# Patient Record
Sex: FEMALE | Race: WHITE | NOT HISPANIC OR LATINO | Employment: OTHER | URBAN - NONMETROPOLITAN AREA
[De-identification: names, ages, dates, MRNs, and addresses within clinical notes are randomized per-mention and may not be internally consistent; named-entity substitution may affect disease eponyms.]

---

## 2017-01-30 ENCOUNTER — AMBULATORY - GICH (OUTPATIENT)
Dept: FAMILY MEDICINE | Facility: OTHER | Age: 52
End: 2017-01-30

## 2017-02-07 ENCOUNTER — OFFICE VISIT - GICH (OUTPATIENT)
Dept: FAMILY MEDICINE | Facility: OTHER | Age: 52
End: 2017-02-07

## 2017-02-07 ENCOUNTER — HISTORY (OUTPATIENT)
Dept: FAMILY MEDICINE | Facility: OTHER | Age: 52
End: 2017-02-07

## 2017-02-07 DIAGNOSIS — N95.2 POSTMENOPAUSAL ATROPHIC VAGINITIS: ICD-10-CM

## 2017-02-07 DIAGNOSIS — R23.2 FLUSHING: ICD-10-CM

## 2017-02-07 LAB
ABSOLUTE BASOPHILS - HISTORICAL: 0 THOU/CU MM
ABSOLUTE EOSINOPHILS - HISTORICAL: 0 THOU/CU MM
ABSOLUTE LYMPHOCYTES - HISTORICAL: 2 THOU/CU MM (ref 0.9–2.9)
ABSOLUTE MONOCYTES - HISTORICAL: 0.4 THOU/CU MM
ABSOLUTE NEUTROPHILS - HISTORICAL: 4.1 THOU/CU MM (ref 1.7–7)
BASOPHILS # BLD AUTO: 0.7 %
EOSINOPHIL NFR BLD AUTO: 0.6 %
ERYTHROCYTE [DISTWIDTH] IN BLOOD BY AUTOMATED COUNT: 11.6 % (ref 11.5–15.5)
GLUCOSE SERPL-MCNC: 92 MG/DL (ref 70–105)
HCT VFR BLD AUTO: 40.2 % (ref 33–51)
HEMOGLOBIN: 13.7 G/DL (ref 12–16)
LYMPHOCYTES NFR BLD AUTO: 30.6 % (ref 20–44)
MCH RBC QN AUTO: 33.3 PG (ref 26–34)
MCHC RBC AUTO-ENTMCNC: 34.1 G/DL (ref 32–36)
MCV RBC AUTO: 98 FL (ref 80–100)
MONOCYTES NFR BLD AUTO: 6.1 %
NEUTROPHILS NFR BLD AUTO: 61.9 % (ref 42–72)
PLATELET # BLD AUTO: 253 THOU/CU MM (ref 140–440)
PMV BLD: 6.8 FL (ref 6.5–11)
RED BLOOD COUNT - HISTORICAL: 4.12 MIL/CU MM (ref 4–5.2)
TSH - HISTORICAL: 1.92 UIU/ML (ref 0.34–5.6)
WHITE BLOOD COUNT - HISTORICAL: 6.6 THOU/CU MM (ref 4.5–11)

## 2017-02-09 ENCOUNTER — COMMUNICATION - GICH (OUTPATIENT)
Dept: PHARMACY | Facility: OTHER | Age: 52
End: 2017-02-09

## 2017-02-21 ENCOUNTER — AMBULATORY - GICH (OUTPATIENT)
Dept: FAMILY MEDICINE | Facility: OTHER | Age: 52
End: 2017-02-21

## 2017-02-22 ENCOUNTER — AMBULATORY - GICH (OUTPATIENT)
Dept: FAMILY MEDICINE | Facility: OTHER | Age: 52
End: 2017-02-22

## 2017-08-04 ENCOUNTER — AMBULATORY - GICH (OUTPATIENT)
Dept: SCHEDULING | Facility: OTHER | Age: 52
End: 2017-08-04

## 2017-08-04 ENCOUNTER — AMBULATORY - GICH (OUTPATIENT)
Dept: RADIOLOGY | Facility: OTHER | Age: 52
End: 2017-08-04

## 2017-08-04 DIAGNOSIS — S83.249A OTHER TEAR OF MEDIAL MENISCUS, CURRENT INJURY, UNSPECIFIED KNEE, INITIAL ENCOUNTER: ICD-10-CM

## 2017-08-04 DIAGNOSIS — M25.562 PAIN IN LEFT KNEE: ICD-10-CM

## 2017-08-07 ENCOUNTER — HOSPITAL ENCOUNTER (OUTPATIENT)
Dept: RADIOLOGY | Facility: OTHER | Age: 52
End: 2017-08-07
Attending: SPECIALIST

## 2017-08-07 DIAGNOSIS — S83.249A OTHER TEAR OF MEDIAL MENISCUS, CURRENT INJURY, UNSPECIFIED KNEE, INITIAL ENCOUNTER: ICD-10-CM

## 2017-08-07 DIAGNOSIS — M25.562 PAIN IN LEFT KNEE: ICD-10-CM

## 2017-08-15 ENCOUNTER — AMBULATORY - GICH (OUTPATIENT)
Dept: SCHEDULING | Facility: OTHER | Age: 52
End: 2017-08-15

## 2017-09-08 ENCOUNTER — HOSPITAL ENCOUNTER (OUTPATIENT)
Dept: RADIOLOGY | Facility: OTHER | Age: 52
End: 2017-09-08
Attending: FAMILY MEDICINE

## 2017-09-08 ENCOUNTER — HISTORY (OUTPATIENT)
Dept: RADIOLOGY | Facility: OTHER | Age: 52
End: 2017-09-08

## 2017-09-08 DIAGNOSIS — Z12.31 ENCOUNTER FOR SCREENING MAMMOGRAM FOR MALIGNANT NEOPLASM OF BREAST: ICD-10-CM

## 2017-09-13 ENCOUNTER — AMBULATORY - GICH (OUTPATIENT)
Dept: SCHEDULING | Facility: OTHER | Age: 52
End: 2017-09-13

## 2017-09-13 ENCOUNTER — HISTORY (OUTPATIENT)
Dept: FAMILY MEDICINE | Facility: OTHER | Age: 52
End: 2017-09-13

## 2017-09-13 ENCOUNTER — OFFICE VISIT - GICH (OUTPATIENT)
Dept: FAMILY MEDICINE | Facility: OTHER | Age: 52
End: 2017-09-13

## 2017-09-13 DIAGNOSIS — Z00.00 ENCOUNTER FOR GENERAL ADULT MEDICAL EXAMINATION WITHOUT ABNORMAL FINDINGS: ICD-10-CM

## 2017-09-13 DIAGNOSIS — F33.41 RECURRENT MAJOR DEPRESSIVE DISORDER IN PARTIAL REMISSION (H): ICD-10-CM

## 2017-09-13 DIAGNOSIS — F51.04 PSYCHOPHYSIOLOGIC INSOMNIA: ICD-10-CM

## 2017-09-13 LAB
BACTERIA URINE: NORMAL BACTERIA/HPF
BILIRUB UR QL: NEGATIVE
CLARITY, URINE: CLEAR CLARITY
COLOR UR: YELLOW COLOR
EPITHELIAL CELLS: NORMAL EPI/HPF
GLUCOSE URINE: NEGATIVE MG/DL
KETONES UR QL: NEGATIVE MG/DL
LEUKOCYTE ESTERASE URINE: NEGATIVE
NITRITE UR QL STRIP: NEGATIVE
OCCULT BLOOD,URINE - HISTORICAL: ABNORMAL
PH UR: 5.5 [PH]
PROTEIN QUALITATIVE,URINE - HISTORICAL: NEGATIVE MG/DL
RBC - HISTORICAL: NORMAL /HPF
SP GR UR STRIP: 1.02
UROBILINOGEN,QUALITATIVE - HISTORICAL: NORMAL EU/DL
WBC - HISTORICAL: NORMAL /HPF

## 2017-09-15 ENCOUNTER — AMBULATORY - GICH (OUTPATIENT)
Dept: SCHEDULING | Facility: OTHER | Age: 52
End: 2017-09-15

## 2017-09-27 ENCOUNTER — AMBULATORY - GICH (OUTPATIENT)
Dept: FAMILY MEDICINE | Facility: OTHER | Age: 52
End: 2017-09-27

## 2017-09-28 ENCOUNTER — AMBULATORY - GICH (OUTPATIENT)
Dept: FAMILY MEDICINE | Facility: OTHER | Age: 52
End: 2017-09-28

## 2017-10-05 ENCOUNTER — COMMUNICATION - GICH (OUTPATIENT)
Dept: FAMILY MEDICINE | Facility: OTHER | Age: 52
End: 2017-10-05

## 2017-10-10 ENCOUNTER — COMMUNICATION - GICH (OUTPATIENT)
Dept: FAMILY MEDICINE | Facility: OTHER | Age: 52
End: 2017-10-10

## 2017-12-11 ENCOUNTER — COMMUNICATION - GICH (OUTPATIENT)
Dept: FAMILY MEDICINE | Facility: OTHER | Age: 52
End: 2017-12-11

## 2017-12-12 ENCOUNTER — AMBULATORY - GICH (OUTPATIENT)
Dept: SCHEDULING | Facility: OTHER | Age: 52
End: 2017-12-12

## 2017-12-28 NOTE — TELEPHONE ENCOUNTER
Patient Information     Patient Name MRN Sex Soraida Branham [id number] Female 1965      Telephone Encounter by Mary Scott MD at 10/10/2017  9:33 AM     Author:  Mary Scott MD Service:  (none) Author Type:  Physician     Filed:  10/10/2017  9:33 AM Encounter Date:  10/10/2017 Status:  Signed     :  Mary Scott MD (Physician)            Needs to be seen.   IBS folks should avoid foods that worsen symptoms, often times lactose/ gluten./ spicy.  Avoid NSAIDS.   Keep up on fluids.

## 2017-12-28 NOTE — TELEPHONE ENCOUNTER
Patient Information     Patient Name MRN Soraida Becker [id number] Female 1965      Telephone Encounter by Vivian Wyman at 10/10/2017  1:46 PM     Author:  Vivian Wyman Service:  (none) Author Type:  (none)     Filed:  10/10/2017  1:50 PM Encounter Date:  10/5/2017 Status:  Signed     :  Vivian Wyman            Now they want to know if the maximum doses specified under the quantity restriction been tried for an adequate period of time and been deemed ineffective in the treatment of the member's disease or medical condition?  Please let me know.  Vivian Wyman ....................  10/10/2017   1:47 PM

## 2017-12-28 NOTE — PROGRESS NOTES
Patient Information     Patient Name MRN Sex Soraida Branham [id number] Female 1965      Progress Notes by Mary Scott MD at 2017  3:30 PM     Author:  Mary Scott MD Service:  (none) Author Type:  Physician     Filed:  9/15/2017  6:05 PM Encounter Date:  2017 Status:  Signed     :  Mary Scott MD (Physician)            Nursing Notes:   Jodi Castellanos  2017  4:16 PM  Signed  Patient presents for yearly Physical with health dynamic paperwork for insurance.    Jodi Castellanos LPN........................2017  3:42 PM     Subjective:  Soraida Suresh is a 52 y.o. female who presents for  Physical           Routine physical examination   patient is a G2 para 2002. She is in monogamous relationship. She denies any breast concerns. She declines any STD screening. Pap is current. No vaginal discharge she does have some rectal discharge after she had surgery 1-2 years ago. She has some hot flashes.  They are not debilitating.  She has paperwork to be filled out for her health insurance.           Recurrent major depressive disorder, in partial remission (HC)    doing well with her anxiety and depression on Wellbutrin  mg ;  She has some insomnia    PHQ Depression Screen  Date of PHQ exam: 17  Over the last 2 weeks, how often have you been bothered by any of the following problems?  1. Little interest or pleasure in doing things: 0 - Not at all  2. Feeling down, depressed, or hopeless: 0 - Not at all                                  skin lesions on both forearms   she would like skin surveillance.  She is out in the sun.             Allergies      Allergen   Reactions     Azithromycin  Palpitations     Codeine  Hallucinations     Depo-Provera [Medroxyprogesterone]  Headache     Caused patient's brain to swell.      Latex  Rash     hands      Penicillins  Rash     Proline  Rash     Prolene: Sutures; caused keloid       Sulfamethoxazole-Trimethoprim  Rash     Current Outpatient Prescriptions on File Prior to Visit       Medication  Sig Dispense Refill     acyclovir (ZOVIRAX) 5 % ointment Apply  topically to affected area(s) 6 times daily. 1 Tube 0     CALCIUM CARBONATE/VITAMIN D3 (CALCIUM 500 + D ORAL) Take 1 tablet by mouth 2 times daily.       calcium citrate 1,000 mg tablet Take 1 tablet by mouth 2 times daily with meals.  0     cholecalciferol (VITAMIN D-3) 2,000 unit capsule Take 1 capsule by mouth once daily.  0     guar gum (BENEFIBER) powd Take  by mouth 3 times daily. MIX IN 4 OZ BEVERAGE/SOFT FOOD.  0     omega-3 fatty acids-vitamin E (FISH OIL) 1,000 mg cap 1 capsule twice daily  0     triamcinolone, 55 mcg each actuation, nasal (NASACORT AQ) 55 mcg nasal spray Inhale 2 Sprays into both nostrils once daily. 16.5 g 0     No current facility-administered medications on file prior to visit.        Problem List/PMH: reviewed in EMR    Social Hx:  Social History        Substance Use Topics          Smoking status:   Former Smoker      Quit date:  10/10/2005      Smokeless tobacco:   Never Used      Alcohol use   8.4 oz/week     14 Standard drinks or equivalent per week        Comment: 2-3 per day       Social History Narrative    This patient was  in .  She was  on 04.  Works at [employer], work #[phone].  Part-time at [employer].        Justen Suresh     Keri Flowers Daughter, born     Mina Flowers Son, born          Preload  2013.                    Family Hx:   Family History       Problem   Relation Age of Onset     Arthritis  Mother      Osteoarthritis;  of 76 pneumonia but no autopsy       Diabetes  Father      Other  Father      COPD/Benign colon polyps/AAA       Cancer-colon  Paternal Grandmother      Cancer-colon  Paternal Aunt      Diabetes  Paternal Aunt      ROS: No TIA's or dysphagia. No prolonged cough. No dyspnea or chest pain on exertion.  No  "abdominal pain, change in bowel habits, black or bloody stools.  No urinary tract symptoms. She is post ablation. , abnormal vaginal bleeding, discharge or unexpected pelvic pain. No new breast lumps, breast pain or nipple discharge.      Objective:  /74  Pulse 68  Ht 1.67 m (5' 5.75\")  Wt 75.8 kg (167 lb)  Breastfeeding? No  BMI 27.16 kg/m2     Patient appears well, alert and oriented x 3, pleasant, cooperative.  SIM. TM's clear, Oral pharynx with good dentition, without lesion, erythema or exudate. Moist mucous membranes. Neck supple and free of adenopathy, or masses. No thyromegaly. Lungs are clear, without wheezes, rhonchi or rales. Heart sounds are normal, no murmurs, clicks, gallops or rubs. Abdomen is soft, no tenderness, masses or organomegaly. No CVAT.  Extremities are without edema. Peripheral pulses are normal. Screening neurological exam is normal without focal findings. Skin is normal without suspicious lesions noted. Breast exam without discrete mass axillary adenopathy or nipple discharge    Results for orders placed or performed in visit on 09/13/17      URINALYSIS W REFLEX MICROSCOPIC IF POSITIVE      Result  Value Ref Range    COLOR                     Yellow Yellow Color    CLARITY                   Clear Clear Clarity    SPECIFIC GRAVITY,URINE    1.025 1.010, 1.015, 1.020, 1.025                    PH,URINE                  5.5 6.0, 7.0, 8.0, 5.5, 6.5, 7.5, 8.5                    UROBILINOGEN,QUALITATIVE  Normal Normal EU/dl    PROTEIN, URINE Negative Negative mg/dL    GLUCOSE, URINE Negative Negative mg/dL    KETONES,URINE             Negative Negative mg/dL    BILIRUBIN,URINE           Negative Negative                    OCCULT BLOOD,URINE        Moderate (A) Negative                    NITRITE                   Negative Negative                    LEUKOCYTE ESTERASE        Negative Negative                   URINALYSIS MICROSCOPIC      Result  Value Ref Range    RBC 0-2 0-2, None " Seen /HPF    WBC None Seen 0-2, 3-5, None Seen /HPF    BACTERIA                  Few None Seen, Rare, Occasional, Few Bacteria/HPF    EPITHELIAL CELLS          Few None Seen, Few Epi/HPF           Assessment:    ICD-10-CM    1. Routine physical examination Z00.00 URINALYSIS W REFLEX MICROSCOPIC IF POSITIVE      URINALYSIS W REFLEX MICROSCOPIC IF POSITIVE      URINALYSIS MICROSCOPIC      URINALYSIS MICROSCOPIC   2. Psychophysiological insomnia F51.04    3. Recurrent major depressive disorder, in partial remission (HC) F33.41 buPROPion (WELLBUTRIN XL) 300 mg Extended-Release tablet            Plan:   -- Expected clinical course discussed   -- Medications and their side effects discussed  Patient Instructions     Please consider the following general health recommendations:    Schedule a mammogram annually.     Eat a quality diet (generally, low in simple sugars, starches, cholesterol and saturated fat.)    Please get 1000 mg of calcium in divided doses with vitamin D in your diet daily.  If you are postmenopausal increase the calcium to 1500 mg daily in divided doses    Exercise regularly.  Ideally you would have 30-60 minutes of aerobic exercise at least 4 times weekly.  Find something you enjoy and a friend to do it with you.    Maintain ideal weight.  Your Body mass index is 27.16 kg/(m^2).. Generally a BMI of 20-25 is considered ideal. Overweight is defined as 25-30, Obese is 30-35 and markedly obese is greater than 35.    Apply sun block (SPF 25 or greater) on exposed skin anytime you are out in the sun to prevent skin cancer.      Wear a seatbelt whenever you are in a car.    If you are beyond age 60 or had an early menopause for any reason, consider a bone density study every 2-3 years to see if you are at increased risk for fracture.    Check blood sugar annually.  Cholesterol annually unless you have had a normal level when last checked within 5 years.     You should have a tetanus booster at least once every  10 years.        Immunization History     Administered  Date(s) Administered     AMB Influenza, IIV4 PF (=>6 mos Flulaval;=>3 yrs Fluzone Fluarix)(Flu Clinic Only) 10/22/2014     Influenza Virus, Unspecified 10/11/2011     Influenza, IIV3 (Age >=3 years) 10/14/2013, 09/29/2016     Tdap 06/03/2008, 02/17/2011       Strongly consider having a flu shot every fall.     You should have a pap  every 3 years between the ages of 30 and 70 unless you have had previous abnormal pap smear, (in these cases the exams and PAP's should be done yearly). If you have had hysterectomy in the past, your future Pap plan may be different.     Colonoscopy (an exam of the colon) is recommended every 10 years after the age of 50 to screen for colon cancer.  (More often if you are at increased risk.)    A vaccine to reduce your chances of getting shingles is available if you are over 60. Information about the vaccine is available through the clinic or at:  (http://www.nlm.nih.gov/medlineplus/druginfo/medmaster/p587005.html)        Electronically signed by Mary Scott MD

## 2017-12-28 NOTE — PROGRESS NOTES
Patient Information     Patient Name MRN Sex Soraida Branham [id number] Female 1965      Progress Notes by Flavia Smallwood at 2017  7:40 AM     Author:  Flavia Smallwood Service:  (none) Author Type:  (none)     Filed:  2017  7:40 AM Date of Service:  2017  7:40 AM Status:  Signed     :  Flavia Smallwood            Falls Risk Criteria:    Age 65 and older or under age 4        Sensory deficits    Poor vision    Use of ambulatory aides    Impaired judgment    Unable to walk independently    Meets High Risk criteria for falls:  no

## 2017-12-28 NOTE — TELEPHONE ENCOUNTER
Patient Information     Patient Name MRN Sex Soraida Branham [id number] Female 1965      Telephone Encounter by Edda Regan at 10/10/2017  8:08 AM     Author:  Edda Regan Service:  (none) Author Type:  (none)     Filed:  10/10/2017  8:09 AM Encounter Date:  10/10/2017 Status:  Signed     :  Edda Regan            Patients IBS has flared up and was wondering if there is anything she can take? Please advise.  Edda Lorenzo ....................  10/10/2017   8:09 AM

## 2017-12-28 NOTE — TELEPHONE ENCOUNTER
Patient Information     Patient Name MRN Sex Soraida Branham [id number] Female 1965      Telephone Encounter by Vivian Wyman at 10/5/2017  2:28 PM     Author:  Vivian Wyman Service:  (none) Author Type:  (none)     Filed:  10/5/2017  2:31 PM Encounter Date:  10/5/2017 Status:  Signed     :  Vivian Wyman electronically is wanting to know which of the following is correct for this patients RX pa request for buPROPion (WELLBUTRIN XL) 300 mg Extended-Release tablet:       Prior Authorization Request Evaluation Questions: What is the reason for exceeding the plan limitations?  For titration or loading-dose purposes (one time authorization)  Requested strength/dose is commercially unavailable  Patient is on dose alternating schedule  Patient requires a larger quantity to cover a larger surface area (for top...  Other    Please let me know so I can try and get this authorized.  Vivian Wyman ....................  10/5/2017   2:31 PM

## 2017-12-28 NOTE — PROGRESS NOTES
Patient Information     Patient Name MRN Sex Soraida Branham [id number] Female 1965      Progress Notes by Brit Gonsales at 2017  5:00 PM     Author:  Brit Gonsales Service:  (none) Author Type:  Other Clinical Staff     Filed:  2017  5:00 PM Date of Service:  2017  5:00 PM Status:  Signed     :  Brit Gonsales (Other Clinical Staff)            Falls Risk Criteria:    Age 65 and older or under age 4        Sensory deficits    Poor vision    Use of ambulatory aides    Impaired judgment    Unable to walk independently    Meets High Risk criteria for falls:  no

## 2017-12-28 NOTE — TELEPHONE ENCOUNTER
Patient Information     Patient Name MRN Soraida Becker [id number] Female 1965      Telephone Encounter by Amalia Vasquez at 10/16/2017 10:53 AM     Author:  Amalia Vasquez Service:  (none) Author Type:  (none)     Filed:  10/16/2017 10:53 AM Encounter Date:  10/5/2017 Status:  Signed     :  Amalia Vasquez            This prior authorization has been approved.  No further action is needed.  Amalia Vasquez LPN ....................  10/16/2017   10:53 AM

## 2017-12-28 NOTE — TELEPHONE ENCOUNTER
Patient Information     Patient Name MRN Soraida Becker [id number] Female 1965      Telephone Encounter by Toña Gallardo at 10/10/2017  9:39 AM     Author:  Toña Gallardo Service:  (none) Author Type:  (none)     Filed:  10/10/2017  9:40 AM Encounter Date:  10/10/2017 Status:  Signed     :  Toña Gallardo            Spoke with patient she is wondering if Mary Scott MD could work her in today or should she see someone else today? Please advise. Toña Gallardo LPN......................10/10/2017 9:39 AM

## 2017-12-28 NOTE — TELEPHONE ENCOUNTER
Patient Information     Patient Name MRN Sex Soraida Branham [id number] Female 1965      Telephone Encounter by Mary Scott MD at 10/10/2017  1:26 PM     Author:  Mary Scott MD Service:  (none) Author Type:  Physician     Filed:  10/10/2017  1:27 PM Encounter Date:  10/5/2017 Status:  Signed     :  Mary Scott MD (Physician)            Patient is increase her wellbutrin from 150 mg to 300 mg XL due to uncontrolled  Symptoms.   She was stopping another medication

## 2017-12-28 NOTE — TELEPHONE ENCOUNTER
Patient Information     Patient Name MRN Sex Soraida Branham [id number] Female 1965      Telephone Encounter by Jodi Castellanos at 10/10/2017  1:11 PM     Author:  Jodi Castellanos Service:  (none) Author Type:  (none)     Filed:  10/10/2017  1:11 PM Encounter Date:  10/10/2017 Status:  Signed     :  Jodi Castellanos            Patient given an apt for 10/11.   Jodi Castellanos LPN........................10/10/2017  1:11 PM

## 2017-12-28 NOTE — PATIENT INSTRUCTIONS
Patient Information     Patient Name MRN Soraida Becker [id number] Female 1965      Patient Instructions by Mary Scott MD at 2017  3:30 PM     Author:  Mary Scott MD Service:  (none) Author Type:  Physician     Filed:  2017  4:29 PM Encounter Date:  2017 Status:  Signed     :  Mary Scott MD (Physician)            Please consider the following general health recommendations:    Schedule a mammogram annually.     Eat a quality diet (generally, low in simple sugars, starches, cholesterol and saturated fat.)    Please get 1000 mg of calcium in divided doses with vitamin D in your diet daily.  If you are postmenopausal increase the calcium to 1500 mg daily in divided doses    Exercise regularly.  Ideally you would have 30-60 minutes of aerobic exercise at least 4 times weekly.  Find something you enjoy and a friend to do it with you.    Maintain ideal weight.  Your Body mass index is 27.16 kg/(m^2).. Generally a BMI of 20-25 is considered ideal. Overweight is defined as 25-30, Obese is 30-35 and markedly obese is greater than 35.    Apply sun block (SPF 25 or greater) on exposed skin anytime you are out in the sun to prevent skin cancer.      Wear a seatbelt whenever you are in a car.    If you are beyond age 60 or had an early menopause for any reason, consider a bone density study every 2-3 years to see if you are at increased risk for fracture.    Check blood sugar annually.  Cholesterol annually unless you have had a normal level when last checked within 5 years.     You should have a tetanus booster at least once every 10 years.        Immunization History     Administered  Date(s) Administered     AMB Influenza, IIV4 PF (=>6 mos Flulaval;=>3 yrs Fluzone Fluarix)(Flu Clinic Only) 10/22/2014     Influenza Virus, Unspecified 10/11/2011     Influenza, IIV3 (Age >=3 years) 10/14/2013, 2016     Tdap 2008, 2011        Strongly consider having a flu shot every fall.     You should have a pap  every 3 years between the ages of 30 and 70 unless you have had previous abnormal pap smear, (in these cases the exams and PAP's should be done yearly). If you have had hysterectomy in the past, your future Pap plan may be different.     Colonoscopy (an exam of the colon) is recommended every 10 years after the age of 50 to screen for colon cancer.  (More often if you are at increased risk.)    A vaccine to reduce your chances of getting shingles is available if you are over 60. Information about the vaccine is available through the clinic or at:  (http://www.nlm.nih.gov/medlineplus/druginfo/medmaster/k745986.html)

## 2017-12-30 NOTE — NURSING NOTE
Patient Information     Patient Name MRN Sex Soraida Branham [id number] Female 1965      Nursing Note by Jodi Castellanos at 2017  3:30 PM     Author:  Jodi Castellanos Service:  (none) Author Type:  (none)     Filed:  2017  4:16 PM Encounter Date:  2017 Status:  Signed     :  Jodi Castellanos            Patient presents for yearly Physical with health dynamic paperwork for insurance.    Jodi Castellanos LPN........................2017  3:42 PM

## 2018-01-03 NOTE — PATIENT INSTRUCTIONS
Patient Information     Patient Name MRSoraida Duarte [id number] Female 1965      Patient Instructions by Mary Scott MD at 2017  4:50 PM     Author:  Mary Scott MD  Service:  (none) Author Type:  Physician     Filed:  2017  4:54 PM  Encounter Date:  2017 Status:  Addendum     :  Mary Scott MD (Physician)        Related Notes: Original Note by Mary Scott MD (Physician) filed at 2017  4:50 PM            1. Continue wellbutrin  2.  Soy milk  :  One cup at night  3.  Estrace cream ;  Every night for 10 days;  Then no more than twice weekly  4.  If continued Hot flashes, then GYN consult.    Index [place] Related topics   Hot Flashes Caused by Menopause   ________________________________________________________________________  KEY POINTS    Hot flashes are sudden feelings of flushing and heat that are common and early signs of menopause. You may start having hot flashes before you stop having menstrual periods.    If your hot flashes are mild and don t happen often, use a fan or sip a glass of cool water or juice when you start having a hot flash. If your hot flashes are frequent, severe, or keep you from sleeping at night, your healthcare provider may recommend lifestyle changes, such as a healthy diet and exercise program. You may need hormone medicine to relieve symptoms and reduce bone lost that may come with menopause.    Follow the full course of treatment prescribed by your healthcare provider. Keep track of and avoid things that trigger your hot flashes.  ________________________________________________________________________  What are hot flashes?   Hot flashes are sudden feelings of flushing and heat that are common and early signs of menopause. You may start having hot flashes before you stop having menstrual periods. Hot flashes may stop after a few months, or you may have them for several years.  What is  the cause?  The exact cause of hot flashes is not fully known.  Female hormones, such as estrogen, change the balance of chemicals in the brain that help regulate body heat. The hypothalamus is a part of the brain that makes hormones that control body temperature, heart rate, and sleep. The drop in estrogen levels may tell the hypothalamus that you need to cool down. It does this by widening blood vessels in the face, chest, and body to release heat.  What are the symptoms?  Symptoms may include:    Sudden redness and warmth on the skin of your face, neck, shoulders, or upper chest    Pounding heartbeat    Sweating followed by a slight chill  Hot flashes usually last a few seconds to a few minutes. Most last no more than 2 or 3 minutes. Hot flashes can happen at night and interrupt your sleep. These are called night sweats.  How are they diagnosed?  Your healthcare provider will ask about your symptoms and medical history and examine you.  How are they treated?  If your hot flashes are mild and don t happen often, use a fan or sip a glass of cool water or juice when you start having a hot flash.  If your hot flashes are frequent, severe, or keep you from sleeping at night, your healthcare provider may recommend lifestyle changes, such as a healthy diet and exercise program. Your provider may prescribe medicine to:    Treat anxiety, depression, and mood swings    Treat hot flashes and other symptoms  Hormone replacement therapy, or menopausal hormone therapy, can treat menopausal symptoms and help prevent bone loss (osteoporosis). Discuss the risks and benefits of hormone therapy with your healthcare provider. Hormone therapy may increase your risk for heart disease. It may also increase your risk for stroke, breast cancer, blood clots, gallbladder problems, and possibly dementia. If you still have your uterus and choose to take hormones, you will need to take progesterone with the estrogen. Taking estrogen alone may  increase your risk of cancer of the uterus.  Estrogen may be taken in many different forms, such as:    Tablets to be swallowed    Patches or lotion to be put on the skin    A cream, ring, or tablet put into the vagina    Pellets placed under the skin    Shots  If you are going to take hormone therapy, ask your healthcare provider about:    The different types and dosages of hormone therapy    Any side effects or special precautions you should know about    When you should start and stop taking the hormones  Claims have been made that certain herbal and dietary products help control hot flashes. These include black cohosh, soy, dong quai, red clover, and evening primrose oil. No herb or dietary supplement has been proven to consistently or completely relieve symptoms. Supplements are not tested or standardized and may vary in strengths and effects. They may change how your prescription medicines work or have side effects and are not always safe. Talk with your healthcare provider before taking nonprescription medicines or supplements.  How can I take care of myself?  Follow the full course of treatment prescribed by your healthcare provider. In addition:    Keep as cool as you can. Wear light, cotton clothing and dress in loose layers. Keep your house cool and use lightweight blankets at night.    Keep track of and avoid things that trigger your hot flashes. The following may help:    Avoid hot, spicy foods.    Don't drink a lot of red wine or eat a lot of chocolate or aged cheeses. These foods contain a chemical that can trigger a hot flash.    Take care of your health. Try to get at least 7 to 9 hours of sleep each night. If you smoke, try to quit. If you want to drink alcohol, ask your healthcare provider how much is safe for you to drink. Learn ways to manage stress. Exercise according to your healthcare provider's instructions.    Ask your provider:    How and when you will get your test results    If there are  activities you should avoid and when you can return to your normal activities    How to take care of yourself at home    What symptoms or problems you should watch for and what to do if you have them    Make sure you know when you should come back for a checkup. Keep all appointments for provider visits or tests.  Developed by [employer].  Adult Advisor 2016.3 published by [employer].  Last modified: 2016-06-08  Last reviewed: 2014-12-31  This content is reviewed periodically and is subject to change as new health information becomes available. The information is intended to inform and educate and is not a replacement for medical evaluation, advice, diagnosis or treatment by a healthcare professional.  References   Adult Advisor 2016.3 Index    Copyright   2016 [employer], a division of [employer] All rights reserved.          Index   Soybean   SOY-been  ________________________________________________________________________  KEY POINTS    This remedy has been used to treat several conditions. Studies in humans or animals have not proved that this remedy is safe or effective for all uses. Before using this remedy for a serious condition, you should talk with your healthcare provider.    The US Food and Drug Administration (FDA) does not approve uses for natural remedies. The FDA does not inspect or regulate natural remedies the way they do prescription medicines. Natural remedies are not always safe.    This remedy may cause unwanted side effects. Tell your healthcare provider if you have any side effects that are serious, continue, or get worse.    This remedy affects your body and may interact with prescription medicines that you take. Tell all healthcare providers who treat you about all the prescription medicines, nonprescription medicines, supplements, natural remedies, and vitamins that you take.  ________________________________________________________________________  What are other names  for this remedy?   Type of medicine: natural remedy  Scientific and common names: Glycine max, soy, soybean, soya, soy protein, soy isoflavones, tofu, miso, phytoestrogen, plant estrogen, isoflavone, genistein, tempeh  What is soybean?   The soybean is a plant that grows 1 to 5 feet tall. The hull pods contain up to 4 oval seeds, which can be yellow to brownish in color. Soybeans contain isoflavones which are similar to the hormone estrogen.  What is it used for?   This remedy has been used to treat several conditions. Studies in humans or animals have not proved that this remedy is safe or effective for all uses. Before using this remedy for a serious condition, you should talk with your healthcare provider.   This remedy is helpful to treat high cholesterol and triglycerides, high blood pressure, hot flashes, and to help treat diarrhea in infants.  Soy has been used to treat    Breast, colon, thyroid, endometrial, and prostate cancer (There is conflicting evidence on whether soy prevents or increases the risk of breast cancer.)    Crohn s disease    Diabetes    Fibromyalgia    Kidney disease    Premenstrual syndrome (PMS)    Rheumatoid arthritis  This remedy does not appear to help treat or prevent osteoporosis.  The US Food and Drug Administration (FDA) does not approve uses for natural remedies. The FDA does not inspect or regulate natural remedies the way they do prescription medicines.  How is it taken?   Examples of soy food products are soy milk, soy cheese, soy yogurt, soy nuts, tofu, soybean oil for cooking, soybean flour, soybean curd, tofu, fermented soybean paste, soybean sprouts, soy sauce, and soy protein drinks. Soy is used to make some infant formulas for babies who are allergic to milk.  Soy is also available in the form of capsules and tablets.  Follow the directions printed on the product label or given by your healthcare provider.  What if I overdose?  Symptoms of an acute overdose have not been  reported.  What should I watch out for?  Talk with your healthcare provider before taking soy supplements if you have:    A bleeding disorder    Endometriosis or uterine fibroids    Hormone-sensitive cancers such as breast, uterine, or ovarian cancer (this remedy has effects similar to the female hormone estrogen).  If you need emergency care, surgery, lab tests, or dental work, tell the healthcare provider or dentist you are taking this medicine.  Talk to your healthcare provider or pharmacist about any natural remedy that you are using or thinking about using. If your provider does not tell you how to take it, follow the directions that come with the package. Do not take more or take it longer than recommended. Ask about anything you do not understand. Remember:    Natural remedies are not always safe.    You should not take them if you are pregnant or breast-feeding without your healthcare provider's approval. They should not be taken by infants, children, or older adults without your provider's approval.    They affect your body and may interact with prescription medicines that you take.    Natural remedies are not standardized and may have different strengths and effects. They may be contaminated.  What are the possible side effects?   Along with its desirable effects, this remedy may cause some unwanted side effects. Some side effects may be very serious. Some side effects may go away as your body adjusts to the remedy. Tell your healthcare provider if you have any side effects that continue or get worse.  Life-threatening (Report these to your healthcare provider right away. If you cannot reach your healthcare provider right away, get emergency medical care or call 911 for help): Allergic reaction (hives; itching; rash; trouble breathing; tightness in your chest; swelling of your lips, tongue, and throat).  Other: Constipation, diarrhea, rash, trouble sleeping, bloating, nausea.  What products might interact  with this remedy?  When you take this remedy with other medicines, it can change the way the remedy or the medicines work. Vitamins and certain foods may also interact. Using these products together might cause harmful side effects. Before taking this remedy, talk to your healthcare provider if you are taking:    Hormonal birth control pills, implants, shots, patches, vaginal rings, and IUDs, and hormones such as conjugated estrogens (Premarin), estradiol (Climara, Estrace, Estraderm, Vivelle), medroxyprogesterone (Depo-Provera, Provera), and norethindrone (Aygestin, Micronor)    Selective estrogen receptor modulators (SERMs) such as raloxifene (Evista) and tamoxifen    Thyroid medicines such as levothyroxine (Levo-T, Levothroid, Levoxyl, Synthroid, Unithroid), liothyronine (Cytomel, Triostat), liotrix (Thyrolar), and thyroid USP (Auxvasse Thyroid, Nature-Throid)    Warfarin (Coumadin)  If you are not sure if your medicines might interact, ask your pharmacist or healthcare provider. Keep a list of all your medicines with you. List all the prescription medicines, nonprescription medicines, supplements, natural remedies, and vitamins that you take. Be sure that you tell all healthcare providers who treat you about all the products you are taking.     Keep all natural remedies and medicines out of the reach of children.  This advisory includes select information only. The information was obtained from scientific journals, study reports, and other documents. The author and publisher make no warranty, expressed or implied, as to the information. The advisory may not include all side effects associated with a remedy or interactions with other medicines. Nothing herein shall constitute a recommendation for the use of any remedy. Ask your healthcare provider or pharmacist for more information.  Developed by [employer].  Medication Advisor 2016.3 published by [employer].  Last modified: 2016-03-08  Last reviewed:  2013-12-03  This content is reviewed periodically and is subject to change as new health information becomes available. The information is intended to inform and educate and is not a replacement for medical evaluation, advice, diagnosis or treatment by a healthcare professional.  References   Medication Advisor 2016.3 Index    Copyright   2016 [employer], a division of [employer] All rights reserved.

## 2018-01-03 NOTE — PROGRESS NOTES
Patient Information     Patient Name MRN Sex Soraida Branham [id number] Female 1965      Progress Notes by Mary Scott MD at 2017  3:45 PM     Author:  Mary Scott MD Service:  (none) Author Type:  Physician     Filed:  2017  8:35 PM Encounter Date:  2017 Status:  Signed     :  Mary Scott MD (Physician)            There are no exam notes on file for this visit.        Subjective:  Soraida Suresh is a 51 y.o. female who presents for hot flashes    Patient is pleasant 51 year old white female who comes in today to discuss hot flashes.  They have come back ' strong'. Noting she had stopped her Estroven over the counter prior to her last surgery.  She has hot flashes, becomes flushed.  At times she has to take outer layer of clothing off, find a fan.  She finds this embarrassing.   She restarted estroven without relief.  She is having vaginal dryness.  She says she has implemented environmental measures but without relief.  She had problem with Depoprovera which caused 'brain swelling' remotely.  She would rather not be on HRT.   She is doing well from an anxiety depression       Allergies: reviewed in EMR  Medications: reviewed in EMR  Problem List/PMH: reviewed in EMR    Social Hx:  Social History        Substance Use Topics          Smoking status:   Former Smoker      Quit date:  10/10/2005      Smokeless tobacco:   Never Used      Alcohol use   8.4 oz/week     14 Standard drinks or equivalent per week        Comment: 2-3 per day       Social History Narrative    This patient was  in .  She was  on 04.  Works at [employer], [id number].  Part-time at [employer].        Justen Suresh     Keri Flowers Daughter, born     Mina Flowers Son, born          Preload  2013.                    Family Hx:   Family History       Problem   Relation Age of Onset     Arthritis  Mother       "Osteoarthritis;  of 76 pneumonia but no autopsy       Diabetes  Father      Other  Father      COPD/Benign colon polyps/AAA       Cancer-colon  Paternal Grandmother      Cancer-colon  Paternal Aunt      Diabetes  Paternal Aunt        Objective:  Visit Vitals       /78     Pulse 76     Temp 96  F (35.6  C) (Temporal)     Resp 16     Ht 1.689 m (5' 6.5\")     Wt 78.7 kg (173 lb 9.6 oz)     BMI 27.6 kg/m2      She is alert , mildly anxious.  Conversational.  Ext without edema.  Rest of exam deferred    Results for orders placed or performed in visit on 17      TSH      Result  Value Ref Range    TSH 1.92 0.34 - 5.60 uIU/mL   GLUCOSE, RANDOM      Result  Value Ref Range    GLUCOSE,RANDOM 92 70 - 105 mg/dL   CBC WITH AUTO DIFFERENTIAL      Result  Value Ref Range    WHITE BLOOD COUNT         6.6 4.5 - 11.0 thou/cu mm    RED BLOOD COUNT           4.12 4.00 - 5.20 mil/cu mm    HEMOGLOBIN                13.7 12.0 - 16.0 g/dL    HEMATOCRIT                40.2 33.0 - 51.0 %    MCV                       98 80 - 100 fL    MCH                       33.3 26.0 - 34.0 pg    MCHC                      34.1 32.0 - 36.0 g/dL    RDW                       11.6 11.5 - 15.5 %    PLATELET COUNT            253 140 - 440 thou/cu mm    MPV                       6.8 6.5 - 11.0 fL    NEUTROPHILS               61.9 42.0 - 72.0 %    LYMPHOCYTES               30.6 20.0 - 44.0 %    MONOCYTES                 6.1 <12.0 %    EOSINOPHILS               0.6 <8.0 %    BASOPHILS                 0.7 <3.0 %    ABSOLUTE NEUTROPHILS      4.1 1.7 - 7.0 thou/cu mm    ABSOLUTE LYMPHOCYTES      2.0 0.9 - 2.9 thou/cu mm    ABSOLUTE MONOCYTES        0.4 <0.9 thou/cu mm    ABSOLUTE EOSINOPHILS      0.0 <0.5 thou/cu mm    ABSOLUTE BASOPHILS        0.0 <0.3 thou/cu mm         Assessment:    ICD-10-CM    1. Hot flashes R23.2 citalopram (CELEXA) 10 mg tablet      TSH      CBC AND DIFFERENTIAL      GLUCOSE, RANDOM      TSH      CBC AND DIFFERENTIAL      " GLUCOSE, RANDOM      CBC WITH AUTO DIFFERENTIAL   2. Vaginal atrophy N95.2 estradiol (ESTRACE) 0.1 mg/g vaginal cream       Counseled on nonhormonal means of managing hot flashes.  Will trial low dose celexa, vaginal estrace ;  Patient counseled not to use estrace more than 2 times a week after the first 2 weeks to avoid risk of endometrial cancer.  She was able to verbalize understanding.  If needed and tolerating celexa, max dose 10 mg.   If symptoms, not controlled then gyn consult     Ms. Suresh's Body mass index is 27.6 kg/(m^2). This is out of the normal range for a 51 y.o. Normal range for ages 18-64 is between 18.5 and 24.9; normal range for ages 65+ is 23-30. To lose weight we reviewed risks and benefits of appropriate options such as diet, exercise, and medications. Patient's strategy will be  self-directed nutrition plan      Plan:   -- Expected clinical course discussed   -- Medications and their side effects discussed  Patient Instructions   1. Continue wellbutrin  2.  Soy milk  :  One cup at night  3.  Estrace cream ;  Every night for 10 days;  Then no more than twice weekly  4.  If continued Hot flashes, then GYN consult.    Index Yi Related topics   Hot Flashes Caused by Menopause   ________________________________________________________________________  KEY POINTS    Hot flashes are sudden feelings of flushing and heat that are common and early signs of menopause. You may start having hot flashes before you stop having menstrual periods.    If your hot flashes are mild and don t happen often, use a fan or sip a glass of cool water or juice when you start having a hot flash. If your hot flashes are frequent, severe, or keep you from sleeping at night, your healthcare provider may recommend lifestyle changes, such as a healthy diet and exercise program. You may need hormone medicine to relieve symptoms and reduce bone lost that may come with menopause.    Follow the full course of treatment  prescribed by your healthcare provider. Keep track of and avoid things that trigger your hot flashes.  ________________________________________________________________________  What are hot flashes?   Hot flashes are sudden feelings of flushing and heat that are common and early signs of menopause. You may start having hot flashes before you stop having menstrual periods. Hot flashes may stop after a few months, or you may have them for several years.  What is the cause?  The exact cause of hot flashes is not fully known.  Female hormones, such as estrogen, change the balance of chemicals in the brain that help regulate body heat. The hypothalamus is a part of the brain that makes hormones that control body temperature, heart rate, and sleep. The drop in estrogen levels may tell the hypothalamus that you need to cool down. It does this by widening blood vessels in the face, chest, and body to release heat.  What are the symptoms?  Symptoms may include:    Sudden redness and warmth on the skin of your face, neck, shoulders, or upper chest    Pounding heartbeat    Sweating followed by a slight chill  Hot flashes usually last a few seconds to a few minutes. Most last no more than 2 or 3 minutes. Hot flashes can happen at night and interrupt your sleep. These are called night sweats.  How are they diagnosed?  Your healthcare provider will ask about your symptoms and medical history and examine you.  How are they treated?  If your hot flashes are mild and don t happen often, use a fan or sip a glass of cool water or juice when you start having a hot flash.  If your hot flashes are frequent, severe, or keep you from sleeping at night, your healthcare provider may recommend lifestyle changes, such as a healthy diet and exercise program. Your provider may prescribe medicine to:    Treat anxiety, depression, and mood swings    Treat hot flashes and other symptoms  Hormone replacement therapy, or menopausal hormone therapy,  can treat menopausal symptoms and help prevent bone loss (osteoporosis). Discuss the risks and benefits of hormone therapy with your healthcare provider. Hormone therapy may increase your risk for heart disease. It may also increase your risk for stroke, breast cancer, blood clots, gallbladder problems, and possibly dementia. If you still have your uterus and choose to take hormones, you will need to take progesterone with the estrogen. Taking estrogen alone may increase your risk of cancer of the uterus.  Estrogen may be taken in many different forms, such as:    Tablets to be swallowed    Patches or lotion to be put on the skin    A cream, ring, or tablet put into the vagina    Pellets placed under the skin    Shots  If you are going to take hormone therapy, ask your healthcare provider about:    The different types and dosages of hormone therapy    Any side effects or special precautions you should know about    When you should start and stop taking the hormones  Claims have been made that certain herbal and dietary products help control hot flashes. These include black cohosh, soy, dong quai, red clover, and evening primrose oil. No herb or dietary supplement has been proven to consistently or completely relieve symptoms. Supplements are not tested or standardized and may vary in strengths and effects. They may change how your prescription medicines work or have side effects and are not always safe. Talk with your healthcare provider before taking nonprescription medicines or supplements.  How can I take care of myself?  Follow the full course of treatment prescribed by your healthcare provider. In addition:    Keep as cool as you can. Wear light, cotton clothing and dress in loose layers. Keep your house cool and use lightweight blankets at night.    Keep track of and avoid things that trigger your hot flashes. The following may help:    Avoid hot, spicy foods.    Don't drink a lot of red wine or eat a lot of  chocolate or aged cheeses. These foods contain a chemical that can trigger a hot flash.    Take care of your health. Try to get at least 7 to 9 hours of sleep each night. If you smoke, try to quit. If you want to drink alcohol, ask your healthcare provider how much is safe for you to drink. Learn ways to manage stress. Exercise according to your healthcare provider's instructions.    Ask your provider:    How and when you will get your test results    If there are activities you should avoid and when you can return to your normal activities    How to take care of yourself at home    What symptoms or problems you should watch for and what to do if you have them    Make sure you know when you should come back for a checkup. Keep all appointments for provider visits or tests.  Developed by [employer].  Adult Advisor 2016.3 published by [employer].  Last modified: 2016-06-08  Last reviewed: 2014-12-31  This content is reviewed periodically and is subject to change as new health information becomes available. The information is intended to inform and educate and is not a replacement for medical evaluation, advice, diagnosis or treatment by a healthcare professional.  References   Adult Advisor 2016.3 Index    Copyright   2016 [employer], a division of [employer] All rights reserved.          Index   Soybean   SOY-been  ________________________________________________________________________  KEY POINTS    This remedy has been used to treat several conditions. Studies in humans or animals have not proved that this remedy is safe or effective for all uses. Before using this remedy for a serious condition, you should talk with your healthcare provider.    The US Food and Drug Administration (FDA) does not approve uses for natural remedies. The FDA does not inspect or regulate natural remedies the way they do prescription medicines. Natural remedies are not always safe.    This remedy may cause unwanted  side effects. Tell your healthcare provider if you have any side effects that are serious, continue, or get worse.    This remedy affects your body and may interact with prescription medicines that you take. Tell all healthcare providers who treat you about all the prescription medicines, nonprescription medicines, supplements, natural remedies, and vitamins that you take.  ________________________________________________________________________  What are other names for this remedy?   Type of medicine: natural remedy  Scientific and common names: Glycine max, soy, soybean, soya, soy protein, soy isoflavones, tofu, miso,phytoestrogen, plant estrogen, isoflavone, genistein, tempeh  What is soybean?   The soybean is a plant that grows 1 to 5 feet tall. The hull pods contain up to 4 oval seeds, which can be yellow to brownish in color. Soybeans contain isoflavones which are similar to the hormone estrogen.  What is it used for?   This remedy has been used to treat several conditions. Studies in humans or animals have not proved that this remedy is safe or effective for all uses. Before using this remedy for a serious condition, you should talk with your healthcare provider.   This remedy is helpful to treat high cholesterol and triglycerides, high blood pressure, hot flashes, and to help treat diarrhea in infants.  Soy has been used to treat    Breast, colon, thyroid, endometrial, and prostate cancer (There is conflicting evidence on whether soy prevents or increases the risk of breast cancer.)    Crohn s disease    Diabetes    Fibromyalgia    Kidney disease    Premenstrual syndrome (PMS)    Rheumatoid arthritis  This remedy does not appear to help treat or prevent osteoporosis.  The US Food and Drug Administration (FDA) does not approve uses for natural remedies. The FDA does not inspect or regulate natural remedies the way they do prescription medicines.  How is it taken?   Examples of soy food products are soy milk,  soy cheese, soy yogurt, soy nuts, tofu, soybean oil for cooking, soybean flour, soybean curd, tofu, fermented soybean paste, soybean sprouts, soy sauce, and soy protein drinks. Soy is used to make some infant formulas for babies who are allergic to milk.  Soy is also available in the form of capsules and tablets.  Follow the directions printed on the product label or given by your healthcare provider.  What if I overdose?  Symptoms of an acute overdose have not been reported.  What should I watch out for?  Talk with your healthcare provider before taking soy supplements if you have:    A bleeding disorder    Endometriosis or uterine fibroids    Hormone-sensitive cancers such as breast, uterine, or ovarian cancer (this remedy has effects similar to the female hormone estrogen).  If you need emergency care, surgery, lab tests, or dental work, tell the healthcare provider or dentist you are taking this medicine.  Talk to your healthcare provider or pharmacist about any natural remedy that you are using or thinking about using. If your provider does not tell you how to take it, follow the directions that come with the package. Do not take more or take it longer than recommended. Ask about anything you do not understand. Remember:    Natural remedies are not always safe.    You should not take them if you are pregnant or breast-feeding without your healthcare provider's approval. They should not be taken by infants, children, or older adults without your provider's approval.    They affect your body and may interact with prescription medicines that you take.    Natural remedies are not standardized and may have different strengths and effects. They may be contaminated.  What are the possible side effects?   Along with its desirable effects, this remedy may cause some unwanted side effects. Some side effects may be very serious. Some side effects may go away as your body adjusts to the remedy. Tell your healthcare  provider if you have any side effects that continue or get worse.  Life-threatening (Report these to your healthcare provider right away. If you cannot reach your healthcare provider right away, get emergency medical care or call 911 for help): Allergic reaction (hives; itching; rash; trouble breathing; tightness in your chest; swelling of your lips, tongue, and throat).  Other: Constipation, diarrhea, rash, trouble sleeping, bloating, nausea.  What products might interact with this remedy?  When you take this remedy with other medicines, it can change the way the remedy or the medicines work. Vitamins and certain foods may also interact. Using these products together might cause harmful side effects. Before taking this remedy, talk to your healthcare provider if you are taking:    Hormonal birth control pills, implants, shots, patches, vaginal rings, and IUDs, and hormones such as conjugated estrogens (Premarin), estradiol (Climara, Estrace, Estraderm, Vivelle), medroxyprogesterone (Depo-Provera, Provera), and norethindrone (Aygestin, Micronor)    Selective estrogen receptor modulators (SERMs) such as raloxifene (Evista) and tamoxifen    Thyroid medicines such as levothyroxine (Levo-T, Levothroid, Levoxyl, Synthroid, Unithroid), liothyronine (Cytomel, Triostat), liotrix (Thyrolar), and thyroid USP ([place] Thyroid, Nature-Throid)    Warfarin (Coumadin)  If you are not sure if your medicines might interact, ask your pharmacist or healthcare provider. Keep a list of all your medicines with you. List all the prescription medicines, nonprescription medicines, supplements, natural remedies, and vitamins that you take. Be sure that you tell all healthcare providers who treat you about all the products you are taking.     Keep all natural remedies and medicines out of the reach of children.  This advisory includes select information only. The information was obtained from scientific journals, study reports, and other  documents. The author and publisher make no warranty, expressed or implied, as to the information. The advisory may not include all side effects associated with a remedy or interactions with other medicines. Nothing herein shall constitute a recommendation for the use of any remedy. Ask your healthcare provider or pharmacist for more information.  Developed by [employer].  Medication Advisor 2016.3 published by [employer].  Last modified: 2016-03-08  Last reviewed: 2013-12-03  This content is reviewed periodically and is subject to change as new health information becomes available. The information is intended to inform and educate and is not a replacement for medical evaluation, advice, diagnosis or treatment by a healthcare professional.  References   Medication Advisor 2016.3 Index    Copyright   2016 [employer], a division of [employer] All rights reserved.             Electronically signed by Mary Scott MD

## 2018-01-03 NOTE — TELEPHONE ENCOUNTER
Patient Information     Patient Name MRN Soraida Becker [id number] Female 1965      Telephone Encounter by Cuba Gruber RPh at 2017  8:53 AM     Author:  Cuba Gruber RPh Service:  (none) Author Type:  PHARM- Pharmacist     Filed:  2017  8:57 AM Encounter Date:  2017 Status:  Signed     :  Cuba Gruber RPh (PHARM- Pharmacist)            In attempting to bill Soraida's Esctrace cream, primary insurance would cover but not her secondary insurance. Both will cover Premarin Cream however.     Do you want to use Premarin cream with same directions or do you want us to start a PA for the Estrace Cream?

## 2018-01-03 NOTE — TELEPHONE ENCOUNTER
Patient Information     Patient Name MRN Sex Soraida Branham [id number] Female 1965      Telephone Encounter by Mary Scott MD at 2017  9:45 AM     Author:  Mary Scott MD  Service:  (none) Author Type:  Physician     Filed:  2017  9:59 AM  Encounter Date:  2017 Status:  Addendum     :  Cuba Gruber [facility] (PHARM- Pharmacist)        Related Notes: Original Note by Mary Scott MD (Physician) filed at 2017  9:46 AM            Which ever     We are going with Premarin- Soraida wanted whichever cost her less and it is $0.00 with Premarin

## 2018-01-16 PROBLEM — K21.9 ESOPHAGEAL REFLUX: Status: ACTIVE | Noted: 2018-01-16

## 2018-01-16 PROBLEM — K58.9 IRRITABLE BOWEL SYNDROME: Status: ACTIVE | Noted: 2018-01-16

## 2018-01-16 PROBLEM — Q26.3: Status: ACTIVE | Noted: 2018-01-16

## 2018-01-16 PROBLEM — J30.1 ALLERGIC RHINITIS DUE TO POLLEN: Status: ACTIVE | Noted: 2018-01-16

## 2018-01-16 RX ORDER — DOCUSATE SODIUM 100 MG/1
100 CAPSULE, LIQUID FILLED ORAL
COMMUNITY
Start: 2017-09-13 | End: 2018-08-20

## 2018-01-16 RX ORDER — ACYCLOVIR 50 MG/G
OINTMENT TOPICAL
COMMUNITY
Start: 2015-12-03 | End: 2020-11-05

## 2018-01-16 RX ORDER — BUPROPION HYDROCHLORIDE 300 MG/1
300 TABLET ORAL
COMMUNITY
Start: 2017-09-13 | End: 2018-07-26

## 2018-01-16 RX ORDER — TRIAMCINOLONE ACETONIDE 55 UG/1
2 SPRAY, METERED NASAL
COMMUNITY
Start: 2015-02-23 | End: 2020-11-05

## 2018-01-16 RX ORDER — CHLORAL HYDRATE 500 MG
CAPSULE ORAL
COMMUNITY
Start: 2013-08-20 | End: 2018-08-20

## 2018-01-16 RX ORDER — ACETAMINOPHEN 160 MG
2000 TABLET,DISINTEGRATING ORAL
COMMUNITY
Start: 2013-08-20 | End: 2018-08-20

## 2018-01-26 VITALS
WEIGHT: 167 LBS | SYSTOLIC BLOOD PRESSURE: 110 MMHG | HEIGHT: 66 IN | HEIGHT: 67 IN | TEMPERATURE: 96 F | DIASTOLIC BLOOD PRESSURE: 74 MMHG | RESPIRATION RATE: 16 BRPM | HEART RATE: 68 BPM | BODY MASS INDEX: 26.84 KG/M2 | SYSTOLIC BLOOD PRESSURE: 116 MMHG | DIASTOLIC BLOOD PRESSURE: 78 MMHG | HEART RATE: 76 BPM | BODY MASS INDEX: 27.25 KG/M2 | WEIGHT: 173.6 LBS

## 2018-02-12 NOTE — TELEPHONE ENCOUNTER
Patient Information     Patient Name MRN Sex Soraida Branham [id number] Female 1965      Telephone Encounter by Vivian Wyman at 2017  3:43 PM     Author:  Vivian Wyman Service:  (none) Author Type:  (none)     Filed:  2017  3:44 PM Encounter Date:  2017 Status:  Signed     :  Vivian Wyman            This was approved by [facility] back on 10/13/17.  Vivian Wyman ....................  2017   3:43 PM

## 2018-02-12 NOTE — TELEPHONE ENCOUNTER
Patient Information     Patient Name Soraida Mcdaniel [id number] Female 1965      Telephone Encounter by Linda Patel at 2017  3:58 PM     Author:  Linda Patel  Service:  (none) Author Type:  (none)     Filed:  2017  4:01 PM  Encounter Date:  2017 Status:  Addendum     :  Linda Patel        Related Notes: Original Note by Linda Patel filed at 2017  3:59 PM            Patient stated pharmacy told her on  that they still needed a prior authorization.  Linda Patel LPN............................ 2017 3:55PM    Pharmacy states they still need a prior authorization, was ran on  and stated it needed a prior authorization.   Linda Patel LPN............................ 2017 3:59 PM

## 2018-02-12 NOTE — TELEPHONE ENCOUNTER
Patient Information     Patient Name MRN Sex Soraida Branham [id number] Female 1965      Telephone Encounter by Linda Patel at 2017 11:53 AM     Author:  Linda Patel Service:  (none) Author Type:  (none)     Filed:  2017 11:56 AM Encounter Date:  2017 Status:  Signed     :  Linda Patel            Spoke with patient who confirmed last name and birth date in regards to fax that was received. Patient stated the prior authorization for Bupropion was sent to her  insurance and not hers. Patient needs the prior authorization sent to the [employer] insurance that she gets through the [facility]. Patient also stated on the back of her insurance card it stated the administer is [employer].   Linda Patel LPN............................ 2017 11:55 AM

## 2018-02-12 NOTE — TELEPHONE ENCOUNTER
Patient Information     Patient Name MRN Sex Soraida Branham [id number] Female 1965      Telephone Encounter by Vivian Wyman at 2017  9:45 AM     Author:  Vivian Wyman Service:  (none) Author Type:  (none)     Filed:  2017  9:47 AM Encounter Date:  2017 Status:  Signed     :  Vivian Wyman            I called [facility] at [phone] today at 9:29 and spoke with Prashant ECKERT and she said that the authorization was valid from 16 to 17 and she started a new pa request for me and it had to go for further review.  She did marisela it as urgent so that the turn around time is within 72 hours.  She said that she will call the patient with the outcome and that we would receive a fax.    Vivian Wyman ....................  2017   9:47 AM

## 2018-05-22 ENCOUNTER — HOSPITAL ENCOUNTER (OUTPATIENT)
Dept: GENERAL RADIOLOGY | Facility: OTHER | Age: 53
Discharge: HOME | End: 2018-05-22
Attending: FAMILY MEDICINE | Admitting: FAMILY MEDICINE
Payer: COMMERCIAL

## 2018-05-22 ENCOUNTER — OFFICE VISIT (OUTPATIENT)
Dept: FAMILY MEDICINE | Facility: OTHER | Age: 53
End: 2018-05-22
Attending: FAMILY MEDICINE
Payer: COMMERCIAL

## 2018-05-22 VITALS
BODY MASS INDEX: 26.2 KG/M2 | SYSTOLIC BLOOD PRESSURE: 120 MMHG | HEIGHT: 66 IN | WEIGHT: 163 LBS | HEART RATE: 74 BPM | DIASTOLIC BLOOD PRESSURE: 60 MMHG

## 2018-05-22 DIAGNOSIS — M77.11 LATERAL EPICONDYLITIS, RIGHT ELBOW: ICD-10-CM

## 2018-05-22 DIAGNOSIS — M25.521 RIGHT ELBOW PAIN: Primary | ICD-10-CM

## 2018-05-22 DIAGNOSIS — M25.521 RIGHT ELBOW PAIN: ICD-10-CM

## 2018-05-22 DIAGNOSIS — S46.911A STRAIN OF RIGHT SHOULDER, INITIAL ENCOUNTER: ICD-10-CM

## 2018-05-22 PROCEDURE — 73080 X-RAY EXAM OF ELBOW: CPT | Mod: RT

## 2018-05-22 PROCEDURE — 99213 OFFICE O/P EST LOW 20 MIN: CPT | Performed by: FAMILY MEDICINE

## 2018-05-22 ASSESSMENT — ENCOUNTER SYMPTOMS
WOUND: 1
JOINT SWELLING: 0
FEVER: 0
ARTHRALGIAS: 1
MYALGIAS: 1

## 2018-05-22 ASSESSMENT — PAIN SCALES - GENERAL: PAINLEVEL: SEVERE PAIN (6)

## 2018-05-22 NOTE — NURSING NOTE
Patient was walking her dog and another dog attacked her dog on 05/07/18. She was bit on right ankle and her right arm hurts still . Carolina Sanchez LPN ....................5/22/2018  1:51 PM'

## 2018-05-22 NOTE — PROGRESS NOTES
SUBJECTIVE:   Nursing Notes:   Daniel Carolina RODGERSTANJA Mendez  5/22/2018  1:51 PM  Unsigned  Patient was walking her dog and another dog attacked her dog on 05/07/18. She was bit on right ankle and her right arm hurts still . Carolina Daniel BROWNINGN ....................5/22/2018  1:51 PM'      Soraida Suresh is a 53 year old female who presents to clinic today for right ankle dog bite and right arm injury.  Both injuries happened on 5/7/18.  She had been walking her dog, who weighs about 18 pounds.  Along her walk, a bulldog ran out of a yard across the street onto the sidewalk where she was walking.  It attacked her dog and grabbed by the neck.  She was trying to wrestle this dog away from her own dog.  It bit her on the right ankle.  Her right shoulder and elbow now hurt.  Doesn't hurt to reach overhead.  If she lifts something like a coffee cup, that affecting motion aggravates it.  Also hurts to supinate her right hand.    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         Cervical disc herniation 07/23/2013     Priority: Medium     Overview:   C6/7 on left;  S/p injection Dr. 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     Priority: Medium     Past Medical History:   Diagnosis Date     Abnormal cytological finding in specimen from cervix uteri      10/11/2011     Allergic rhinitis     No Comments Provided     Benign lipomatous neoplasm of skin and subcutaneous tissue of trunk     1/20/2016     Contact dermatitis     No Comments Provided     Dyshidrosis     No Comments Provided     Encounter for screening for malignant neoplasm of colon     8/27/2014     Gastro-esophageal reflux disease without esophagitis     No Comments Provided     Major depressive disorder, single episode     6/24/2011     Other congenital malformations of great veins (CODE)     7/10/2006     Other specified diseases of anus and rectum (CODE)     5/20/2015     Panic disorder without agoraphobia     resolved after heart surgery     Partial anomalous pulmonary venous connection     No Comments Provided     Residual hemorrhoidal skin tags     3/3/2011      Past Surgical History:   Procedure Laterality Date     APPENDECTOMY OPEN      5/1987     ARTHROSCOPY KNEE      7/2006, 2013,Debridement, partial medial meniscectomy, and anterior cruciate ligament reconstruction using tibialis anterior allograft, right knee     COLONOSCOPY      8/28/2014     ESOPHAGOSCOPY, GASTROSCOPY, DUODENOSCOPY (EGD), COMBINED      1996     ESOPHAGOSCOPY, GASTROSCOPY, DUODENOSCOPY (EGD), COMBINED      2001     ESOPHAGOSCOPY, GASTROSCOPY, DUODENOSCOPY (EGD), COMBINED      2006     ESOPHAGOSCOPY, GASTROSCOPY, DUODENOSCOPY (EGD), COMBINED      6/28/11,Normal     OTHER SURGICAL HISTORY      8/22/00,272561,([place]) FACILITY LAP ESOPHAGOGAST FUNDOPLASTY,Laprascopic Nissen Fundoplication     OTHER SURGICAL HISTORY      11/2006,73032,CARDIAC SURGERY,Repair of anomalous pulmonary venous return by anastomosis of the anomalous vein to the left atrial appendage, done at [facility]     OTHER SURGICAL HISTORY      037363,ECHO LIMITED W CONT,mild left atrial enlargement;  normal ef     OTHER SURGICAL HISTORY      5/20/2015,49062.0,[place] ANORECTAL EXAM SURG REQ ANESTH  DIAG     OTHER SURGICAL HISTORY       2015,53478.0,[place] HYSTEROSCOPY [facility] PERFORMED     OTHER SURGICAL HISTORY      16,930981,OTHER,Left,Left flank     SIGMOIDOSCOPY FLEXIBLE      ,And BE, negative, for rectal bleeding.  Fissures and hemorrhoids noted     Family History   Problem Relation Age of Onset     Arthritis Mother      Arthritis,Osteoarthritis;  of 76 pneumonia but no autopsy     DIABETES Father      Diabetes     Other - See Comments Father      COPD/Benign colon polyps/AAA     Colon Cancer Paternal Grandmother      Cancer-colon     Colon Cancer Paternal Aunt      Cancer-colon     DIABETES Paternal Aunt      Diabetes     Social History   Substance Use Topics     Smoking status: Former Smoker     Quit date: 10/10/2005     Smokeless tobacco: Never Used     Alcohol use 8.4 oz/week      Comment: Alcoholic Drinks/day: 2-3 per day     Social History     Social History Narrative    This patient was  in .  She was remarried on 04.  Works at [employer], work #[phone].          Justen Suresh       Keri Flowers Daughter, born       Mina Flowers Son, born          Current Outpatient Prescriptions   Medication Sig Dispense Refill     acyclovir (ZOVIRAX) 5 % ointment        buPROPion (WELLBUTRIN XL) 300 MG 24 hr tablet Take 300 mg by mouth       Calcium carb-Vitamin D 500 mg [place]-200 units (OSCAL WITH D;OYSTER SHELL CALCIUM) 500-200 MG-UNIT per tablet Take 1 tablet by mouth       Cholecalciferol (VITAMIN D3) 2000 UNITS CAPS Take 2,000 Units by mouth       docusate sodium (COLACE) 100 MG capsule Take 100 mg by mouth       FIBER, GUAR GUM, PO        fish oil-omega-3 fatty acids 1000 MG capsule 1 capsule twice daily       Multiple Minerals-Vitamins (CALCIUM CITRATE PLUS PO) Take 1,000 mg by mouth 2 times daily (with meals)       other medical supplies Tennis elbow strap.  Diagnosis:  M77.11.  Length of need:  99. 1 each 0     triamcinolone (NASACORT) 55 MCG/ACT Inhaler 2 sprays       Allergies  "  Allergen Reactions     Codeine Other (See Comments)     Medroxyprogesterone Other (See Comments)     Caused patient's brain to swell.     Azithromycin Palpitations     Latex Rash     hands     Penicillins Rash     Proline Rash     Prolene: Sutures; caused keloid     Sulfamethoxazole-Trimethoprim Rash       Review of Systems   Constitutional: Negative for fever.   Musculoskeletal: Positive for arthralgias and myalgias. Negative for joint swelling.   Skin: Positive for wound.   Psychiatric/Behavioral: Negative for mood changes.        OBJECTIVE:     /60 (BP Location: Right arm, Patient Position: Sitting, Cuff Size: Adult Regular)  Pulse 74  Ht 5' 6\" (1.676 m)  Wt 163 lb (73.9 kg)  BMI 26.31 kg/m2  Body mass index is 26.31 kg/(m^2).  Physical Exam   Constitutional: She appears well-developed.   Musculoskeletal:   Right arm: No tenderness over the clavicle, AC joint or scapula.  She is able to abduct her shoulder fully to 180  with negative Neer's and Hamm signs.  Elbow has tenderness palpation over the lateral epicondyle.  No medial epicondyle tenderness to palpation.  Normal flexion/extension at elbow.  Pain with supination, but not with pronation.  Normal  strength.  No bruising noted of the forearm, upper arm or shoulder.   Skin: Skin is warm and dry.   Right lateral ankle has a scabbed lesion.  There is no surrounding redness or drainage noted.       PHQ-9 SCORE 11/24/2015 6/7/2016 9/9/2016   Total Score 4 5 3     I personally reviewed results withpatient as listed below:   Diagnostic Test Results:  PROCEDURE:  XR ELBOW RT G/E 3 VW     HISTORY: ; Right elbow pain     COMPARISON:  None.     TECHNIQUE:  3 views of the right elbow were obtained.     FINDINGS:  No fracture or dislocation is identified. The joint spaces  are preserved.           IMPRESSION: No acute fracture.       JEFF RESENDIZ MD      ASSESSMENT/PLAN:       ICD-10-CM    1. Right elbow pain M25.521 XR Elbow Right G/E 3 Views     " other medical supplies   2. Lateral epicondylitis, right elbow M77.11    3. Strain of right shoulder, initial encounter S49.140L        1.  X-ray of her elbow was negative.  Suspect that she strained muscles causing the lateral epicondylitis and the pain that she is experiencing in her upper arm as well as her forearm.  She will continue to ice, stretching give this time if it continues to be a problem, consider further imaging.  2.  Recommended rest, ice, etc. prescription for tennis elbow strap given to obtain a globe drug.  3.  See #1.    Patricia Brooke MD  [facility]

## 2018-05-22 NOTE — MR AVS SNAPSHOT
"              After Visit Summary   5/22/2018    Soraida Suresh    MRN: [id number]           Patient Information     Date Of Birth          1965        Visit Information        Provider Department      5/22/2018 1:30 PM Patricia Brooke MD [facility]        Today's Diagnoses     Right elbow pain    -  1    Lateral epicondylitis, right elbow        Strain of right shoulder, initial encounter           Follow-ups after your visit        Your next 10 appointments already scheduled     May 29, 2018  8:45 AM CDT   SHORT with Waqar Mckeon MD   [facility] ([facility])    [address]  [place] [address]   [phone]              Future tests that were ordered for you today     Open Future Orders        Priority Expected Expires Ordered    XR Elbow Right G/E 3 Views Routine 5/22/2018 5/22/2019 5/22/2018            Who to contact     If you have questions or need follow up information about today's clinic visit or your schedule please contact [facility] directly at [phone].  Normal or non-critical lab and imaging results will be communicated to you by [employer], letter or phone within 4 business days after the clinic has received the results. If you do not hear from us within 7 days, please contact the clinic through [employer] or phone. If you have a critical or abnormal lab result, we will notify you by phone as soon as possible.  Submit refill requests through [employer] or call your pharmacy and they will forward the refill request to us. Please allow 3 business days for your refill to be completed.          Additional Information About Your Visit        [employer] Information     [employer] lets you send messages to your doctor, view your test results, renew your prescriptions, schedule appointments and more. To sign up, go to [email or web address] . Click on \"Log in\" on the left side of the screen, which " "will take you to the Welcome page. Then click on \"Sign up Now\" on the right side of the page.     You will be asked to enter the access code listed below, as well as some personal information. Please follow the directions to create your username and password.     Your access code is: [email or web address]  Expires: 2018  2:26 PM     Your access code will  in 90 days. If you need help or a new code, please call your [facility] clinic or [phone].        Care EveryWhere ID     This is your Care EveryWhere ID. This could be used by other organizations to access your [facility] medical records  [id number]        Your Vitals Were     Pulse Height BMI (Body Mass Index)             74 5' 6\" (1.676 m) 26.31 kg/m2          Blood Pressure from Last 3 Encounters:   18 120/60   17 116/74   17 110/78    Weight from Last 3 Encounters:   18 163 lb (73.9 kg)   17 167 lb (75.8 kg)   17 173 lb 9.6 oz (78.7 kg)                 Today's Medication Changes          These changes are accurate as of 18  2:26 PM.  If you have any questions, ask your nurse or doctor.               Start taking these medicines.        Dose/Directions    other medical supplies   Used for:  Right elbow pain   Started by:  Patricia Brooke MD        Tennis elbow strap.  Diagnosis:  M77.11.  Length of need:  99.   Quantity:  1 each   Refills:  0            Where to get your medicines      Some of these will need a paper prescription and others can be bought over the counter.  Ask your nurse if you have questions.     Bring a paper prescription for each of these medications     other medical supplies                Primary Care Provider Office Phone # Fax #    Mary Scott [phone] [address] [facility] [address]        Equal Access to Services     STEFANO JAFFE AH: Hadii leopoldo washingtono Sojudah, waaxda luqadaha, qaybta kaalmarebecca rivera " labetzaida monaco. So [facility] [phone].    ATENCIÓN: Si habla kevin, tiene a bee disposición servicios gratuitos de asistencia lingüística. Kwan augustin [phone].    We comply with applicable federal civil rights laws and [place] laws. We do not discriminate on the basis of race, color, national origin, age, disability, sex, sexual orientation, or gender identity.            Thank you!     Thank you for choosing [facility] AND [facility]  for your care. Our goal is always to provide you with excellent care. Hearing back from our patients is one way we can continue to improve our services. Please take a few minutes to complete the written survey that you may receive in the mail after your visit with us. Thank you!             Your Updated Medication List - Protect others around you: Learn how to safely use, store and throw away your medicines at www.disposemymeds.org.          This list is accurate as of 5/22/18  2:26 PM.  Always use your most recent med list.                   Brand Name Dispense Instructions for use Diagnosis    acyclovir 5 % ointment    ZOVIRAX          buPROPion 300 MG 24 hr tablet    WELLBUTRIN XL     Take 300 mg by mouth        Calcium carb-Vitamin D 500 mg [place]-200 units 500-200 MG-UNIT per tablet    OSCAL with D;Oyster Shell Calcium     Take 1 tablet by mouth        CALCIUM CITRATE PLUS PO      Take 1,000 mg by mouth 2 times daily (with meals)        docusate sodium 100 MG capsule    COLACE     Take 100 mg by mouth        FIBER (GUAR GUM) PO           fish oil-omega-3 fatty acids 1000 MG capsule      1 capsule twice daily        other medical supplies     1 each    Tennis elbow strap.  Diagnosis:  M77.11.  Length of need:  99.    Right elbow pain       triamcinolone 55 MCG/ACT Inhaler    NASACORT     2 sprays        vitamin D3 2000 units Caps      Take 2,000 Units by mouth

## 2018-07-05 ENCOUNTER — TELEPHONE (OUTPATIENT)
Dept: FAMILY MEDICINE | Facility: OTHER | Age: 53
End: 2018-07-05

## 2018-07-05 DIAGNOSIS — M77.11 RIGHT LATERAL EPICONDYLITIS: Primary | ICD-10-CM

## 2018-07-05 DIAGNOSIS — M25.521 RIGHT ELBOW PAIN: ICD-10-CM

## 2018-07-05 NOTE — TELEPHONE ENCOUNTER
"Patient was seen in May and states,\"she is still having pain in elbow and trouble gripping things\". She is requesting a referral for PT at [facility]. Please advise    Debby Rubio LPN on 7/5/2018 at 10:44 AM    "

## 2018-07-25 ENCOUNTER — TELEPHONE (OUTPATIENT)
Dept: FAMILY MEDICINE | Facility: OTHER | Age: 53
End: 2018-07-25

## 2018-07-25 RX ORDER — BUPROPION HYDROCHLORIDE 300 MG/1
TABLET ORAL
Qty: 90 TABLET | OUTPATIENT
Start: 2018-07-25

## 2018-07-25 NOTE — TELEPHONE ENCOUNTER
[facility]- Patient requesting medication renewal. Former patient of [facility]. Patient is scheduled on 8/20/18 for medication renewal and to establish care. However, patient is in need of medication renewal at this time.  Request was sent by [facility] pharmacy per patient.

## 2018-07-25 NOTE — TELEPHONE ENCOUNTER
Redundant refill request refused: Too soon.    buPROPion (WELLBUTRIN XL) 300 mg Extended-Release tablet 90 tablet 3 9/13/2017     Sig - Route: Take 1 tablet by mouth every morning. - Oral    Class: eRx    E-Prescribing Status: Receipt confirmed by pharmacy (9/13/2017  4:27 PM CDT)    Prior authorization: Approved      [facility] - [address]     Unable to complete prescription refill per RN Medication Refill Policy. Alida Sierra RN .............. 7/25/2018  3:37 PM

## 2018-07-26 ENCOUNTER — TRANSFERRED RECORDS (OUTPATIENT)
Dept: HEALTH INFORMATION MANAGEMENT | Facility: OTHER | Age: 53
End: 2018-07-26

## 2018-07-26 DIAGNOSIS — F32.9 MAJOR DEPRESSION: Primary | ICD-10-CM

## 2018-07-30 ENCOUNTER — TRANSFERRED RECORDS (OUTPATIENT)
Dept: HEALTH INFORMATION MANAGEMENT | Facility: OTHER | Age: 53
End: 2018-07-30

## 2018-07-30 RX ORDER — BUPROPION HYDROCHLORIDE 300 MG/1
TABLET ORAL
Qty: 90 TABLET | Refills: 2 | Status: SHIPPED | OUTPATIENT
Start: 2018-07-30 | End: 2018-08-20

## 2018-07-30 NOTE — TELEPHONE ENCOUNTER
OV with PCP 5/22/18 - medications reviewed    Prescription approved per [facility] Refill Protocol.  Alida Wharton RN............................ 7/30/2018 9:58 AM

## 2018-08-16 ENCOUNTER — THERAPY VISIT (OUTPATIENT)
Dept: CHIROPRACTIC MEDICINE | Facility: OTHER | Age: 53
End: 2018-08-16
Attending: CHIROPRACTOR
Payer: COMMERCIAL

## 2018-08-16 DIAGNOSIS — R51.9 CEPHALALGIA: ICD-10-CM

## 2018-08-16 DIAGNOSIS — G89.29 CHRONIC LEFT-SIDED LOW BACK PAIN WITH LEFT-SIDED SCIATICA: ICD-10-CM

## 2018-08-16 DIAGNOSIS — M99.01 SEGMENTAL AND SOMATIC DYSFUNCTION OF CERVICAL REGION: Primary | ICD-10-CM

## 2018-08-16 DIAGNOSIS — M62.838 MUSCLE SPASMS OF NECK: ICD-10-CM

## 2018-08-16 DIAGNOSIS — M54.2 CERVICALGIA: ICD-10-CM

## 2018-08-16 DIAGNOSIS — M99.04 SEGMENTAL AND SOMATIC DYSFUNCTION OF SACRAL REGION: ICD-10-CM

## 2018-08-16 DIAGNOSIS — M54.42 CHRONIC LEFT-SIDED LOW BACK PAIN WITH LEFT-SIDED SCIATICA: ICD-10-CM

## 2018-08-16 PROCEDURE — 99202 OFFICE O/P NEW SF 15 MIN: CPT | Mod: 25 | Performed by: CHIROPRACTOR

## 2018-08-16 PROCEDURE — 97140 MANUAL THERAPY 1/> REGIONS: CPT | Performed by: CHIROPRACTOR

## 2018-08-16 PROCEDURE — 98940 CHIROPRACT MANJ 1-2 REGIONS: CPT | Mod: AT | Performed by: CHIROPRACTOR

## 2018-08-16 NOTE — PATIENT INSTRUCTIONS
INSTRUCTIONS   Continue to perform activities as tolerated    Follow-up:  Return to care in 4 days.

## 2018-08-16 NOTE — MR AVS SNAPSHOT
After Visit Summary   8/16/2018    Soraida Suresh    MRN: [id number]           Patient Information     Date Of Birth          1965        Visit Information        Provider Department      8/16/2018 3:30 PM Heath Gomez DC [facility]        Today's Diagnoses     Segmental and somatic dysfunction of cervical region    -  1    Segmental and somatic dysfunction of sacral region        Chronic left-sided low back pain with left-sided sciatica        Cervicalgia        Cephalalgia        Muscle spasms of neck          Care Instructions    INSTRUCTIONS   Continue to perform activities as tolerated    Follow-up:  Return to care in 4 days.           Follow-ups after your visit        Follow-up notes from your care team     Return in about 4 days (around 8/20/2018) for Routine Visit.      Your next 10 appointments already scheduled     Aug 20, 2018  3:00 PM CDT   Office Visit with Patricia Brooke MD   [facility])    [address]  [place] [id number] [phone]           Bring a current list of meds and any records pertaining to this visit. For Physicals, please bring immunization records and any forms needing to be filled out. Please arrive 10 minutes early to complete paperwork.            Aug 22, 2018  4:00 PM CDT   [facility] Chiropractor with Heath Gomez DC   [facility]    [address] [facility] [id number] [phone]              Who to contact     If you have questions or need follow up information about today's clinic visit or your schedule please contact [facility] directly at [phone].  Normal or non-critical lab and imaging results will be communicated to you by MyChart, letter or phone within 4 business days after the clinic has received the results. If you do not hear from us within 7 days, please  "contact the clinic through [employer] or phone. If you have a critical or abnormal lab result, we will notify you by phone as soon as possible.  Submit refill requests through [employer] or call your pharmacy and they will forward the refill request to us. Please allow 3 business days for your refill to be completed.          Additional Information About Your Visit        [employer] Information     [employer] lets you send messages to your doctor, view your test results, renew your prescriptions, schedule appointments and more. To sign up, go to [email or web address] . Click on \"Log in\" on the left side of the screen, which will take you to the Welcome page. Then click on \"Sign up Now\" on the right side of the page.     You will be asked to enter the access code listed below, as well as some personal information. Please follow the directions to create your username and password.     Your access code is: [email or web address]  Expires: 2018  2:26 PM     Your access code will  in 90 days. If you need help or a new code, please call your [facility] clinic or [phone].        Care EveryWhere ID     This is your Care EveryWhere ID. This could be used by other organizations to access your [place] medical records  [id number]         Blood Pressure from Last 3 Encounters:   18 120/60   17 116/74   17 110/78    Weight from Last 3 Encounters:   18 73.9 kg (163 lb)   17 75.8 kg (167 lb)   17 78.7 kg (173 lb 9.6 oz)              We Performed the Following     CHIROPRAC MANIP,SPINAL,1-2 REGIONS     MANUAL THER TECH,1+REGIONS,EA 15 MIN        Primary Care Provider Office Phone # Fax #    Mary Scott [phone] [address] [facility] [address]        Equal Access to Services     Mission Bernal campusGREG : Arsh Tyler, neelima kelly, qarebecca telles . [facility] [phone].    ATENCIÓN: Si kylela " español, tiene a bee disposición servicios gratuitos de asistencia lingüística. Kwan augustin [phone].    We comply with applicable federal civil rights laws and [place] laws. We do not discriminate on the basis of race, color, national origin, age, disability, sex, sexual orientation, or gender identity.            Thank you!     Thank you for choosing [facility]  for your care. Our goal is always to provide you with excellent care. Hearing back from our patients is one way we can continue to improve our services. Please take a few minutes to complete the written survey that you may receive in the mail after your visit with us. Thank you!             Your Updated Medication List - Protect others around you: Learn how to safely use, store and throw away your medicines at www.disposemymeds.org.          This list is accurate as of 8/16/18  4:43 PM.  Always use your most recent med list.                   Brand Name Dispense Instructions for use Diagnosis    acyclovir 5 % ointment    ZOVIRAX          buPROPion 300 MG 24 hr tablet    WELLBUTRIN XL    90 tablet    Take 1 tablet by mouth every morning.    Major depression       Calcium carb-Vitamin D 500 mg [place]-200 units 500-200 MG-UNIT per tablet    OSCAL with D;Oyster Shell Calcium     Take 1 tablet by mouth        CALCIUM CITRATE PLUS PO      Take 1,000 mg by mouth 2 times daily (with meals)        docusate sodium 100 MG capsule    COLACE     Take 100 mg by mouth        FIBER (GUAR GUM) PO           fish oil-omega-3 fatty acids 1000 MG capsule      1 capsule twice daily        other medical supplies     1 each    Tennis elbow strap.  Diagnosis:  M77.11.  Length of need:  99.    Right elbow pain       triamcinolone 55 MCG/ACT inhaler    NASACORT     2 sprays        vitamin D3 2000 units Caps      Take 2,000 Units by mouth

## 2018-08-16 NOTE — PROGRESS NOTES
PATIENT:  Soraida Suresh is a 53 year old female presenting for pain with headaches and low back pain with sciatic-like symptoms on the left side    PROBLEM:   Date of Initial Visit for this Episode:  8/16/2018     Visit #1    SUBJECTIVE / HPI:   Description and onset: Initially presented to our office on August 14.  Patient had to leave before being seen for evaluation and treatment.  No treatment or evaluation was rendered on August 14.  Patient rescheduled for August 16 where evaluation and treatment was rendered  She reports primary complaints of neck pain with headaches patient reports headaches have not been present for several years and attributes this to being out of chiropractic care for extended period of time.  No recent accidents or traumas were reported by the patient  Duration and Frequency of Pain: Approximately 3 weeks and frequent 51-75%  Radiation of pain: Patient notes pain with headaches but no radiation of symptoms into the upper extremities  Pain rated at it's worst: 8/10  Pain rated currently:  4/10  Pain course: Gradually getting worse  Worse with:  overhead motions, cervical rotation  Improved by: Patient reports trying ice and a tens unit.  Patient noted motion like sickness with TENS unit  Additional Features: Aches are present  Other Health Care Providers seen for this: GUILLERMO Johnson DC  Previous treatment: Chiropractic, spinal decompression        Other Secondary/Associated Problems  Description, Duration and Location of Seondary Problem: Low back with sciatic-like symptoms into the left hamstring.  Patient reports sciatic-like symptoms are not typical and are usually a sign to be seen.    Duration and Frequency of Pain: Approximately 3 weeks and record 51-75%  Radiation of pain: Patient reports occasional radiation of symptoms into the mid hamstring on the left side  Pain rated at it's worst: 5/10  Pain rated currently:  2/10      See flowsheets in chart for details.  8/16/2018  Neck index  42%    Oswestry Back index 29%      Functional limitations: Sleeping, static right rotation of the neck, ironing, and overhead motions    Exercise habits: She reports stretching helps manage symptoms  Sleeping habits: Patient notes neck pain and headaches have been waking her more recently    Past D.C. Care: yes, helpful       Health History as reported by the patient: Good      PAST MEDICAL HISTORY:  Past Medical History:   Diagnosis Date     Abnormal cytological finding in specimen from cervix uteri     10/11/2011     Allergic rhinitis     No Comments Provided     Benign lipomatous neoplasm of skin and subcutaneous tissue of trunk     1/20/2016     Contact dermatitis     No Comments Provided     Dyshidrosis     No Comments Provided     Encounter for screening for malignant neoplasm of colon     8/27/2014     Gastro-esophageal reflux disease without esophagitis     No Comments Provided     Major depressive disorder, single episode     6/24/2011     Other congenital malformations of great veins (CODE)     7/10/2006     Other specified diseases of anus and rectum (CODE)     5/20/2015     Panic disorder without agoraphobia     resolved after heart surgery     Partial anomalous pulmonary venous connection     No Comments Provided     Residual hemorrhoidal skin tags     3/3/2011       PAST SURGICAL HISTORY:  Past Surgical History:   Procedure Laterality Date     APPENDECTOMY OPEN      5/1987     ARTHROSCOPY KNEE      7/2006, 2013,Debridement, partial medial meniscectomy, and anterior cruciate ligament reconstruction using tibialis anterior allograft, right knee     COLONOSCOPY      8/28/2014     ESOPHAGOSCOPY, GASTROSCOPY, DUODENOSCOPY (EGD), COMBINED      1996     ESOPHAGOSCOPY, GASTROSCOPY, DUODENOSCOPY (EGD), COMBINED      2001     ESOPHAGOSCOPY, GASTROSCOPY, DUODENOSCOPY (EGD), COMBINED      2006     ESOPHAGOSCOPY, GASTROSCOPY, DUODENOSCOPY (EGD), COMBINED      6/28/11,Normal     OTHER SURGICAL HISTORY       8/22/00,593537,([place]) FACILITY LAP ESOPHAGOGAST FUNDOPLASTY,Laprascopic Nissen Fundoplication     OTHER SURGICAL HISTORY      11/2006,43335,CARDIAC SURGERY,Repair of anomalous pulmonary venous return by anastomosis of the anomalous vein to the left atrial appendage, done at [facility]     OTHER SURGICAL HISTORY      848691,ECHO LIMITED W CONT,mild left atrial enlargement;  normal ef     OTHER SURGICAL HISTORY      5/20/2015,99020.0,[place] ANORECTAL EXAM SURG REQ ANESTH  DIAG     OTHER SURGICAL HISTORY      12/14/2015,45135.0,[facility]OSCOPY [facility] PERFOR[facility]     OTHER SURGICAL HISTORY      2/2/16,170089,OTHER,Left,Left flank     SIGMOIDOSCOPY FLEXIBLE      1996,And BE, negative, for rectal bleeding.  Fissures and hemorrhoids noted       ALLERGIES:  Allergies   Allergen Reactions     Codeine Other (See Comments)     Medroxyprogesterone Other (See Comments)     Caused patient's brain to swell.     Azithromycin Palpitations     Latex Rash     hands     Penicillins Rash     Proline Rash     Prolene: Sutures; caused keloid     Sulfamethoxazole-Trimethoprim Rash       CURRENT MEDICATIONS:  Current Outpatient Prescriptions   Medication Sig Dispense Refill     acyclovir (ZOVIRAX) 5 % ointment        buPROPion (WELLBUTRIN XL) 300 MG 24 hr tablet Take 1 tablet by mouth every morning. 90 tablet 2     Calcium carb-Vitamin D 500 mg [place]-200 units (OSCAL WITH D;OYSTER SHELL CALCIUM) 500-200 MG-UNIT per tablet Take 1 tablet by mouth       Cholecalciferol (VITAMIN D3) 2000 UNITS CAPS Take 2,000 Units by mouth       docusate sodium (COLACE) 100 MG capsule Take 100 mg by mouth       FIBER, GUAR GUM, PO        fish oil-omega-3 fatty acids 1000 MG capsule 1 capsule twice daily       Multiple Minerals-Vitamins (CALCIUM CITRATE PLUS PO) Take 1,000 mg by mouth 2 times daily (with meals)       other medical supplies Tennis elbow strap.  Diagnosis:  M77.11.  Length of need:  99. 1 each 0     triamcinolone (NASACORT)  "55 MCG/ACT Inhaler 2 sprays         SOCIAL HISTORY:  Marital Status: .  Children: yes.  Occupation: Works at [employer].  Alcohol use: Yes.  Tobacco use: Smoker: Former smoker.      FAMILY HISTORY:  Family History   Problem Relation Age of Onset     Arthritis Mother      Arthritis,Osteoarthritis;  of 76 pneumonia but no autopsy     Diabetes Father      Diabetes     Other - See Comments Father      COPD/Benign colon polyps/AAA     Colon Cancer Paternal Grandmother      Cancer-colon     Colon Cancer Paternal Aunt      Cancer-colon     Diabetes Paternal Aunt      Diabetes       Patient Active Problem List   Diagnosis     Other congenital anomalies of great veins     Allergic rhinitis due to pollen     Cervical disc herniation     Esophageal reflux     Irritable bowel syndrome     Major depression     Menorrhagia with regular cycle     Other urinary incontinence     Partial congenital anomalous pulmonary venous connection     Segmental and somatic dysfunction of cervical region         ROS:  The patient denies any fevers, chills, nausea, vomiting, diarrhea, constipation,dysuria, hematuria, or urinary hesitancy or incontinence.  No shortness of breath, chest pain, or rashes.    OBJECTIVE:    DIAGNOSTICS:  no current spinal imaging taken.     PHYSICAL EXAM:     GENERAL APPEARANCE: healthy, alert, mild distress, moderate distress and cooperative   GAIT: NORMAL      MUSCULOSKELETAL:   Posture: Anterior head carriage, rounded shoulders.  Low left iliac crest  Gait:  unremarkable.     Cervical  ROM:   smooth/halting arc of motion   40/50 flexion    45/45 extension    25/45 RLF  25/45 LLF    75/85 RR         85/85 LR       -Maximal Foraminal Compression: +focal mild neck pain.    Shoulder Depression:   -Distraction causes a motion like sickness      Thoracic and Lumbar  ROM:  55/60 flexion 60/60 extension    45/45 RLF    45/45 LLF      +Kemps: Left  +Gillets:+KRISSY, DARREN   +Leg length inequality: left \"[facility] -; " Restricted left heel to buttock   Other:  +Left Nachlas, + Left Elys  -Braggards, - Fabres    +Tenderness: Moderate severe noted over C6 on the right and over the left SI joint  +Muscle spasm: Moderate severe spasms of the suboccipitals bilaterally and of the cervical paraspinals bilaterally right predominant.  Moderate noted over the upper trapezius bilaterally.  Moderate of the left lumbar paraspinals and quadratus lumborum extending into the lower thoracic paraspinals on the left  +Joint asymmetry and restriction: C6 with right lateral flexion extension, S1 with left lateral flexion extension    ASSESSMENT: Soraida Suresh is a 53 year old female      1. Segmental and somatic dysfunction of cervical region    2. Segmental and somatic dysfunction of sacral region    3. Chronic left-sided low back pain with left-sided sciatica    4. Cervicalgia    5. Cephalalgia    6. Muscle spasms of neck        PLAN    Evaluation and Management:  89502 Low to moderate level exam 20 min    Procedures:  Modalities:  Traction, C/S intermittent pull 18/7 for 17 minutes    CMT:  98543 Chiropractic manipulative treatment 1-2 regions performed   Cervical: Activator, C6, Supine  Pelvis: Diversified, Sacrum , Side posture    Therapeutic procedures:  None    Response to Treatment  Patient reported not noticing initially reduction in symptoms following traction protocols. following adjustment patient did note decrease of symptoms of the low back and neck.    Prognosis: Good, patient is expected to respond well to care however due to patient history exacerbation of symptoms is expected and progress may be slowed.    8/16/2018 Plan of Care:  4-6 visits of Chiropractic Care including Spinal Adjustments and/or physiotherapy and active rehabilitation, to include exercises in the office and/or at home to meet care plan goals.     Frequency: 1-2xweek for up to 4 weeks. A reevaluation would be clinically appropriate in 4-6 visits, to determine  progress and further course of care.    POC discussed and patient agreeable to plan of care.      8/16/2018 Goals:      Patient will report improved pain of the neck and of the headaches.   Patient will report improved cervical rotation to the right.   Reduce spasms noted over left quadratus lumborum and upper trapezium bilaterally        INSTRUCTIONS   Continue to perform activities as tolerated    Follow-up:  Return to care in 4 days.        Disclaimer: This note consists of symbols derived from keyboarding, dictation and/or voice recognition software. As a result, there may be errors in the script that have gone undetected. Please consider this when interpreting information found in this chart.

## 2018-08-20 ENCOUNTER — OFFICE VISIT (OUTPATIENT)
Dept: FAMILY MEDICINE | Facility: OTHER | Age: 53
End: 2018-08-20
Attending: FAMILY MEDICINE
Payer: COMMERCIAL

## 2018-08-20 VITALS
DIASTOLIC BLOOD PRESSURE: 80 MMHG | HEART RATE: 80 BPM | SYSTOLIC BLOOD PRESSURE: 122 MMHG | HEIGHT: 66 IN | BODY MASS INDEX: 25.07 KG/M2 | WEIGHT: 156 LBS

## 2018-08-20 DIAGNOSIS — F33.42 RECURRENT MAJOR DEPRESSIVE DISORDER, IN FULL REMISSION (H): Primary | ICD-10-CM

## 2018-08-20 PROCEDURE — 99213 OFFICE O/P EST LOW 20 MIN: CPT | Performed by: FAMILY MEDICINE

## 2018-08-20 RX ORDER — LYSINE 500 MG
1 TABLET ORAL DAILY
COMMUNITY

## 2018-08-20 RX ORDER — POLYETHYLENE GLYCOL 3350 17 G/17G
POWDER, FOR SOLUTION ORAL DAILY
COMMUNITY
End: 2023-12-04

## 2018-08-20 RX ORDER — ASCORBIC ACID 500 MG
500 TABLET ORAL DAILY
COMMUNITY
End: 2021-11-15

## 2018-08-20 RX ORDER — MULTIVIT/FOLIC ACID/HERBAL 223 400 MCG
1 TABLET ORAL DAILY
COMMUNITY
End: 2019-11-25

## 2018-08-20 RX ORDER — BUPROPION HYDROCHLORIDE 300 MG/1
300 TABLET ORAL EVERY MORNING
Qty: 90 TABLET | Refills: 3 | Status: SHIPPED | OUTPATIENT
Start: 2018-08-20 | End: 2018-08-20

## 2018-08-20 RX ORDER — BUPROPION HYDROCHLORIDE 300 MG/1
300 TABLET ORAL EVERY MORNING
Qty: 90 TABLET | Refills: 3 | Status: SHIPPED | OUTPATIENT
Start: 2018-08-20 | End: 2019-09-16

## 2018-08-20 ASSESSMENT — PAIN SCALES - GENERAL: PAINLEVEL: NO PAIN (0)

## 2018-08-20 ASSESSMENT — ENCOUNTER SYMPTOMS
FEVER: 0
UNEXPECTED WEIGHT CHANGE: 0
NERVOUS/ANXIOUS: 0
COUGH: 0

## 2018-08-20 NOTE — PROGRESS NOTES
SUBJECTIVE:   Nursing Notes:   Iva Young LPN  8/20/2018  3:20 PM  Signed  Patient is here to establish care.  Iva Young LPN .......8/20/2018 3:13 PM     Soraida Suresh is a 53 year old female who presents to clinic today for establish care.  She previously followed with Dr. Mary alexis.  She needs a refill of the Wellbutrin which she uses for depression.  She has been stable for quite some time on this medication and is doing very well.    HPI    I personally reviewed medications/allergies/history listed below:    Patient Active Problem List    Diagnosis Date Noted     Segmental and somatic dysfunction of cervical region 08/16/2018     Priority: Medium     Allergic rhinitis due to pollen 01/16/2018     Priority: Medium     Esophageal reflux 01/16/2018     Priority: Medium     Irritable bowel syndrome 01/16/2018     Priority: Medium     Partial congenital anomalous pulmonary venous connection 01/16/2018     Priority: Medium     Overview:   Repaired       Menorrhagia with regular cycle 12/14/2015     Priority: Medium     Abscess of anal and rectal regions 07/14/2015     Priority: Medium     Major depression 08/07/2014     Priority: Medium     Overview:    Riaz Cheung.         Cervical disc herniation 07/23/2013     Priority: Medium     Overview:   C6/7 on left;  S/p injection Dr. 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     Priority: Medium     Past Medical History:   Diagnosis Date     Abnormal cytological finding in specimen from cervix uteri     10/11/2011     Allergic rhinitis     No Comments Provided     Benign lipomatous neoplasm of skin and subcutaneous tissue of trunk     1/20/2016     Contact dermatitis     No Comments Provided     Dyshidrosis     No Comments Provided     Encounter for screening for malignant neoplasm of colon     8/27/2014      Gastro-esophageal reflux disease without esophagitis     No Comments Provided     Major depressive disorder, single episode     2011     Other congenital malformations of great veins (CODE)     7/10/2006     Other specified diseases of anus and rectum (CODE)     2015     Panic disorder without agoraphobia     resolved after heart surgery     Partial anomalous pulmonary venous connection     No Comments Provided     Residual hemorrhoidal skin tags     3/3/2011      Past Surgical History:   Procedure Laterality Date     anorectal exam anesthesia  2015    [place] ANORECTAL EXAM SURG REQ ANESTH  DIAG     APPENDECTOMY OPEN      1987     ARTHROSCOPY KNEE      2006, 2013,Debridement, partial medial meniscectomy, and anterior cruciate ligament reconstruction using tibialis anterior allograft, right knee     AS HYSTEROSCOPY, SURGICAL; W/ ENDOMETRIAL ABLATION, ANY METHOD  2015    Kathya     CARDIAC SURGERY  2006    Repair of anomalous pulmonary venous return by anastomosis of the anomalous vein to the left atrial appendage, done at [facility]     COLONOSCOPY      2014     ECHO LIMITED      mild left atrial enlargement;  normal ef     ESOPHAGOSCOPY, GASTROSCOPY, DUODENOSCOPY (EGD), COMBINED           ESOPHAGOSCOPY, GASTROSCOPY, DUODENOSCOPY (EGD), COMBINED           ESOPHAGOSCOPY, GASTROSCOPY, DUODENOSCOPY (EGD), COMBINED           ESOPHAGOSCOPY, GASTROSCOPY, DUODENOSCOPY (EGD), COMBINED      11,Normal     NISSEN FUNDOPLICATION  2000    Dr. Sales     SIGMOIDOSCOPY FLEXIBLE      ,And BE, negative, for rectal bleeding.  Fissures and hemorrhoids noted     SURGICAL PATHOLOGY EXAM Left 2016    Left flank     Family History   Problem Relation Age of Onset     Arthritis Mother      Arthritis,Osteoarthritis;  of 76 pneumonia but no autopsy     Diabetes Father      Diabetes     Other - See Comments Father      COPD/Benign colon polyps/AAA -  of  ruptured AAA     Colon Cancer Paternal Grandmother      Cancer-colon     Colon Cancer Paternal Aunt      Cancer-colon     Diabetes Paternal Aunt      Diabetes     Obesity Sister      bariatric surgery     Other - See Comments Sister      TBI     Depression Sister      Anxiety Disorder Sister      Diabetes Sister      Hyperlipidemia Sister      Diabetes Brother      Hyperlipidemia Brother      Other - See Comments Maternal Grandmother       of old age     Dementia Maternal Grandfather      possible dementia,  of pneumonia     Other - See Comments Brother      MVA - at age 50     Hypertension Brother      Diabetes Brother      Hyperlipidemia Brother      Depression Brother      Diabetes Brother      Hyperlipidemia Brother      Macular Degeneration Brother      Diabetes Brother      Hyperlipidemia Brother      Suicide Brother 49     Cerebrovascular Disease Paternal Grandfather       of stroke     Social History   Substance Use Topics     Smoking status: Former Smoker     Quit date: 10/10/2005     Smokeless tobacco: Never Used     Alcohol use 8.4 oz/week      Comment: Alcoholic Drinks/day: 2-3 per day     Social History     Social History Narrative    This patient was  in .  She was remarried on 04.  Works at [employer], work #[phone].          Justen Suresh       Keri Flowers Daughter, born       Mina Flowers Son, born          Current Outpatient Prescriptions   Medication Sig Dispense Refill     acyclovir (ZOVIRAX) 5 % ointment        ascorbic acid (VITAMIN C) 500 MG tablet Take 500 mg by mouth daily       Black Cohosh-Soy-Ginkgo-Magnol (ESTROVEN MOOD & MEMORY) TABS Take 1 tablet by mouth daily       buPROPion (WELLBUTRIN XL) 300 MG 24 hr tablet Take 1 tablet (300 mg) by mouth every morning 90 tablet 3     Calcium carb-Vitamin D 500 mg [place]-200 units (OSCAL WITH D;OYSTER SHELL CALCIUM) 500-200 MG-UNIT per tablet Take 1 tablet by mouth       FIBER, GUAR GUM, PO         "Glucosamine-Chondroitin 750 & 400 MG MISC Take 1 tablet by mouth daily       L-Lysine 500 MG TABS Take 1 tablet by mouth daily       other medical supplies Tennis elbow strap.  Diagnosis:  M77.11.  Length of need:  99. 1 each 0     polyethylene glycol (MIRALAX) powder Take by mouth daily       triamcinolone (NASACORT) 55 MCG/ACT Inhaler 2 sprays       [DISCONTINUED] buPROPion (WELLBUTRIN XL) 300 MG 24 hr tablet Take 1 tablet (300 mg) by mouth every morning 90 tablet 3     [DISCONTINUED] buPROPion (WELLBUTRIN XL) 300 MG 24 hr tablet Take 1 tablet by mouth every morning. 90 tablet 2     Allergies   Allergen Reactions     Codeine Other (See Comments)     Medroxyprogesterone Other (See Comments)     Caused patient's brain to swell.     Azithromycin Palpitations     Latex Rash     hands     Penicillins Rash     Proline Rash     Prolene: Sutures; caused keloid     Sulfamethoxazole-Trimethoprim Rash       Review of Systems   Constitutional: Negative for fever and unexpected weight change.   Respiratory: Negative for cough.    Cardiovascular: Negative for peripheral edema.   Psychiatric/Behavioral: Negative for mood changes. The patient is not nervous/anxious.         OBJECTIVE:     /80 (BP Location: Right arm, Patient Position: Sitting, Cuff Size: Adult Regular)  Pulse 80  Ht 5' 5.5\" (1.664 m)  Wt 156 lb (70.8 kg)  BMI 25.56 kg/m2  Body mass index is 25.56 kg/(m^2).  Physical Exam   Constitutional: She appears well-developed.   HENT:   Head: Normocephalic.   Eyes: Pupils are equal, round, and reactive to light.   Neck: Normal range of motion. Neck supple. No thyromegaly present.   Cardiovascular: Normal rate, regular rhythm and normal heart sounds.    No murmur heard.  Pulmonary/Chest: Effort normal and breath sounds normal. No respiratory distress. She has no wheezes. She has no rales.   Abdominal: Soft. Bowel sounds are normal. She exhibits no distension and no mass. There is no tenderness. There is no rebound " and no guarding.   Musculoskeletal: She exhibits no edema.   Lymphadenopathy:     She has no cervical adenopathy.   Psychiatric: She has a normal mood and affect.         PHQ-9 SCORE 6/7/2016 9/9/2016 8/20/2018   Total Score 5 3 0         CECI-7 SCORE 6/2/2015 8/21/2015 6/7/2016   Total Score 14 2 5       I personally reviewed results withpatient as listed below:   Diagnostic Test Results:  none     ASSESSMENT/PLAN:       ICD-10-CM    1. Recurrent major depressive disorder, in full remission (H) F33.42 buPROPion (WELLBUTRIN XL) 300 MG 24 hr tablet     DISCONTINUED: buPROPion (WELLBUTRIN XL) 300 MG 24 hr tablet       1.  Stable.  Continue on Wellbutrin  mg p.o. daily.  Of note, we did look through her old record and found her previous healthcare directive.  [facility] and will be scanned into the current electronic health record.  Patricia Brooke MD  [facility] AND [facility]

## 2018-08-20 NOTE — MR AVS SNAPSHOT
"              After Visit Summary   8/20/2018    Soraida Suresh    MRN: [id number]           Patient Information     Date Of Birth          1965        Visit Information        Provider Department      8/20/2018 3:00 PM Patricia Brooke MD [facility]        Today's Diagnoses     Recurrent major depressive disorder, in full remission (H)    -  1       Follow-ups after your visit        Your next 10 appointments already scheduled     Aug 29, 2018  3:45 PM CDT   [place] Chiropractor with Heath Gomez DC   [facility] ([facility])    [address] [place] [address] [id number] [phone]              Who to contact     If you have questions or need follow up information about today's clinic visit or your schedule please contact [facility] directly at [phone].  Normal or non-critical lab and imaging results will be communicated to you by MyChart, letter or phone within 4 business days after the clinic has received the results. If you do not hear from us within 7 days, please contact the clinic through [employer] or phone. If you have a critical or abnormal lab result, we will notify you by phone as soon as possible.  Submit refill requests through [employer] or call your pharmacy and they will forward the refill request to us. Please allow 3 business days for your refill to be completed.          Additional Information About Your Visit        MyChart Information     [employer] lets you send messages to your doctor, view your test results, renew your prescriptions, schedule appointments and more. To sign up, go to [email or web address] . Click on \"Log in\" on the left side of the screen, which will take you to the Welcome page. Then click on \"Sign up Now\" on the right side of the page.     You will be asked to enter the access code listed below, as well as some personal information. Please follow the directions to create " "your username and password.     Your access code is: [email or web address]  Expires: 2018  3:16 PM     Your access code will  in 90 days. If you need help or a new code, please call your [facility] or [phone].        Care EveryWhere ID     This is your Care EveryWhere ID. This could be used by other organizations to access your [facility] medical records  [id number]        Your Vitals Were     Pulse Height BMI (Body Mass Index)             80 5' 5.5\" (1.664 m) 25.56 kg/m2          Blood Pressure from Last 3 Encounters:   18 122/80   18 120/60   17 116/74    Weight from Last 3 Encounters:   18 156 lb (70.8 kg)   18 163 lb (73.9 kg)   17 167 lb (75.8 kg)              Today, you had the following     No orders found for display         Today's Medication Changes          These changes are accurate as of 18  4:09 PM.  If you have any questions, ask your nurse or doctor.               Start taking these medicines.        Dose/Directions    buPROPion 300 MG 24 hr tablet   Commonly known as:  WELLBUTRIN XL   Used for:  Recurrent major depressive disorder, in full remission (H)   Started by:  Patricia Brooke MD        Dose:  300 mg   Take 1 tablet (300 mg) by mouth every morning   Quantity:  90 tablet   Refills:  3            Where to get your medicines      These medications were sent to [facility], - [place] - [address]  [address], [place] [address]     Phone:  [phone]     buPROPion 300 MG 24 hr tablet                Primary Care Provider Office Phone # Fax #    Patricia Brooke [phone]       [address]  [facility] [address]        Equal Access to Services     AMANDA JAFFE : Arsh Tyler, wamitzida luqadaha, qaybta kaalrebecca velásquez. [facility] [phone].    ATENCIÓN: Si norm griffin " disposición servicios gratuitos de asistencia lingüística. Kwan augustin [phone].    We comply with applicable federal civil rights laws and [place] laws. We do not discriminate on the basis of race, color, national origin, age, disability, sex, sexual orientation, or gender identity.            Thank you!     Thank you for choosing [facility] AND [facility]  for your care. Our goal is always to provide you with excellent care. Hearing back from our patients is one way we can continue to improve our services. Please take a few minutes to complete the written survey that you may receive in the mail after your visit with us. Thank you!             Your Updated Medication List - Protect others around you: Learn how to safely use, store and throw away your medicines at www.disposemymeds.org.          This list is accurate as of 8/20/18  4:09 PM.  Always use your most recent med list.                   Brand Name Dispense Instructions for use Diagnosis    acyclovir 5 % ointment    ZOVIRAX          ascorbic acid 500 MG tablet    VITAMIN C     Take 500 mg by mouth daily        buPROPion 300 MG 24 hr tablet    WELLBUTRIN XL    90 tablet    Take 1 tablet (300 mg) by mouth every morning    Recurrent major depressive disorder, in full remission (H)       Calcium carb-Vitamin D 500 mg [place]-200 units 500-200 MG-UNIT per tablet    OSCAL with D;Oyster Shell Calcium     Take 1 tablet by mouth        ESTROVEN MOOD & MEMORY Tabs      Take 1 tablet by mouth daily        FIBER (GUAR GUM) PO           Glucosamine-Chondroitin 750 & 400 MG Misc      Take 1 tablet by mouth daily        L-Lysine 500 MG Tabs      Take 1 tablet by mouth daily        MIRALAX powder   Generic drug:  polyethylene glycol      Take by mouth daily        other medical supplies     1 each    Tennis elbow strap.  Diagnosis:  M77.11.  Length of need:  99.    Right elbow pain       triamcinolone 55 MCG/ACT inhaler    NASACORT     2 sprays

## 2018-08-21 ASSESSMENT — PATIENT HEALTH QUESTIONNAIRE - PHQ9: SUM OF ALL RESPONSES TO PHQ QUESTIONS 1-9: 0

## 2018-08-29 ENCOUNTER — THERAPY VISIT (OUTPATIENT)
Dept: CHIROPRACTIC MEDICINE | Facility: OTHER | Age: 53
End: 2018-08-29
Attending: CHIROPRACTOR
Payer: COMMERCIAL

## 2018-08-29 DIAGNOSIS — M99.03 SEGMENTAL AND SOMATIC DYSFUNCTION OF LUMBAR REGION: Primary | ICD-10-CM

## 2018-08-29 DIAGNOSIS — M62.838 SPASM OF MUSCLE: ICD-10-CM

## 2018-08-29 DIAGNOSIS — M54.42 ACUTE LEFT-SIDED LOW BACK PAIN WITH LEFT-SIDED SCIATICA: ICD-10-CM

## 2018-08-29 PROCEDURE — 98940 CHIROPRACT MANJ 1-2 REGIONS: CPT | Mod: AT | Performed by: CHIROPRACTOR

## 2018-08-29 NOTE — MR AVS SNAPSHOT
After Visit Summary   8/29/2018    Soraida Suresh    MRN: [id number]           Patient Information     Date Of Birth          1965        Visit Information        Provider Department      8/29/2018 3:45 PM Heath Gomez DC [facility]        Today's Diagnoses     Segmental and somatic dysfunction of lumbar region    -  1    Acute left-sided low back pain with left-sided sciatica        Spasm of muscle          Care Instructions    Perform activities as tolerated.  Utilize massage therapy to continue to help with spasms of the neck upper back region and of the low back.  Continue to use home stretching program.          Follow-ups after your visit        Follow-up notes from your care team     Return if symptoms worsen or fail to improve.      Who to contact     If you have questions or need follow up information about today's clinic visit or your schedule please contact [facility] [employer] directly at [phone].  Normal or non-critical lab and imaging results will be communicated to you by [employer], letter or phone within 4 business days after the clinic has received the results. If you do not hear from us within 7 days, please contact the clinic through [employer] or phone. If you have a critical or abnormal lab result, we will notify you by phone as soon as possible.  Submit refill requests through [employer] or call your pharmacy and they will forward the refill request to us. Please allow 3 business days for your refill to be completed.          Additional Information About Your Visit        MyChart Information     [employer] gives you secure access to your electronic health record. If you see a primary care provider, you can also send messages to your care team and make appointments. If you have questions, please call your primary care clinic.  If you do not have a primary care provider, please call [phone] and they will assist you.        Care EveryWhere  ID     This is your Care EveryWhere ID. This could be used by other organizations to access your [facility] medical records  [id number]         Blood Pressure from Last 3 Encounters:   08/20/18 122/80   05/22/18 120/60   09/13/17 116/74    Weight from Last 3 Encounters:   08/20/18 70.8 kg (156 lb)   05/22/18 73.9 kg (163 lb)   09/13/17 75.8 kg (167 lb)              We Performed the Following     CHIROPRAC MANIP,SPINAL,1-2 REGIONS        Primary Care Provider Office Phone # Fax #    Patricia Ami Brooke [phone] [address] [facility] [address]        Equal Access to Services     STEFANO JAFFE : Hadii leopoldo washingtono Sojudah, waaxda luqadaha, qaybta kaalmada adeegyada, rebecca coyle . So [facility] [phone].    ATENCIÓN: Si habla español, tiene a bee disposición servicios gratuitos de asistencia lingüística. [facility] [phone].    We comply with applicable federal civil rights laws and [place] laws. We do not discriminate on the basis of race, color, national origin, age, disability, sex, sexual orientation, or gender identity.            Thank you!     Thank you for choosing [facility]  for your care. Our goal is always to provide you with excellent care. Hearing back from our patients is one way we can continue to improve our services. Please take a few minutes to complete the written survey that you may receive in the mail after your visit with us. Thank you!             Your Updated Medication List - Protect others around you: Learn how to safely use, store and throw away your medicines at www.disposemymeds.org.          This list is accurate as of 8/29/18 11:59 PM.  Always use your most recent med list.                   Brand Name Dispense Instructions for use Diagnosis    acyclovir 5 % ointment    ZOVIRAX          ascorbic acid 500 MG tablet    VITAMIN C     Take 500 mg by mouth daily        buPROPion 300 MG 24 hr  tablet    WELLBUTRIN XL    90 tablet    Take 1 tablet (300 mg) by mouth every morning    Recurrent major depressive disorder, in full remission (H)       calcium carbonate 500 mg-vitamin D 200 units 500-200 MG-UNIT per tablet    OSCAL with D;OYSTER SHELL CALCIUM     Take 1 tablet by mouth        ESTROVEN MOOD & MEMORY Tabs      Take 1 tablet by mouth daily        FIBER (GUAR GUM) PO           Glucosamine-Chondroitin 750 & 400 MG Misc      Take 1 tablet by mouth daily        L-Lysine 500 MG Tabs      Take 1 tablet by mouth daily        MIRALAX powder   Generic drug:  polyethylene glycol      Take by mouth daily        other medical supplies     1 each    Tennis elbow strap.  Diagnosis:  M77.11.  Length of need:  99.    Right elbow pain       triamcinolone 55 MCG/ACT inhaler    NASACORT     2 sprays

## 2018-08-29 NOTE — PROGRESS NOTES
Visit #:  2    Subjective:  Soraida Suresh is a 53 year old female who is seen in f/u up for:        Segmental and somatic dysfunction of lumbar region  Acute left-sided low back pain with left-sided sciatica  Spasm of muscle.     Since last visit on 8/16/2018,  Soraida Suresh reports:    Area of chief complaint:  Patient reports since her last treatment her neck pain and headache symptoms have improved. Pain is currently 0/10. Patient does note neck stiffness upon waking in the morning. Patient reports falling on Aug. 18 at home causing her low back pain to increase. Patient reports her low back symptoms are currently ranked at 3/10. Patient reports noticing sciatic like pain which extends into the left mid hamstring but not beyond the left knee. Patient reports tripping over her left foot which she has noted has felt more difficult to raise more recently and has caused additional falls recently.   Patient was provided with massage referral paperwork on today's visit as it is believed massage therapy would continue to benefit her health.        Objective:  The following was observed:    P: palpatory tenderness is localized over L5 on the left side.  Mild discomfort noted over the cervical paraspinals into the upper trapezium bilaterally  A: static palpation demonstrates intersegmental asymmetry , lumbar  R: motion palpation notes restricted motion, :  L5 Left lateral flexion restricted and Extension restriction  T: Spasms are noted along the lumbar and cervical paraspinals bilaterally. Left hamstring muscle group is in spasm.     Segmental spinal dysfunction/restrictions found at:  :  L5 Left lateral flexion restricted and Extension restriction.      Assessment:    Diagnoses:      1. Segmental and somatic dysfunction of lumbar region    2. Acute left-sided low back pain with left-sided sciatica    3. Spasm of muscle        Patient's condition:  Patient had restrictions pre-manipulation and Patient symptoms are  gradually improving    Treatment effectiveness:  Post manipulation there is better intersegmental movement and Patients symptoms are getting better      Procedures:  CMT:  49315 Chiropractic manipulative treatment 1-2 regions performed   Lumbar: Diversified, L5, Side posture    Modalities:  None performed this visit    Therapeutic procedures:  None    Response to Treatment  Reduction in symptoms as reported by patient    Prognosis: Good, is believed patient will respond well to care based off of the patient's previous encounter.  Patient notes when headache symptoms are present or nerve/radiation of symptoms are noted pain often takes time to improve.  Patient noted pain resolved very quickly following last treatment of the neck and head.  It is expected patient's low back will respond just as quickly.    Progress towards Goals: Patient is making progress towards the goal.     Recommendations:    Instructions:Perform activities as tolerated.  Utilize massage therapy to continue to help with spasms of the neck upper back region and of the low back.  Continue to use home stretching program.    Follow-up:  Return to care if symptoms persist.

## 2018-08-30 NOTE — PATIENT INSTRUCTIONS
Perform activities as tolerated.  Utilize massage therapy to continue to help with spasms of the neck upper back region and of the low back.  Continue to use home stretching program.

## 2018-09-04 ENCOUNTER — TRANSFERRED RECORDS (OUTPATIENT)
Dept: HEALTH INFORMATION MANAGEMENT | Facility: OTHER | Age: 53
End: 2018-09-04

## 2018-09-27 ENCOUNTER — MYC MEDICAL ADVICE (OUTPATIENT)
Dept: FAMILY MEDICINE | Facility: OTHER | Age: 53
End: 2018-09-27

## 2018-10-01 ENCOUNTER — OFFICE VISIT (OUTPATIENT)
Dept: FAMILY MEDICINE | Facility: OTHER | Age: 53
End: 2018-10-01
Attending: FAMILY MEDICINE
Payer: COMMERCIAL

## 2018-10-01 ENCOUNTER — HOSPITAL ENCOUNTER (OUTPATIENT)
Dept: GENERAL RADIOLOGY | Facility: OTHER | Age: 53
Discharge: HOME | End: 2018-10-01
Attending: FAMILY MEDICINE | Admitting: FAMILY MEDICINE
Payer: COMMERCIAL

## 2018-10-01 VITALS
SYSTOLIC BLOOD PRESSURE: 106 MMHG | HEART RATE: 76 BPM | BODY MASS INDEX: 26.12 KG/M2 | WEIGHT: 159.4 LBS | DIASTOLIC BLOOD PRESSURE: 78 MMHG

## 2018-10-01 DIAGNOSIS — M79.645 PAIN OF FINGER OF LEFT HAND: ICD-10-CM

## 2018-10-01 DIAGNOSIS — M79.645 PAIN OF FINGER OF LEFT HAND: Primary | ICD-10-CM

## 2018-10-01 PROCEDURE — 99213 OFFICE O/P EST LOW 20 MIN: CPT | Performed by: FAMILY MEDICINE

## 2018-10-01 PROCEDURE — 73130 X-RAY EXAM OF HAND: CPT | Mod: LT

## 2018-10-01 ASSESSMENT — PAIN SCALES - GENERAL: PAINLEVEL: SEVERE PAIN (6)

## 2018-10-01 NOTE — TELEPHONE ENCOUNTER
Yes, I did get the paperwork.  Sorry for the delay in touching base with her about it.  I have been out of the office for the past few days.  It would be helpful to see her if possible to discuss the findings.  Could you please help her in making an appointment?  Patricia Brooke MD on 10/1/2018 at 1:02 PM

## 2018-10-01 NOTE — MR AVS SNAPSHOT
After Visit Summary   10/1/2018    Soraida Suresh    MRN: [id number]           Patient Information     Date Of Birth          1965        Visit Information        Provider Department      10/1/2018 2:45 PM Waqar Mckeon MD [facility]        Today's Diagnoses     Pain of finger of left hand    -  1       Follow-ups after your visit        Your next 10 appointments already scheduled     Oct 08, 2018  1:30 PM CDT   Office Visit with Patricia Brooke MD   [facility] ([facility])    [address]  [place] [address]   [phone]           Bring a current list of meds and any records pertaining to this visit. For Physicals, please bring immunization records and any forms needing to be filled out. Please arrive 10 minutes early to complete paperwork.              Future tests that were ordered for you today     Open Future Orders        Priority Expected Expires Ordered    XR Hand Left G/E 3 Views Routine 10/1/2018 10/1/2019 10/1/2018            Who to contact     If you have questions or need follow up information about today's clinic visit or your schedule please contact [facility] directly at [phone].  Normal or non-critical lab and imaging results will be communicated to you by Visiprisehart, letter or phone within 4 business days after the clinic has received the results. If you do not hear from us within 7 days, please contact the clinic through [employer] or phone. If you have a critical or abnormal lab result, we will notify you by phone as soon as possible.  Submit refill requests through [employer] or call your pharmacy and they will forward the refill request to us. Please allow 3 business days for your refill to be completed.          Additional Information About Your Visit        [employer] Information     [employer] gives you secure access to your electronic health record. If you see a  primary care provider, you can also send messages to your care team and make appointments. If you have questions, please call your primary care clinic.  If you do not have a primary care provider, please call [phone] and they will assist you.        Care EveryWhere ID     This is your Care EveryWhere ID. This could be used by other organizations to access your [facility] medical records  [id number]        Your Vitals Were     Pulse BMI (Body Mass Index)                76 26.12 kg/m2           Blood Pressure from Last 3 Encounters:   10/01/18 106/78   08/20/18 122/80   05/22/18 120/60    Weight from Last 3 Encounters:   10/01/18 159 lb 6.4 oz (72.3 kg)   08/20/18 156 lb (70.8 kg)   05/22/18 163 lb (73.9 kg)               Primary Care Provider Office Phone # Fax #    Patricia Ami Brooke [phone]       [address] [facility] [address]        Equal Access to Services     [facility] AH: Hadii aad ku hadasho Soomaali, waaxda luqadaha, qaybta kaalmada adeegyada, waxay idiin hayzafarn kp coyle . So [facility] [phone].    ATENCIÓN: Si habla español, tiene a bee disposición servicios gratuitos de asistencia lingüística. [facility] [phone].    We comply with applicable federal civil rights laws and [place] laws. We do not discriminate on the basis of race, color, national origin, age, disability, sex, sexual orientation, or gender identity.            Thank you!     Thank you for choosing [facility]  for your care. Our goal is always to provide you with excellent care. Hearing back from our patients is one way we can continue to improve our services. Please take a few minutes to complete the written survey that you may receive in the mail after your visit with us. Thank you!             Your Updated Medication List - Protect others around you: Learn how to safely use, store and throw away your medicines at www.disposemymeds.org.          This list is  accurate as of 10/1/18  3:28 PM.  Always use your most recent med list.                   Brand Name Dispense Instructions for use Diagnosis    acyclovir 5 % ointment    ZOVIRAX          ascorbic acid 500 MG tablet    VITAMIN C     Take 500 mg by mouth daily        buPROPion 300 MG 24 hr tablet    WELLBUTRIN XL    90 tablet    Take 1 tablet (300 mg) by mouth every morning    Recurrent major depressive disorder, in full remission (H)       calcium carbonate 500 mg-vitamin D 200 units 500-200 MG-UNIT per tablet    OSCAL with D;OYSTER SHELL CALCIUM     Take 1 tablet by mouth        ESTROVEN MOOD & MEMORY Tabs      Take 1 tablet by mouth daily        FIBER (GUAR GUM) PO           Glucosamine-Chondroitin 750 & 400 MG Misc      Take 1 tablet by mouth daily        L-Lysine 500 MG Tabs      Take 1 tablet by mouth daily        MIRALAX powder   Generic drug:  polyethylene glycol      Take by mouth daily        other medical supplies     1 each    Tennis elbow strap.  Diagnosis:  M77.11.  Length of need:  99.    Right elbow pain       triamcinolone 55 MCG/ACT inhaler    NASACORT     2 sprays

## 2018-10-01 NOTE — PROGRESS NOTES
SUBJECTIVE:  Soraida Suresh is a 53 year old female here for left hand pain.  She reports that she was going up some stairs at home on September 29 when she slipped on a step and fell forward hitting the ulnar aspect of her left hand into the door frame.  She thinks that her fifth finger was also deviated ulnarly.  Her  helped relocated and she has been lloyd taping it ever since.  She has been using some Tylenol and ice as well.  She is having quite a bit of pain along the ulnar aspect of her hand and fourth and fifth digits.  She is right-hand dominant.    ROS:    As above otherwise ROS is unremarkable.    OBJECTIVE:  /78  Pulse 76  Wt 159 lb 6.4 oz (72.3 kg)  BMI 26.12 kg/m2    EXAM:  General Appearance: Pleasant, alert, appropriate appearance for age. No acute distress  Musculoskeletal: Left wrist shows normal range of motion.  She has tenderness palpation along her fourth and fifth metacarpals, fourth and fifth MCPs and fifth proximal phalanx.  She also has some bruising noted along the volar aspect of her hand.  Range of motion of her fingers was reduced due to pain.  Skin: Capillary refill is intact distally.  Neurologic Exam: Normal distal sensation light touch.    ASSESSEMENT AND PLAN:    1. Pain of finger of left hand      Based on her exam and history of trauma an x-ray was performed and personally reviewed and shows a nondisplaced spiral fracture of her fifth metacarpal.  She was placed in ulnar gutter splint and follow-up in 1 week for cast placement.    Ricki Mckeon MD    This document was prepared using voice generated software.  While every attempt was made for accuracy, grammatical errors may exist.

## 2018-10-01 NOTE — NURSING NOTE
Patient presents today for injury to her left hand. Patient reports she fell walking up the stairs on Saturday, and she hit her left hand on the door frame. She rates her pain 6/10 in her hand today.    Debby Rubio LPN on 10/1/2018 at 2:51 PM

## 2018-10-02 ENCOUNTER — MYC MEDICAL ADVICE (OUTPATIENT)
Dept: FAMILY MEDICINE | Facility: OTHER | Age: 53
End: 2018-10-02

## 2018-10-05 ENCOUNTER — OFFICE VISIT (OUTPATIENT)
Dept: ORTHOPEDICS | Facility: OTHER | Age: 53
End: 2018-10-05
Attending: FAMILY MEDICINE
Payer: COMMERCIAL

## 2018-10-05 DIAGNOSIS — M79.642 PAIN OF LEFT HAND: Primary | ICD-10-CM

## 2018-10-05 PROCEDURE — 29075 APPL CST ELBW FNGR SHORT ARM: CPT

## 2018-10-05 NOTE — PROGRESS NOTES
Cast placed per Dr. Mckeon's request.  Patient tolerated well.   A well fitting short arm ulnar gutter cast was applied with the appropriate padding.  CMS is intact.  Cast care instructions were reviewed with the patient.  Iva Young LPN .......10/5/2018 4:16 PM

## 2018-10-05 NOTE — NURSING NOTE
A well fitting ulnar gutter short arm cast was applied with the appropriate padding.  CMS is intact.  Cast care instructions were reviewed.  Iva Young LPN .......10/5/2018 4:13 PM

## 2018-10-05 NOTE — MR AVS SNAPSHOT
After Visit Summary   10/5/2018    Soraida Suresh    MRN: [id number]           Patient Information     Date Of Birth          1965        Visit Information        Provider Department      10/5/2018 3:30 PM [facility]        Today's Diagnoses     Pain of left hand    -  1       Follow-ups after your visit        Your next 10 appointments already scheduled     Oct 08, 2018  1:30 PM CDT   [facility]    [address]  [place] [id number] [phone]           Bring a current list of meds and any records pertaining to this visit. For Physicals, please bring immunization records and any forms needing to be filled out. Please arrive 10 minutes early to complete paperwork.            Oct 09, 2018 10:45 AM CDT   RENETTA Chiropractor with Heath Gomez DC   [facility] ([facility])    [address]  [place] [address]   [phone]            Nov 19, 2018 12:45 PM CST   SHORT with Waqar Mckeon [facility]   [facility]    [email or web address]  [place] [address] [phone]              Who to contact     If you have questions or need follow up information about today's clinic visit or your schedule please contact [facility] directly at [phone].  Normal or non-critical lab and imaging results will be communicated to you by MyChart, letter or phone within 4 business days after the clinic has received the results. If you do not hear from us within 7 days, please contact the clinic through [employer] or phone. If you have a critical or abnormal lab result, we will notify you by phone as soon as possible.  Submit refill requests through Kanjoya or call your pharmacy and they will forward the refill request to us. Please allow  3 business days for your refill to be completed.          Additional Information About Your Visit        MyChart Information     [employer] gives you secure access to your electronic health record. If you see a primary care provider, you can also send messages to your care team and make appointments. If you have questions, please call your primary care clinic.  If you do not have a primary care provider, please call [phone] and they will assist you.        Care EveryWhere ID     This is your Care EveryWhere ID. This could be used by other organizations to access your [facility] medical records  [id number]         Blood Pressure from Last 3 Encounters:   10/01/18 106/78   08/20/18 122/80   05/22/18 120/60    Weight from Last 3 Encounters:   10/01/18 159 lb 6.4 oz (72.3 kg)   08/20/18 156 lb (70.8 kg)   05/22/18 163 lb (73.9 kg)              We Performed the Following     APPLY SHORT ARM CAST        Primary Care Provider Office Phone # Fax #    Patricia Ami Brooke [phone]       [address] [facility] [address]        Equal Access to Services     [facility]: Hadii aad ku hadasho Soomaali, waaxda luqadaha, qaybta kaalmada adeseymour, rebecca coyle . So [facility] [phone].    ATENCIÓN: Si habla español, tiene a bee disposición servicios gratuitos de asistencia lingüística. [facility] [phone].    We comply with applicable federal civil rights laws and [place] laws. We do not discriminate on the basis of race, color, national origin, age, disability, sex, sexual orientation, or gender identity.            Thank you!     Thank you for choosing [facility] AND [facility]  for your care. Our goal is always to provide you with excellent care. Hearing back from our patients is one way we can continue to improve our services. Please take a few minutes to complete the written survey that you may receive in the mail after your visit with us. Thank you!              Your Updated Medication List - Protect others around you: Learn how to safely use, store and throw away your medicines at www.disposemymeds.org.          This list is accurate as of 10/5/18  4:17 PM.  Always use your most recent med list.                   Brand Name Dispense Instructions for use Diagnosis    acyclovir 5 % ointment    ZOVIRAX          ascorbic acid 500 MG tablet    VITAMIN C     Take 500 mg by mouth daily        buPROPion 300 MG 24 hr tablet    WELLBUTRIN XL    90 tablet    Take 1 tablet (300 mg) by mouth every morning    Recurrent major depressive disorder, in full remission (H)       calcium carbonate 500 mg-vitamin D 200 units 500-200 MG-UNIT per tablet    OSCAL with D;OYSTER SHELL CALCIUM     Take 1 tablet by mouth        ESTROVEN MOOD & MEMORY Tabs      Take 1 tablet by mouth daily        FIBER (GUAR GUM) PO           Glucosamine-Chondroitin 750 & 400 MG Misc      Take 1 tablet by mouth daily        L-Lysine 500 MG Tabs      Take 1 tablet by mouth daily        MIRALAX powder   Generic drug:  polyethylene glycol      Take by mouth daily        other medical supplies     1 each    Tennis elbow strap.  Diagnosis:  M77.11.  Length of need:  99.    Right elbow pain       triamcinolone 55 MCG/ACT inhaler    NASACORT     2 sprays

## 2018-10-08 ENCOUNTER — OFFICE VISIT (OUTPATIENT)
Dept: FAMILY MEDICINE | Facility: OTHER | Age: 53
End: 2018-10-08
Attending: FAMILY MEDICINE
Payer: COMMERCIAL

## 2018-10-08 VITALS
DIASTOLIC BLOOD PRESSURE: 68 MMHG | RESPIRATION RATE: 16 BRPM | BODY MASS INDEX: 26.12 KG/M2 | WEIGHT: 159.4 LBS | SYSTOLIC BLOOD PRESSURE: 110 MMHG | HEART RATE: 76 BPM

## 2018-10-08 DIAGNOSIS — R31.29 MICROSCOPIC HEMATURIA: ICD-10-CM

## 2018-10-08 DIAGNOSIS — S62.327S: ICD-10-CM

## 2018-10-08 DIAGNOSIS — N28.9 MILD RENAL INSUFFICIENCY: ICD-10-CM

## 2018-10-08 DIAGNOSIS — E78.5 HYPERLIPIDEMIA LDL GOAL <100: ICD-10-CM

## 2018-10-08 DIAGNOSIS — R71.8 ELEVATED MCV: Primary | ICD-10-CM

## 2018-10-08 LAB
ALBUMIN UR-MCNC: NEGATIVE MG/DL
APPEARANCE UR: CLEAR
BASOPHILS # BLD AUTO: 0 10E9/L (ref 0–0.2)
BASOPHILS NFR BLD AUTO: 0.3 %
BILIRUB UR QL STRIP: NEGATIVE
COLOR UR AUTO: YELLOW
DIFFERENTIAL METHOD BLD: ABNORMAL
EOSINOPHIL # BLD AUTO: 0 10E9/L (ref 0–0.7)
EOSINOPHIL NFR BLD AUTO: 0.6 %
ERYTHROCYTE [DISTWIDTH] IN BLOOD BY AUTOMATED COUNT: 11.9 % (ref 10–15)
FOLATE SERPL-MCNC: 10 NG/ML
GLUCOSE UR STRIP-MCNC: NEGATIVE MG/DL
HCT VFR BLD AUTO: 42.1 % (ref 35–47)
HGB BLD-MCNC: 14.1 G/DL (ref 11.7–15.7)
HGB UR QL STRIP: ABNORMAL
IMM GRANULOCYTES # BLD: 0 10E9/L (ref 0–0.4)
IMM GRANULOCYTES NFR BLD: 0.3 %
KETONES UR STRIP-MCNC: NEGATIVE MG/DL
LEUKOCYTE ESTERASE UR QL STRIP: NEGATIVE
LYMPHOCYTES # BLD AUTO: 2.5 10E9/L (ref 0.8–5.3)
LYMPHOCYTES NFR BLD AUTO: 35 %
MCH RBC QN AUTO: 33.2 PG (ref 26.5–33)
MCHC RBC AUTO-ENTMCNC: 33.5 G/DL (ref 31.5–36.5)
MCV RBC AUTO: 99 FL (ref 78–100)
MONOCYTES # BLD AUTO: 0.4 10E9/L (ref 0–1.3)
MONOCYTES NFR BLD AUTO: 5.4 %
NEUTROPHILS # BLD AUTO: 4.2 10E9/L (ref 1.6–8.3)
NEUTROPHILS NFR BLD AUTO: 58.4 %
NITRATE UR QL: NEGATIVE
PH UR STRIP: 6 PH (ref 5–9)
PLATELET # BLD AUTO: 280 10E9/L (ref 150–450)
RBC # BLD AUTO: 4.25 10E12/L (ref 3.8–5.2)
RBC #/AREA URNS AUTO: ABNORMAL /HPF
SOURCE: ABNORMAL
SP GR UR STRIP: 1.02 (ref 1–1.03)
UROBILINOGEN UR STRIP-ACNC: 0.2 EU/DL (ref 0.2–1)
VIT B12 SERPL-MCNC: 747 PG/ML (ref 180–914)
WBC # BLD AUTO: 7.2 10E9/L (ref 4–11)
WBC #/AREA URNS AUTO: ABNORMAL /HPF

## 2018-10-08 PROCEDURE — 85025 COMPLETE CBC W/AUTO DIFF WBC: CPT | Performed by: FAMILY MEDICINE

## 2018-10-08 PROCEDURE — 82746 ASSAY OF FOLIC ACID SERUM: CPT | Performed by: FAMILY MEDICINE

## 2018-10-08 PROCEDURE — 82607 VITAMIN B-12: CPT | Performed by: FAMILY MEDICINE

## 2018-10-08 PROCEDURE — 36415 COLL VENOUS BLD VENIPUNCTURE: CPT | Performed by: FAMILY MEDICINE

## 2018-10-08 PROCEDURE — 99214 OFFICE O/P EST MOD 30 MIN: CPT | Performed by: FAMILY MEDICINE

## 2018-10-08 PROCEDURE — 87086 URINE CULTURE/COLONY COUNT: CPT | Performed by: FAMILY MEDICINE

## 2018-10-08 PROCEDURE — 81001 URINALYSIS AUTO W/SCOPE: CPT | Performed by: FAMILY MEDICINE

## 2018-10-08 ASSESSMENT — PAIN SCALES - GENERAL: PAINLEVEL: NO PAIN (0)

## 2018-10-08 ASSESSMENT — ENCOUNTER SYMPTOMS
DYSURIA: 0
FREQUENCY: 0
FATIGUE: 0
FEVER: 0
HEMATURIA: 0

## 2018-10-08 NOTE — NURSING NOTE
"Chief Complaint   Patient presents with     Results       Initial /68 (BP Location: Right arm, Patient Position: Chair, Cuff Size: Adult Regular)  Pulse 76  Resp 16  Wt 159 lb 6.4 oz (72.3 kg)  BMI 26.12 kg/m2 Estimated body mass index is 26.12 kg/(m^2) as calculated from the following:    Height as of 8/20/18: 5' 5.5\" (1.664 m).    Weight as of this encounter: 159 lb 6.4 oz (72.3 kg).  Medication Reconciliation: complete    Felicita Carranza LPN  "

## 2018-10-08 NOTE — MR AVS SNAPSHOT
After Visit Summary   10/8/2018    Soraida Suresh    MRN: [id number]           Patient Information     Date Of Birth          1965        Visit Information        Provider Department      10/8/2018 1:30 PM Patricia Brooke MD [facility]        Today's Diagnoses     Elevated MCV    -  1    Hyperlipidemia LDL goal <100        Microscopic hematuria           Follow-ups after your visit        Your next 10 appointments already scheduled     Oct 09, 2018 10:45 AM CDT   RENETTA Chiropractor with Heath Gomez DC   [facility] ([facility])    [address]  [place] [address]   [phone]            Nov 19, 2018 12:45 PM CST   SHORT with Waqar Mckeon MD   [facility] ([facility])    [address]  [facility] [id number] [phone]              Future tests that were ordered for you today     Open Future Orders        Priority Expected Expires Ordered    CBC with platelets differential Routine  10/9/2019 10/8/2018    Vitamin B12 Routine  10/8/2019 10/8/2018    Folic Acid Routine  10/8/2019 10/8/2018            Who to contact     If you have questions or need follow up information about today's clinic visit or your schedule please contact [facility] directly at [phone].  Normal or non-critical lab and imaging results will be communicated to you by MyChart, letter or phone within 4 business days after the clinic has received the results. If you do not hear from us within 7 days, please contact the clinic through MyChart or phone. If you have a critical or abnormal lab result, we will notify you by phone as soon as possible.  Submit refill requests through [employer] or call your pharmacy and they will forward the refill request to us. Please allow 3 business days for your refill to be completed.          Additional Information About Your  Visit        [employer] Information     [employer] gives you secure access to your electronic health record. If you see a primary care provider, you can also send messages to your care team and make appointments. If you have questions, please call your primary care clinic.  If you do not have a primary care provider, please call [phone] and they will assist you.        Care EveryWhere ID     This is your Care EveryWhere ID. This could be used by other organizations to access your [facility] medical records  [id number]        Your Vitals Were     Pulse Respirations BMI (Body Mass Index)             76 16 26.12 kg/m2          Blood Pressure from Last 3 Encounters:   10/08/18 110/68   10/01/18 106/78   08/20/18 122/80    Weight from Last 3 Encounters:   10/08/18 159 lb 6.4 oz (72.3 kg)   10/01/18 159 lb 6.4 oz (72.3 kg)   08/20/18 156 lb (70.8 kg)              We Performed the Following     UA reflex to Microscopic and Culture        Primary Care Provider Office Phone # Fax #    Patricia Ami Brooke [phone] [address] [facility] [address]        Equal Access to Services     [facility] ALANNAH : Hadii leopoldo washingtono Sojudah, waaxda luqadaha, qaybta kaalmada adecristhianda, rebecca monaco. So [facility] [phone].    ATENCIÓN: Si habla español, tiene a bee disposición servicios gratuitos de asistencia lingüística. Kwan al [phone].    We comply with applicable federal civil rights laws and [place] laws. We do not discriminate on the basis of race, color, national origin, age, disability, sex, sexual orientation, or gender identity.            Thank you!     Thank you for choosing [facility] AND hospitals  for your care. Our goal is always to provide you with excellent care. Hearing back from our patients is one way we can continue to improve our services. Please take a few minutes to complete the written survey that you may receive in the mail after  your visit with us. Thank you!             Your Updated Medication List - Protect others around you: Learn how to safely use, store and throw away your medicines at www.disposemymeds.org.          This list is accurate as of 10/8/18  2:12 PM.  Always use your most recent med list.                   Brand Name Dispense Instructions for use Diagnosis    acyclovir 5 % ointment    ZOVIRAX          ascorbic acid 500 MG tablet    VITAMIN C     Take 500 mg by mouth daily        buPROPion 300 MG 24 hr tablet    WELLBUTRIN XL    90 tablet    Take 1 tablet (300 mg) by mouth every morning    Recurrent major depressive disorder, in full remission (H)       calcium carbonate 500 mg-vitamin D 200 units 500-200 MG-UNIT per tablet    OSCAL with D;OYSTER SHELL CALCIUM     Take 1 tablet by mouth        ESTROVEN MOOD & MEMORY Tabs      Take 1 tablet by mouth daily        FIBER (GUAR GUM) PO           Glucosamine-Chondroitin 750 & 400 MG Misc      Take 1 tablet by mouth daily        L-Lysine 500 MG Tabs      Take 1 tablet by mouth daily        MIRALAX powder   Generic drug:  polyethylene glycol      Take by mouth daily        other medical supplies     1 each    Tennis elbow strap.  Diagnosis:  M77.11.  Length of need:  99.    Right elbow pain       triamcinolone 55 MCG/ACT inhaler    NASACORT     2 sprays

## 2018-10-08 NOTE — PROGRESS NOTES
"  SUBJECTIVE:   Nursing Notes:   Felicita Carranza LPN  10/8/2018  1:37 PM  Unsigned  Chief Complaint   Patient presents with     Results       Initial /68 (BP Location: Right arm, Patient Position: Chair, Cuff Size: Adult Regular)  Pulse 76  Resp 16  Wt 159 lb 6.4 oz (72.3 kg)  BMI 26.12 kg/m2 Estimated body mass index is 26.12 kg/(m^2) as calculated from the following:    Height as of 8/20/18: 5' 5.5\" (1.664 m).    Weight as of this encounter: 159 lb 6.4 oz (72.3 kg).  Medication Reconciliation: complete    Felicita Carranza LPN    Soraida Suresh is a 53 year old female who presents to clinic today for follow up of some labwork she had done through her 's employer.  There were a few mild abnormalities that she wanted to follow-up on.  Notably, her GFR was 86, BUN 12 and creatinine 0.71.  Also she had a normal hemoglobin of 13.9 but a an MCV of 102.2.  Also her total cholesterol was 242, triglycerides 36,  and .  Her UA also showed 3-10 white blood cells per high-power field.  She denies any hematuria.    With respect to her mildly increased GFR, she does admit that she had been using a lot of NSAIDs when she was having some knee issues.  She is currently trying to avoid using these types of medications.    She states that she has no known history of B12 or folate deficiency.  She has been taking B12 supplement recently to try to improve this.  She does admit that she drinks about 2 alcoholic beverages per day, usually wine.    She states that she does have a family history of hyperlipidemia.  She has worked hard to try to watch her diet very closely.  She also exercises on a regular basis.    She also recently broke her left fifth metacarpal.  She had tripped on some stairs and hit her hand against a door jam.  She is scheduled to see Dr. Mckeon for removal of her cast on 11/19/2018.  She wonders if she would be able to see Dr. Schuster instead and try to see him the week prior to " this.  She is going to be leaving on vacation to [place] the week of 11/19/2018 and wanted to be seen sooner in case there was any further follow-up that needed to be completed.    HPI    I personally reviewed medications/allergies/history listed below:    Patient Active Problem List    Diagnosis Date Noted     Mild renal insufficiency 10/08/2018     Priority: Medium     9/4/18 outside labs - GFR 86  Creatinine 0.71, BUN 12.       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         Cervical disc herniation 07/23/2013     Priority: Medium     Overview:   C6/7 on left;  S/p injection Dr. 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     Priority: Medium     Past Medical History:   Diagnosis Date     Abnormal cytological finding in specimen from cervix uteri     10/11/2011     Allergic rhinitis     No Comments Provided     Benign lipomatous neoplasm of skin and subcutaneous tissue of trunk     1/20/2016     Contact dermatitis     No Comments Provided     Dyshidrosis     No Comments  Provided     Encounter for screening for malignant neoplasm of colon     2014     Gastro-esophageal reflux disease without esophagitis     No Comments Provided     Major depressive disorder, single episode     2011     Other congenital malformations of great veins (CODE)     7/10/2006     Other specified diseases of anus and rectum (CODE)     2015     Panic disorder without agoraphobia     resolved after heart surgery     Partial anomalous pulmonary venous connection     No Comments Provided     Residual hemorrhoidal skin tags     3/3/2011      Past Surgical History:   Procedure Laterality Date     anorectal exam anesthesia  2015    NE ANORECTAL EXAM SURG REQ ANESTH  DIAG     APPENDECTOMY OPEN      1987     ARTHROSCOPY KNEE      2006, ,Debridement, partial medial meniscectomy, and anterior cruciate ligament reconstruction using tibialis anterior allograft, right knee     AS HYSTEROSCOPY, SURGICAL; W/ ENDOMETRIAL ABLATION, ANY METHOD  2015    [place]     CARDIAC SURGERY  2006    Repair of anomalous pulmonary venous return by anastomosis of the anomalous vein to the left atrial appendage, done at [facility]     COLONOSCOPY      2014     ECHO LIMITED      mild left atrial enlargement;  normal ef     ESOPHAGOSCOPY, GASTROSCOPY, DUODENOSCOPY (EGD), COMBINED           ESOPHAGOSCOPY, GASTROSCOPY, DUODENOSCOPY (EGD), COMBINED           ESOPHAGOSCOPY, GASTROSCOPY, DUODENOSCOPY (EGD), COMBINED           ESOPHAGOSCOPY, GASTROSCOPY, DUODENOSCOPY (EGD), COMBINED      11,Normal     NISSEN FUNDOPLICATION  2000    Dr. Sales     SIGMOIDOSCOPY FLEXIBLE      ,And BE, negative, for rectal bleeding.  Fissures and hemorrhoids noted     SURGICAL PATHOLOGY EXAM Left 2016    Left flank     Family History   Problem Relation Age of Onset     Arthritis Mother      Arthritis,Osteoarthritis;  of 76 pneumonia but no autopsy     Diabetes Father       Diabetes     Other - See Comments Father      COPD/Benign colon polyps/AAA -  of ruptured AAA     Colon Cancer Paternal Grandmother      Cancer-colon     Colon Cancer Paternal Aunt      Cancer-colon     Diabetes Paternal Aunt      Diabetes     Obesity Sister      bariatric surgery     Other - See Comments Sister      TBI     Depression Sister      Anxiety Disorder Sister      Diabetes Sister      Hyperlipidemia Sister      Diabetes Brother      Hyperlipidemia Brother      Other - See Comments Maternal Grandmother       of old age     Dementia Maternal Grandfather      possible dementia,  of pneumonia     Other - See Comments Brother      MVA - at age 50     Hypertension Brother      Diabetes Brother      Hyperlipidemia Brother      Depression Brother      Diabetes Brother      Hyperlipidemia Brother      Macular Degeneration Brother      Diabetes Brother      Hyperlipidemia Brother      Suicide Brother 49     Cerebrovascular Disease Paternal Grandfather       of stroke     Social History   Substance Use Topics     Smoking status: Former Smoker     Quit date: 10/10/2005     Smokeless tobacco: Never Used     Alcohol use 8.4 oz/week      Comment: Alcoholic Drinks/day: 2-3 per day     Social History     Social History Narrative    This patient was  in .  She was remarried on 04.  Works at [employer], work #[phone].          Justen Suresh       Keri Flowers Daughter, born       Mina Flowers Son, born          Current Outpatient Prescriptions   Medication Sig Dispense Refill     acyclovir (ZOVIRAX) 5 % ointment        ascorbic acid (VITAMIN C) 500 MG tablet Take 500 mg by mouth daily       Black Cohosh-Soy-Ginkgo-Magnol (ESTROVEN MOOD & MEMORY) TABS Take 1 tablet by mouth daily       buPROPion (WELLBUTRIN XL) 300 MG 24 hr tablet Take 1 tablet (300 mg) by mouth every morning 90 tablet 3     Calcium carb-Vitamin D 500 mg Saxman-200 units (OSCAL WITH D;OYSTER SHELL CALCIUM)  500-200 MG-UNIT per tablet Take 1 tablet by mouth       FIBER, GUAR GUM, PO        Glucosamine-Chondroitin 750 & 400 MG MISC Take 1 tablet by mouth daily       L-Lysine 500 MG TABS Take 1 tablet by mouth daily       other medical supplies Tennis elbow strap.  Diagnosis:  M77.11.  Length of need:  99. 1 each 0     polyethylene glycol (MIRALAX) powder Take by mouth daily       triamcinolone (NASACORT) 55 MCG/ACT Inhaler 2 sprays       Allergies   Allergen Reactions     Codeine Other (See Comments)     Medroxyprogesterone Other (See Comments)     Caused patient's brain to swell.     Azithromycin Palpitations     Latex Rash     hands     Penicillins Rash     Proline Rash     Prolene: Sutures; caused keloid     Sulfamethoxazole-Trimethoprim Rash       Review of Systems   Constitutional: Negative for fatigue and fever.   Genitourinary: Negative for dysuria, frequency, hematuria and urgency.   Psychiatric/Behavioral: Negative for mood changes.        OBJECTIVE:     /68 (BP Location: Right arm, Patient Position: Chair, Cuff Size: Adult Regular)  Pulse 76  Resp 16  Wt 159 lb 6.4 oz (72.3 kg)  BMI 26.12 kg/m2  Body mass index is 26.12 kg/(m^2).  Physical Exam   Constitutional: She appears well-developed.   HENT:   Head: Normocephalic.   Eyes: Pupils are equal, round, and reactive to light.   Neck: Normal range of motion. Neck supple. No thyromegaly present.   Cardiovascular: Normal rate, regular rhythm and normal heart sounds.    No murmur heard.  Pulmonary/Chest: Effort normal and breath sounds normal. No respiratory distress. She has no wheezes. She has no rales.   Musculoskeletal: She exhibits no edema.   Left hand in short arm cast with volar flexion of fourth and fifth fingers.   Lymphadenopathy:     She has no cervical adenopathy.   Psychiatric: She has a normal mood and affect.       PHQ-2 Score:     PHQ-2 ( 1999 Pfizer) 10/8/2018 10/1/2018   Q1: Little interest or pleasure in doing things 0 0   Q2: Feeling  down, depressed or hopeless 0 0   PHQ-2 Score 0 0       I personally reviewed results withpatient as listed below:   Diagnostic Test Results:  none     ASSESSMENT/PLAN:       ICD-10-CM    1. Elevated MCV R71.8 CBC with platelets differential     Vitamin B12     Folic Acid     CBC with platelets differential     Vitamin B12     Folic Acid     Urine Microscopic   2. Hyperlipidemia LDL goal <100 E78.5    3. Microscopic hematuria R31.29 UA reflex to Microscopic and Culture     Urine Culture Aerobic Bacterial   4. Mild renal insufficiency N28.9     9/4/18 outside labs - GFR 86  Creatinine 0.71, BUN 12.   5. Closed displaced fracture of shaft of fifth metacarpal bone of left hand, sequela S62.327S ORTHOPEDICS ADULT REFERRAL       1.  Labs completed as above to evaluate further for elevated MCV.  2.  Encouraged her to continue healthy eating and exercise.  Calculated cardiovascular risk over the next 10 years and it is less than 1%  Discussed that at this time, statin medications would not be indicated, but she should recheck this yearly in a fasting state.  3.  UA will be repeated today as above.  Urine culture sent as well.  [place] 4.  Discussed that her decrease in GFR is very mild at this time.   4.   Continue avoidance of NSAIDs if at all possible, but discussed that they can be used for short periods of time as needed for acute injuries.  5.  Referred to see Dr. Schuster for ongoing orthopedic care.    Patricia Brooke MD  [facility]

## 2018-10-09 ENCOUNTER — THERAPY VISIT (OUTPATIENT)
Dept: CHIROPRACTIC MEDICINE | Facility: OTHER | Age: 53
End: 2018-10-09
Attending: CHIROPRACTOR
Payer: COMMERCIAL

## 2018-10-09 DIAGNOSIS — M54.42 ACUTE LEFT-SIDED LOW BACK PAIN WITH LEFT-SIDED SCIATICA: Primary | ICD-10-CM

## 2018-10-09 DIAGNOSIS — M62.838 SPASM OF MUSCLE: ICD-10-CM

## 2018-10-09 DIAGNOSIS — M99.04 SEGMENTAL AND SOMATIC DYSFUNCTION OF SACRAL REGION: ICD-10-CM

## 2018-10-09 PROCEDURE — 98940 CHIROPRACT MANJ 1-2 REGIONS: CPT | Mod: AT | Performed by: CHIROPRACTOR

## 2018-10-09 NOTE — PROGRESS NOTES
"Visit #:  3    Subjective:  Soraida Suresh is a 53 year old female who is seen in f/u up for:        Acute left-sided low back pain with left-sided sciatica  Spasm of muscle  Segmental and somatic dysfunction of sacral region.     Since last visit on 8/29/2018,  Soraida Suresh reports:    Area of chief complaint:  Patient presents for evaluation and care of neck and low back pain.  Patient notes neck pain symptoms are currently ranked at 4 out of 10 on a pain scale.  And low back pain is currently ranked at 5 out of 10 on a pain scale.  Patient reports falling at home about a week ago which resulted in a fracture of the fifth metacarpal.  Patient is casted at this time.  Patient notes difficulty raising her left foot and continues to note hamstring tension of the left side.  This is the third trip and fall accident involving the patient's left leg in which she has tripped over the left foot.  Patient notes activity seems to increase her low back pain symptoms.  Patient noted neck pain it was exacerbated following the trip and fall accident at home.  Patient noted a decrease of cervical rotation following the incident.       Objective:  The following was observed: Oswestry index is at 33% which is slightly increased from 29% initially  Disability index is currently at 26% which is improved from 42% initially.  Patient's gait exhibited limited left leg extension and knee flexion, this could indicate inactivity of the left hamstrings. No foot drop or \"swinging,\" of the left leg is noted  Straight leg raiser is positive on the left, negative on the right. Braggards test is negative bilaterally.    P: Palpatory tenderness is noted over the left lumbosacral region and over the right lower cervical paraspinal musculature  A: static palpation demonstrates intersegmental asymmetry , pelvis no asymmetry of the cervical spine is noted  R: motion palpation notes restricted motion, :  Sacrum Left lateral flexion restricted and " Extension restriction   Restriction of the cervical spine is noted  T: Spasms are present throughout the patient's quadratus lumborum and gluteal musculature on the left side.  Taut tender fibers are noted of the cervical paraspinals and splenius muscle groups on the right cervical spine.    Segmental spinal dysfunction/restrictions found at:  .:  Sacrum Left lateral flexion restricted and Extension restriction      Assessment: Patient continues to report left radicular symptoms which are beginning to cause difficulty raising her left foot.  Is believed further imaging studies would be beneficial in determining cause of ongoing hamstring tension and difficulty raising left foot. Patient's primary care provider, Dr. Brooke, will be contacted at the request of the patient.    Diagnoses:      1. Acute left-sided low back pain with left-sided sciatica    2. Spasm of muscle    3. Segmental and somatic dysfunction of sacral region        Patient's condition:  Patient had restrictions pre-manipulation    Treatment effectiveness:  Post manipulation there is better intersegmental movement  Patient continues to note hamstring tension with difficulty raising left foot.       Procedures:  CMT:  04495 Chiropractic manipulative treatment 1-2 regions performed   Pelvis: Diversified, Sacrum , Side posture    Modalities:  None performed this visit    Therapeutic procedures:  None    Response to Treatment  No Change in symptoms in the patient's hamstrings. Low back was reported as feeling slightly looser.    Prognosis: Guarded. Patient is exhibiting signs of neural tension of the left sciatic nerve. Further imaging studies are suggested to determine root cause.     Progress towards Goals: Patient has not made progress towards goal.     Recommendations:    Instructions:Perform activities as tolerated.    Follow-up:  Return to care in 2 days if symptoms fail to improve. Consider contacting Dr. Brooke's office for further  imaging studies.

## 2018-10-09 NOTE — PATIENT INSTRUCTIONS
Instructions:Perform activities as tolerated.    Follow-up:  Return to care in 2 days if symptoms fail to improve. Consider contacting Dr. Brooke's office for further imaging studies.

## 2018-10-10 ENCOUNTER — MYC MEDICAL ADVICE (OUTPATIENT)
Dept: FAMILY MEDICINE | Facility: OTHER | Age: 53
End: 2018-10-10

## 2018-10-10 LAB
BACTERIA SPEC CULT: NORMAL
SPECIMEN SOURCE: NORMAL

## 2018-10-11 ENCOUNTER — APPOINTMENT (OUTPATIENT)
Dept: LAB | Facility: OTHER | Age: 53
End: 2018-10-11
Attending: FAMILY MEDICINE
Payer: COMMERCIAL

## 2018-10-11 ENCOUNTER — TELEPHONE (OUTPATIENT)
Dept: CHIROPRACTIC MEDICINE | Facility: OTHER | Age: 53
End: 2018-10-11

## 2018-10-11 DIAGNOSIS — R31.29 MICROSCOPIC HEMATURIA: Primary | ICD-10-CM

## 2018-10-11 PROCEDURE — 88112 CYTOPATH CELL ENHANCE TECH: CPT | Performed by: FAMILY MEDICINE

## 2018-10-11 NOTE — TELEPHONE ENCOUNTER
Contacted Soraida with information. Dr. Gomez and Dr. Brooke spoke and agreed that Soraida should follow-up with her PCP Dr. Brooke.

## 2018-10-12 ENCOUNTER — HOSPITAL ENCOUNTER (OUTPATIENT)
Dept: CT IMAGING | Facility: OTHER | Age: 53
Discharge: HOME | End: 2018-10-12
Attending: FAMILY MEDICINE | Admitting: FAMILY MEDICINE
Payer: COMMERCIAL

## 2018-10-12 DIAGNOSIS — R31.29 MICROSCOPIC HEMATURIA: ICD-10-CM

## 2018-10-12 PROCEDURE — 74178 CT ABD&PLV WO CNTR FLWD CNTR: CPT

## 2018-10-12 PROCEDURE — 25500064 ZZH RX 255 OP 636: Performed by: FAMILY MEDICINE

## 2018-10-12 RX ADMIN — IOHEXOL 100 ML: 350 INJECTION, SOLUTION INTRAVENOUS at 16:15

## 2018-10-17 ENCOUNTER — OFFICE VISIT (OUTPATIENT)
Dept: UROLOGY | Facility: OTHER | Age: 53
End: 2018-10-17
Attending: FAMILY MEDICINE
Payer: COMMERCIAL

## 2018-10-17 VITALS — HEART RATE: 76 BPM | BODY MASS INDEX: 25.7 KG/M2 | TEMPERATURE: 98.1 F | WEIGHT: 156.8 LBS

## 2018-10-17 DIAGNOSIS — R31.29 MICROSCOPIC HEMATURIA: ICD-10-CM

## 2018-10-17 PROCEDURE — 52000 CYSTOURETHROSCOPY: CPT | Performed by: UROLOGY

## 2018-10-17 PROCEDURE — 99244 OFF/OP CNSLTJ NEW/EST MOD 40: CPT | Mod: 25 | Performed by: UROLOGY

## 2018-10-17 ASSESSMENT — PAIN SCALES - GENERAL: PAINLEVEL: MILD PAIN (2)

## 2018-10-17 NOTE — NURSING NOTE
Patient is here today for a consult for microhematuria. Toñarocael Gallardo LPN......................10/17/2018 2:44 PM      Review of Systems:    Weight loss:    No     Recent fever/chills:  No   Night sweats:   YES  Current skin rash:  No   Recent hair loss:  No  Heat intolerance:  No   Cold intolerance:  No  Chest pain:   No   Palpitations:   No  Shortness of breath:  No   Wheezing:   No  Constipation:    No   Diarrhea:   No   Nausea:   No   Vomiting:   No   Kidney/side pain:  No   Back pain:   No  Frequent headaches:  No   Dizziness:     No  Leg swelling:   No   Calf pain:    No    Parents, brothers or sisters with history of kidney cancer:   No  Parents, brothers or sisters with history of bladder cancer: No  Father or brother with history of prostate cancer:  No

## 2018-10-17 NOTE — NURSING NOTE
Patient positioned in frog leg position prior to Mendoza Howard MD prepping patient with chlorhexidine gluconate cleanser.    Spur Protocol    A. Pre-procedure verification complete Yes   1-relevant information / documentation available, reviewed and properly matched to the patient; 2-consent accurate and complete, 3-equipment and supplies available    B. Site marking complete N/A  Site marked if not in continuous attendance with patient    C. TIME OUT completed Yes  Time Out was conducted just prior to starting procedure to verify the eight required elements: 1-patient identity, 2-consent accurate and complete, 3-position, 4-correct side/site marked (if applicable), 5-procedure, 6-relevant images / results properly labeled and displayed (if applicable), 7-antibiotics / irrigation fluids (if applicable), 8-safety precautions.    After procedure perineum area rinsed. Discharge instructions reviewed with patient. Patient verbalized understanding of discharge instructions and discharged ambulatory.  Felicita Flowers..................10/17/2018  3:18 PM

## 2018-10-17 NOTE — PROGRESS NOTES
I was asked to see this patient by Dr Brooke and provide my opinion about the following: Hematuria  Type of Visit  Consult    Chief Complaint  Hematuria    HPI  Ms. Suresh is a 53 year old female who presents with hematuria.  Microhematuria was first noted on UA 9 days ago during a routine physical.  Patient denies associated dysuria at the time of the UA.  She does have approximately 15-pack year history of smoking.  She denies gross hematuria or passing clots.  She underwent a CT urogram earlier which was negative.  She follows up to complete her workup.    Hematuria-related signs/symptoms  History of smoking?    Yes  History of chemotherapy?   No  History of pelvic radiation?   No  History of kidney or bladder stones?  No  History of frequent urinary tract infections? No      Past Medical History  She  has a past medical history of Abnormal cytological finding in specimen from cervix uteri; Allergic rhinitis; Benign lipomatous neoplasm of skin and subcutaneous tissue of trunk; Contact dermatitis; Dyshidrosis; Encounter for screening for malignant neoplasm of colon; Gastro-esophageal reflux disease without esophagitis; Major depressive disorder, single episode; Other congenital malformations of great veins (CODE); Other specified diseases of anus and rectum (CODE); Panic disorder without agoraphobia; Partial anomalous pulmonary venous connection; and Residual hemorrhoidal skin tags.  Patient Active Problem List   Diagnosis     Other congenital anomalies of great veins     Allergic rhinitis due to pollen     Cervical disc herniation     Esophageal reflux     Irritable bowel syndrome     Major depression     Menorrhagia with regular cycle     Other urinary incontinence     Partial congenital anomalous pulmonary venous connection     Segmental and somatic dysfunction of cervical region     Abscess of anal and rectal regions     Mild renal insufficiency     Hyperlipidemia LDL goal <100     Microscopic  hematuria     Closed displaced fracture of shaft of fifth metacarpal bone of left hand, sequela       Past Surgical History  She  has a past surgical history that includes Appendectomy open; Sigmoidoscopy flexible; Esophagoscopy, gastroscopy, duodenoscopy (EGD), combined; Esophagoscopy, gastroscopy, duodenoscopy (EGD), combined; Esophagoscopy, gastroscopy, duodenoscopy (EGD), combined; Arthroscopy knee; Esophagoscopy, gastroscopy, duodenoscopy (EGD), combined; Colonoscopy; Nissen fundoplication (2000); Echocardiogram Limited; anorectal exam anesthesia (2015); HYSTEROSCOPY, SURGICAL; W/ ENDOMETRIAL ABLATION, ANY METHOD (2015); Surgical pathology exam (Left, 2016); and Cardiac surgery (2006).    Medications  She has a current medication list which includes the following prescription(s): acyclovir, ascorbic acid, estroven mood & memory, bupropion, calcium carbonate 500 mg-vitamin d 200 units, guar gum, glucosamine-chondroitin, l-lysine, other medical supplies, polyethylene glycol, and triamcinolone.    Allergies  Allergies   Allergen Reactions     Codeine Other (See Comments)     Medroxyprogesterone Other (See Comments)     Caused patient's brain to swell.     Azithromycin Palpitations     Latex Rash     hands     Penicillins Rash     Proline Rash     Prolene: Sutures; caused keloid     Sulfamethoxazole-Trimethoprim Rash       Social History  She  reports that she quit smoking about 13 years ago. She has never used smokeless tobacco. She reports that she drinks about 8.4 oz of alcohol per week  She reports that she does not use illicit drugs.  No drug abuse.    Family History  Family History   Problem Relation Age of Onset     Arthritis Mother      Arthritis,Osteoarthritis;  of 76 pneumonia but no autopsy     Diabetes Father      Diabetes     Other - See Comments Father      COPD/Benign colon polyps/AAA -  of ruptured AAA     Colon Cancer Paternal Grandmother      Cancer-colon      Colon Cancer Paternal Aunt      Cancer-colon     Diabetes Paternal Aunt      Diabetes     Obesity Sister      bariatric surgery     Other - See Comments Sister      TBI     Depression Sister      Anxiety Disorder Sister      Diabetes Sister      Hyperlipidemia Sister      Diabetes Brother      Hyperlipidemia Brother      Other - See Comments Maternal Grandmother       of old age     Dementia Maternal Grandfather      possible dementia,  of pneumonia     Other - See Comments Brother      MVA - at age 50     Hypertension Brother      Diabetes Brother      Hyperlipidemia Brother      Depression Brother      Diabetes Brother      Hyperlipidemia Brother      Macular Degeneration Brother      Diabetes Brother      Hyperlipidemia Brother      Suicide Brother 49     Cerebrovascular Disease Paternal Grandfather       of stroke       Review of Systems  I personally reviewed the ROS with the patient.    Nursing Notes:   Toña Gallardo LPN  10/17/2018  2:55 PM  Signed  Patient is here today for a consult for microhematuria. Toña Gallardo LPN......................10/17/2018 2:44 PM      Review of Systems:    Weight loss:    No     Recent fever/chills:  No   Night sweats:   YES  Current skin rash:  No   Recent hair loss:  No  Heat intolerance:  No   Cold intolerance:  No  Chest pain:   No   Palpitations:   No  Shortness of breath:  No   Wheezing:   No  Constipation:    No   Diarrhea:   No   Nausea:   No   Vomiting:   No   Kidney/side pain:  No   Back pain:   No  Frequent headaches:  No   Dizziness:     No  Leg swelling:   No   Calf pain:    No    Parents, brothers or sisters with history of kidney cancer:   No  Parents, brothers or sisters with history of bladder cancer: No  Father or brother with history of prostate cancer:  No      Physical Exam  Vitals:    10/17/18 1446   Pulse: 76   Temp: 98.1  F (36.7  C)   TempSrc: Temporal   Weight: 71.1 kg (156 lb 12.8 oz)     Constitutional: NAD, WDWN.   Head: NCAT  Eyes:  Conjunctivae normal  Cardiovascular: Regular rate.  Pulmonary/Chest: Respirations are even and non-labored bilaterally.  Abdominal: Soft. No distension, tenderness, masses or guarding. No CVA tenderness.  Neurological: A + O x 3.  Cranial Nerves II-XII grossly intact.  Extremities: AFIA x 4, Warm. No clubbing.  No cyanosis.    Skin: Pink, warm and dry.  No rashes noted.  Psychiatric:  Normal mood and affect  Genitourinary:  Nonpalpable bladder    ^^^^^^^^^^^^^^^^^^^^^^^^^^^^^^^^^^^^^^^^^^^^^^^^^^^^^^^^^^^^^^^^^^^^^^^^^^^^^^^^^^^  Preprocedure diagnosis  Hematuria    Postprocedure diagnosis  Hematuria    Procedure  Flexible Cystourethroscopy    Surgeon  Mendoza Howard MD    Anesthesia  2% lidocaine jelly intraurethrally    Complications  None    Indications  53 year old female undergoing a flexible cystoscopy for the above mentioned indications.    Findings  Cystoscopic findings included a normal urethra.    The bladder appeared to be normal capacity.    There were no tumors, stones or foreign bodies.    The orifices were slit-shaped and in their normal location.    Procedure  The patient was placed in supine position and prepped and draped in sterile fashion with lidocaine jelly per urethra for anesthesia.    I passed a lubricated 14F flexible cystoscope through the urethra and into the bladder and the bladder was completely visualized.  The cystoscope was retroflexed and the bladder neck visualized.    The cystoscope was slowly withdrawn while visualizing the urethra and the procedure terminated.    The patient tolerated the procedure well.      ^^^^^^^^^^^^^^^^^^^^^^^^^^^^^^^^^^^^^^^^^^^^^^^^^^^^^^^^^^^^^^^^^^^^^^^^^^^^^^^^^^^    Labs  Results for orders placed or performed during the hospital encounter of 10/12/18   CT Urogram wo & w Contrast    Narrative    PROCEDURE:  CT UROGRAM WO & W CONTRAST    HISTORY: microscopic hematuria; Microscopic hematuria    TECHNIQUE:  Helical CT of the abdomen and pelvis was performed  before  and during the nephrographic and urographic phases following injection  of intravenous contrast.   COMPARISON:  1/6/16.    MEDS/CONTRAST: Omnipaque 350 100 ml    FINDINGS:      Limited images through the lung bases demonstrate dependent  atelectasis.    Precontrast imaging demonstrates no renal or ureteral calculi.  Nephrographic phase imaging demonstrates symmetric nephrograms.  Delayed imaging demonstrates normal opacification of the collecting  system including the ureters and partially distended bladder.    Postoperative changes of a prior Nissen are seen at the gastric  cardia. A few hepatic cysts are present. The gallbladder, spleen,  pancreas and adrenal glands are unremarkable. There is no abdominal  aortic aneurysm. The bowel is normal in caliber.     No free fluid, free air or adenopathy is present. A focal area of  sclerosis within the anterior most aspect of the right eighth rib is  nonspecific.      Impression    IMPRESSION:      No finding to account for hematuria.    MILENA WHEELER MD       Imaging  I personally reviewed and interpreted the images and report.  CT a/p  10/12/2018  IMPRESSION:    No finding to account for hematuria.    Assessment  Ms. Suresh is a 53 year old female with hematuria who underwent a cystoscopy today to complete her hematuria work up which was negative.    Reviewed the labs and imaging with the patient.    Discussed rationale for work up.  Discussed potential findings of hematuria work up including, but not limited to, kidney stones, bladder tumors and/or kidney tumors.  Also discussed the potential for a normal work up.    Plan  Provided reassurance  Follow up as needed

## 2018-10-17 NOTE — PATIENT INSTRUCTIONS

## 2018-10-17 NOTE — MR AVS SNAPSHOT
After Visit Summary   10/17/2018    Soraida Suresh    MRN: [id number]           Patient Information     Date Of Birth          1965        Visit Information        Provider Department      10/17/2018 2:15 PM Mendoza Howard MD [facility]        Today's Diagnoses     Microscopic hematuria          Care Instructions    Home Care after Cystoscopy  Follow these guidelines for your care after your procedure.    Activity  No limitations    Bathing or showering  No limitations    Symptoms  You may notice some burning with urination but this usually resolves after 1-2 days.  You may also notice small amounts of blood in your urine.  Please increase water intake for the next few days to help with these symptoms.    Contacts  General Questions: [phone]  Appointments:  [phone]  Emergencies:  [phone]    When to call the clinic  If you develop any of the following symptoms please call the clinic immediately.  If the clinic is closed please be seen at an urgent care clinic or the Emergency Department.  - Burning with urination that worsens after 2 days  - Unable to urinate causing severe pelvic pain  - Fevers of greater than 101 degrees F  - Flank pain that is not responding to pain medication    Follow up  Please follow up as discussed at the appointment.          Follow-ups after your visit        Your next 10 appointments already scheduled     Nov 16, 2018  1:30 PM CST   New Visit with Jake Schuster MD   [facility]    [address]  [place] [address] [phone]              Who to contact     If you have questions or need follow up information about today's clinic visit or your schedule please contact [facility] directly at [phone].  Normal or non-critical lab and imaging results will be communicated to you by MyChart, letter or phone within 4 business days after the  clinic has received the results. If you do not hear from us within 7 days, please contact the clinic through [employer] or phone. If you have a critical or abnormal lab result, we will notify you by phone as soon as possible.  Submit refill requests through [employer] or call your pharmacy and they will forward the refill request to us. Please allow 3 business days for your refill to be completed.          Additional Information About Your Visit        [employer] Information     [employer] gives you secure access to your electronic health record. If you see a primary care provider, you can also send messages to your care team and make appointments. If you have questions, please call your primary care clinic.  If you do not have a primary care provider, please call [phone] and they will assist you.        Care EveryWhere ID     This is your Care EveryWhere ID. This could be used by other organizations to access your [facility] medical records  [id number]        Your Vitals Were     Pulse Temperature BMI (Body Mass Index)             76 98.1  F (36.7  C) (Temporal) 25.7 kg/m2          Blood Pressure from Last 3 Encounters:   10/08/18 110/68   10/01/18 106/78   08/20/18 122/80    Weight from Last 3 Encounters:   10/17/18 71.1 kg (156 lb 12.8 oz)   10/08/18 72.3 kg (159 lb 6.4 oz)   10/01/18 72.3 kg (159 lb 6.4 oz)              Today, you had the following     No orders found for display       Primary Care Provider Office Phone # Fax #    Patricia Ami Brooke -[phone]       [address] [facility] [address]        Equal Access to Services     [facility]: Hadii aad ku hadasho Soomaali, waaxda luqadaha, qaybta kaalmada oliva, rebecca monaco. So [facility] [phone].    ATENCIÓN: Si habla español, tiene a bee disposición servicios gratuitos de asistencia lingüística. Llame al [phone].    We comply with applicable federal civil rights laws and [place] laws. We  do not discriminate on the basis of race, color, national origin, age, disability, sex, sexual orientation, or gender identity.            Thank you!     Thank you for choosing [facility] AND \[facility] [place] [facility]\""  for your care. Our goal is always to provide you with excellent care. Hearing back from our patients is one way we can continue to improve our services. Please take a few minutes to complete the written survey that you may receive in the mail after your visit with us. Thank you!             Your Updated Medication List - Protect others around you: Learn how to safely use, store and throw away your medicines at www.disposemymeds.org.          This list is accurate as of 10/17/18  3:02 PM.  Always use your most recent med list.                   Brand Name Dispense Instructions for use Diagnosis    acyclovir 5 % ointment    ZOVIRAX          ascorbic acid 500 MG tablet    VITAMIN C     Take 500 mg by mouth daily        buPROPion 300 MG 24 hr tablet    WELLBUTRIN XL    90 tablet    Take 1 tablet (300 mg) by mouth every morning    Recurrent major depressive disorder, in full remission (H)       calcium carbonate 500 mg-vitamin D 200 units 500-200 MG-UNIT per tablet    OSCAL with D;OYSTER SHELL CALCIUM     Take 1 tablet by mouth        ESTROVEN MOOD & MEMORY Tabs      Take 1 tablet by mouth daily        FIBER (GUAR GUM) PO           Glucosamine-Chondroitin 750 & 400 MG Misc      Take 1 tablet by mouth daily        L-Lysine 500 MG Tabs      Take 1 tablet by mouth daily        MIRALAX powder   Generic drug:  polyethylene glycol      Take by mouth daily        other medical supplies     1 each    Tennis elbow strap.  Diagnosis:  M77.11.  Length of need:  99.    Right elbow pain       triamcinolone 55 MCG/ACT inhaler    NASACORT     2 sprays

## 2018-10-24 ENCOUNTER — TELEPHONE (OUTPATIENT)
Dept: FAMILY MEDICINE | Facility: OTHER | Age: 53
End: 2018-10-24

## 2018-10-24 NOTE — TELEPHONE ENCOUNTER
[facility] - Patient is requesting a appointment to discuss further imaging of low back per Dr. Gomez's recommendation.

## 2018-10-25 NOTE — TELEPHONE ENCOUNTER
Patient given apt for 11/1 at 9:30.  Okay per Patricia Brooke MD nurse to do a 15 min apt.   Jodi Castellanos LPN........................10/25/2018  9:59 AM

## 2018-10-29 ENCOUNTER — THERAPY VISIT (OUTPATIENT)
Dept: CHIROPRACTIC MEDICINE | Facility: OTHER | Age: 53
End: 2018-10-29
Attending: CHIROPRACTOR
Payer: COMMERCIAL

## 2018-10-29 DIAGNOSIS — M54.42 ACUTE LEFT-SIDED LOW BACK PAIN WITH LEFT-SIDED SCIATICA: Primary | ICD-10-CM

## 2018-10-29 DIAGNOSIS — M99.03 SEGMENTAL AND SOMATIC DYSFUNCTION OF LUMBAR REGION: ICD-10-CM

## 2018-10-29 DIAGNOSIS — M62.838 SPASM OF MUSCLE: ICD-10-CM

## 2018-10-29 PROCEDURE — 98940 CHIROPRACT MANJ 1-2 REGIONS: CPT | Mod: AT | Performed by: CHIROPRACTOR

## 2018-10-29 PROCEDURE — 97012 MECHANICAL TRACTION THERAPY: CPT | Performed by: CHIROPRACTOR

## 2018-10-29 NOTE — PROGRESS NOTES
Visit #:  4    Subjective:  Soraida Suresh is a 53 year old female who is seen in f/u up for:        Acute left-sided low back pain with left-sided sciatica  Segmental and somatic dysfunction of lumbar region  Spasm of muscle.     Since last visit on 10/11/2018,  Soraida Suresh reports:    Area of chief complaint:  Patient presents for treatment of left-sided low back pain with sciatic-like issues.  Currently pain levels are ranked at 6 out of 10.  Patient notes pain extends into the left buttock and high hamstring and into the anterior groin.  Patient continues to note difficulty with raising her left foot however since her fall accident she has been more aware of how she is walking.  Patient reports she will be following up with her PCP Dr. Junito Wilder at [facility] next week.  Patient also reports that she will be taking a short vacation for several days within the next couple of weeks.  Patient has been trying to manage symptoms at home by stretching exercising and bathing it with Epsom salts.  In the past this has been improving to be helpful in managing her symptoms however patient has noted these measures have not been beneficial and in some cases have actually been aggravating her symptoms.       Objective:  The following was observed:    P: Palpatory tenderness elicited over L5 on the left side  A: static palpation demonstrates intersegmental asymmetry , lumbar  R: motion palpation notes restricted motion, :  L5 Left lateral flexion restricted and Extension restriction  T: Mild spasms are present of the left quadratus lumborum and lumbar paraspinals.  Taut tender fibers are noted of the gluteus medius and the left side    Segmental spinal dysfunction/restrictions found at:  .:  L5 Left lateral flexion restricted and Extension restriction      Assessment: Patient is continuing to note left-sided low back pain with radiation of symptoms into the left upper leg.  Patient is also still experiencing  difficulty with raising her left foot.  Patient is following up with her primary care provider next week to see if further imaging studies are warranted of the patient's low back.  Patient did contact us requesting to try conservative care therapy before resorting to advanced imaging studies.  It was agreed this was a possible course of care. it is believed this will help with patient's symptoms restoring normal mechanical function of the lumbar spine and pelvis it is believed spasms and pain will reduce of the low back and left hip/groin.  This may also help improve patient's reports and complaints of having difficulty tripping over her left foot.  Patient reports she will be leaving town for several days which will make follow-up care difficult after today's visit    Diagnoses:      1. Acute left-sided low back pain with left-sided sciatica    2. Segmental and somatic dysfunction of lumbar region    3. Spasm of muscle        Patient's condition:  Patient had restrictions pre-manipulation    Treatment effectiveness:  Post manipulation there is better intersegmental movement and Patient claims to feel looser post manipulation      Procedures:  CMT:  57737 Chiropractic manipulative treatment 1-2 regions performed   Lumbar: Diversified, L5, Side posture    Modalities:  Mechanical traction, lumbar spine, 62/30, intermittent pull, 16 minutes    Therapeutic procedures:  None    Response to Treatment  Reduction in symptoms as reported by patient    Prognosis: Good    Progress towards Goals: Patient is making progress towards the goal.     Recommendations:    Instructions:Perform activities as tolerated and follow up with Dr. Brooke as originally planned    Follow-up:  Recommendation 1 additional visits.

## 2018-10-29 NOTE — MR AVS SNAPSHOT
After Visit Summary   10/29/2018    Soraida Suresh    MRN: [id number]           Patient Information     Date Of Birth          1965        Visit Information        Provider Department      10/29/2018 10:30 AM Heath Gomez DC [facility]        Today's Diagnoses     Acute left-sided low back pain with left-sided sciatica    -  1    Segmental and somatic dysfunction of lumbar region        Spasm of muscle          Care Instructions    Instructions:Perform activities as tolerated and follow up with Dr. Brooke as originally planned          Follow-ups after your visit        Follow-up notes from your care team     Return in about 1 week (around 11/5/2018) for Routine Visit.      Your next 10 appointments already scheduled     Nov 01, 2018  9:30 AM CDT   [facility]    [address]  [place] [address]   [phone]            Nov 16, 2018  1:30 PM CST   New Visit with Jake Schuster MD   [facility]    [address]  [place] [address]   [phone]              Who to contact     If you have questions or need follow up information about today's clinic visit or your schedule please contact [facility] directly at [phone].  Normal or non-critical lab and imaging results will be communicated to you by MyChart, letter or phone within 4 business days after the clinic has received the results. If you do not hear from us within 7 days, please contact the clinic through MyChart or phone. If you have a critical or abnormal lab result, we will notify you by phone as soon as possible.  Submit refill requests through Farmol or call your pharmacy and they will forward the refill request to us. Please allow 3 business days for your refill to be completed.           Additional Information About Your Visit        Trendmeonhart Information     [employer] gives you secure access to your electronic health record. If you see a primary care provider, you can also send messages to your care team and make appointments. If you have questions, please call your primary care clinic.  If you do not have a primary care provider, please call [phone] and they will assist you.        Care EveryWhere ID     This is your Care EveryWhere ID. This could be used by other organizations to access your [place] medical records  [id number]         Blood Pressure from Last 3 Encounters:   10/08/18 110/68   10/01/18 106/78   08/20/18 122/80    Weight from Last 3 Encounters:   10/17/18 71.1 kg (156 lb 12.8 oz)   10/08/18 72.3 kg (159 lb 6.4 oz)   10/01/18 72.3 kg (159 lb 6.4 oz)              We Performed the Following     CHIROPRAC MANIP,SPINAL,1-2 REGIONS     MECHANICAL TRACTION THERAPY        Primary Care Provider Office Phone # Fax #    Patricia Ami Brooke [phone]       [address] [facility] [address]        Equal Access to Services     [facility]: Hadii aad ku hadasho Soventuraali, waaxda luqadaha, qaybta kaalmada adeegyada, rebecca coyle . So [facility] [phone].    ATENCIÓN: Si habla español, tiene a bee disposición servicios gratuitos de asistencia lingüística. Llame al [phone].    We comply with applicable federal civil rights laws and [place] laws. We do not discriminate on the basis of race, color, national origin, age, disability, sex, sexual orientation, or gender identity.            Thank you!     Thank you for choosing [facility]  for your care. Our goal is always to provide you with excellent care. Hearing back from our patients is one way we can continue to improve our services. Please take a few minutes to complete the written survey that you may receive in the mail after your visit with us. Thank  you!             Your Updated Medication List - Protect others around you: Learn how to safely use, store and throw away your medicines at www.disposemymeds.org.          This list is accurate as of 10/29/18 12:12 PM.  Always use your most recent med list.                   Brand Name Dispense Instructions for use Diagnosis    acyclovir 5 % ointment    ZOVIRAX          ascorbic acid 500 MG tablet    VITAMIN C     Take 500 mg by mouth daily        buPROPion 300 MG 24 hr tablet    WELLBUTRIN XL    90 tablet    Take 1 tablet (300 mg) by mouth every morning    Recurrent major depressive disorder, in full remission (H)       calcium carbonate 500 mg-vitamin D 200 units 500-200 MG-UNIT per tablet    OSCAL with D;OYSTER SHELL CALCIUM     Take 1 tablet by mouth        ESTROVEN MOOD & MEMORY Tabs      Take 1 tablet by mouth daily        FIBER (GUAR GUM) PO           Glucosamine-Chondroitin 750 & 400 MG Misc      Take 1 tablet by mouth daily        L-Lysine 500 MG Tabs      Take 1 tablet by mouth daily        MIRALAX powder   Generic drug:  polyethylene glycol      Take by mouth daily        other medical supplies     1 each    Tennis elbow strap.  Diagnosis:  M77.11.  Length of need:  99.    Right elbow pain       triamcinolone 55 MCG/ACT inhaler    NASACORT     2 sprays

## 2018-11-01 ENCOUNTER — OFFICE VISIT (OUTPATIENT)
Dept: FAMILY MEDICINE | Facility: OTHER | Age: 53
End: 2018-11-01
Attending: FAMILY MEDICINE
Payer: COMMERCIAL

## 2018-11-01 VITALS
DIASTOLIC BLOOD PRESSURE: 68 MMHG | BODY MASS INDEX: 25.76 KG/M2 | SYSTOLIC BLOOD PRESSURE: 128 MMHG | WEIGHT: 157.2 LBS | HEART RATE: 88 BPM

## 2018-11-01 DIAGNOSIS — M54.42 LEFT-SIDED LOW BACK PAIN WITH LEFT-SIDED SCIATICA, UNSPECIFIED CHRONICITY: Primary | ICD-10-CM

## 2018-11-01 PROCEDURE — 99213 OFFICE O/P EST LOW 20 MIN: CPT | Performed by: FAMILY MEDICINE

## 2018-11-01 ASSESSMENT — PAIN SCALES - GENERAL: PAINLEVEL: SEVERE PAIN (6)

## 2018-11-01 ASSESSMENT — ENCOUNTER SYMPTOMS
BACK PAIN: 1
PARESTHESIAS: 1

## 2018-11-01 NOTE — NURSING NOTE
Soraida presents to the clinic today for concerns of lower back pain that radiates to her groin. It has been going on for over 6 months. Denies flu shot today.         Jimy Carranza LPN 11/01/18 9:33 AM

## 2018-11-01 NOTE — PROGRESS NOTES
SUBJECTIVE:   Nursing Notes:   Jimy Carranza, LPN  11/1/2018  9:37 AM  Unsigned  Soraida presents to the clinic today for concerns of lower back pain that radiates to her groin. It has been going on for over 6 months. Denies flu shot today.         Jimy Carranza LPN 11/01/18 9:33 AM        Soraida Suresh is a 53 year old female who presents to clinic today for sciatica.  Started about 6 months ago.  Had been seeing Chiropractor.  She has noticed she has been tripping more.  She had actually tripped and broke her left hand.  Cannot use a treadmill or a bike because it aggravates the pain.  Hurts to be sitting or standing for long.  Has to be laying or moving constantly.  Her left sided sciatica initially went to her upper posterior thigh.  Now is going posteriorly to her groin area.  Feels like she has to be very mindful of how she is walking.  No saddle anesthesia, but has a burning pain in this area.  Tylenol doesn't manage her pain very well.  No bowel/bladder dysfunction.    HPI    I personally reviewed medications/allergies/history listed below:    Patient Active Problem List    Diagnosis Date Noted     Mild renal insufficiency 10/08/2018     Priority: Medium     9/4/18 outside labs - GFR 86  Creatinine 0.71, BUN 12.       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         Cervical disc herniation 07/23/2013     Priority: Medium     Overview:   C6/7 on left;  S/p injection Dr. 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     Priority: Medium     Past Medical History:   Diagnosis Date     Abnormal cytological finding in specimen from cervix uteri     10/11/2011     Allergic rhinitis     No Comments Provided     Benign lipomatous neoplasm of skin and subcutaneous tissue of trunk     1/20/2016     Contact dermatitis     No Comments Provided     Dyshidrosis     No Comments Provided     Encounter for screening for malignant neoplasm of colon     8/27/2014     Gastro-esophageal reflux disease without esophagitis     No Comments Provided     Major depressive disorder, single episode     6/24/2011     Other congenital malformations of great veins (CODE)     7/10/2006     Other specified diseases of anus and rectum (CODE)     5/20/2015     Panic disorder without agoraphobia     resolved after heart surgery     Partial anomalous pulmonary venous connection     No Comments Provided     Residual hemorrhoidal skin tags     3/3/2011      Past Surgical History:   Procedure Laterality Date     anorectal exam anesthesia  05/20/2015    DC ANORECTAL EXAM SURG REQ ANESTH  DIAG     APPENDECTOMY OPEN      5/1987     ARTHROSCOPY KNEE      7/2006, 2013,Debridement, partial medial meniscectomy, and anterior cruciate ligament reconstruction using tibialis anterior allograft, right knee     AS HYSTEROSCOPY, SURGICAL; W/ ENDOMETRIAL ABLATION, ANY METHOD  12/14/2015    Kathya     CARDIAC SURGERY  11/2006    Repair of anomalous pulmonary venous return by anastomosis of the anomalous vein to the left atrial  appendage, done at [facility]     COLONOSCOPY      2014     ECHO LIMITED      mild left atrial enlargement;  normal ef     ESOPHAGOSCOPY, GASTROSCOPY, DUODENOSCOPY (EGD), COMBINED           ESOPHAGOSCOPY, GASTROSCOPY, DUODENOSCOPY (EGD), COMBINED           ESOPHAGOSCOPY, GASTROSCOPY, DUODENOSCOPY (EGD), COMBINED           ESOPHAGOSCOPY, GASTROSCOPY, DUODENOSCOPY (EGD), COMBINED      11,Normal     NISSEN FUNDOPLICATION  2000    Dr. Sales     SIGMOIDOSCOPY FLEXIBLE      ,And BE, negative, for rectal bleeding.  Fissures and hemorrhoids noted     SURGICAL PATHOLOGY EXAM Left 2016    Left flank     Family History   Problem Relation Age of Onset     Arthritis Mother      Arthritis,Osteoarthritis;  of 76 pneumonia but no autopsy     Diabetes Father      Diabetes     Other - See Comments Father      COPD/Benign colon polyps/AAA -  of ruptured AAA     Colon Cancer Paternal Grandmother      Cancer-colon     Colon Cancer Paternal Aunt      Cancer-colon     Diabetes Paternal Aunt      Diabetes     Obesity Sister      bariatric surgery     Other - See Comments Sister      TBI     Depression Sister      Anxiety Disorder Sister      Diabetes Sister      Hyperlipidemia Sister      Diabetes Brother      Hyperlipidemia Brother      Other - See Comments Maternal Grandmother       of old age     Dementia Maternal Grandfather      possible dementia,  of pneumonia     Other - See Comments Brother      MVA - at age 50     Hypertension Brother      Diabetes Brother      Hyperlipidemia Brother      Depression Brother      Diabetes Brother      Hyperlipidemia Brother      Macular Degeneration Brother      Diabetes Brother      Hyperlipidemia Brother      Suicide Brother 49     Cerebrovascular Disease Paternal Grandfather       of stroke     Social History   Substance Use Topics     Smoking status: Former Smoker     Quit date: 10/10/2005     Smokeless tobacco: Never  Used     Alcohol use 8.4 oz/week      Comment: Alcoholic Drinks/day: 2-3 per day     Social History     Social History Narrative    This patient was  in 2002.  She was remarried on 03/17/04.  Works at [employer], work #[phone].          Justen Suresh       Keri Flowers Daughter, born 1987      Mina Flowers Son, born 1990         Current Outpatient Prescriptions   Medication Sig Dispense Refill     acyclovir (ZOVIRAX) 5 % ointment        ascorbic acid (VITAMIN C) 500 MG tablet Take 500 mg by mouth daily       Black Cohosh-Soy-Ginkgo-Magnol (ESTROVEN MOOD & MEMORY) TABS Take 1 tablet by mouth daily       buPROPion (WELLBUTRIN XL) 300 MG 24 hr tablet Take 1 tablet (300 mg) by mouth every morning 90 tablet 3     Calcium carb-Vitamin D 500 mg [place]-200 units (OSCAL WITH D;OYSTER SHELL CALCIUM) 500-200 MG-UNIT per tablet Take 1 tablet by mouth       FIBER, GUAR GUM, PO        Glucosamine-Chondroitin 750 & 400 MG MISC Take 1 tablet by mouth daily       L-Lysine 500 MG TABS Take 1 tablet by mouth daily       other medical supplies Tennis elbow strap.  Diagnosis:  M77.11.  Length of need:  99. 1 each 0     polyethylene glycol (MIRALAX) powder Take by mouth daily       triamcinolone (NASACORT) 55 MCG/ACT Inhaler 2 sprays       Allergies   Allergen Reactions     Codeine Other (See Comments)     Medroxyprogesterone Other (See Comments)     Caused patient's brain to swell.     Azithromycin Palpitations     Latex Rash     hands     Penicillins Rash     Proline Rash     Prolene: Sutures; caused keloid     Sulfamethoxazole W/Trimethoprim Rash     Sulfamethoxazole-Trimethoprim Rash       Review of Systems   Musculoskeletal: Positive for back pain and gait problem.   Neurological: Positive for paresthesias.   Psychiatric/Behavioral: Negative for mood changes.        OBJECTIVE:     /68 (BP Location: Right arm, Patient Position: Sitting, Cuff Size: Adult Regular)  Pulse 88  Wt 157 lb 3.2 oz (71.3 kg)   Breastfeeding? No  BMI 25.76 kg/m2  Body mass index is 25.76 kg/(m^2).  Physical Exam   Constitutional: She appears well-developed.   HENT:   Head: Normocephalic.   Musculoskeletal:   Pain with palpation over very lowest lumbar spinous processes.  Also has some pain in the left SI joint region.  No right SI joint pain.  Also has some left sciatic notch tenderness.  None on the right.  Normal lower extremity strength noted.  No discernible foot drop noted on the left side.  She has normal strength with dorsi and plantar  flexion of her great toe and foot laterally.  Also normal strength with flexion and extension at her knee and hips bilaterally.   Neurological:   DTRs are 2+ and symmetric at the knees and Achilles bilaterally.   Psychiatric: She has a normal mood and affect.         PHQ-2 Score:     PHQ-2 ( 1999 Pfizer) 10/8/2018 10/1/2018   Q1: Little interest or pleasure in doing things 0 0   Q2: Feeling down, depressed or hopeless 0 0   PHQ-2 Score 0 0       CECI-7 SCORE 6/2/2015 8/21/2015 6/7/2016   Total Score 14 2 5           I personally reviewed results withpatient as listed below:   Diagnostic Test Results:  none     ASSESSMENT/PLAN:       ICD-10-CM    1. Left-sided low back pain with left-sided sciatica, unspecified chronicity M54.42 MR Lumbar Spine w/o Contrast       1.  We will pursue further evaluation with MRI of lumbar spine.  Offered prednisone burst, which she declines at this time.  Consider referral for steroid injection if indicated.  She feels that she is able to manage her pain adequately at home currently.    Patricia Brooke MD  [facility] AND [facility]

## 2018-11-01 NOTE — MR AVS SNAPSHOT
After Visit Summary   11/1/2018    Soraida Suresh    MRN: [id number]           Patient Information     Date Of Birth          1965        Visit Information        Provider Department      11/1/2018 9:30 AM Patricia Brooke MD [facility]        Today's Diagnoses     Left-sided low back pain with left-sided sciatica, unspecified chronicity    -  1       Follow-ups after your visit        Your next 10 appointments already scheduled     Nov 16, 2018  1:30 PM CST   New Visit with Jake Schuster MD   [facility] ([facility])    [address]  [place] [address] [phone]              Future tests that were ordered for you today     Open Future Orders        Priority Expected Expires Ordered    MR Lumbar Spine w/o Contrast Routine  11/1/2019 11/1/2018            Who to contact     If you have questions or need follow up information about today's clinic visit or your schedule please contact [facility] directly at [phone].  Normal or non-critical lab and imaging results will be communicated to you by AdhereTechhart, letter or phone within 4 business days after the clinic has received the results. If you do not hear from us within 7 days, please contact the clinic through [employer] Appt or phone. If you have a critical or abnormal lab result, we will notify you by phone as soon as possible.  Submit refill requests through Blomming or call your pharmacy and they will forward the refill request to us. Please allow 3 business days for your refill to be completed.          Additional Information About Your Visit        MyChart Information     [employer] gives you secure access to your electronic health record. If you see a primary care provider, you can also send messages to your care team and make appointments. If you have questions, please call your primary care clinic.  If you do not have a primary care  provider, please call [phone] and they will assist you.        Care EveryWhere ID     This is your Care EveryWhere ID. This could be used by other organizations to access your [place] medical records  [id number]        Your Vitals Were     Pulse Breastfeeding? BMI (Body Mass Index)             88 No 25.76 kg/m2          Blood Pressure from Last 3 Encounters:   11/01/18 128/68   10/08/18 110/68   10/01/18 106/78    Weight from Last 3 Encounters:   11/01/18 157 lb 3.2 oz (71.3 kg)   10/17/18 156 lb 12.8 oz (71.1 kg)   10/08/18 159 lb 6.4 oz (72.3 kg)               Primary Care Provider Office Phone # Fax #    Patricia Ami Brooke [phone]       [address] [facility] [address]        Equal Access to Services     [facility]: Hadii aad ku hadasho Soomaali, waaxda luqadaha, qaybta kaalmada adeegyada, rebecca hein hayzafarn kp coyle . So [facility] [phone].    ATENCIÓN: Si habla español, tiene a bee disposición servicios gratuitos de asistencia lingüística. Kwan al [phone].    We comply with applicable federal civil rights laws and [place] laws. We do not discriminate on the basis of race, color, national origin, age, disability, sex, sexual orientation, or gender identity.            Thank you!     Thank you for choosing [facility] AND \[facility]\""  for your care. Our goal is always to provide you with excellent care. Hearing back from our patients is one way we can continue to improve our services. Please take a few minutes to complete the written survey that you may receive in the mail after your visit with us. Thank you!             Your Updated Medication List - Protect others around you: Learn how to safely use, store and throw away your medicines at www.disposemymeds.org.          This list is accurate as of 11/1/18 10:05 AM.  Always use your most recent med list.                   Brand Name Dispense Instructions for use Diagnosis    acyclovir 5 %  ointment    ZOVIRAX          ascorbic acid 500 MG tablet    VITAMIN C     Take 500 mg by mouth daily        buPROPion 300 MG 24 hr tablet    WELLBUTRIN XL    90 tablet    Take 1 tablet (300 mg) by mouth every morning    Recurrent major depressive disorder, in full remission (H)       calcium carbonate 500 mg-vitamin D 200 units 500-200 MG-UNIT per tablet    OSCAL with D;OYSTER SHELL CALCIUM     Take 1 tablet by mouth        ESTROVEN MOOD & MEMORY Tabs      Take 1 tablet by mouth daily        FIBER (GUAR GUM) PO           Glucosamine-Chondroitin 750 & 400 MG Misc      Take 1 tablet by mouth daily        L-Lysine 500 MG Tabs      Take 1 tablet by mouth daily        MIRALAX powder   Generic drug:  polyethylene glycol      Take by mouth daily        other medical supplies     1 each    Tennis elbow strap.  Diagnosis:  M77.11.  Length of need:  99.    Right elbow pain       triamcinolone 55 MCG/ACT inhaler    NASACORT     2 sprays

## 2018-11-03 ENCOUNTER — HOSPITAL ENCOUNTER (OUTPATIENT)
Dept: MRI IMAGING | Facility: OTHER | Age: 53
Discharge: HOME | End: 2018-11-03
Attending: FAMILY MEDICINE | Admitting: FAMILY MEDICINE
Payer: COMMERCIAL

## 2018-11-03 DIAGNOSIS — M54.42 LEFT-SIDED LOW BACK PAIN WITH LEFT-SIDED SCIATICA, UNSPECIFIED CHRONICITY: ICD-10-CM

## 2018-11-03 PROCEDURE — 72148 MRI LUMBAR SPINE W/O DYE: CPT

## 2018-11-06 DIAGNOSIS — M54.16 LUMBAR RADICULOPATHY: Primary | ICD-10-CM

## 2018-11-07 ENCOUNTER — MYC MEDICAL ADVICE (OUTPATIENT)
Dept: FAMILY MEDICINE | Facility: OTHER | Age: 53
End: 2018-11-07

## 2018-11-08 ENCOUNTER — MYC MEDICAL ADVICE (OUTPATIENT)
Dept: FAMILY MEDICINE | Facility: OTHER | Age: 53
End: 2018-11-08

## 2018-11-08 NOTE — LETTER
November 8, 2018      Soraida Suresh  [address] [address]        To Whom It May Concern,      Soraida has been getting decompression treatments through her Chiropractor to treat radicular nerve pain from lumbar disc disease.  She has found that these treatments do help to relieve her pain temporarily.  Since she does get some relief from them, I would request that you consider covering them for her.      Sincerely,        Patricia Brooke MD

## 2018-11-09 NOTE — TELEPHONE ENCOUNTER
Patient notified that letter was completed and she would like it mailed to her mailing address. It was put to be mailed.  Eliza Aquino LPN  11/9/2018  8:29 AM

## 2018-11-12 ENCOUNTER — HOSPITAL ENCOUNTER (OUTPATIENT)
Dept: GENERAL RADIOLOGY | Facility: OTHER | Age: 53
Discharge: HOME | End: 2018-11-12
Attending: FAMILY MEDICINE | Admitting: FAMILY MEDICINE
Payer: COMMERCIAL

## 2018-11-12 DIAGNOSIS — M53.3 SACROILIAC JOINT PAIN: ICD-10-CM

## 2018-11-12 DIAGNOSIS — M54.16 LUMBAR RADICULOPATHY: ICD-10-CM

## 2018-11-12 PROCEDURE — 25000125 ZZHC RX 250: Performed by: RADIOLOGY

## 2018-11-12 PROCEDURE — 25500064 ZZH RX 255 OP 636: Performed by: RADIOLOGY

## 2018-11-12 PROCEDURE — 27096 INJECT SACROILIAC JOINT: CPT | Mod: LT

## 2018-11-12 PROCEDURE — 25000128 H RX IP 250 OP 636: Performed by: RADIOLOGY

## 2018-11-12 RX ORDER — BUPIVACAINE HYDROCHLORIDE 5 MG/ML
3 INJECTION, SOLUTION EPIDURAL; INTRACAUDAL ONCE
Status: COMPLETED | OUTPATIENT
Start: 2018-11-12 | End: 2018-11-12

## 2018-11-12 RX ORDER — METHYLPREDNISOLONE ACETATE 80 MG/ML
80 INJECTION, SUSPENSION INTRA-ARTICULAR; INTRALESIONAL; INTRAMUSCULAR; SOFT TISSUE ONCE
Status: DISCONTINUED | OUTPATIENT
Start: 2018-11-12 | End: 2018-11-12 | Stop reason: CLARIF

## 2018-11-12 RX ORDER — TRIAMCINOLONE ACETONIDE 40 MG/ML
40 INJECTION, SUSPENSION INTRA-ARTICULAR; INTRAMUSCULAR ONCE
Status: COMPLETED | OUTPATIENT
Start: 2018-11-12 | End: 2018-11-12

## 2018-11-12 RX ORDER — LIDOCAINE HYDROCHLORIDE 10 MG/ML
2 INJECTION, SOLUTION EPIDURAL; INFILTRATION; INTRACAUDAL; PERINEURAL ONCE
Status: DISCONTINUED | OUTPATIENT
Start: 2018-11-12 | End: 2018-11-12 | Stop reason: CLARIF

## 2018-11-12 RX ADMIN — BUPIVACAINE HYDROCHLORIDE 15 MG: 5 INJECTION, SOLUTION EPIDURAL; INTRACAUDAL; PERINEURAL at 12:01

## 2018-11-12 RX ADMIN — LIDOCAINE HYDROCHLORIDE 2 ML: 10 INJECTION, SOLUTION INFILTRATION; PERINEURAL at 12:02

## 2018-11-12 RX ADMIN — TRIAMCINOLONE ACETONIDE 40 MG: 40 INJECTION, SUSPENSION INTRA-ARTICULAR; INTRAMUSCULAR at 12:01

## 2018-11-12 RX ADMIN — IOHEXOL 1 ML: 240 INJECTION, SOLUTION INTRATHECAL; INTRAVASCULAR; INTRAVENOUS; ORAL at 12:01

## 2018-11-15 ENCOUNTER — THERAPY VISIT (OUTPATIENT)
Dept: CHIROPRACTIC MEDICINE | Facility: OTHER | Age: 53
End: 2018-11-15
Attending: CHIROPRACTOR
Payer: COMMERCIAL

## 2018-11-15 DIAGNOSIS — M99.01 SEGMENTAL AND SOMATIC DYSFUNCTION OF CERVICAL REGION: ICD-10-CM

## 2018-11-15 DIAGNOSIS — M54.42 LEFT-SIDED LOW BACK PAIN WITH LEFT-SIDED SCIATICA, UNSPECIFIED CHRONICITY: Primary | ICD-10-CM

## 2018-11-15 DIAGNOSIS — S62.92XD CLOSED FRACTURE OF LEFT HAND WITH ROUTINE HEALING, SUBSEQUENT ENCOUNTER: Primary | ICD-10-CM

## 2018-11-15 DIAGNOSIS — M99.04 SEGMENTAL AND SOMATIC DYSFUNCTION OF SACRAL REGION: ICD-10-CM

## 2018-11-15 PROCEDURE — 98940 CHIROPRACT MANJ 1-2 REGIONS: CPT | Mod: AT | Performed by: CHIROPRACTOR

## 2018-11-15 PROCEDURE — 97012 MECHANICAL TRACTION THERAPY: CPT | Performed by: CHIROPRACTOR

## 2018-11-15 NOTE — PROGRESS NOTES
"Visit #:  5    Subjective:  Soraida Suresh is a 53 year old female who is seen in f/u up for:        Left-sided low back pain with left-sided sciatica, unspecified chronicity  Segmental and somatic dysfunction of sacral region  Segmental and somatic dysfunction of cervical region.     Since last visit on 10/29/2018,  Soraida Suresh reports:    Area of chief complaint:  She presents for follow-up care of low back pain.  Patient currently ranks back pain at 4-5 out of 10.  Patient underwent spinal injection therapy earlier this week on 11/12/18.  Therapy was performed at [facility] and clinic under Dr. Lilliam VICK.  Patient noted improvement of symptoms following treatment.  Patient reports quality has changed from a gnawing sensation to more achy of the low back and into the left groin.  Patient reports that she is more cognizant of lifting her left leg.  Patient reports that she will be going away on vacation for the next 2 weeks.  Patient noted improvement of symptoms following last treatment.  Patient did request examination of her cervical spine.  Patient denies any neck pain currently but feels that it is \"off today       Objective:  The following was observed:  Narrative   PROCEDURE: MR LUMBAR SPINE W/O CONTRAST 11/3/2018 11:29 AM    HISTORY: left sided sciatica; Left-sided low back pain with left-sided  sciatica, unspecified chronicity    COMPARISONS: None.    Meds/Dose Given:    TECHNIQUE: Images were obtained sagittally T1 STIR and T2 weighted.  Images were obtained axially T2 weighted.    FINDINGS: The T12 L1 L1 L2 L2 L3 L3-L4 discs are normal. At L4-L5  there is some mild annular bulging mild asymmetry greater on the left  than the right. This minimally impinges on the left L5 nerve root. At  L5-S1 there is a lateral disc protrusion on the left which mildly  impinges on the left L5 nerve root in the neural foramina. The lumbar  facet joints appear normal. Intradurally the nerves of cauda " equina  and conus appear normal. The paravertebral soft tissues appear normal.        Impression   IMPRESSION:   1. Minimal asymmetric disc protrusion L4-L5 on the left minimally  impinging on the left L5 nerve root.  2. Lateral disc protrusion L5-S1 on the left mildly impinging on the  left L5 nerve root in the neural foramina.    SACHA FARR MD         P: {No palpatory tenderness is elicited today  A: static palpation demonstrates intersegmental asymmetry , cervical, pelvis  R: motion palpation notes restricted motion, :  C2 Left lateral flexion restricted and Extension restriction  Sacrum Left lateral flexion restricted and Extension restriction  T: muscular spasms not present on today's visit.  There are taut tender fibers of the left suboccipitals and left upper cervical paraspinals.  Moderate hypertonicity of the left quadratus lumborum and left lumbar erectors are noted.    Segmental spinal dysfunction/restrictions found at:  .:  C2 Left lateral flexion restricted and Extension restriction  Sacrum Left lateral flexion restricted and Extension restriction      Assessment: Patient appears to be showing improvement of symptoms of low back and radicular complaints.  Injection therapy seems to have been beneficial in cotreatment with spinal decompression and chiropractic adjustments.  It is expected that due to patient's upcoming travel exacerbation of symptoms is to be expected.  It was recommended patient consider follow-up care upon returning from her vacation.  There was segmental somatic dysfunction of the cervical spine.  No neck pain was present on today's visit nor were there any headache symptoms.  It is possible this is due to compensatory mechanics as the SI joint on the left side was misaligned.    Diagnoses:      1. Left-sided low back pain with left-sided sciatica, unspecified chronicity    2. Segmental and somatic dysfunction of sacral region    3. Segmental and somatic dysfunction of cervical  region        Patient's condition:  Patient had restrictions pre-manipulation and Patient symptoms are gradually improving    Treatment effectiveness:  Post manipulation there is better intersegmental movement, Patient claims to feel looser post manipulation and Muscle spasm is reducing, palpatory tenderness not noted today      Procedures:  CMT:  30283 Chiropractic manipulative treatment 1-2 regions performed   Cervical: Activator, C2, Supine  Pelvis: Diversified, Sacrum , Side posture    Modalities:  Mechanical traction, lumbar spine, 60/32, intermittent pull, 16 minutes    Therapeutic procedures:  None    Response to Treatment  Reduction in symptoms as reported by patient    Prognosis: Good    Progress towards Goals: Patient is making progress towards the goal.     Recommendations:    Instructions:Perform activities as tolerated    Follow-up:  Continue treatment PRN.

## 2018-11-15 NOTE — MR AVS SNAPSHOT
After Visit Summary   11/15/2018    Soraida Suresh    MRN: [id number]           Patient Information     Date Of Birth          1965        Visit Information        Provider Department      11/15/2018 11:30 AM Heath Gomez DC [facility]        Today's Diagnoses     Left-sided low back pain with left-sided sciatica, unspecified chronicity    -  1    Segmental and somatic dysfunction of sacral region        Segmental and somatic dysfunction of cervical region          Care Instructions    Perform activities as tolerated          Follow-ups after your visit        Follow-up notes from your care team     Return if symptoms worsen or fail to improve, for Routine Visit.      Your next 10 appointments already scheduled     Nov 16, 2018  1:30 PM CST   New Visit with Jake Schuster MD   [facility] ([facility])    [address]  [facility] [id number] [phone]              Future tests that were ordered for you today     Open Future Orders        Priority Expected Expires Ordered    XR Hand Left G/E 3 Views Routine 11/16/2018 11/15/2019 11/15/2018            Who to contact     If you have questions or need follow up information about today's clinic visit or your schedule please contact [facility] directly at [phone].  Normal or non-critical lab and imaging results will be communicated to you by MyChart, letter or phone within 4 business days after the clinic has received the results. If you do not hear from us within 7 days, please contact the clinic through MyChart or phone. If you have a critical or abnormal lab result, we will notify you by phone as soon as possible.  Submit refill requests through [employer] or call your pharmacy and they will forward the refill request to us. Please allow 3 business days for your refill to be completed.          Additional Information About Your Visit         [employer] Information     [employer] gives you secure access to your electronic health record. If you see a primary care provider, you can also send messages to your care team and make appointments. If you have questions, please call your primary care clinic.  If you do not have a primary care provider, please call [phone] and they will assist you.        Care EveryWhere ID     This is your Care EveryWhere ID. This could be used by other organizations to access your [place] medical records  [id number]         Blood Pressure from Last 3 Encounters:   11/01/18 128/68   10/08/18 110/68   10/01/18 106/78    Weight from Last 3 Encounters:   11/01/18 71.3 kg (157 lb 3.2 oz)   10/17/18 71.1 kg (156 lb 12.8 oz)   10/08/18 72.3 kg (159 lb 6.4 oz)              We Performed the Following     CHIROPRAC MANIP,SPINAL,1-2 REGIONS     MECHANICAL TRACTION THERAPY        Primary Care Provider Office Phone # Fax #    Patricia Ami Brooke [phone]       [address] [facility] [address]        Equal Access to Services     [facility]: Hadii aad ku hadasho Soomaali, waaxda luqadaha, qaybta kaalmada adekarinayageorgia, rebecca coyle . So [facility] [phone].    ATENCIÓN: Si habla español, tiene a bee disposición servicios gratuitos de asistencia lingüística. [facility] [phone].    We comply with applicable federal civil rights laws and [place] laws. We do not discriminate on the basis of race, color, national origin, age, disability, sex, sexual orientation, or gender identity.            Thank you!     Thank you for choosing [facility]  for your care. Our goal is always to provide you with excellent care. Hearing back from our patients is one way we can continue to improve our services. Please take a few minutes to complete the written survey that you may receive in the mail after your visit with us. Thank you!             Your Updated Medication List  - Protect others around you: Learn how to safely use, store and throw away your medicines at www.disposemymeds.org.          This list is accurate as of 11/15/18 12:25 PM.  Always use your most recent med list.                   Brand Name Dispense Instructions for use Diagnosis    acyclovir 5 % ointment    ZOVIRAX          ascorbic acid 500 MG tablet    VITAMIN C     Take 500 mg by mouth daily        buPROPion 300 MG 24 hr tablet    WELLBUTRIN XL    90 tablet    Take 1 tablet (300 mg) by mouth every morning    Recurrent major depressive disorder, in full remission (H)       calcium carbonate 500 mg-vitamin D 200 units 500-200 MG-UNIT per tablet    OSCAL with D;OYSTER SHELL CALCIUM     Take 1 tablet by mouth        ESTROVEN MOOD & MEMORY Tabs      Take 1 tablet by mouth daily        FIBER (GUAR GUM) PO           Glucosamine-Chondroitin 750 & 400 MG Misc      Take 1 tablet by mouth daily        L-Lysine 500 MG Tabs      Take 1 tablet by mouth daily        MIRALAX powder   Generic drug:  polyethylene glycol      Take by mouth daily        other medical supplies     1 each    Tennis elbow strap.  Diagnosis:  M77.11.  Length of need:  99.    Right elbow pain       triamcinolone 55 MCG/ACT inhaler    NASACORT     2 sprays

## 2018-11-16 ENCOUNTER — HOSPITAL ENCOUNTER (OUTPATIENT)
Dept: GENERAL RADIOLOGY | Facility: OTHER | Age: 53
Discharge: HOME | End: 2018-11-16
Attending: SPECIALIST | Admitting: SPECIALIST
Payer: COMMERCIAL

## 2018-11-16 ENCOUNTER — OFFICE VISIT (OUTPATIENT)
Dept: ORTHOPEDICS | Facility: OTHER | Age: 53
End: 2018-11-16
Attending: FAMILY MEDICINE
Payer: COMMERCIAL

## 2018-11-16 DIAGNOSIS — S62.327S: ICD-10-CM

## 2018-11-16 DIAGNOSIS — S62.92XD CLOSED FRACTURE OF LEFT HAND WITH ROUTINE HEALING, SUBSEQUENT ENCOUNTER: ICD-10-CM

## 2018-11-16 PROCEDURE — G0463 HOSPITAL OUTPT CLINIC VISIT: HCPCS | Mod: 25 | Performed by: SPECIALIST

## 2018-11-16 PROCEDURE — 73130 X-RAY EXAM OF HAND: CPT | Mod: LT

## 2018-11-16 NOTE — MR AVS SNAPSHOT
After Visit Summary   11/16/2018    Soraida Suresh    MRN: [id number]           Patient Information     Date Of Birth          1965        Visit Information        Provider Department      11/16/2018 1:30 PM Jake Scuhster MD [facility]        Today's Diagnoses     Closed displaced fracture of shaft of fifth metacarpal bone of left hand, sequela           Follow-ups after your visit        Your next 10 appointments already scheduled     Dec 07, 2018  1:00 PM CST   Return Visit with Jake Schuster MD   [facility]    [address]  [place] [id number] [phone]              Who to contact     If you have questions or need follow up information about today's clinic visit or your schedule please contact [facility] directly at [phone].  Normal or non-critical lab and imaging results will be communicated to you by MyChart, letter or phone within 4 business days after the clinic has received the results. If you do not hear from us within 7 days, please contact the clinic through [employer] or phone. If you have a critical or abnormal lab result, we will notify you by phone as soon as possible.  Submit refill requests through [employer] or call your pharmacy and they will forward the refill request to us. Please allow 3 business days for your refill to be completed.          Additional Information About Your Visit        MyChart Information     [employer] gives you secure access to your electronic health record. If you see a primary care provider, you can also send messages to your care team and make appointments. If you have questions, please call your primary care clinic.  If you do not have a primary care provider, please call [phone] and they will assist you.        Care EveryWhere ID     This is your Care EveryWhere ID. This could be used by other organizations to access  your [place] medical records  [id number]         Blood Pressure from Last 3 Encounters:   11/01/18 128/68   10/08/18 110/68   10/01/18 106/78    Weight from Last 3 Encounters:   11/01/18 71.3 kg (157 lb 3.2 oz)   10/17/18 71.1 kg (156 lb 12.8 oz)   10/08/18 72.3 kg (159 lb 6.4 oz)              Today, you had the following     No orders found for display       Primary Care Provider Office Phone # Fax #    Patricia Ami Brooke [phone]       [address] [facility] [address]        Equal Access to Services     STEFANO JAFFE : Arsh Tyler, neelima kelly, walter baptiste, rebecca coyle . So [facility] [phone].    ATENCIÓN: Si habla español, tiene a bee disposición servicios gratuitos de asistencia lingüística. Llame al [phone].    We comply with applicable federal civil rights laws and [place] laws. We do not discriminate on the basis of race, color, national origin, age, disability, sex, sexual orientation, or gender identity.            Thank you!     Thank you for choosing [facility]  for your care. Our goal is always to provide you with excellent care. Hearing back from our patients is one way we can continue to improve our services. Please take a few minutes to complete the written survey that you may receive in the mail after your visit with us. Thank you!             Your Updated Medication List - Protect others around you: Learn how to safely use, store and throw away your medicines at www.disposemymeds.org.          This list is accurate as of 11/16/18 11:59 PM.  Always use your most recent med list.                   Brand Name Dispense Instructions for use Diagnosis    acyclovir 5 % ointment    ZOVIRAX          ascorbic acid 500 MG tablet    VITAMIN C     Take 500 mg by mouth daily        buPROPion 300 MG 24 hr tablet    WELLBUTRIN XL    90 tablet    Take 1 tablet (300 mg) by mouth every  morning    Recurrent major depressive disorder, in full remission (H)       calcium carbonate 500 mg-vitamin D 200 units 500-200 MG-UNIT per tablet    OSCAL with D;OYSTER SHELL CALCIUM     Take 1 tablet by mouth        ESTROVEN MOOD & MEMORY Tabs      Take 1 tablet by mouth daily        FIBER (GUAR GUM) PO           Glucosamine-Chondroitin 750 & 400 MG Misc      Take 1 tablet by mouth daily        L-Lysine 500 MG Tabs      Take 1 tablet by mouth daily        MIRALAX powder   Generic drug:  polyethylene glycol      Take by mouth daily        other medical supplies     1 each    Tennis elbow strap.  Diagnosis:  M77.11.  Length of need:  99.    Right elbow pain       triamcinolone 55 MCG/ACT inhaler    NASACORT     2 sprays

## 2018-11-20 NOTE — PROGRESS NOTES
VITALS:   Height:   66  Weight:   155      CHIEF COMPLAINT: Left Small Finger Injury, Fracture    PROBLEMS:   Patient has no noted problems.    PATIENT REPORTED MEDICATIONS:  WELLBUTRIN XL TABLET EXTENDED RELEASE 24 HOUR (BUPROPION HCL XR24H-TAB)     Medications were reviewed with patient this visit.    PATIENT REPORTED ALLERGIES:  CODEINE (SevereReaction: Unknown Reaction)  BACTRIM (SULFAMETHOXAZOLE-TRIMETHOPRIM) (SevereReaction: Unknown Reaction)  * Z-CASSY (SevereReaction: Unknown Reaction)  PENICILLIN (PENICILLIN V POTASSIUM) (ModerateReaction: Unknown)  SULFA (ModerateReaction: Unknown)  CODEINE (ModerateReaction: Unknown)  * LATEX (ModerateReaction: Unknown)    Allergies were reviewed during this visit.    RISK FACTORS:  Tobacco use:   former smoker  Passive smoke exposure: Yes  Alcohol Use:   Yes    HISTORY OF PRESENT ILLNESS:    Soraida returns for followup.  I have not seen her for some time.   I am seeing her today for evaluation and treatment of a left small finger metacarpal fracture sustained about seven weeks ago.   She was treated with cast immobilization until today.  She has no complaints.      PMH:   Health history form dated 11/16/18 is reviewed and signed.  Past medical history, surgical history, social history, family history, medications, and review of systems is noted and scanned into the chart.     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  Height: 5' 6\",  Weight: 155 pounds.  The patient is pleasant, alert, oriented x3, appropriate, in no acute distress.    The patient's gait and station are appropriate.  The patient is well groomed, well kempt.  Skin is healthy and intact with no erythema, warmth, rashes, or bruising.   Normal capillary refill.  Pulses are palpable.  Motor is five out of five in all muscle groups tested.   Sensory exam is intact.    Musculoskeletal:        Examination of both upper extremities reveals full and symmetric range of motion of shoulders, elbows.   Examination of both hands and wrists shows no evidence of obvious extensor or flexor tenosynovitis.    She has tenderness with palpation over the small finger metacarpal.    Examination shows no evidence of obvious rotational deformity.    Her range of motion is limited.    X-RAY:  AP, lateral, oblique views of the left hand reveal a comminuted fracture involving the small finger metacarpal.    This appears to be healing in good position.    ASSESSMENT:    Approximately nine weeks status post left metacarpal fracture of small digit    PLAN:   Approximately nine weeks status post left metacarpal fracture of small digit:  Our plan will be to advance her activities.  Range of motion exercises were outlined.  She will begin to use a removable splint that she has.  We will reassess her in four weeks' time.  X-rays in four weeks only if clinically indicated.    IMAGING:   X-rays in four weeks only if clinically indicated.    Dictated by Jake Schuster M.D.  D:  11/16/18  T:   11/20/18    Copy to:  Mendy VICK, Patricia     Typed and/or reviewed and corrected by signing  below, and sent to the Physician for final review and signature.     This report was created using voice recording software and computer-generated templates. Although every effort has been made to review for and eliminate errors, some errors may still " occur.     Electronically signed by Myra Valiente  on 11/20/2018 at 6:22 AM    Electronically signed by Jake Schuster MD on 11/20/2018 at 7:44 AM  ________________________________________________________________________

## 2018-12-06 ENCOUNTER — TRANSFERRED RECORDS (OUTPATIENT)
Dept: HEALTH INFORMATION MANAGEMENT | Facility: OTHER | Age: 53
End: 2018-12-06

## 2018-12-07 ENCOUNTER — OFFICE VISIT (OUTPATIENT)
Dept: ORTHOPEDICS | Facility: OTHER | Age: 53
End: 2018-12-07
Attending: SPECIALIST
Payer: COMMERCIAL

## 2018-12-07 DIAGNOSIS — Z00.00 ROUTINE GENERAL MEDICAL EXAMINATION AT A HEALTH CARE FACILITY: Primary | ICD-10-CM

## 2018-12-07 PROCEDURE — G0463 HOSPITAL OUTPT CLINIC VISIT: HCPCS | Performed by: SPECIALIST

## 2018-12-14 NOTE — PROGRESS NOTES
CHIEF COMPLAINT: Left Small Finger Fracture Recheck    PROBLEMS:   Patient has no noted problems.    PATIENT REPORTED MEDICATIONS:  WELLBUTRIN XL TABLET EXTENDED RELEASE 24 HOUR (BUPROPION HCL XR24H-TAB)     PATIENT REPORTED ALLERGIES:  CODEINE (SevereReaction: Unknown Reaction)  BACTRIM (SULFAMETHOXAZOLE-TRIMETHOPRIM) (SevereReaction: Unknown Reaction)  * Z-CASSY (SevereReaction: Unknown Reaction)  PENICILLIN (PENICILLIN V POTASSIUM) (ModerateReaction: Unknown)  SULFA (ModerateReaction: Unknown)  CODEINE (ModerateReaction: Unknown)  * LATEX (ModerateReaction: Unknown)    HISTORY OF PRESENT ILLNESS:    Soraida returns for followup with respect to her left small finger.   She is doing well.    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    There is no evidence of rotational deformity.       X-RAY:  X-rays were not obtained today.     ASSESSMENT:    Left Metacarpal Fracture Small Digit    PLAN:   At this point I congratulated her on her progress.  We will advance her activities.  I will see her back as symptoms warrant on an as needed basis.    Dictated by Jake Schuster M.D.  D:  12/07/18  T:   12/13/18    Copy to:  Mendy VICK, Patricia     Typed and/or reviewed and corrected by signing  below, and sent to the Physician for final review and signature.     This report was created using voice  recording software and computer-generated templates. Although every effort has been made to review for and eliminate errors, some errors may still occur.     Electronically signed by Myra Valiente  on 12/13/2018 at 2:32 PM    ________________________________________________________________________

## 2019-01-07 ENCOUNTER — TELEPHONE (OUTPATIENT)
Dept: FAMILY MEDICINE | Facility: OTHER | Age: 54
End: 2019-01-07

## 2019-01-07 DIAGNOSIS — G89.29 CHRONIC LEFT SI JOINT PAIN: Primary | ICD-10-CM

## 2019-01-07 DIAGNOSIS — M53.3 CHRONIC LEFT SI JOINT PAIN: Primary | ICD-10-CM

## 2019-01-07 NOTE — TELEPHONE ENCOUNTER
Called patient and verified last name and .    She would like an order for the same injection she had in November. Left SI joint injection.  Thanks!    Yelena Herrera LPN on 2019 at 4:27 PM

## 2019-01-07 NOTE — TELEPHONE ENCOUNTER
Is she planning on another left SI joint injection as she had done in November? Please let me know so I can sign appropriate order.  Thank you!  Patricia Brooke MD on 1/7/2019 at 4:06 PM

## 2019-01-23 ENCOUNTER — THERAPY VISIT (OUTPATIENT)
Dept: CHIROPRACTIC MEDICINE | Facility: OTHER | Age: 54
End: 2019-01-23
Attending: CHIROPRACTOR
Payer: COMMERCIAL

## 2019-01-23 DIAGNOSIS — M99.01 SEGMENTAL AND SOMATIC DYSFUNCTION OF CERVICAL REGION: Primary | ICD-10-CM

## 2019-01-23 DIAGNOSIS — M99.04 SEGMENTAL AND SOMATIC DYSFUNCTION OF SACRAL REGION: ICD-10-CM

## 2019-01-23 DIAGNOSIS — M54.2 CERVICALGIA: ICD-10-CM

## 2019-01-23 DIAGNOSIS — M54.50 LUMBAGO: ICD-10-CM

## 2019-01-23 DIAGNOSIS — M62.838 SPASM OF MUSCLE: ICD-10-CM

## 2019-01-23 PROCEDURE — 97012 MECHANICAL TRACTION THERAPY: CPT | Performed by: CHIROPRACTOR

## 2019-01-23 PROCEDURE — 98940 CHIROPRACT MANJ 1-2 REGIONS: CPT | Mod: AT | Performed by: CHIROPRACTOR

## 2019-01-23 NOTE — PROGRESS NOTES
Visit #:  1    Subjective:  Soraida Suresh is a 53 year old female who is seen in f/u up for:        Segmental and somatic dysfunction of cervical region  Cervicalgia  Spasm of muscle  Segmental and somatic dysfunction of sacral region  Lumbago.     Since last visit on 11/15/2018,  Soraida Suresh reports:    Area of chief complaint:  Presents with primary complaints of neck pain and headache symptoms.  Currently pain levels are ranked between 5-7 out of 10 on a pain scale.  Patient reports that she recently underwent 2 trips down in back to the [facility] in 2 days.  Patient reports on Sunday when returning home beginning to experience exacerbation of neck pain symptoms as well as experience and headache symptoms.  In the past this is been in the single to the patient to seek chiropractic care as a misalignment of the spine is most likely present.  Patient reports that since Sunday she has been slowly losing ranges of motion primarily of right lateral flexion and left rotation.  Patient also notes that she has been having difficulty raising her right arm since Sunday.  Patient reports that she is also scheduled to undergo a second SI joint injection with Dr. Alida Castillo at [facility] on February 18.  Patient reports that this has been beneficial in the past however her symptoms are slowly returning.       Objective:  The following was observed: Patient does exhibit loss of left cervical rotation by approximately 15-20 degrees as well as loss of right lateral flexion by approximately 20-25 degrees    P: Palpatory tenderness is elicited over the cervical spine primarily of the right suboccipital region and over the left CT junction.  A: static palpation demonstrates intersegmental asymmetry , cervical, pelvis  R: motion palpation notes restricted motion, C2 , C6  and Sacrum   T: Spasms are moderate and located primarily of the cervical paraspinal musculature bilaterally, splenius muscle groups bilaterally,  right suboccipitals however bilaterally and of the upper trapezius bilaterally however left side is predominant.  Left quadratus lumborum and left gluteal musculature is taut and tender on today's visit.    Segmental spinal dysfunction/restrictions found at:  :  C2 Right lateral flexion restricted and Extension restriction  C6 Left rotation restricted and Extension restriction  Sacrum Left lateral flexion restricted and Extension restriction.      Assessment: Patient appears to have experienced moderate exacerbation of the misalignment of the cervical spine resulting in patient headaches as well as loss of range of motion.  Patient has been under our care with beneficial results in the past.  Due to the severity of patient's symptoms I am recommending patient undergo spinal decompression therapy of the cervical spine.  Patient also presented with form for monthly massages.  Patient has responded very favorably with these in the past and I am recommending patient continue to undergo massage therapy for ongoing muscular spasms.  Due to severity of patient's symptoms as well as acute onset I am recommending patient care be 1-3 visits over the next 4 weeks.  Patient is undergoing spinal injection of left SI joint soon which should help lessen her low back symptoms.  Patient reports that she is undergoing massage therapy this evening which should also contribute to decrease of neck pain and headache symptoms.    Diagnoses:      1. Segmental and somatic dysfunction of cervical region    2. Cervicalgia    3. Spasm of muscle    4. Segmental and somatic dysfunction of sacral region    5. Lumbago        Patient's condition:  Patient had restrictions pre-manipulation and Patient symptoms are worsed due to traveling recently.    Treatment effectiveness:  Post manipulation there is better intersegmental movement      Procedures:  CMT:  04816 Chiropractic manipulative treatment 1-2 regions performed   Cervical: Activator, C2, C6,  Prone, Supine  Pelvis: Diversified, Sacrum , Side posture    Modalities:  Mechanical traction, cervical spine, 22/11, intermittent pull, 16 minutes    Therapeutic procedures:  None    Response to Treatment  Improvement of intersegmental motion, decrease of muscular spasms.    Prognosis: Good     Goal: Reduce patients neck pain and headache symptoms  Improve cervical ROM of left rotation and right lateral flexion     Recommendations:    Instructions:ice 20 minutes every other hour as needed, heat 15 minutes every other hour as needed and perform all other activities as tolerated    Follow-up:  Return to care in 1 week.

## 2019-01-23 NOTE — PATIENT INSTRUCTIONS
ice 20 minutes every other hour as needed, heat 15 minutes every other hour as needed and perform all other activities as tolerated

## 2019-02-06 ENCOUNTER — THERAPY VISIT (OUTPATIENT)
Dept: CHIROPRACTIC MEDICINE | Facility: OTHER | Age: 54
End: 2019-02-06
Attending: CHIROPRACTOR
Payer: COMMERCIAL

## 2019-02-06 DIAGNOSIS — M99.01 SEGMENTAL AND SOMATIC DYSFUNCTION OF CERVICAL REGION: Primary | ICD-10-CM

## 2019-02-06 DIAGNOSIS — M51.379 DEGENERATION OF LUMBAR OR LUMBOSACRAL INTERVERTEBRAL DISC: ICD-10-CM

## 2019-02-06 DIAGNOSIS — M99.03 SEGMENTAL AND SOMATIC DYSFUNCTION OF LUMBAR REGION: ICD-10-CM

## 2019-02-06 DIAGNOSIS — M50.20 CERVICAL DISC HERNIATION: ICD-10-CM

## 2019-02-06 PROCEDURE — 98940 CHIROPRACT MANJ 1-2 REGIONS: CPT | Mod: AT | Performed by: CHIROPRACTOR

## 2019-02-06 PROCEDURE — 97012 MECHANICAL TRACTION THERAPY: CPT | Performed by: CHIROPRACTOR
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
## 2019-02-13 ENCOUNTER — THERAPY VISIT (OUTPATIENT)
Dept: CHIROPRACTIC MEDICINE | Facility: OTHER | Age: 54
End: 2019-02-13
Attending: CHIROPRACTOR
Payer: COMMERCIAL

## 2019-02-13 DIAGNOSIS — M99.01 SEGMENTAL AND SOMATIC DYSFUNCTION OF CERVICAL REGION: Primary | ICD-10-CM

## 2019-02-13 DIAGNOSIS — M99.04 SEGMENTAL AND SOMATIC DYSFUNCTION OF SACRAL REGION: ICD-10-CM

## 2019-02-13 DIAGNOSIS — M50.20 CERVICAL DISC HERNIATION: ICD-10-CM

## 2019-02-13 DIAGNOSIS — M51.379 DEGENERATION OF LUMBAR OR LUMBOSACRAL INTERVERTEBRAL DISC: ICD-10-CM

## 2019-02-13 PROCEDURE — 97012 MECHANICAL TRACTION THERAPY: CPT | Performed by: CHIROPRACTOR

## 2019-02-13 PROCEDURE — 98940 CHIROPRACT MANJ 1-2 REGIONS: CPT | Mod: AT | Performed by: CHIROPRACTOR

## 2019-02-13 NOTE — PROGRESS NOTES
Visit #:  3    Subjective:  Soraida Suresh is a 53 year old female who is seen in f/u up for:        Segmental and somatic dysfunction of cervical region  Cervical disc herniation  Degeneration of lumbar or lumbosacral intervertebral disc  Segmental and somatic dysfunction of sacral region.     Since last visit on 2/6/2019,  Soraida Suresh reports:    Area of chief complaint:  Patient presents for follow-up care of neck and low back pain.  Patient reports pain levels of the neck are currently ranked between 4-7 out of 10 on a pain scale.  Patient noted improvement of neck pain symptoms following last treatment.  Patient has not been experiencing headache symptoms or radicular complaints since starting this episode of care.  Patient notes low back/SI joint pain continues to be ranked at 7-9 out of 10 on a pain scale.  Patient reports noticing no change of her low back symptoms and is also noted sleeping has become somewhat more difficult.  She will be undergoing SI joint injection therapy on February 18 at [facility].     Objective:  The following was observed:    P: Palpatory tenderness elicited along the left side of C2/C3 and over the left PSIS.  A: static palpation demonstrates intersegmental asymmetry , cervical, pelvis  R: motion palpation notes restricted motion, C2  and Sacrum   T: Mild spasms are present of the cervical paraspinal muscles primarily on the left side.  Taut and tender fibers are present of the left gluteal musculature and left quadratus lumborum.    Segmental spinal dysfunction/restrictions found at:  :  C2 Left lateral flexion restricted and Extension restriction  Sacrum Left lateral flexion restricted and Extension restriction.      Assessment: Patient's neck symptoms do appear to be improved.  Patient's low back/SI joint pain remains unchanged.  Due to discontinuation do believe that it is beneficial that the patient is undergoing SI joint injection next week.  I  also believe that due to mechanical inconsistency of the patient's lumbosacral region this is likely contributing to neck pain symptoms along the left side this could explain why the patient's neck symptoms have been slow to progress as well.    Diagnoses:      1. Segmental and somatic dysfunction of cervical region    2. Cervical disc herniation    3. Degeneration of lumbar or lumbosacral intervertebral disc    4. Segmental and somatic dysfunction of sacral region        Patient's condition:  Patient's neck symptoms are improving, patient's low back/SI pain remains unchanged    Treatment effectiveness:  Post manipulation there is better intersegmental movement      Procedures:  CMT:  02689 Chiropractic manipulative treatment 1-2 regions performed   Cervical: Activator, C2, Supine  Pelvis: Diversified, Sacrum , Side posture    Modalities:  Mechanical traction, cervical spine, 22/11, intermittent pull, 16 minutes.    Therapeutic procedures:  None    Response to Treatment  Reduction in symptoms as reported by patient    Prognosis: Good    Progress towards Goals: Patient has met the goal.     Recommendations:    Instructions:ice 20 minutes every other hour as needed, heat 15 minutes every other hour as needed and rest    Follow-up:  Return to care if symptoms persist.

## 2019-02-18 ENCOUNTER — HOSPITAL ENCOUNTER (OUTPATIENT)
Dept: GENERAL RADIOLOGY | Facility: OTHER | Age: 54
Discharge: HOME | End: 2019-02-18
Attending: FAMILY MEDICINE | Admitting: FAMILY MEDICINE
Payer: COMMERCIAL

## 2019-02-18 DIAGNOSIS — M53.3 CHRONIC LEFT SI JOINT PAIN: ICD-10-CM

## 2019-02-18 DIAGNOSIS — G89.29 CHRONIC LEFT SI JOINT PAIN: ICD-10-CM

## 2019-02-18 PROCEDURE — 25500064 ZZH RX 255 OP 636: Performed by: RADIOLOGY

## 2019-02-18 PROCEDURE — 27096 INJECT SACROILIAC JOINT: CPT | Mod: LT

## 2019-02-18 PROCEDURE — 25000128 H RX IP 250 OP 636: Performed by: RADIOLOGY

## 2019-02-18 PROCEDURE — 25000125 ZZHC RX 250: Performed by: RADIOLOGY

## 2019-02-18 RX ORDER — TRIAMCINOLONE ACETONIDE 40 MG/ML
40 INJECTION, SUSPENSION INTRA-ARTICULAR; INTRAMUSCULAR ONCE
Status: COMPLETED | OUTPATIENT
Start: 2019-02-18 | End: 2019-02-18

## 2019-02-18 RX ORDER — BUPIVACAINE HYDROCHLORIDE 5 MG/ML
3 INJECTION, SOLUTION PERINEURAL ONCE
Status: COMPLETED | OUTPATIENT
Start: 2019-02-18 | End: 2019-02-18

## 2019-02-18 RX ADMIN — Medication 1 ML: at 09:06

## 2019-02-18 RX ADMIN — TRIAMCINOLONE ACETONIDE 40 MG: 40 INJECTION, SUSPENSION INTRA-ARTICULAR; INTRAMUSCULAR at 09:06

## 2019-02-18 RX ADMIN — BUPIVACAINE HYDROCHLORIDE 15 MG: 5 INJECTION, SOLUTION PERINEURAL at 09:06

## 2019-02-18 RX ADMIN — LIDOCAINE HYDROCHLORIDE 3 ML: 10 INJECTION, SOLUTION EPIDURAL; INFILTRATION; INTRACAUDAL; PERINEURAL at 09:06

## 2019-04-16 ENCOUNTER — THERAPY VISIT (OUTPATIENT)
Dept: CHIROPRACTIC MEDICINE | Facility: OTHER | Age: 54
End: 2019-04-16
Attending: CHIROPRACTOR
Payer: COMMERCIAL

## 2019-04-16 DIAGNOSIS — M62.838 MUSCLE SPASMS OF NECK: ICD-10-CM

## 2019-04-16 DIAGNOSIS — R51.9 HEAD ACHE: ICD-10-CM

## 2019-04-16 DIAGNOSIS — M50.20 CERVICAL DISC HERNIATION: ICD-10-CM

## 2019-04-16 DIAGNOSIS — M54.12 CERVICAL RADICULOPATHY: ICD-10-CM

## 2019-04-16 DIAGNOSIS — M99.01 SEGMENTAL AND SOMATIC DYSFUNCTION OF CERVICAL REGION: Primary | ICD-10-CM

## 2019-04-16 PROCEDURE — 98940 CHIROPRACT MANJ 1-2 REGIONS: CPT | Mod: AT | Performed by: CHIROPRACTOR

## 2019-04-16 PROCEDURE — 97012 MECHANICAL TRACTION THERAPY: CPT | Performed by: CHIROPRACTOR

## 2019-04-16 NOTE — PATIENT INSTRUCTIONS
heat 15 minutes every other hour as needed, rest and To need to perform other activities as tolerated.

## 2019-04-16 NOTE — PROGRESS NOTES
Visit #:  4    Subjective:  Soraida Suresh is a 53 year old female who is seen in f/u up for:        Segmental and somatic dysfunction of cervical region  Cervical disc herniation  Muscle spasms of neck  Cervical radiculopathy  Head ache.     Since last visit on 2/13/2019,  Soraida Suresh reports:    Area of chief complaint:  Patient presents with recent aggravation of neck pain symptoms with headache and occasional cervical radiculopathy into the left arm.  Patient reports last week she was traveling and noted flare of neck related symptoms.  Patient denied any recent traumatic events or overuse injuries preceding flare of symptoms.  Patient reports trying to self treat by use of home TENS unit, massage, various topical analgesics, heat, and stretching.  Patient reports symptoms have begun waking her up recently and have started to cause headache symptoms within the past 24 hours.  Patient is also noted when she is lying down on her right side experiencing left arm radiculopathy.  Neck pain and headaches currently ranked between 9-10 out of 10 on a pain scale.  She denies any other recent ailments preceding flare of symptoms.  Because of symptoms not responding to home intervention patient presents to our office for further evaluation and treatment.    Patient underwent SI joint injection therapy at [facility] in mid February.  Patient reports that this is been beneficial in helping with ongoing low back and SI joint pain and dysfunction.       Objective:  The following was observed: Neck disability index score 60%  Functional loss: Sleeping, lifting, reading, driving, and recreational activities.    Range of motion was assessed while patient was describing symptoms.  Patient did exhibit mild loss of cervical rotation.  Flexion extension was not able to be assessed.    P: pain elicited on palpationAlong the left side of the suboccipital region taut and tender fibers of the upper trapezius  muscle are noted as well as the scalenes.:  On the left  A: static palpation demonstrates intersegmental asymmetry , cervical  R: motion palpation notes restricted motion, C2   T: muscle spasm at level(s): Left suboccipitals, left scalenes, left levator scapula, left upper trapezius, cervical paraspinal musculature bilaterally however left side predominant:      Segmental spinal dysfunction/restrictions found at:  :  C2 Right rotation restricted, Left lateral flexion restricted and Extension restriction.      Assessment: Patient appears to be experiencing acute exacerbation of neck related symptoms with associated headaches and cervical radiculopathy.  Patient has been under our care with similar complaints in the past following travel as well.  Patient has responded favorably within short course of chiropractic care and I do believe that this will be the case on today's visit.  Patient's low back appears unremarkable on today's visit.  Patient has been undergoing injection therapy and this seems to have been very beneficial in helping her ongoing symptoms.  Plan of care to include 1-3 chiropractic visits over the next 4 weeks.  This is of course barring acute exacerbation of symptoms.    Diagnoses:      1. Segmental and somatic dysfunction of cervical region    2. Cervical disc herniation    3. Muscle spasms of neck    4. Cervical radiculopathy    5. Head ache        Patient's condition:  Patient had restrictions pre-manipulation and Patient symptoms are worsed due to recent travel.    Treatment effectiveness:  Post manipulation there is better intersegmental movement      Procedures:  CMT:  67651 Chiropractic manipulative treatment 1-2 regions performed   Cervical: Activator, C2, Prone    Modalities:  Mechanical traction, cervical spine, 24/12, intermittent pull, 16 minutes.    Therapeutic procedures:  None    Response to Treatment  Reduction in symptoms as reported by patient    Prognosis: Good     Goal: Reduce  patient's neck pain symptoms 50%  Reduce neck disability index by 20%  Patient will be able to sleep without painful limits     Recommendations:    Instructions:heat 15 minutes every other hour as needed, rest and To need to perform other activities as tolerated.    Follow-up:  Recommendation 2 additional visits.

## 2019-04-18 ENCOUNTER — THERAPY VISIT (OUTPATIENT)
Dept: CHIROPRACTIC MEDICINE | Facility: OTHER | Age: 54
End: 2019-04-18
Attending: CHIROPRACTOR
Payer: COMMERCIAL

## 2019-04-18 DIAGNOSIS — M50.20 CERVICAL DISC HERNIATION: ICD-10-CM

## 2019-04-18 DIAGNOSIS — M54.12 CERVICAL RADICULOPATHY: ICD-10-CM

## 2019-04-18 DIAGNOSIS — M99.01 SEGMENTAL AND SOMATIC DYSFUNCTION OF CERVICAL REGION: Primary | ICD-10-CM

## 2019-04-18 PROCEDURE — 98940 CHIROPRACT MANJ 1-2 REGIONS: CPT | Mod: AT | Performed by: CHIROPRACTOR

## 2019-04-18 NOTE — PROGRESS NOTES
Visit #:  5    Subjective:  Soraida Suresh is a 53 year old female who is seen in f/u up for:        Segmental and somatic dysfunction of cervical region  Cervical disc herniation  Cervical radiculopathy.     Since last visit on 4/16/2019,  Soraida Suresh reports:    Area of chief complaint:  She presents for follow-up care of neck pain with headache symptoms.  Symptoms ranked between 5-8 out of 10 on a pain scale.  Patient reports noticing improvement of symptoms following last treatment.  Patient notes headaches have improved tremendously.  Patient reports however following her last visit having difficulty sleeping initially however this has been improving.  Patient also notes decrease of numbness and tingling into her left arm as well as pain in her neck to her anterior chest since her last treatment.  Patient is noticing a catching sensation in her neck currently.  Patient reports this has shifted sides and reports noticing symptoms bilaterally today.       Objective:  The following was observed:    P: palpatory tendernessPresent along C2 on the right and C5 on the left.:    A: static palpation demonstrates intersegmental asymmetry , cervical  R: motion palpation notes restricted motion, C2  and C5   T: Taut and tender fibers are present of the cervical paraspinal musculature bilaterally.  No spasms are present.    Segmental spinal dysfunction/restrictions found at:  :  C2 Right lateral flexion restricted and Extension restriction  C5 Left lateral flexion restricted and Extension restriction.      Assessment: Vision is showing signs of improvement.  On today's visit patient was scheduled for decompression therapy however due to his provide this service and the patient was unable to reschedule for follow-up later today.  Because of this patient will return tomorrow for decompression therapy only to promote and strengthen efficacy of chiropractic adjustment.  Recommended continue course of care.  Patient may  require 1-2 additional follow-up visits however we will continue to monitor patient's care at this time.    Diagnoses:      1. Segmental and somatic dysfunction of cervical region    2. Cervical disc herniation    3. Cervical radiculopathy        Patient's condition:  Patient had restrictions pre-manipulation and Patient symptoms are gradually improving    Treatment effectiveness:  Post manipulation there is better intersegmental movement, Patient claims to feel looser post manipulation and Symptoms appear to be decreasing      Procedures:  CMT:  03947 Chiropractic manipulative treatment 1-2 regions performed   Cervical: Activator, C2, C5 , Supine    Modalities:  None performed this visit    Therapeutic procedures:  None    Response to Treatment  Reduction in symptoms as reported by patient    Prognosis: Good    Progress towards Goals: Patient is making progress towards the goal.     Recommendations:    Instructions:ice 20 minutes every other hour as needed and heat 15 minutes every other hour as needed    Follow-up:  Return to care in 1 day.

## 2019-04-19 ENCOUNTER — THERAPY VISIT (OUTPATIENT)
Dept: CHIROPRACTIC MEDICINE | Facility: OTHER | Age: 54
End: 2019-04-19
Attending: CHIROPRACTOR
Payer: COMMERCIAL

## 2019-04-19 DIAGNOSIS — M99.01 SEGMENTAL AND SOMATIC DYSFUNCTION OF CERVICAL REGION: Primary | ICD-10-CM

## 2019-04-19 DIAGNOSIS — M62.838 MUSCLE SPASMS OF NECK: ICD-10-CM

## 2019-04-19 DIAGNOSIS — M50.20 CERVICAL DISC HERNIATION: ICD-10-CM

## 2019-04-19 DIAGNOSIS — M54.12 CERVICAL RADICULOPATHY: ICD-10-CM

## 2019-04-19 PROCEDURE — 97012 MECHANICAL TRACTION THERAPY: CPT | Performed by: CHIROPRACTOR

## 2019-04-19 PROCEDURE — 98940 CHIROPRACT MANJ 1-2 REGIONS: CPT | Mod: AT | Performed by: CHIROPRACTOR

## 2019-04-19 NOTE — PATIENT INSTRUCTIONS
ice 20 minutes every other hour as needed, heat 15 minutes every other hour as needed and Continue to monitor symptoms and perform activities as tolerated.

## 2019-04-19 NOTE — PROGRESS NOTES
Visit #:  6    Subjective:  Soraida Suresh is a 53 year old female who is seen in f/u up for:        Segmental and somatic dysfunction of cervical region  Cervical disc herniation  Cervical radiculopathy  Muscle spasms of neck.     Since last visit on 4/18/2019,  Soraida Suresh reports:    Area of chief complaint:  Patient presents for follow-up care of neck pain.  Patient reports pain currently ranked at 2 out of 10 on a pain scale.  Patient notes however symptoms have ranked as high as 8 out of 10 on a pain scale since yesterday's treatment.  Patient reports symptoms continue to show improvement at that sleep is improving however she has been needing to take medication to help aid in her sleep.  Patient presents primarily for spinal decompression therapy of the cervical spine as this was unable to be provided yesterday due to scheduling error.  Patient did note that she had some restricted ranges of motion primarily of right cervical rotation.  Because of this patient requested evaluation and possible spinal adjustment to her cervical spine as well should misalignment be present.       Objective:  The following was observed:    P: palpatory tendernessRight side of C2.:    A: static palpation demonstrates intersegmental asymmetry , cervical  R: motion palpation notes restricted motion, C2   T: muscle spasm at level(s): Cervical paraspinals bilaterally however right predominant, suboccipitals bilaterally however right predominant.:      Segmental spinal dysfunction/restrictions found at:  :  C2 Left rotation restricted, Right lateral flexion restricted and Extension restriction.      Assessment: Patient does appear to be showing signs of progress.  As stated above patient originally was scheduled for spinal decompression therapy yesterday however due to scheduling error this was unable to be provided which is why we scheduled patient for follow-up visit on today's visit.  Post spinal decompression/traction therapy  patient did note she had restricted range of motion primarily of cervical rotation especially to the right.  We did find evidence to support segmental somatic dysfunction of T2 which is why we provided additional adjustment on today's visit.  I do anticipate patient will require one additional follow-up visit next week.  Also on today's visit we discussed dry needling therapy for ongoing symptoms.  Patient reports that she is aware of a physical therapist in [facility] who practices this modality.  I did instruct patient to follow-up with her primary care provider as well as [place] for further referrals, evaluation, and treatment of dry needling therapy.    Diagnoses:      1. Segmental and somatic dysfunction of cervical region    2. Cervical disc herniation    3. Cervical radiculopathy    4. Muscle spasms of neck        Patient's condition:  Patient had restrictions pre-manipulation and Patient symptoms are gradually improving    Treatment effectiveness:  Post manipulation there is better intersegmental movement and Patient claims to feel looser post manipulation      Procedures:  CMT:  59045 Chiropractic manipulative treatment 1-2 regions performed   Cervical: Activator, C2, Seated    Modalities:  Spinal traction, cervical spine, 24/12, intermittent pull, 16 minutes.    Therapeutic procedures:  None    Response to Treatment  Reduction in symptoms as reported by patient    Prognosis: Good    Progress towards Goals: Patient is making progress towards the goal.     Recommendations:    Instructions:ice 20 minutes every other hour as needed, heat 15 minutes every other hour as needed and Continue to monitor symptoms and perform activities as tolerated.    Follow-up:  Return to care if symptoms persist.

## 2019-04-23 ENCOUNTER — THERAPY VISIT (OUTPATIENT)
Dept: CHIROPRACTIC MEDICINE | Facility: OTHER | Age: 54
End: 2019-04-23
Attending: CHIROPRACTOR
Payer: COMMERCIAL

## 2019-04-23 DIAGNOSIS — M99.01 SEGMENTAL AND SOMATIC DYSFUNCTION OF CERVICAL REGION: Primary | ICD-10-CM

## 2019-04-23 DIAGNOSIS — M51.379 DEGENERATION OF LUMBAR OR LUMBOSACRAL INTERVERTEBRAL DISC: ICD-10-CM

## 2019-04-23 DIAGNOSIS — M54.6 PAIN IN THORACIC SPINE: ICD-10-CM

## 2019-04-23 DIAGNOSIS — M99.02 SEGMENTAL AND SOMATIC DYSFUNCTION OF THORACIC REGION: ICD-10-CM

## 2019-04-23 DIAGNOSIS — R51.9 HEAD ACHE: ICD-10-CM

## 2019-04-23 DIAGNOSIS — M62.838 MUSCLE SPASMS OF NECK: ICD-10-CM

## 2019-04-23 DIAGNOSIS — M50.20 CERVICAL DISC HERNIATION: ICD-10-CM

## 2019-04-23 DIAGNOSIS — M54.12 CERVICAL RADICULOPATHY: ICD-10-CM

## 2019-04-23 PROCEDURE — 97012 MECHANICAL TRACTION THERAPY: CPT | Performed by: CHIROPRACTOR

## 2019-04-23 PROCEDURE — 98940 CHIROPRACT MANJ 1-2 REGIONS: CPT | Mod: AT | Performed by: CHIROPRACTOR

## 2019-04-23 NOTE — PROGRESS NOTES
Visit #:  7    Subjective:  Soraida Suresh is a 53 year old female who is seen in f/u up for:        Segmental and somatic dysfunction of cervical region  Cervical disc herniation  Cervical radiculopathy  Muscle spasms of neck  Head ache  Degeneration of lumbar or lumbosacral intervertebral disc  Segmental and somatic dysfunction of sacral region.     Since last visit on 4/19/2019,  Soraida Suresh reports:    Area of chief complaint:  Patient presents with worsening headache/neck pain symptoms.  Currently pain is ranked at 8-9 out of 10 on a pain scale.  Patient notes that symptoms have been extending into her left shoulder blade.  Patient reports that following her last treatment symptoms did improve initially however by Sunday night symptoms had begun to increase to their current levels.  Patient reports earlier today utilizing her TENS unit so she could perform her home stretch routine.  Patient reports low back symptoms seems to have been improving since her last visit.  Patient is quite frustrated on today's visit as she feels that her neck symptoms have not been improving like they normally have.       Objective:  The following was observed:    P: palpatory tendernessVery present along the left cervical spine into the upper thoracic spine between T3 through C2.  Mild tenderness is also noted along the right side of C5:    A: static palpation demonstrates intersegmental asymmetry , cervical, thoracic  R: motion palpation notes restricted motion, C2 , C5  and T3   T: muscle spasm at level(s): Moderate of the left cervical paraspinal musculature, left splenius musculature, left suboccipitals.  Mild/moderate spasms are also present of the left levator scapula.  Taut and tender fibers are noted of the right cervical paraspinal musculature:      Segmental spinal dysfunction/restrictions found at:  :  C2 Left lateral flexion restricted and Extension restriction  C5 Right lateral flexion restricted and Extension  restriction  T3 Left lateral flexion restricted and Extension restriction.      Assessment: Patient symptoms appear to be increased compared to last patient visit.  On last patient visit we did discuss possibility of undergoing dry needling therapy as I believe many of her symptoms are due to overactive muscles.  Patient usually undergoes massage therapy for this however her massage therapist has been unable to schedule appointments at this time.  I believe this to be contributing factor to patient's increased levels of pain as well as slower progression of symptoms.  On today's visit we also did discuss further imaging studies as another evaluation/treatment option.  This was done because of patient's prior history with low back symptoms which seem to follow a similar progression.  Upon evaluation of patient's lower back it was discovered that disc degeneration had worsened since her initial evaluation of her low back.  While this may be warranted I believe that undergoing dry needling therapy would be a good starting point before undergoing advanced imaging studies. I did discuss this with the patient, who also recommended I notify her primary care provider Dr. Mendy VICK. I have notified Dr. Brooke of my plan.    Diagnoses:      1. Segmental and somatic dysfunction of cervical region    2. Cervical disc herniation    3. Cervical radiculopathy    4. Muscle spasms of neck    5. Head ache    6. Degeneration of lumbar or lumbosacral intervertebral disc    7. Segmental and somatic dysfunction of sacral region        Patient's condition:  Patient had restrictions pre-manipulation, Symptoms come and go and Patient symptoms are worse compared to her last visit.    Treatment effectiveness:  Post manipulation there is better intersegmental movement and Patient claims to feel looser post manipulation      Procedures:  CMT:  61873 Chiropractic manipulative treatment 1-2 regions performed   Cervical: Activator, C2,  C5 , Prone  Thoracic: Diversified, T3, Prone    Modalities:  Mechanical traction, cervical spine, 24/12. Intermittent pull, 16 minutes.    Therapeutic procedures:  None    Response to Treatment  Reduction in symptoms as reported by patient    Prognosis: Guarded    Progress towards Goals: Patient has not made progress towards goal.     Recommendations:    Instructions:ice 20 minutes every other hour as needed and heat 15 minutes every other hour as needed    Follow-up:  Follow up with primary provider for referral for dry-needling therapy. Consider additional imaging studies of the cervical spine.

## 2019-04-29 ENCOUNTER — THERAPY VISIT (OUTPATIENT)
Dept: CHIROPRACTIC MEDICINE | Facility: OTHER | Age: 54
End: 2019-04-29
Attending: CHIROPRACTOR
Payer: COMMERCIAL

## 2019-04-29 ENCOUNTER — TELEPHONE (OUTPATIENT)
Dept: FAMILY MEDICINE | Facility: OTHER | Age: 54
End: 2019-04-29

## 2019-04-29 DIAGNOSIS — M54.12 CERVICAL RADICULOPATHY: ICD-10-CM

## 2019-04-29 DIAGNOSIS — M54.2 CHRONIC NECK PAIN: ICD-10-CM

## 2019-04-29 DIAGNOSIS — G89.29 CHRONIC NECK PAIN: ICD-10-CM

## 2019-04-29 DIAGNOSIS — M50.20 CERVICAL DISC HERNIATION: ICD-10-CM

## 2019-04-29 DIAGNOSIS — M99.01 SEGMENTAL AND SOMATIC DYSFUNCTION OF CERVICAL REGION: Primary | ICD-10-CM

## 2019-04-29 DIAGNOSIS — M50.20 CERVICAL DISC HERNIATION: Primary | ICD-10-CM

## 2019-04-29 DIAGNOSIS — M62.838 MUSCLE SPASMS OF NECK: ICD-10-CM

## 2019-04-29 PROCEDURE — 98940 CHIROPRACT MANJ 1-2 REGIONS: CPT | Mod: AT | Performed by: CHIROPRACTOR

## 2019-04-29 NOTE — PATIENT INSTRUCTIONS
ice 20 minutes every other hour as needed, heat 15 minutes every other hour as needed and Follow-up with [facility] for dry needling therapy

## 2019-04-29 NOTE — PROGRESS NOTES
Visit #:  8    Subjective:  Soraida Suresh is a 54 year old female who is seen in f/u up for:        Segmental and somatic dysfunction of cervical region  Cervical disc herniation  Cervical radiculopathy  Muscle spasms of neck.     Since last visit on 4/23/2019,  Soraida Suresh reports:    Area of chief complaint:  Patient presents with ongoing complaints of neck pain symptoms with radicular plaints into the left hand.  Patient reports pain of the neck currently between 7-9 out of 10 on a pain scale.  Patient reports she did notice some improvement of her symptoms after her last treatment.  Patient reports she traveled to [facility] over the weekend and underwent a pressure point massage which was beneficial in reducing her pain.  Following the massage patient underwent  appointment.  This required her to sit in a hair salon chair for an extended period of time.  This caused her symptoms to increase  to the current levels.  Patient notes radiculopathy of the left hand is most prevalent when she is laying down at night on her right side.  Prior to today's visit I was able to discuss patient's question of dry needling therapy with Soraida his primary care provider Dr. Akua Wilder MD.  Both Dr. Junito Wilder and myself agree this would be a good course of action.  If symptoms do not show improvement we did agree that further evaluation would be warranted with x-ray, or further imaging studies.  I did discuss this with Soraida today and she was in agreement with this course of care.       Objective:  The following was observed:    P: palpatory tendernessPalpatory tenderness elicited along the left side of C1 and along the right side of C7:    A: static palpation demonstrates intersegmental asymmetry , cervical  R: motion palpation notes restricted motion, C1  and C7   T: Spasms are present of the cervical paraspinal musculature bilaterally.  This does appear to be less compared to last visit.   Spasm not noted of the suboccipitals today but rather hypertonicity predominantly on the left side.  Right levator scapula is taut and tender.    Segmental spinal dysfunction/restrictions found at:  :  C1 Right rotation restricted and Left lateral flexion restricted  C7 Left lateral flexion restricted and Extension restriction.      Assessment: Patient has been under our care for ongoing neck complaints with headache symptoms.  Oftentimes patient responds very favorably with chiropractic adjustments and occasional decompression therapy/traction therapy of the cervical spine.  Patient's neck has been slower to progress on this course of care.  Because of this we have been discussing that the patient should likely try additional treatments or undergo further imaging studies.  As mentioned above I have discussed this with the patient's primary care provider.  We both are in agreement that additional treatment is needed.  We both agreed to try dry needling therapy and if this does not show improvement of symptoms further imaging studies are warranted.    Diagnoses:      1. Segmental and somatic dysfunction of cervical region    2. Cervical disc herniation    3. Cervical radiculopathy    4. Muscle spasms of neck        Patient's condition:  Patient had restrictions pre-manipulation and Symptoms come and go    Treatment effectiveness:  Post manipulation there is better intersegmental movement, Patient states that they feel much better post manipulation but they gradually tighten up over time, Tenderness is reducing and Muscle spasm is reducing      Procedures:  CMT:  86381 Chiropractic manipulative treatment 1-2 regions performed   Cervical: Activator, C1 , C7 , Prone    Modalities:  None performed this visit    Therapeutic procedures:  None    Response to Treatment  Reduction in symptoms as reported by patient    Prognosis: Good    Progress towards Goals: Patient is making progress towards the  goal.     Recommendations:    Instructions:ice 20 minutes every other hour as needed, heat 15 minutes every other hour as needed and Follow-up with [facility] for dry needling therapy    Follow-up:  Return to care if symptoms persist.

## 2019-04-29 NOTE — TELEPHONE ENCOUNTER
Referred to [facility] at [place] for Dry Needling of her neck for chronic neck pain.  Patricia Brooke MD on 4/29/2019 at 2:12 PM

## 2019-04-29 NOTE — TELEPHONE ENCOUNTER
Soraida called stating [facility] wants a referral for Dry Needling.  She states Dr. Junito Wilder is aware she may need this referral.  Please call her with questions.

## 2019-04-29 NOTE — TELEPHONE ENCOUNTER
Patient didn't know information . Called [facility] and it is [facility] in [facility] that does the dry needling . This is for neck pain . Carolina Sanchez LPN ....................4/29/2019  1:59 PM

## 2019-04-29 NOTE — TELEPHONE ENCOUNTER
Please confirm who is going to do this procedure for her so that I can make sure to refer to the appropriate place?  Also is this Physical Therapy, Chiropractor, etc?  Dr. Gomez (Chiropractor) here at [facility] had contacted me about doing this, but if this is [facility] in [place] contacting us, I'm assuming he is not the one doing it.  Also need to know diagnosis (I believe this was for neck pain, but please confirm).  Patricia Brooke MD on 4/29/2019 at 1:27 PM

## 2019-05-13 ENCOUNTER — TRANSFERRED RECORDS (OUTPATIENT)
Dept: HEALTH INFORMATION MANAGEMENT | Facility: OTHER | Age: 54
End: 2019-05-13

## 2019-07-11 ENCOUNTER — TELEPHONE (OUTPATIENT)
Dept: FAMILY MEDICINE | Facility: OTHER | Age: 54
End: 2019-07-11

## 2019-07-11 DIAGNOSIS — M79.676 PAIN OF TOE, UNSPECIFIED LATERALITY: ICD-10-CM

## 2019-07-11 DIAGNOSIS — L60.0 INGROWN TOENAIL: Primary | ICD-10-CM

## 2019-07-11 NOTE — TELEPHONE ENCOUNTER
Patient is calling because she is needing a referral for Dr. Chirinos for her ingrown toe nails as well as an injection in her toe. She has seen him in the past and would like to see him again. Please review and return call. Thank you!

## 2019-09-16 DIAGNOSIS — F33.42 RECURRENT MAJOR DEPRESSIVE DISORDER, IN FULL REMISSION (H): ICD-10-CM

## 2019-09-19 RX ORDER — BUPROPION HYDROCHLORIDE 300 MG/1
300 TABLET ORAL EVERY MORNING
Qty: 90 TABLET | Refills: 3 | Status: SHIPPED | OUTPATIENT
Start: 2019-09-19 | End: 2020-09-10

## 2019-09-19 NOTE — TELEPHONE ENCOUNTER
BuPROPion (WELLBUTRIN XL) 300 MG 24 hr tablet   Last Office Visit: 11/01/2018  Future Office visit:       Routing refill request to provider for review/approval because:  No PHQ-9 score less than 5 in past 6 months      Unable to complete prescription refill per RNMedication Refill Policy.................... Jyoti Cross RN ....................  9/19/2019   1:56 PM

## 2019-09-20 ENCOUNTER — TRANSFERRED RECORDS (OUTPATIENT)
Dept: HEALTH INFORMATION MANAGEMENT | Facility: OTHER | Age: 54
End: 2019-09-20

## 2019-09-20 LAB
ALT SERPL-CCNC: 18 U/L (ref 0–55)
AST SERPL-CCNC: 20 U/L (ref 0–35)
CHOLEST SERPL-MCNC: 233 MG/DL (ref 100–200)
CREAT SERPL-MCNC: 0.72 MG/DL (ref 0.6–1.1)
GFR SERPL CREATININE-BSD FRML MDRD: 84 ML/MIN/1.73M2
GLUCOSE SERPL-MCNC: 94 MG/DL (ref 70–100)
HDLC SERPL-MCNC: 110 MG/DL (ref 40–80)
LDLC SERPL CALC-MCNC: 113 MG/DL (ref 0–129)
POTASSIUM SERPL-SCNC: 4.4 MEQ/L (ref 3.5–5.3)
TRIGL SERPL-MCNC: 51 MG/DL (ref 50–150)

## 2019-10-21 ENCOUNTER — TELEPHONE (OUTPATIENT)
Dept: FAMILY MEDICINE | Facility: OTHER | Age: 54
End: 2019-10-21

## 2019-10-21 NOTE — TELEPHONE ENCOUNTER
Please call - I received her health dynamics results and reviewed them.  Her cholesterol levels are a bit high, but with her high HDL and lack of other cardiovascular risk factors, her calculated risk of cardiovascular disease over the next 10 years is low at 1.32%.  Since this risk level is so low, she does not need to take any prescription medications for her cholesterol.  Healthy eating and exercise would be advised.  Her spirometry looked pretty normal.  Her EKG showed that she may have had a heart attack in the past.  This however, is unchanged comparing it to an old EKG from 11/22/16.  She may want to consider following up in clinic to discuss this further.  Patricia Brooke MD

## 2019-10-21 NOTE — TELEPHONE ENCOUNTER
After proper verification, patient was relayed message from below. Patient was scheduled for an appointment.  Jinny Hi LPN on 10/21/2019 at 8:50 AM

## 2019-10-29 ENCOUNTER — OFFICE VISIT (OUTPATIENT)
Dept: FAMILY MEDICINE | Facility: OTHER | Age: 54
End: 2019-10-29
Attending: FAMILY MEDICINE
Payer: COMMERCIAL

## 2019-10-29 VITALS
SYSTOLIC BLOOD PRESSURE: 120 MMHG | WEIGHT: 160 LBS | BODY MASS INDEX: 25.11 KG/M2 | TEMPERATURE: 99 F | RESPIRATION RATE: 20 BRPM | HEIGHT: 67 IN | HEART RATE: 88 BPM | DIASTOLIC BLOOD PRESSURE: 60 MMHG | OXYGEN SATURATION: 91 %

## 2019-10-29 DIAGNOSIS — G89.29 CHRONIC LEFT SI JOINT PAIN: ICD-10-CM

## 2019-10-29 DIAGNOSIS — M19.072 PRIMARY OSTEOARTHRITIS OF LEFT FOOT: ICD-10-CM

## 2019-10-29 DIAGNOSIS — M53.3 CHRONIC LEFT SI JOINT PAIN: ICD-10-CM

## 2019-10-29 DIAGNOSIS — E78.5 HYPERLIPIDEMIA LDL GOAL <100: ICD-10-CM

## 2019-10-29 DIAGNOSIS — Z23 NEED FOR INFLUENZA VACCINATION: ICD-10-CM

## 2019-10-29 DIAGNOSIS — R94.31 ABNORMAL ELECTROCARDIOGRAM: Primary | ICD-10-CM

## 2019-10-29 PROCEDURE — 99213 OFFICE O/P EST LOW 20 MIN: CPT | Performed by: FAMILY MEDICINE

## 2019-10-29 PROCEDURE — 90471 IMMUNIZATION ADMIN: CPT | Performed by: FAMILY MEDICINE

## 2019-10-29 PROCEDURE — 90686 IIV4 VACC NO PRSV 0.5 ML IM: CPT | Performed by: FAMILY MEDICINE

## 2019-10-29 ASSESSMENT — PAIN SCALES - GENERAL: PAINLEVEL: NO PAIN (0)

## 2019-10-29 ASSESSMENT — ENCOUNTER SYMPTOMS
SHORTNESS OF BREATH: 0
CHILLS: 0
FEVER: 0
NERVOUS/ANXIOUS: 0
NECK PAIN: 1
ARTHRALGIAS: 1
COUGH: 0

## 2019-10-29 ASSESSMENT — MIFFLIN-ST. JEOR: SCORE: 1345.45

## 2019-10-29 NOTE — PROGRESS NOTES
"  SUBJECTIVE:   Nursing Notes:   Carolina Sanchez LPN  10/29/2019 11:40 AM  Sign at exiting of workspace  Chief Complaint   Patient presents with     RECHECK     follow up test results        Initial /60 (BP Location: Right arm, Patient Position: Sitting, Cuff Size: Adult Regular)   Pulse 88   Temp 99  F (37.2  C) (Tympanic)   Resp 20   Ht 1.689 m (5' 6.5\")   Wt 72.6 kg (160 lb)   SpO2 91%   BMI 25.44 kg/m    Estimated body mass index is 25.44 kg/m  as calculated from the following:    Height as of this encounter: 1.689 m (5' 6.5\").    Weight as of this encounter: 72.6 kg (160 lb).  Medication Reconciliation: complete    Carolina Sanchez LPN    Soraida Suresh is a 54 year old female who presents to clinic today for follow up.  She had recently had a health dynamics physical at [facility] in [place].  As part of that work-up, she had a lipid profile.  Her total cholesterol was 233, triglycerides 51,  and .  She also had an EKG.  It was noted on that EKG that she may have had a septal infarct, age undetermined.  There were some small changes compared to her EKG from 2018 noted.  She states that she did have a lot of left neck and shoulder pain a while ago, but no chest pain.  She does have underlying neck pain.  This pain has been better lately.  She has noted some shortness of breath with going up and down stairs, but has been going on for a long time.  She exercises every morning, with a yoga and bodyweight types of exercises.  She doesn't have chest pain with them, but always has gotten a little shortness of breath with exertion otherwise.  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  This was surgically repaired many years ago at [facility] in 2006.    Also she has had a history of osteoarthritis in her left foot.  She had been previously referred to Dr. Chirinos, but was unable to go forth with that appointment.  She would like to have a new " referral again.    She also has had chronic problems with left SI joint pain.  She has had intermittent steroid injections through radiology.  She feels that she would like to have another one at this time.    She would like to have a flu shot today while she is here.    HPI    I personally reviewed medications/allergies/history listed below:    Patient Active Problem List    Diagnosis Date Noted     Primary osteoarthritis of left foot 10/29/2019     Priority: Medium     Abnormal electrocardiogram 10/29/2019     Priority: Medium     Mild renal insufficiency 10/08/2018     Priority: Medium     9/4/18 outside labs - GFR 86  Creatinine 0.71, BUN 12.       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         Cervical disc herniation 07/23/2013     Priority: Medium     Overview:   C6/7 on left;  S/p injection Dr. 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     Priority: Medium     Past Medical History:   Diagnosis Date     Abnormal cytological finding in specimen from cervix uteri      10/11/2011     Allergic rhinitis     No Comments Provided     Benign lipomatous neoplasm of skin and subcutaneous tissue of trunk     1/20/2016     Contact dermatitis     No Comments Provided     Dyshidrosis     No Comments Provided     Encounter for screening for malignant neoplasm of colon     8/27/2014     Gastro-esophageal reflux disease without esophagitis     No Comments Provided     Major depressive disorder, single episode     6/24/2011     Other congenital malformations of great veins (CODE)     7/10/2006     Other specified diseases of anus and rectum (CODE)     5/20/2015     Panic disorder without agoraphobia     resolved after heart surgery     Partial anomalous pulmonary venous connection     No Comments Provided     Residual hemorrhoidal skin tags     3/3/2011      Past Surgical History:   Procedure Laterality Date     anorectal exam anesthesia  05/20/2015    [place] ANORECTAL EXAM SURG REQ ANESTH  DIAG     APPENDECTOMY OPEN      5/1987     ARTHROSCOPY KNEE      7/2006, 2013,Debridement, partial medial meniscectomy, and anterior cruciate ligament reconstruction using tibialis anterior allograft, right knee     AS HYSTEROSCOPY, SURGICAL; W/ ENDOMETRIAL ABLATION, ANY METHOD  12/14/2015    [facility]     CARDIAC SURGERY  11/2006    Repair of anomalous pulmonary venous return by anastomosis of the anomalous vein to the left atrial appendage, done at [facility]     COLONOSCOPY  08/28/2014 8/28/2014 Follow up in 10 years 8/28/24 normal     ECHO LIMITED      mild left atrial enlargement;  normal ef     ESOPHAGOSCOPY, GASTROSCOPY, DUODENOSCOPY (EGD), COMBINED      1996     ESOPHAGOSCOPY, GASTROSCOPY, DUODENOSCOPY (EGD), COMBINED      2001     ESOPHAGOSCOPY, GASTROSCOPY, DUODENOSCOPY (EGD), COMBINED      2006     ESOPHAGOSCOPY, GASTROSCOPY, DUODENOSCOPY (EGD), COMBINED      6/28/11,Normal     NISSEN FUNDOPLICATION  08/22/2000    Dr. Sales     SIGMOIDOSCOPY FLEXIBLE      1996,And BE, negative, for  rectal bleeding.  Fissures and hemorrhoids noted     SURGICAL PATHOLOGY EXAM Left 2016    Left flank     Family History   Problem Relation Age of Onset     Arthritis Mother         Arthritis,Osteoarthritis;  of 76 pneumonia but no autopsy     Diabetes Father         Diabetes     Other - See Comments Father         COPD/Benign colon polyps/AAA -  of ruptured AAA     Colon Cancer Paternal Grandmother         Cancer-colon     Colon Cancer Paternal Aunt         Cancer-colon     Diabetes Paternal Aunt         Diabetes     Obesity Sister         bariatric surgery     Other - See Comments Sister         TBI     Depression Sister      Anxiety Disorder Sister      Diabetes Sister      Hyperlipidemia Sister      Diabetes Brother      Hyperlipidemia Brother      Other - See Comments Maternal Grandmother          of old age     Dementia Maternal Grandfather         possible dementia,  of pneumonia     Other - See Comments Brother         MVA - at age 50     Hypertension Brother      Diabetes Brother      Hyperlipidemia Brother      Depression Brother      Diabetes Brother      Hyperlipidemia Brother      Macular Degeneration Brother      Diabetes Brother      Hyperlipidemia Brother      Suicide Brother 49     Cerebrovascular Disease Paternal Grandfather          of stroke     Social History     Tobacco Use     Smoking status: Former Smoker     Last attempt to quit: 10/10/2005     Years since quittin.0     Smokeless tobacco: Never Used   Substance Use Topics     Alcohol use: Yes     Alcohol/week: 14.0 standard drinks     Comment: Alcoholic Drinks/day: 2-3 per day     Social History     Patient does not qualify to have social determinant information on file (likely too young).   Social History Narrative    This patient was  in .  She was remarried on 04.  Works at [employer], work #[phone].          Justen Suresh       Keri Flowers Daughter, born       Mina Lionel  "Son, born 1990         Current Outpatient Medications   Medication Sig Dispense Refill     acyclovir (ZOVIRAX) 5 % ointment        ascorbic acid (VITAMIN C) 500 MG tablet Take 500 mg by mouth daily       Black Cohosh-Soy-Ginkgo-Magnol (ESTROVEN MOOD & MEMORY) TABS Take 1 tablet by mouth daily       buPROPion (WELLBUTRIN XL) 300 MG 24 hr tablet Take 1 tablet (300 mg) by mouth every morning 90 tablet 3     calcium carbonate-vitamin D (OSCAL W/D) 500-200 MG-UNIT tablet Take 0.5 mg daily.       FIBER, GUAR GUM, PO        Glucosamine-Chondroitin 750 & 400 MG MISC Take 1 tablet by mouth daily       L-Lysine 500 MG TABS Take 1 tablet by mouth daily       other medical supplies Tennis elbow strap.  Diagnosis:  M77.11.  Length of need:  99. 1 each 0     polyethylene glycol (MIRALAX) powder Take by mouth daily       triamcinolone (NASACORT) 55 MCG/ACT Inhaler 2 sprays       Allergies   Allergen Reactions     Codeine Other (See Comments)     Medroxyprogesterone Other (See Comments)     Caused patient's brain to swell.     Azithromycin Palpitations     Latex Rash     hands     Penicillins Rash     Proline Rash     Prolene: Sutures; caused keloid     Sulfamethoxazole W/Trimethoprim Rash     Sulfamethoxazole-Trimethoprim Rash       Review of Systems   Constitutional: Negative for chills and fever.   Respiratory: Negative for cough and shortness of breath.    Cardiovascular: Negative for peripheral edema.   Musculoskeletal: Positive for arthralgias and neck pain.   Psychiatric/Behavioral: Negative for mood changes. The patient is not nervous/anxious.         OBJECTIVE:     /60 (BP Location: Right arm, Patient Position: Sitting, Cuff Size: Adult Regular)   Pulse 88   Temp 99  F (37.2  C) (Tympanic)   Resp 20   Ht 1.689 m (5' 6.5\")   Wt 72.6 kg (160 lb)   SpO2 91%   BMI 25.44 kg/m    Body mass index is 25.44 kg/m .  Physical Exam  HENT:      Head: Normocephalic.   Eyes:      Extraocular Movements: Extraocular movements " intact.      Pupils: Pupils are equal, round, and reactive to light.   Neck:      Musculoskeletal: Normal range of motion and neck supple.   Cardiovascular:      Rate and Rhythm: Normal rate and regular rhythm.      Pulses: Normal pulses.      Heart sounds: Normal heart sounds. No murmur.   Pulmonary:      Effort: Pulmonary effort is normal.      Breath sounds: Normal breath sounds. No wheezing, rhonchi or rales.   Lymphadenopathy:      Cervical: No cervical adenopathy.   Neurological:      Mental Status: She is alert.   Psychiatric:         Mood and Affect: Mood normal.           PHQ-9 SCORE 6/7/2016 9/9/2016 8/20/2018   PHQ-9 Total Score 5 3 0       PHQ-2 Score:     PHQ-2 ( 1999 Pf[employer]) 10/8/2018 10/1/2018   Q1: Little interest or pleasure in doing things 0 0   Q2: Feeling down, depressed or hopeless 0 0   PHQ-2 Score 0 0       CECI-7 SCORE 6/2/2015 8/21/2015 6/7/2016   Total Score 14 2 5         I personally reviewed results withpatient as listed below:   Diagnostic Test Results:  none     ASSESSMENT/PLAN:       ICD-10-CM    1. Abnormal electrocardiogram R94.31 CARDIOLOGY EVAL ADULT REFERRAL     CANCELED: CARDIOLOGY EVAL ADULT REFERRAL   2. Hyperlipidemia LDL goal <100 E78.5    3. Primary osteoarthritis of left foot M19.072 PODIATRY/FOOT & ANKLE SURGERY REFERRAL     CANCELED: PODIATRY/FOOT & ANKLE SURGERY REFERRAL   4. Chronic left SI joint pain M53.3 XR Sacroiliac Therapeutic Injection Left    G89.29    5. Need for influenza vaccination Z23 GH-IMM- FLU VAC PRESRV FREE QUAD SPLIT VIR > 6 MONTHS IM       1.  Referred to cardiology for further discussion as to whether she should have a stress test versus other evaluation.  2.  At this point her HDL is quite high.  Her calculated risk of cardiovascular disease over the next 10 years is very low at 1.17%.  No statins were prescribed at this time.  Did recommend that she continue a healthy eating and exercise and continue to have her fasting lipid profile rechecked  yearly.  3.  Referred to Dr. Chirinos.  4.  Order placed to have left SI joint injection through radiology.  5.  Flu shot updated today.    Patricia Brooke MD  [facility]    Portions of this dictation were created using the Dragon Nuance voice recognition system. Proofreading was completed but there may be errors in text.

## 2019-10-29 NOTE — NURSING NOTE
"Chief Complaint   Patient presents with     RECHECK     follow up test results        Initial /60 (BP Location: Right arm, Patient Position: Sitting, Cuff Size: Adult Regular)   Pulse 88   Temp 99  F (37.2  C) (Tympanic)   Resp 20   Ht 1.689 m (5' 6.5\")   Wt 72.6 kg (160 lb)   SpO2 91%   BMI 25.44 kg/m   Estimated body mass index is 25.44 kg/m  as calculated from the following:    Height as of this encounter: 1.689 m (5' 6.5\").    Weight as of this encounter: 72.6 kg (160 lb).  Medication Reconciliation: complete    Carolina Sanchez LPN  "

## 2019-10-30 ENCOUNTER — MYC MEDICAL ADVICE (OUTPATIENT)
Dept: FAMILY MEDICINE | Facility: OTHER | Age: 54
End: 2019-10-30

## 2019-10-31 ENCOUNTER — MYC MEDICAL ADVICE (OUTPATIENT)
Dept: FAMILY MEDICINE | Facility: OTHER | Age: 54
End: 2019-10-31

## 2019-11-04 ENCOUNTER — HOSPITAL ENCOUNTER (OUTPATIENT)
Dept: GENERAL RADIOLOGY | Facility: OTHER | Age: 54
Discharge: HOME | End: 2019-11-04
Attending: FAMILY MEDICINE | Admitting: FAMILY MEDICINE
Payer: COMMERCIAL

## 2019-11-04 DIAGNOSIS — M53.3 CHRONIC LEFT SI JOINT PAIN: ICD-10-CM

## 2019-11-04 DIAGNOSIS — G89.29 CHRONIC LEFT SI JOINT PAIN: ICD-10-CM

## 2019-11-04 PROCEDURE — 27096 INJECT SACROILIAC JOINT: CPT | Mod: LT

## 2019-11-04 PROCEDURE — 25000125 ZZHC RX 250: Performed by: RADIOLOGY

## 2019-11-04 PROCEDURE — 25000128 H RX IP 250 OP 636: Performed by: RADIOLOGY

## 2019-11-04 PROCEDURE — 25500064 ZZH RX 255 OP 636: Performed by: RADIOLOGY

## 2019-11-04 RX ORDER — BUPIVACAINE HYDROCHLORIDE 5 MG/ML
3 INJECTION, SOLUTION EPIDURAL; INTRACAUDAL ONCE
Status: COMPLETED | OUTPATIENT
Start: 2019-11-04 | End: 2019-11-04

## 2019-11-04 RX ORDER — LIDOCAINE HYDROCHLORIDE 10 MG/ML
2 INJECTION, SOLUTION EPIDURAL; INFILTRATION; INTRACAUDAL; PERINEURAL ONCE
Status: COMPLETED | OUTPATIENT
Start: 2019-11-04 | End: 2019-11-04

## 2019-11-04 RX ORDER — TRIAMCINOLONE ACETONIDE 40 MG/ML
40 INJECTION, SUSPENSION INTRA-ARTICULAR; INTRAMUSCULAR ONCE
Status: COMPLETED | OUTPATIENT
Start: 2019-11-04 | End: 2019-11-04

## 2019-11-04 RX ADMIN — BUPIVACAINE HYDROCHLORIDE 3 ML: 5 INJECTION, SOLUTION EPIDURAL; INTRACAUDAL at 15:14

## 2019-11-04 RX ADMIN — TRIAMCINOLONE ACETONIDE 40 MG: 40 INJECTION, SUSPENSION INTRA-ARTICULAR; INTRAMUSCULAR at 15:15

## 2019-11-04 RX ADMIN — LIDOCAINE HYDROCHLORIDE 2 ML: 10 INJECTION, SOLUTION INFILTRATION; PERINEURAL at 15:14

## 2019-11-04 RX ADMIN — IOHEXOL 2 ML: 240 INJECTION, SOLUTION INTRATHECAL; INTRAVASCULAR; INTRAVENOUS; ORAL at 15:15

## 2019-11-19 ENCOUNTER — THERAPY VISIT (OUTPATIENT)
Dept: CHIROPRACTIC MEDICINE | Facility: OTHER | Age: 54
End: 2019-11-19
Attending: CHIROPRACTOR
Payer: COMMERCIAL

## 2019-11-19 DIAGNOSIS — M62.838 SPASM OF MUSCLE: ICD-10-CM

## 2019-11-19 DIAGNOSIS — M99.05 SEGMENTAL AND SOMATIC DYSFUNCTION OF PELVIC REGION: ICD-10-CM

## 2019-11-19 DIAGNOSIS — M51.379 DEGENERATION OF LUMBAR OR LUMBOSACRAL INTERVERTEBRAL DISC: Primary | ICD-10-CM

## 2019-11-19 PROCEDURE — 98940 CHIROPRACT MANJ 1-2 REGIONS: CPT | Mod: AT | Performed by: CHIROPRACTOR

## 2019-11-19 NOTE — PROGRESS NOTES
Visit #:  9 total  1/5  New Episode 11/19/19    Subjective:  Soraida Suresh is a 54 year old female who is seen in f/u up for:        Degeneration of lumbar or lumbosacral intervertebral disc  Segmental and somatic dysfunction of pelvic region  Spasm of muscle.     Since last visit on Visit date not found,  Soraida Suresh reports: Recently taking a family trip to [place] for a wedding.  Patient noted that while visiting family members she attempted to close the door to prevent family dog from escaping the house.  Patient reports that she experienced a severe sharp left-sided low back pain.  Patient reports pain has been progressively worsening.  Patient has been having quite a bit of difficulty with walking up an incline.  Patient denies any tripping.  Patient has also been having quite a bit of difficulty with sleeping.  Patient was able to utilize muscle relaxant medication with some relief of symptoms so she could sleep.  Patient reports Tylenol provided no relief of symptoms.  Patient returned to [facility] last night and immediately contacted her office for follow-up treatment and evaluation.    Area of chief complaint:  Lumbar :  Symptoms are graded at 8-9/10. The quality is described as stabbing, throbbing.     Prior to today's visit patient has been undergoing SI joint injections and does find them to be beneficial.    Patient was last seen in our office for symptoms of neck pain and headache symptoms.  Patient was referred for physical therapy specifically dry needling technique under the direction of Duane Springer PT.  Patient notes that this is helped her symptoms quite a bit.       Objective:  The following was observed:  Oswestry (SWATHI) Questionnaire    OSWESTRY DISABILITY INDEX 11/19/2019   Count 9   Sum 27   Oswestry Score (%) 60   Some recent data might be hidden      + left Ely's    P: palpatory tenderness present following the left PSIS   A: static palpation demonstrates intersegmental asymmetry ,  pelvis  R: motion palpation notes restricted motion, PSIS Left   T: muscle spasm at level(s): Gluteus medius left side, left quadratus lumborum, left iliopsoas, left piriformis:      Segmental spinal dysfunction/restrictions found at:  :  PSIS Left PI listing.      Assessment: Patient appears to be experiencing flareup of ongoing back pain symptoms.  Patient does have history of degenerative changes of both the cervical spine and lumbar spine as well as quite a bit of muscular spasms throughout her neck and back.  Patient has been under our care with similar complaints and does respond favorably with chiropractic intervention and I do anticipate that to be the case currently.  Plan of care to include 3-5 chiropractic visits over the next 4 weeks.  As per patient request we will follow-up PRN.    Diagnoses:      1. Degeneration of lumbar or lumbosacral intervertebral disc    2. Segmental and somatic dysfunction of pelvic region    3. Spasm of muscle        Patient's condition:  Patient had restrictions pre-manipulation and Patient symptoms are worsed due to moving incorrectly trying to stop a dog.    Treatment effectiveness:  Post manipulation there is better intersegmental movement and Patient claims to feel looser post manipulation      Procedures:  CMT:  47138 Chiropractic manipulative treatment 1-2 regions performed   Pelvis: Diversified, PSIS Left , Side posture    Modalities:  None performed this visit    Therapeutic procedures:  Total time: 2 minutes  Patient educated on resisted knee adduction exercise.    Response to Treatment  Reduction in symptoms as reported by patient    Prognosis: Good    Progress towards Goals: Goal: Reduce low back pain on the left side.  Report being able to sleep without painful limitations  Patient will report being able to walk without painful limitations     Recommendations:    Instructions:ice 20 minutes every other hour as needed and Perform knee squeeze exercise as  instructed    Follow-up:  Continue treatment PRN.

## 2019-11-20 DIAGNOSIS — M79.672 LEFT FOOT PAIN: Primary | ICD-10-CM

## 2019-11-21 ENCOUNTER — OFFICE VISIT (OUTPATIENT)
Dept: ORTHOPEDICS | Facility: OTHER | Age: 54
End: 2019-11-21
Attending: FAMILY MEDICINE
Payer: COMMERCIAL

## 2019-11-21 ENCOUNTER — HOSPITAL ENCOUNTER (OUTPATIENT)
Dept: GENERAL RADIOLOGY | Facility: OTHER | Age: 54
Discharge: HOME | End: 2019-11-21
Attending: PODIATRIST | Admitting: PODIATRIST
Payer: COMMERCIAL

## 2019-11-21 DIAGNOSIS — M19.072 PRIMARY OSTEOARTHRITIS OF LEFT FOOT: ICD-10-CM

## 2019-11-21 DIAGNOSIS — M79.672 LEFT FOOT PAIN: ICD-10-CM

## 2019-11-21 PROCEDURE — G0463 HOSPITAL OUTPT CLINIC VISIT: HCPCS | Performed by: PODIATRIST

## 2019-11-21 PROCEDURE — 73630 X-RAY EXAM OF FOOT: CPT

## 2019-11-25 ENCOUNTER — OFFICE VISIT (OUTPATIENT)
Dept: CARDIOLOGY | Facility: OTHER | Age: 54
End: 2019-11-25
Attending: FAMILY MEDICINE
Payer: COMMERCIAL

## 2019-11-25 VITALS
DIASTOLIC BLOOD PRESSURE: 80 MMHG | HEIGHT: 66 IN | HEART RATE: 80 BPM | SYSTOLIC BLOOD PRESSURE: 120 MMHG | TEMPERATURE: 99.2 F | RESPIRATION RATE: 18 BRPM | WEIGHT: 160 LBS | OXYGEN SATURATION: 99 % | BODY MASS INDEX: 25.71 KG/M2

## 2019-11-25 DIAGNOSIS — Z87.74 STATUS POST REPAIR OF PARTIAL ANOMALOUS PULMONARY VENOUS CONNECTION: Primary | ICD-10-CM

## 2019-11-25 DIAGNOSIS — R06.09 DOE (DYSPNEA ON EXERTION): ICD-10-CM

## 2019-11-25 PROCEDURE — 99203 OFFICE O/P NEW LOW 30 MIN: CPT | Performed by: NURSE PRACTITIONER

## 2019-11-25 PROCEDURE — 93005 ELECTROCARDIOGRAM TRACING: CPT | Performed by: NURSE PRACTITIONER

## 2019-11-25 PROCEDURE — 93000 ELECTROCARDIOGRAM COMPLETE: CPT | Performed by: NURSE PRACTITIONER

## 2019-11-25 ASSESSMENT — MIFFLIN-ST. JEOR: SCORE: 1345.38

## 2019-11-25 ASSESSMENT — PATIENT HEALTH QUESTIONNAIRE - PHQ9: SUM OF ALL RESPONSES TO PHQ QUESTIONS 1-9: 0

## 2019-11-25 ASSESSMENT — PAIN SCALES - GENERAL: PAINLEVEL: NO PAIN (0)

## 2019-11-25 NOTE — NURSING NOTE
"Chief Complaint   Patient presents with     Consult     abnormal ekg       Initial /80 (BP Location: Right arm, Patient Position: Sitting, Cuff Size: Adult Regular)   Pulse 80   Temp 99.2  F (37.3  C) (Tympanic)   Resp 18   Ht 1.689 m (5' 6.5\")   Wt 72.6 kg (160 lb)   SpO2 99%   BMI 25.44 kg/m   Estimated body mass index is 25.44 kg/m  as calculated from the following:    Height as of this encounter: 1.689 m (5' 6.5\").    Weight as of this encounter: 72.6 kg (160 lb).  Meds Reconciled: complete  Pt is not on Aspirin  Pt is not on a Statin  PHQ and/or CECI reviewed. Pt referred to PCP/MH Provider as appropriate.    Iva Young LPN        "

## 2019-11-25 NOTE — PATIENT INSTRUCTIONS
You were seen by  AGUSTINA Angulo CNP       1. A Echocardiogram has been ordered. You will be called to schedule this. You will receive instructions for testing at that time. You will be contacted with results.       2. No other changes.      You will follow up with [facility] as needed.       Please call the cardiology office with problems, questions, or concerns at [phone].    If you experience chest pain, chest pressure, chest tightness, shortness of breath, fainting, lightheadedness, nausea, vomiting, or other concerning symptoms, please report to the Emergency Department or call 911. These symptoms may be emergent, and best treated in the Emergency Department.     Cardiology Nurses  JOHANNA Zepeda LPN Amy M., LPN  [facility] (Unit 3C)  [phone]

## 2019-11-25 NOTE — PROGRESS NOTES
[facility] HEART CARE   CARDIOLOGY CONSULT     Soraida Suresh   [address] [id number]      Patricia Brooke     Chief Complaint   Patient presents with     Consult     abnormal ekg        HPI:   Ms. Suresh is a 54 year old female who presents for cardiology evaluation with abnormal EKG. Patient has a history of hyperlipidemia, allergic rhinitis, GERD, IBS, menorrhagia, depression and urinary incontinence. She does have a history of tobacco use, quit smoking in 2006 prior to her surgery.    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 This was identified incidentally on chest CT in 2006. 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 There appeared to be a significant amount of left to right shunting at that time to cause enlargement of the right ventricle and atrium. In 2006 she was seen by cardiology with I, at that time it was recommended surgical repair with the left upper lobe draining such that it enters the appropriate atria.    She went on to have surgical repair on 11/13/06 by Dr. Temple at [facility]. Repair of anomalous left superior pulmonary vein by anastomosis to the left atrial appendage via left lateral thoracotomy with muscle sparring approach. The patients postoperative course was noted to be uncomplicated. She was discharged on 11/18/2006.     Patient was seen by her PCP on 10/29/19 following a recent health dynamics physical at [place] in [place]. As part of that work-up, she had a lipid profile.  Her total cholesterol was 233, triglycerides 51,  and .  She also had an EKG.  It was noted on that EKG that she may have had a septal infarct, age undetermined.  There were some small changes  compared to her EKG from 2018 noted. No reported chest pain and long standing CAMEJO reported. She exercises every morning, with a yoga and bodyweight types of exercises.    Today patient denies any chest pain or pressure. No symptoms with exercise. Positive for shortness of breath with exertion, admits that this has been long standing. No progression of dyspnea reported. No palpitations, no lightheadedness or syncope. No edema. Patient has no other concerns today.    PAST MEDICAL HISTORY:   Past Medical History:   Diagnosis Date     Abnormal cytological finding in specimen from cervix uteri     10/11/2011     Allergic rhinitis     No Comments Provided     Benign lipomatous neoplasm of skin and subcutaneous tissue of trunk     2016     Contact dermatitis     No Comments Provided     Dyshidrosis     No Comments Provided     Encounter for screening for malignant neoplasm of colon     2014     Gastro-esophageal reflux disease without esophagitis     No Comments Provided     Major depressive disorder, single episode     2011     Other congenital malformations of great veins (CODE)     7/10/2006     Other specified diseases of anus and rectum (CODE)     2015     Panic disorder without agoraphobia     resolved after heart surgery     Partial anomalous pulmonary venous connection     No Comments Provided     Residual hemorrhoidal skin tags     3/3/2011          FAMILY HISTORY:   Family History   Problem Relation Age of Onset     Arthritis Mother         Arthritis,Osteoarthritis;  of 76 pneumonia but no autopsy     Diabetes Father         Diabetes     Other - See Comments Father         COPD/Benign colon polyps/AAA -  of ruptured AAA     Colon Cancer Paternal Grandmother         Cancer-colon     Colon Cancer Paternal Aunt         Cancer-colon     Diabetes Paternal Aunt         Diabetes     Obesity Sister         bariatric surgery     Other - See Comments Sister         TBI     Depression Sister       Anxiety Disorder Sister      Diabetes Sister      Hyperlipidemia Sister      Diabetes Brother      Hyperlipidemia Brother      Other - See Comments Maternal Grandmother          of old age     Dementia Maternal Grandfather         possible dementia,  of pneumonia     Other - See Comments Brother         MVA - at age 50     Hypertension Brother      Diabetes Brother      Hyperlipidemia Brother      Depression Brother      Diabetes Brother      Hyperlipidemia Brother      Macular Degeneration Brother      Diabetes Brother      Hyperlipidemia Brother      Suicide Brother 49     Cerebrovascular Disease Paternal Grandfather          of stroke          PAST SURGICAL HISTORY:   Past Surgical History:   Procedure Laterality Date     anorectal exam anesthesia  2015    [place] ANORECTAL EXAM SURG REQ ANESTH  DIAG     APPENDECTOMY OPEN      1987     ARTHROSCOPY KNEE      2006, ,Debridement, partial medial meniscectomy, and anterior cruciate ligament reconstruction using tibialis anterior allograft, right knee     AS HYSTEROSCOPY, SURGICAL; W/ ENDOMETRIAL ABLATION, ANY METHOD  2015    Kathya     CARDIAC SURGERY  2006    Repair of anomalous pulmonary venous return by anastomosis of the anomalous vein to the left atrial appendage, done at [facility]     COLONOSCOPY  2014 Follow up in 10 years 24 normal     ECHO LIMITED      mild left atrial enlargement;  normal ef     ESOPHAGOSCOPY, GASTROSCOPY, DUODENOSCOPY (EGD), COMBINED           ESOPHAGOSCOPY, GASTROSCOPY, DUODENOSCOPY (EGD), COMBINED           ESOPHAGOSCOPY, GASTROSCOPY, DUODENOSCOPY (EGD), COMBINED           ESOPHAGOSCOPY, GASTROSCOPY, DUODENOSCOPY (EGD), COMBINED      11,Normal     NISSEN FUNDOPLICATION  2000    Dr. Sales     SIGMOIDOSCOPY FLEXIBLE      ,And BE, negative, for rectal bleeding.  Fissures and hemorrhoids noted     SURGICAL PATHOLOGY EXAM Left 2016    Left  flank          SOCIAL HISTORY:   Social History     Socioeconomic History     Marital status:      Spouse name: Not on file     Number of children: Not on file     Years of education: Not on file     Highest education level: Not on file   Occupational History     Occupation:      Employer: OTHER   Social Needs     Financial resource strain: Not on file     Food insecurity:     Worry: Not on file     Inability: Not on file     Transportation needs:     Medical: Not on file     Non-medical: Not on file   Tobacco Use     Smoking status: Former Smoker     Last attempt to quit: 10/10/2005     Years since quittin.1     Smokeless tobacco: Never Used   Substance and Sexual Activity     Alcohol use: Yes     Alcohol/week: 14.0 standard drinks     Comment: Alcoholic Drinks/day: 2-3 per day     Drug use: No     Sexual activity: Yes     Partners: Male   Lifestyle     Physical activity:     Days per week: Not on file     Minutes per session: Not on file     Stress: Not on file   Relationships     Social connections:     Talks on phone: Not on file     Gets together: Not on file     Attends Restorationist service: Not on file     Active member of club or organization: Not on file     Attends meetings of clubs or organizations: Not on file     Relationship status: Not on file     Intimate partner violence:     Fear of current or ex partner: Not on file     Emotionally abused: Not on file     Physically abused: Not on file     Forced sexual activity: Not on file   Other Topics Concern     Parent/sibling w/ CABG, MI or angioplasty before 65F 55M? Not Asked   Social History Narrative    This patient was  in .  She was remarried on 04.  Works at [employer], work #[phone].          Justen Suresh       Keri Flowers Daughter, born       Mina Flowers Son, born               CURRENT MEDICATIONS:   Prior to Admission medications    Medication Sig Start Date End Date Taking?  Authorizing Provider   acyclovir (ZOVIRAX) 5 % ointment  12/3/15   Reported, Patient   ascorbic acid (VITAMIN C) 500 MG tablet Take 500 mg by mouth daily    Reported, Patient   Black Cohosh-Soy-Ginkgo-Magnol (ESTROVEN MOOD & MEMORY) TABS Take 1 tablet by mouth daily    Reported, Patient   buPROPion (WELLBUTRIN XL) 300 MG 24 hr tablet Take 1 tablet (300 mg) by mouth every morning 9/19/19   Patricia Brooke MD   calcium carbonate-vitamin D (OSCAL W/D) 500-200 MG-UNIT tablet Take 0.5 mg daily. 10/29/19   Patricia Brooke MD   Cyanocobalamin 2500 MCG CHEW Take 2,500 mcg by mouth daily    Reported, Patient   FIBER, GUAR GUM, PO  1/9/15   Reported, Patient   Glucosamine-Chondroitin 750 & 400 MG MISC Take 1 tablet by mouth daily    Reported, Patient   L-Lysine 500 MG TABS Take 1 tablet by mouth daily    Reported, Patient   other medical supplies Tennis elbow strap.  Diagnosis:  M77.11.  Length of need:  99. 5/22/18   Patricia Brooke MD   polyethylene glycol (MIRALAX) powder Take by mouth daily    Reported, Patient   triamcinolone (NASACORT) 55 MCG/ACT Inhaler 2 sprays 2/23/15   Reported, Patient          ALLERGIES:   Allergies   Allergen Reactions     Codeine Other (See Comments)     Medroxyprogesterone Other (See Comments)     Caused patient's brain to swell.     Azithromycin Palpitations     Latex Rash     hands     Penicillins Rash     Proline Rash     Prolene: Sutures; caused keloid     Sulfamethoxazole W/Trimethoprim Rash     Sulfamethoxazole-Trimethoprim Rash          ROS:   CONSTITUTIONAL: No reported fever or chills. No changes in weight.  ENT: No visual disturbance, ear ache, epistaxis or sore throat.   CARDIOVASCULAR: No chest pain, chest pressure or chest discomfort. No palpitations or lower extremity edema.   RESPIRATORY: No shortness of breath, reports chronic mild CAMEJO without progression.  No cough, wheezing or hemoptysis.   GI: No reported abdominal pain, nausea or vomiting.  "Positive for history GERD.   : No reported hematuria or dysuria.   NEUROLOGICAL: No lightheadedness, dizziness, syncope, ataxia, paresthesias or weakness.   HEMATOLOGIC: No history of anemia. No bleeding or excessive bruising. No history of blood clots.   MUSCULOSKELETAL: Positive for chronic knee and neck pain, no muscle pain.  ENDOCRINOLOGIC: No temperature intolerance. No hair or skin changes.  SKIN: No abnormal rashes or sores, no unusual itching.  PSYCHIATRIC: Positive for history of anxiety and depression.     PHYSICAL EXAM:   /80 (BP Location: Right arm, Patient Position: Sitting, Cuff Size: Adult Regular)   Pulse 80   Temp 99.2  F (37.3  C) (Tympanic)   Resp 18   Ht 1.689 m (5' 6.5\")   Wt 72.6 kg (160 lb)   SpO2 99%   BMI 25.44 kg/m    GENERAL: The patient is a well-developed, well-nourished, in no apparent distress.  HEENT: Head is normocephalic and atraumatic. Eyes are symmetrical with normal visual tracking. No icterus, no xanthelasmas. Nares appeared normal without nasal drainage. Mucous membranes are moist, no cyanosis.  NECK: Supple. No cervical bruits, JVP not visible.   CHEST/ LUNGS: Lungs clear to auscultation, no rales, rhonchi or wheezes, no use of accessory muscles, no retractions, respirations unlabored and normal respiratory rate.   CARDIO: Regular rate and rhythm normal with S1 and S2, no S3 or S4 and no murmur, click or rub. Precordium quiet with normal PMI.    ABD: Abdomen is soft and nontender, nondistended. No palpable masses, aorta not enlarged to palpitation and no abdominal bruits heard.   EXTREMITIES: No LE edema present.   MUSCULOSKELETAL: No visible joint swelling.   NEUROLOGIC: Alert and oriented X3. Normal speech, gait and affect. No focal neurologic deficits.   SKIN: No jaundice. No rashes or visible skin lesions present. No ecchymosis.       EKG:    EKG NSR, rate 69 bpm, no ST-T changes. Stable EKG. Improved in comparison to EKG on 9/20/19 ST segments have " normalized and nonspecific T wave flattening resolved.     LAB RESULTS:   Transferred Records on 09/20/2019   Component Date Value Ref Range Status     Glucose 09/20/2019 94  70 - 100 mg/dL Final     Creatinine 09/20/2019 0.72  0.60 - 1.10 mg/dL Final     GFR Estimate 09/20/2019 84  >=60 ml/min/1.73m2 Final     GFR Estimate If Black 09/20/2019 >90  >=60 ml/min/1.73m2 Final     Potassium 09/20/2019 4.4  3.5 - 5.3 meq/L Final     AST 09/20/2019 20  0 - 35 U/L Final     ALT 09/20/2019 18  0 - 55 U/L Final     Cholesterol 09/20/2019 233* 100 - 200 mg/dL Final     Triglycerides 09/20/2019 51  50 - 150 mg/dL Final     HDL Cholesterol 09/20/2019 110* 40 - 80 mg/dL Final     LDL Cholesterol Calculated 09/20/2019 113  0 - 129 mg/dL Final          ASSESSMENT:   Soraida Suresh presents for cardiology evaluation with abnormal EKG. Patient has a history of hyperlipidemia, allergic rhinitis, GERD, IBS, menorrhagia, depression and urinary incontinence. She does have a history of tobacco use, quit smoking in 2006 prior to her surgery.  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    1. 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  2. CAMEJO (dyspnea on exertion)    PLAN:   1. Reviewed EKG today with NSR, rate 69 bpm, no ST-T changes. Stable EKG. Improved in comparison to EKG on 9/20/19 ST segments have normalized and nonspecific T wave flattening resolved. Todays EKG consistent with findings on EKG from 2018.   2. No angina pectoris, reports CAMEJO longstanding as mild without progression, no specific anginal or anginal symptoms. Reviewed low risk for CAD. No history of HTN or DM. Short history of tobacco use. Mild HLD with cardioprotective HDL. No family history of premature CAD. Patient had a CTA of chest in 2006 prior to her surgery with no coronary artery calcification  identified which also places patient at low risk for CAD. Given these findings and EKG stable today to baseline, no recommendation for stress testing. She will continue to monitor symptoms and recommended continued exercise and heart healthy diet for cardiac optimization.   3. TTE ordered given her history of pulmonary venous connection repair with anastomosis to left atrial appendage.    > 30 minutes of 60 minute visit spend reviewing past history, symptoms and EKG review with patient.     Thank you for allowing me to participate in the care of your patient. Please do not hesitate to contact me if you have any questions.     Tenisha Lowe, APRN CNP [facility]

## 2019-11-26 ENCOUNTER — THERAPY VISIT (OUTPATIENT)
Dept: CHIROPRACTIC MEDICINE | Facility: OTHER | Age: 54
End: 2019-11-26
Attending: CHIROPRACTOR
Payer: COMMERCIAL

## 2019-11-26 DIAGNOSIS — M51.379 DEGENERATION OF LUMBAR OR LUMBOSACRAL INTERVERTEBRAL DISC: Primary | ICD-10-CM

## 2019-11-26 DIAGNOSIS — M99.04 SEGMENTAL AND SOMATIC DYSFUNCTION OF SACRAL REGION: ICD-10-CM

## 2019-11-26 DIAGNOSIS — M62.838 SPASM OF MUSCLE: ICD-10-CM

## 2019-11-26 DIAGNOSIS — M25.552 HIP PAIN, LEFT: ICD-10-CM

## 2019-11-26 PROCEDURE — 98940 CHIROPRACT MANJ 1-2 REGIONS: CPT | Mod: AT | Performed by: CHIROPRACTOR

## 2019-11-26 NOTE — PATIENT INSTRUCTIONS
Ice up to 20 minutes every 2 hours as needed.  Perform stretch and knee squeeze exercise as instructed

## 2019-11-26 NOTE — PROGRESS NOTES
Visit #:  2/5    Subjective:  Soraida Suresh is a 54 year old female who is seen in f/u up for:        Degeneration of lumbar or lumbosacral intervertebral disc  Segmental and somatic dysfunction of sacral region  Spasm of muscle  Hip pain, left.     Since last visit on 11/19/2019,  Soraida Suresh reports:    Area of chief complaint:  Lumbar :  Symptoms are graded at 6-8/10. The quality is described as spasmed and left sided.  Patient noted some improvement of symptoms after last treatment however symptoms do not fully resolved.  Patient has been noticing referred pain into the left lateral hip and groin.  No radicular complaints present.  Patient has been compliant with home exercise recommendations and finds them somewhat beneficial.  As symptoms have not fully resolved and patient will be traveling this weekend for upcoming Thanksgiving patient is concerned that driving in the car will cause symptoms to worsen which is why she is presenting for follow-up care on today's visit.    Patient noted on today's visit that on her last SI joint injection Dr. Lilliam VICK had mentioned possibly undergoing injection of the left hip.     Objective:  The following was observed:    P: palpatory tendernessPresent following the left PSIS, left gluteus medius, left external rotator muscle group, left quadratus lumborum:    A: static palpation demonstrates intersegmental asymmetry , pelvis  R: motion palpation notes restricted motion, Sacrum   T: muscle spasm at level(s): Left gluteus medius, left quadratus lumborum, left external rotator muscle group:      Segmental spinal dysfunction/restrictions found at:  :  Sacrum Left lateral flexion restricted and Extension restriction.      Assessment: Patient does appear to making progress however I am still finding quite a bit of soft tissue/muscular spasms surrounding left hip.  Patient usually is quite active with home stretching routine however due to recent flareup patient has been  concerned that this activity would cause symptoms to worsen.  On today's visit we did provide patient with alternative stretch listed below.  Patient requested I contact her primary care provider Dr. Mendy VICK about undergoing injection therapy within the left hip.   Due to upcoming travel by the patient I do anticipate that further treatment will be needed.    Diagnoses:      1. Degeneration of lumbar or lumbosacral intervertebral disc    2. Segmental and somatic dysfunction of sacral region    3. Spasm of muscle    4. Hip pain, left        Patient's condition:  Patient had restrictions pre-manipulation and Patient symptoms are gradually improving    Treatment effectiveness:  Post manipulation there is better intersegmental movement and Patient claims to feel looser post manipulation      Procedures:  CMT:  61849 Chiropractic manipulative treatment 1-2 regions performed   Pelvis: Diversified, Sacrum , Side posture    Modalities:  None performed this visit    Therapeutic procedures:  Total time: 3 minutes  Trigger point therapy applied to the spasm muscles of the left lumbo-sacral region prior to adjustment  Patient educated on seated piriformis stretch to be performed up to 2 minutes 1-2 times a day or as tolerated by the patient    Response to Treatment  Reduction in symptoms as reported by patient    Prognosis: Good    Progress towards Goals: Patient is making progress towards the goal.     Recommendations:    Instructions:ice 20 minutes every other hour as needed and stretch as instructed at visit    Follow-up:  Return to care in 1 week.

## 2019-12-02 NOTE — PROGRESS NOTES
VITALS:   Height:   66  Weight:   160  Pulse rate:  60  Blood Pressure:  104 / 68      CHIEF COMPLAINT: Left Great Toe Pain/Left Arch and Heel Pain    PROBLEMS:   Patient has no noted problems.    PATIENT REPORTED MEDICATIONS:  WELLBUTRIN XL TABLET EXTENDED RELEASE 24 HOUR (BUPROPION HCL XR24H-TAB)     PATIENT REPORTED ALLERGIES:  CODEINE (SevereReaction: Unknown Reaction)  BACTRIM (SULFAMETHOXAZOLE-TRIMETHOPRIM) (SevereReaction: Unknown Reaction)  * Z-CASSY (SevereReaction: Unknown Reaction)  PENICILLIN (PENICILLIN V POTASSIUM) (ModerateReaction: Unknown)  SULFA (ModerateReaction: Unknown)  CODEINE (ModerateReaction: Unknown)  * LATEX (ModerateReaction: Unknown)    RISK FACTORS:  Tobacco use:   former smoker  Passive smoke exposure: No  Alcohol Use:   Yes    HISTORY OF PRESENT ILLNESS:    The patient is a 54-year-old female who is here today with left great toe pain.  I saw her a year and a half ago and did a big toe injection.  This helped quite significantly for quite some time.  She also has some arch pain or some heel pain that has been going on for about six months.  She has done nothing significant to treat aside from the big toe injection and modifying some shoes.  She rates her pain as 6 to 9 out of 10 depending on activity.  It hurts with standing and too much activity.  She has tried some gel inserts without success.    The patient's health history form dated 11/21/2019 was reviewed and signed.  Past medical history, surgical history, social history, family history, and review of systems noted.     PAST MEDICAL HISTORY:    Arthritis in Left Big Toe    PAST ORTHOPEDIC SURGICAL HISTORY:    Left Knee Arthroscopy with ACL Reconstruction 09/04/13  Right Knee ACL & Meniscus Surgery    PAST SURGICAL HISTORY:    Superior Pulmonary Vein Thoracotomy Anomaly Repair 11/06  Appendectomy  Nissen 08/00    FAMILY HISTORY:    Family History Unknown    SOCIAL HISTORY:   Occupation:   [employer]  Marital Status:    Alcohol Use:  Yes  Tobacco Use:  Former  Secondhand Smoke Exposure:  No  History of HIV:  No  History of Hepatitis:  No    REVIEW OF SYSTEMS:  Joint or Muscle pain: No  Stiffness:  Yes  Swelling:  No  Difficulty in walking: Yes  Weakness of muscles: No  Rash or Itching: No  Bruising:  No  Numbness/Tingling: Yes    PHYSICAL EXAMINATION:    CONSTITUTIONAL:  Patient is alert and oriented x3, well-appearing and in no apparent distress.  Affect is pleasant and answers questions appropriately.  VASCULAR:  Circulation is intact with palpable pedal pulses and has adequate capillary fill time.  NEUROLOGIC:  Light touch sensation is intact to digits.  INTEGUMENT:  No dermatologic lesions are noted.  Skin with normal texture and turgor.  MUSCULOSKELETAL:   Hallux limitus is appreciated.  Spurring at the dorsal aspect of the first MPJ with mild crepitus within the joint.  Hindfoot is well-aligned.  She does have some pain with palpation of the medial plantar heel.  The patient has a slight equinus deformity with knee extended which is improved with the knee flexed.  The patient has pain to palpation to medial instep of heel at insertion of plantar fascia.  Plantar fascial band is tight with dorsiflexion of the ankle and hallux.  The patient is otherwise ambulatory in normal shoe gear and without assistive device.     X-RAY:  Three views of the left foot demonstrate hallux limitus.  Joint space narrowing first MPJ.  Dorsal spurring of metatarsal head, consistent with previous finding.  Plantar heel spur consistent with plantar fascia pathology.  No other acute osseous pathology.    ASSESSMENT:    1.  Left hallux limitus and first metatarsophalangeal joint arthrosis.  2.  Left plantar fasciitis.    PROCEDURES:   Risks and benefits of the procedure were reviewed with the patient.  Informed consent was obtained.  Blood sugar risks for our diabetic patients were also discussed.  Dorsomedial aspect  of left first metatarsophalangeal joint was prepped with alcohol.  Injected through a medial portal into the first metatarsophalangeal joint with 20 mg of Kenalog-40 and 1 cc of 1% lidocaine. Procedure performed without incident.  Sterile Band-Aid applied.    Informed consent was obtained and risks and benefits discussed.  Blood sugar risks for our diabetic patients were also discussed.  The patient's left medial instep of heel was prepped with alcohol and Betadine solution.  I injected into the insertion of the plantar fascia through the medial instep of heel 20 mg of Kenalog and 1 cc of 1% lidocaine.  A sterile bandage was applied.  The procedure was tolerated well.    PLAN:   Discussed condition and treatment with the patient today.  In regards to the hallux limitus we did a repeat injection.  She got good relief for over a year and she requests another injection today.  We discussed other options at some point, including first MPJ fusion.  Possible Arthrosurface type resurfacing implant could be considered as well.  In regards to the plantar fasciitis we discussed conservative cares.  Stretching, shoe gear and orthotics were all discussed in detail.  We also did an injection in addition to the office visit.  With persistent pain reappoint.    Dictated by Mendoza Chirinos DPM  cc:  MD Dr. [place]an [place] at [facility]    D:  11/21/2019  T:  11/27/2019    Typed and/or reviewed and corrected by signing  below, and sent to the Physician for final review and signature.    This report was created using voice recording software and computer-generated templates. Although every effort has been made to review for and eliminate errors, some errors may still occur.

## 2019-12-09 ENCOUNTER — HOSPITAL ENCOUNTER (OUTPATIENT)
Dept: CARDIOLOGY | Facility: OTHER | Age: 54
Discharge: HOME | End: 2019-12-09
Attending: NURSE PRACTITIONER | Admitting: NURSE PRACTITIONER
Payer: COMMERCIAL

## 2019-12-09 DIAGNOSIS — Z87.74 STATUS POST REPAIR OF PARTIAL ANOMALOUS PULMONARY VENOUS CONNECTION: ICD-10-CM

## 2019-12-09 DIAGNOSIS — R06.09 DOE (DYSPNEA ON EXERTION): ICD-10-CM

## 2019-12-09 PROCEDURE — 93306 TTE W/DOPPLER COMPLETE: CPT

## 2019-12-09 PROCEDURE — 93306 TTE W/DOPPLER COMPLETE: CPT | Mod: 26 | Performed by: INTERNAL MEDICINE

## 2019-12-18 ENCOUNTER — THERAPY VISIT (OUTPATIENT)
Dept: CHIROPRACTIC MEDICINE | Facility: OTHER | Age: 54
End: 2019-12-18
Attending: CHIROPRACTOR
Payer: COMMERCIAL

## 2019-12-18 ENCOUNTER — TELEPHONE (OUTPATIENT)
Dept: FAMILY MEDICINE | Facility: OTHER | Age: 54
End: 2019-12-18

## 2019-12-18 DIAGNOSIS — M25.552 HIP PAIN, LEFT: ICD-10-CM

## 2019-12-18 DIAGNOSIS — M51.379 DEGENERATION OF LUMBAR OR LUMBOSACRAL INTERVERTEBRAL DISC: Primary | ICD-10-CM

## 2019-12-18 DIAGNOSIS — M54.50 LUMBAGO: ICD-10-CM

## 2019-12-18 DIAGNOSIS — M99.04 SEGMENTAL AND SOMATIC DYSFUNCTION OF SACRAL REGION: ICD-10-CM

## 2019-12-18 PROCEDURE — 98940 CHIROPRACT MANJ 1-2 REGIONS: CPT | Mod: AT | Performed by: CHIROPRACTOR

## 2019-12-18 NOTE — TELEPHONE ENCOUNTER
Reason for call: Patient wanting a work in appointment.    Patient is having the following symptoms:  Hip pain f/u for 1 day. Pt requesting work in sooner than Jan 16.    The patient is requesting an appointment with  [facility]    Was an appointment offered for this call? Yes    If Yes, what is the date of the appointment?  Jan 16     Preferred method for responding to this message: Telephone Call    Phone number patient can be reached at? Home number on file [phone] (home)    If we can't reach you directly, may we leave a detailed response at the number you provided? Yes    Can this message wait until your PCP/provider returns if unavailable today? Yes

## 2019-12-18 NOTE — PROGRESS NOTES
Visit #:  3/5    Subjective:  Soraida Suresh is a 54 year old female who is seen in f/u up for:        Degeneration of lumbar or lumbosacral intervertebral disc  Segmental and somatic dysfunction of sacral region  Hip pain, left  Lumbago.     Since last visit on 11/26/2019,  Soraida Suresh reports:    Area of chief complaint:  Lumbar/left hip :  Symptoms are graded at 9/10. Following last treatment patient felt some relief of her pain. Patient finds relief with knee squeeze exercise, however seated piriformis stretches cause symptoms to worsen. Patient noted symptoms worsened after recent travel which required her to sit for extended periods of time. Patient states sitting aggravates symptoms, as does rolling over in bed. Standing decreases pain as does sitting with left leg flexed and externally rotated.     Walking up stairs is difficult and patient finds putting on shoes and socks painful.    Patient is in tears on today's visit and is very frustrated. Left hip/lower back pain started in past November after patient attempted to close a door to stop a dog from escaping.    Pain in left hip/lower back is localizing to the medial/anterior groin and to the ischial attachment of the hamstrings on the left side.         Objective:  The following was observed:  Oswestry (SWATHI) Questionnaire    OSWESTRY DISABILITY INDEX 12/18/2019   Count 9   Sum 19   Oswestry Score (%) 42.22   Some recent data might be hidden      This is much improved since last evaluation    +Fabers left side    Left hip is restricted with flexion beyond 90 degrees and hip internal rotation beyond 10-15 degrees. Pain is noted along the anterior groin and along the postero-lateral left hip/buttocks.    P: palpatory tenderness elicited along the left PSIS, left TFL, left gluteus medius:    A: static palpation demonstrates intersegmental asymmetry , pelvis  R: motion palpation notes restricted motion, Sacrum   T: muscle spasm at level(s): left psoas,  left TFL, left gluteus medius, taut/tender fibers are noted of the left quadratus lumborum:      Segmental spinal dysfunction/restrictions found at:  :  Sacrum Left lateral flexion restricted and Extension restriction.      Assessment: Patient continues to have complaints of left hip/lower back pain. There is evidence to support present of segmental/somatic dysfunction of the sacrum, however based off of patients reports as well as objective findings I am suspicious of problem within the femoral-acetabular joint. DDx includes, but is not limited to, arthritis, labral tear, and bursitis of the left greater trochanter and ischium. Further imaging studies should be performed. As the patient does find relief with adjustments I am recommending continuation of care     Diagnoses:      1. Degeneration of lumbar or lumbosacral intervertebral disc    2. Segmental and somatic dysfunction of sacral region    3. Hip pain, left    4. Lumbago        Patient's condition:  Patient had restrictions pre-manipulation and Patient symptoms are worsed due to recent travel and inability to perform home stretches.    Treatment effectiveness:  Post manipulation there is better intersegmental movement and Patient claims to feel looser post manipulation      Procedures:  CMT:  93704 Chiropractic manipulative treatment 1-2 regions performed   Pelvis: Diversified, Sacrum , Side posture    Modalities:  None performed this visit    Therapeutic procedures:  None    Response to Treatment  Reduction in symptoms as reported by patient    Prognosis: Guarded    Progress towards Goals: Patient is making progress towards the goal.     Recommendations:    Instructions:ice 20 minutes every other hour as needed, heat 15 minutes every other hour as needed and Contact Dr. Brooke's office at soonest convenience    Follow-up:  Return to care in 1 week.

## 2019-12-19 ENCOUNTER — MYC MEDICAL ADVICE (OUTPATIENT)
Dept: FAMILY MEDICINE | Facility: OTHER | Age: 54
End: 2019-12-19

## 2019-12-19 DIAGNOSIS — M25.552 HIP PAIN, LEFT: Primary | ICD-10-CM

## 2019-12-24 ENCOUNTER — THERAPY VISIT (OUTPATIENT)
Dept: CHIROPRACTIC MEDICINE | Facility: OTHER | Age: 54
End: 2019-12-24
Attending: CHIROPRACTOR
Payer: COMMERCIAL

## 2019-12-24 DIAGNOSIS — M25.552 HIP PAIN, LEFT: ICD-10-CM

## 2019-12-24 DIAGNOSIS — M51.379 DEGENERATION OF LUMBAR OR LUMBOSACRAL INTERVERTEBRAL DISC: Primary | ICD-10-CM

## 2019-12-24 DIAGNOSIS — M54.50 LUMBAGO: ICD-10-CM

## 2019-12-24 DIAGNOSIS — M99.04 SEGMENTAL AND SOMATIC DYSFUNCTION OF SACRAL REGION: ICD-10-CM

## 2019-12-24 PROCEDURE — 98940 CHIROPRACT MANJ 1-2 REGIONS: CPT | Mod: AT | Performed by: CHIROPRACTOR

## 2019-12-24 NOTE — PROGRESS NOTES
Visit #:  4/5    Subjective:  Soraida Suresh is a 54 year old female who is seen in f/u up for:        Degeneration of lumbar or lumbosacral intervertebral disc  Segmental and somatic dysfunction of sacral region  Hip pain, left  Lumbago.     Since last visit on 12/18/2019,  Soraida Suresh reports:    Area of chief complaint:  Lumbar :  Symptoms are graded at 8-10/10. The quality is described as aching, spasmed and tight.  Patient reports pain continues to affect her ability to sit comfortably. Patient finds comfort with walking and standing.    Patient was able to schedule an MRI of her left hip.    Patient did find relief of symptoms after last treatment but notes that pain began to return within a day or two.         Objective:  The following was observed:    P: palpatory tendernessLeft PSIS into the lumbar paraspinal musculature left side:    A: static palpation demonstrates intersegmental asymmetry , pelvis  R: motion palpation notes restricted motion, Sacrum   T: muscle spasm at level(s): Lumbar paraspinals left side, left TF L, left piriformis.  Taut and tender fibers apparent left gluteus medius left quadratus lumborum:      Segmental spinal dysfunction/restrictions found at:  :  Sacrum Left lateral flexion restricted and Extension restriction.      Assessment: Left-sided hip/low back pain symptoms continue.  Patient is finding temporary relief of symptoms with chiropractic adjustments.  Patient attempted to treat left hip pain with massage therapy however due to spasms of inner thigh musculature patient noted treatment was only mildly effective.  After last treatment I was able to contact patient's primary care provider regarding my concerns about possible labral tearing of the left hip.  Patient was able to be scheduled for an MRI with contrast study.  As patient is still finding relief of symptoms with chiropractic adjustments and with upcoming holiday travels we will see patient again in 2  days.    Diagnoses:      1. Degeneration of lumbar or lumbosacral intervertebral disc    2. Segmental and somatic dysfunction of sacral region    3. Hip pain, left    4. Lumbago        Patient's condition:  Patient had restrictions pre-manipulation and Symptoms come and go    Treatment effectiveness:  Post manipulation there is better intersegmental movement and Patient states that they feel much better post manipulation but they gradually tighten up over time      Procedures:  CMT:  39627 Chiropractic manipulative treatment 1-2 regions performed   Pelvis: Diversified, LAD , Sacrum, Side posture/supine    Modalities:  None performed this visit    Therapeutic procedures:  Total time: 2 minutes  Encouraged use of home TENS unit    Response to Treatment  Reduction in symptoms as reported by patient    Prognosis: Good    Progress towards Goals: Patient is making progress towards the goal.     Recommendations:    Instructions:ice 20 minutes every other hour as needed and utilize TENS unit per pooja recommendations    Follow-up:  Return to care in 2 days.

## 2019-12-24 NOTE — PATIENT INSTRUCTIONS
ice 20 minutes every other hour as needed and utilize TENS unit per manufactuerers recommendations

## 2019-12-26 ENCOUNTER — THERAPY VISIT (OUTPATIENT)
Dept: CHIROPRACTIC MEDICINE | Facility: OTHER | Age: 54
End: 2019-12-26
Attending: CHIROPRACTOR
Payer: COMMERCIAL

## 2019-12-26 DIAGNOSIS — M25.552 HIP PAIN, LEFT: ICD-10-CM

## 2019-12-26 DIAGNOSIS — M62.838 SPASM OF MUSCLE: ICD-10-CM

## 2019-12-26 DIAGNOSIS — M99.04 SEGMENTAL AND SOMATIC DYSFUNCTION OF SACRAL REGION: ICD-10-CM

## 2019-12-26 DIAGNOSIS — M54.50 LUMBAGO: ICD-10-CM

## 2019-12-26 DIAGNOSIS — M51.379 DEGENERATION OF LUMBAR OR LUMBOSACRAL INTERVERTEBRAL DISC: Primary | ICD-10-CM

## 2019-12-26 PROCEDURE — 98940 CHIROPRACT MANJ 1-2 REGIONS: CPT | Mod: AT | Performed by: CHIROPRACTOR

## 2019-12-26 NOTE — PROGRESS NOTES
Visit #:  5/5    Subjective:  Soraida Suresh is a 54 year old female who is seen in f/u up for:        Degeneration of lumbar or lumbosacral intervertebral disc  Segmental and somatic dysfunction of sacral region  Hip pain, left  Lumbago  Spasm of muscle.     Since last visit on 12/24/2019,  Soraida Suresh reports:    Area of chief complaint:  Lumbar/ left hip :  Symptoms are graded at 6-10/10. The quality is described as achey, spasmed and tight.  Patient found relief of symptoms which seem to last little bit longer.  Patient notes that she has been able to sit more comfortably for approximately 30 minutes before needing to change positions or get up and move around due to pain of the low back/left hip.  Patient states pain seems to have shifted from left anterior groin to left posterior buttocks.  No radicular complaints are noted at this time.  Patient has not experienced any slips, or overuse activities since last visit.    Patient is scheduled to under MRI with contrast of the left hip on December 30, 2019.       Objective:  The following was observed:    P: palpatory tendernessLeft PSIS, left piriformis:    A: static palpation demonstrates intersegmental asymmetry , pelvis  R: motion palpation notes restricted motion, Sacrum   T: muscle spasm at level(s): Left quadratus lumborum, left piriformis, left gluteus medius:      Segmental spinal dysfunction/restrictions found at:  :  Sacrum Left lateral flexion restricted and Extension restriction.      Assessment: Patient does appear to be showing modest progress at this time.  Patient is scheduled to undergo MRI of the left hip with contrast to evaluate for suspicion of labral tearing.  We will follow-up with patient early next week to discuss further course of care.  Reassessment should be performed at this time.    Diagnoses:      1. Degeneration of lumbar or lumbosacral intervertebral disc    2. Segmental and somatic dysfunction of sacral region    3. Hip  pain, left    4. Lumbago    5. Spasm of muscle        Patient's condition:  Patient had restrictions pre-manipulation and Symptoms come and go    Treatment effectiveness:  Post manipulation there is better intersegmental movement and Patient states that they feel much better post manipulation but they gradually tighten up over time      Procedures:  CMT:  75971 Chiropractic manipulative treatment 1-2 regions performed   Pelvis: Diversified, Sacrum , Side posture    Modalities:  None performed this visit    Therapeutic procedures:  None    Response to Treatment  Reduction in symptoms as reported by patient    Prognosis: Guarded    Progress towards Goals: Patient is making progress towards the goal.     Recommendations:    Instructions:heat 15 minutes every other hour as needed    Follow-up:  Return to care in 5 days.

## 2019-12-30 ENCOUNTER — HOSPITAL ENCOUNTER (OUTPATIENT)
Dept: MRI IMAGING | Facility: OTHER | Age: 54
End: 2019-12-30
Attending: FAMILY MEDICINE
Payer: COMMERCIAL

## 2019-12-30 ENCOUNTER — HOSPITAL ENCOUNTER (OUTPATIENT)
Dept: GENERAL RADIOLOGY | Facility: OTHER | Age: 54
Discharge: HOME | End: 2019-12-30
Attending: FAMILY MEDICINE | Admitting: FAMILY MEDICINE
Payer: COMMERCIAL

## 2019-12-30 DIAGNOSIS — M25.552 HIP PAIN, LEFT: ICD-10-CM

## 2019-12-30 PROCEDURE — 73722 MRI JOINT OF LWR EXTR W/DYE: CPT | Mod: LT

## 2019-12-30 PROCEDURE — A9575 INJ GADOTERATE MEGLUMI 0.1ML: HCPCS | Performed by: RADIOLOGY

## 2019-12-30 PROCEDURE — 25500064 ZZH RX 255 OP 636: Performed by: RADIOLOGY

## 2019-12-30 PROCEDURE — 27093 INJECTION FOR HIP X-RAY: CPT

## 2019-12-30 PROCEDURE — 25000125 ZZHC RX 250: Performed by: RADIOLOGY

## 2019-12-30 RX ORDER — LIDOCAINE HYDROCHLORIDE 10 MG/ML
2 INJECTION, SOLUTION EPIDURAL; INFILTRATION; INTRACAUDAL; PERINEURAL ONCE
Status: COMPLETED | OUTPATIENT
Start: 2019-12-30 | End: 2019-12-30

## 2019-12-30 RX ORDER — GADOTERATE MEGLUMINE 376.9 MG/ML
0.1 INJECTION INTRAVENOUS ONCE
Status: COMPLETED | OUTPATIENT
Start: 2019-12-30 | End: 2019-12-30

## 2019-12-30 RX ADMIN — GADOTERATE MEGLUMINE 0.1 ML: 376.9 INJECTION INTRAVENOUS at 08:31

## 2019-12-30 RX ADMIN — IOHEXOL 5 ML: 240 INJECTION, SOLUTION INTRATHECAL; INTRAVASCULAR; INTRAVENOUS; ORAL at 08:32

## 2019-12-30 RX ADMIN — LIDOCAINE HYDROCHLORIDE 3 ML: 10 INJECTION, SOLUTION INFILTRATION; PERINEURAL at 08:33

## 2019-12-31 ENCOUNTER — THERAPY VISIT (OUTPATIENT)
Dept: CHIROPRACTIC MEDICINE | Facility: OTHER | Age: 54
End: 2019-12-31
Attending: CHIROPRACTOR
Payer: COMMERCIAL

## 2019-12-31 DIAGNOSIS — M99.05 SEGMENTAL AND SOMATIC DYSFUNCTION OF PELVIC REGION: ICD-10-CM

## 2019-12-31 DIAGNOSIS — M51.379 DEGENERATION OF LUMBAR OR LUMBOSACRAL INTERVERTEBRAL DISC: Primary | ICD-10-CM

## 2019-12-31 DIAGNOSIS — M25.552 HIP PAIN, LEFT: ICD-10-CM

## 2019-12-31 PROCEDURE — 98940 CHIROPRACT MANJ 1-2 REGIONS: CPT | Mod: AT | Performed by: CHIROPRACTOR

## 2019-12-31 NOTE — PROGRESS NOTES
Visit #:  6/6    Subjective:  Soraida Suresh is a 54 year old female who is seen in f/u up for:        Degeneration of lumbar or lumbosacral intervertebral disc  Segmental and somatic dysfunction of pelvic region  Hip pain, left.     Since last visit on 12/26/2019,  Soraida Suresh reports: Undergoing MRI left hip yesterday.  Following last treatment patient noted mild improvement of symptoms temporarily.  Patient is continuing to have pain following left outer hip and into the groin.  No radicular complaints are present at this time however the patient is still having some difficulty with stepping high with left foot noting occasionally that she will trip over her foot.  Sitting continues to be the most problematic for the patient as this seems to cause most pain.    Area of chief complaint:  Lumbar/left hip :  Symptoms are graded at 4-8/10. The quality is described as achey, spasmed.           Objective:  The following was observed:  Oswestry (SWATHI) Questionnaire    OSWESTRY DISABILITY INDEX 12/31/2019   Count 9   Sum 13   Oswestry Score (%) 28.89   Some recent data might be hidden    This is improved, sleeping is mildly more restricted.    Study Result     Procedure: MR HIP ARTHROGRAM  LEFT W CONTRAST     HISTORY:  Hip pain, chronic, labral tear suspected, neg xray or  equivocal; Hip pain, left.      TECHNIQUE: Coronal T1 fat-sat and, axial T1 fat-sat and T2 fat-sat,  oblique axial T1, sagittal T1 fat-sat MR images of the hip were  obtained. T2 fat-sat MR images of the entire pelvis were obtained..     COMPARISON: None.     FINDINGS:  Entire pelvis: There is no fracture or avascular necrosis. The right  hip is grossly intact. There are mild degenerative changes of the  sacroiliac joints.     Left hip:  Osseous and articular structures: No bone marrow signal abnormality.  No fracture or stress fracture. No avascular necrosis. There is a  normal contour of the femoral head neck junction. The cartilage  is  intact.     Labrum: There is fraying of the superior and anterior superior labrum  without labral tear.     Musculotendinous structures: There is a partial tear of the proximal  hamstring origin involving the biceps femoris and semitendinosus  components. There is marked tendinosis of the semimembranosus portion  of the hamstring tendon. There is no significant tendon retraction.  There is moderate tendinosis of the gluteus medius and minimus tendons  without tear.                                                                        IMPRESSION:   1. Marked tendinosis and partial tear of the proximal hamstring  origin.  2. Mild fraying of the superior and anterior superior labrum without  labral tear.  3. Moderate gluteus medius and minimus tendinosis.       JEFF RESENDIZ MD     I was able to personally review these prior to patient's visit today.  Imaging results were discussed with the patient.      P: palpatory tendernessElicited left PSIS inferior aspect:    A: static palpation demonstrates intersegmental asymmetry , pelvis  R: motion palpation notes restricted motion, PSIS Left   T:Taut/tender fibers apparent of the left gluteus medius.     Segmental spinal dysfunction/restrictions found at:  :  PSIS Left PI listing.      Assessment: MRI studies reviewed with patient on today's visit.  I am still finding presence of segmental somatic dysfunction of the left innominate bone however due to patient's other conditions of labral fraying and partial hamstring tears further medical evaluation and treatment is warranted. I do believe that these other conditions may cause segmental/somatic dysfunction to worsen occasionally which may require chiropractic care. This is why we will be following up with the patient PRN at this time.      Diagnoses:      1. Degeneration of lumbar or lumbosacral intervertebral disc    2. Segmental and somatic dysfunction of pelvic region    3. Hip pain, left        Patient's  condition:  Patient had restrictions pre-manipulation and Symptoms come and go    Treatment effectiveness:  Post manipulation there is better intersegmental movement and Patient states that they feel much better post manipulation but they gradually tighten up over time      Procedures:  CMT:  30541 Chiropractic manipulative treatment 1-2 regions performed   Pelvis: Diversified, PSIS Left , Side posture    Modalities:  None performed this visit    Therapeutic procedures:  None  Encouraged patient to utilize compression, ice, and rest    Response to Treatment  Reduction in symptoms as reported by patient    Prognosis: Guarded    Progress towards Goals: Patient is making progress towards the goal.     Recommendations:    Instructions:ice 20 minutes every other hour as needed and use ACE wrap for high hamstring, rest    Follow-up:  Continue treatment PRN.

## 2020-01-02 ENCOUNTER — MYC MEDICAL ADVICE (OUTPATIENT)
Dept: FAMILY MEDICINE | Facility: OTHER | Age: 55
End: 2020-01-02

## 2020-01-06 ENCOUNTER — MYC MEDICAL ADVICE (OUTPATIENT)
Dept: FAMILY MEDICINE | Facility: OTHER | Age: 55
End: 2020-01-06

## 2020-01-06 DIAGNOSIS — M76.02 GLUTEAL TENDINITIS OF LEFT BUTTOCK: ICD-10-CM

## 2020-01-06 DIAGNOSIS — M76.892 HAMSTRING TENDINITIS OF LEFT THIGH: ICD-10-CM

## 2020-01-06 DIAGNOSIS — S76.312A PARTIAL HAMSTRING TEAR, LEFT, INITIAL ENCOUNTER: Primary | ICD-10-CM

## 2020-01-07 ENCOUNTER — MYC MEDICAL ADVICE (OUTPATIENT)
Dept: FAMILY MEDICINE | Facility: OTHER | Age: 55
End: 2020-01-07

## 2020-01-17 ENCOUNTER — HOSPITAL ENCOUNTER (OUTPATIENT)
Dept: PHYSICAL THERAPY | Facility: OTHER | Age: 55
Setting detail: THERAPIES SERIES
End: 2020-01-17
Attending: FAMILY MEDICINE
Payer: COMMERCIAL

## 2020-01-17 DIAGNOSIS — M76.892 HAMSTRING TENDINITIS OF LEFT THIGH: ICD-10-CM

## 2020-01-17 DIAGNOSIS — S76.312A PARTIAL HAMSTRING TEAR, LEFT, INITIAL ENCOUNTER: ICD-10-CM

## 2020-01-17 DIAGNOSIS — M76.02 GLUTEAL TENDINITIS OF LEFT BUTTOCK: ICD-10-CM

## 2020-01-17 PROCEDURE — 97140 MANUAL THERAPY 1/> REGIONS: CPT | Mod: GP

## 2020-01-17 PROCEDURE — 97035 APP MDLTY 1+ULTRASOUND EA 15: CPT | Mod: GP

## 2020-01-17 PROCEDURE — 97161 PT EVAL LOW COMPLEX 20 MIN: CPT | Mod: GP

## 2020-01-17 NOTE — PROGRESS NOTES
01/17/20 0700   General Information   Type of Visit Initial OP Ortho PT Evaluation   Start of Care Date 01/17/20   Referring Physician Dr. Brooke    Patient/Family Goals Statement decrease pain and improve mobility    Orders Evaluate and Treat   Date of Order 01/07/20   Certification Required? No   Medical Diagnosis Partial hamstring tear, left, initial encounter S76.312A    Surgical/Medical history reviewed Yes   Body Part(s)   Body Part(s) Hip   Presentation and Etiology   Pertinent history of current problem (include personal factors and/or comorbidities that impact the POC) Patient states left proximal hamstring pain started on11/16/19. She was trying to bring a dog in a house on a leash when the dog pulled her in an awkward direction.  She felt immeduate pain and an popping sensation over the origin of the left hamstrings. She continues to have pain and limited functional mobility.     Impairments A. Pain;D. Decreased ROM;E. Decreased flexibility;F. Decreased strength and endurance;G. Impaired balance;H. Impaired gait;J. Burning   Functional Limitations perform activities of daily living;perform required work activities;perform desired leisure / sports activities   Symptom Location left ischial tuberosity, left SI joint, left groin    Onset date of current episode/exacerbation 11/16/19   Chronicity New   Pain rating (0-10 point scale) Best (/10);Worst (/10)   Best (/10) 5/10   Worst (/10) 10/10    Pain quality A. Sharp;B. Dull;C. Aching;D. Burning;E. Shooting;F. Stabbing;G. Cramping   Frequency of pain/symptoms A. Constant   Pain/symptoms exacerbated by A. Sitting;B. Walking;I. Bending;J. ADL;K. Home tasks;L. Work tasks;M. Other  (staris, donning/doffing shoes and socks)   Pain/symptoms eased by D. Nothing;E. Changing positions;F. Certain positions;K. Other  (standing )   Progression of symptoms since onset: Unchanged   Prior Level of Function   Prior Level of Function-Mobility independent    Prior  Level of Function-ADLs independent    Current Level of Function   Current Community Support Family/friend caregiver   Patient role/employment history A. Employed   Fall Risk Screen   Fall screen completed by PT   Have you fallen 2 or more times in the past year? No   Have you fallen and had an injury in the past year? No   Is patient a fall risk? No   Hip Objective Findings   Side (if bilateral, select both right and left) Left   Gait/Locomotion normal gait pattern    Hip ROM Comments Hip IR and ER at 90 were full and pain free   Pelvic Screen leg length equal, iliac crest equal    Hip/Knee Strength Comments In prone, patient noted increase pain at the ischial tuberosity with active knee flexion. deferred formal strength testing due to increased pain today    FADIR Test negative    Palpation pain over the left ischial tuberosity, left proximal hamstring, left SI joint,    Left Hip Flexion PROM limited to 90 degrees due to pain over the left ischial tuberostiy    Left Hip Abduction PROM 40    Planned Therapy Interventions   Planned Therapy Interventions joint mobilization;manual therapy;gait training;neuromuscular re-education;ROM;strengthening;stretching   Planned Modality Interventions   Planned Modality Interventions Ultrasound;Cryotherapy   Clinical Impression   Criteria for Skilled Therapeutic Interventions Met yes, treatment indicated   PT Diagnosis Partial hamstring tear, left, initial encounter S76.706R    Influenced by the following impairments pain, weakness, impaired ROM,    Functional limitations due to impairments sitting, walking, bending, household tasks, work tasks, stairs,    Clinical Presentation Stable/Uncomplicated   Clinical Presentation Rationale symptoms are consistent with diagnosis    Clinical Decision Making (Complexity) Low complexity   Therapy Frequency   (16 visits )   Predicted Duration of Therapy Intervention (days/wks) 8 weeks    Risk & Benefits of therapy have been explained Yes    Patient, Family & other staff in agreement with plan of care Yes   Education Assessment   Preferred Learning Style Listening;Demonstration;Pictures/video   Barriers to Learning No barriers   ORTHO GOALS   PT Ortho Eval Goals 1;2;3   Ortho Goal 1   Goal Identifier ROM   Goal Description Demonstrate 95 degrees hip flexion to allow squatting without pain   Target Date 03/13/20   Ortho Goal 2   Goal Identifier Pain    Goal Description Report pain at 2/10 or less to allow sitting at work for 60 minutes    Target Date 03/13/20   Ortho Goal 3   Goal Identifier weakness   Goal Description Improve left hamstring strength to 4/5 or greater to allow stairs without difficulty    Target Date 03/13/20   Total Evaluation Time   PT Eval, Low Complexity Minutes (04242) 15

## 2020-01-20 ENCOUNTER — HOSPITAL ENCOUNTER (OUTPATIENT)
Dept: PHYSICAL THERAPY | Facility: OTHER | Age: 55
Setting detail: THERAPIES SERIES
End: 2020-01-20
Attending: FAMILY MEDICINE
Payer: COMMERCIAL

## 2020-01-20 PROCEDURE — 97140 MANUAL THERAPY 1/> REGIONS: CPT | Mod: GP

## 2020-01-20 PROCEDURE — 97035 APP MDLTY 1+ULTRASOUND EA 15: CPT | Mod: GP

## 2020-01-22 ENCOUNTER — HOSPITAL ENCOUNTER (OUTPATIENT)
Dept: PHYSICAL THERAPY | Facility: OTHER | Age: 55
Setting detail: THERAPIES SERIES
End: 2020-01-22
Attending: FAMILY MEDICINE
Payer: COMMERCIAL

## 2020-01-22 PROCEDURE — 97035 APP MDLTY 1+ULTRASOUND EA 15: CPT | Mod: GP

## 2020-01-22 PROCEDURE — 97140 MANUAL THERAPY 1/> REGIONS: CPT | Mod: GP

## 2020-01-29 ENCOUNTER — HOSPITAL ENCOUNTER (OUTPATIENT)
Dept: PHYSICAL THERAPY | Facility: OTHER | Age: 55
Setting detail: THERAPIES SERIES
End: 2020-01-29
Attending: FAMILY MEDICINE
Payer: COMMERCIAL

## 2020-01-29 PROCEDURE — 97110 THERAPEUTIC EXERCISES: CPT | Mod: GP

## 2020-01-29 PROCEDURE — 97035 APP MDLTY 1+ULTRASOUND EA 15: CPT | Mod: GP

## 2020-01-29 PROCEDURE — 97140 MANUAL THERAPY 1/> REGIONS: CPT | Mod: GP

## 2020-01-30 ENCOUNTER — THERAPY VISIT (OUTPATIENT)
Dept: CHIROPRACTIC MEDICINE | Facility: OTHER | Age: 55
End: 2020-01-30
Attending: CHIROPRACTOR
Payer: COMMERCIAL

## 2020-01-30 DIAGNOSIS — M54.50 LEFT-SIDED LOW BACK PAIN WITHOUT SCIATICA, UNSPECIFIED CHRONICITY: Primary | ICD-10-CM

## 2020-01-30 DIAGNOSIS — M99.04 SEGMENTAL AND SOMATIC DYSFUNCTION OF SACRAL REGION: ICD-10-CM

## 2020-01-30 DIAGNOSIS — M51.379 DEGENERATION OF LUMBAR OR LUMBOSACRAL INTERVERTEBRAL DISC: ICD-10-CM

## 2020-01-30 PROCEDURE — 99212 OFFICE O/P EST SF 10 MIN: CPT | Mod: 25 | Performed by: CHIROPRACTOR

## 2020-01-30 PROCEDURE — 98940 CHIROPRACT MANJ 1-2 REGIONS: CPT | Mod: AT | Performed by: CHIROPRACTOR

## 2020-01-30 NOTE — PROGRESS NOTES
PATIENT:  Soraida Suresh is a 54 year old female presenting for left lower back SI joint pain    PROBLEM:   Date of Initial Visit for this Episode:  1/30/2020    Visit #1    SUBJECTIVE / HPI: Patient presents for left-sided low back/hip pain symptoms.  Patient recently began physical therapy under the direction of Braulio Brown for treatment of proximal hamstring tear.  Patient notes physical therapy has been helpful with managing the symptoms.  Earlier this week patient's physical therapist noted aberrant motion of the left SI joint as well as a leg length deficiency.  Because of this patient was recommended to follow-up with our office as marked believe there to be segmental/somatic dysfunction of the left SI joint.    Since last visit patient denies any recent traumatic events or overuse type injuries.  Description and onset:  Duration and Frequency of Pain: Earlier this week and constant achey/burning  Radiation of pain: no  Pain rated at it's worst: 8/10  Pain rated currently:  4/10  Improved by:  Attempted to utilize ice, no change in pain levels  Additional Features: patient recently diagnosed with proximal hamstring tear and gluteal tendinosis   Other Health Care Providers seen for this: patient has been under our care for SI joint dysfunction, Braulio Brown PT, Dr. Mendy VICK  Previous treatment: Chiropractic, Physical therapy  Previous injury:Current injury stemmed from 11/16/19 when trying to keep a dog in doors.        See flowsheets in chart for details.  1/30/2020   Oswestry (SWATHI) Questionnaire    OSWESTRY DISABILITY INDEX 1/30/2020   Count 10   Sum 18   Oswestry Score (%) 36   Some recent data might be hidden      Functional limitations:  Walking, lifting and personal care all are increased since last evaluation.      Past D.C. Care: yes, helpful           PAST MEDICAL HISTORY:  Past Medical History:   Diagnosis Date     Abnormal cytological finding in specimen from cervix uteri     10/11/2011      Allergic rhinitis     No Comments Provided     Benign lipomatous neoplasm of skin and subcutaneous tissue of trunk     1/20/2016     Contact dermatitis     No Comments Provided     Dyshidrosis     No Comments Provided     Encounter for screening for malignant neoplasm of colon     8/27/2014     Gastro-esophageal reflux disease without esophagitis     No Comments Provided     Major depressive disorder, single episode     6/24/2011     Other congenital malformations of great veins (CODE)     7/10/2006     Other specified diseases of anus and rectum (CODE)     5/20/2015     Panic disorder without agoraphobia     resolved after heart surgery     Partial anomalous pulmonary venous connection     No Comments Provided     Residual hemorrhoidal skin tags     3/3/2011       PAST SURGICAL HISTORY:  Past Surgical History:   Procedure Laterality Date     anorectal exam anesthesia  05/20/2015    CT ANORECTAL EXAM SURG REQ ANESTH  DIAG     APPENDECTOMY OPEN      5/1987     ARTHROSCOPY KNEE      7/2006, 2013,Debridement, partial medial meniscectomy, and anterior cruciate ligament reconstruction using tibialis anterior allograft, right knee     AS HYSTEROSCOPY, SURGICAL; W/ ENDOMETRIAL ABLATION, ANY METHOD  12/14/2015    Kathya     CARDIAC SURGERY  11/2006    Repair of anomalous pulmonary venous return by anastomosis of the anomalous vein to the left atrial appendage, done at [facility]     COLONOSCOPY  08/28/2014 8/28/2014 Follow up in 10 years 8/28/24 normal     ECHO LIMITED      mild left atrial enlargement;  normal ef     ESOPHAGOSCOPY, GASTROSCOPY, DUODENOSCOPY (EGD), COMBINED      1996     ESOPHAGOSCOPY, GASTROSCOPY, DUODENOSCOPY (EGD), COMBINED      2001     ESOPHAGOSCOPY, GASTROSCOPY, DUODENOSCOPY (EGD), COMBINED      2006     ESOPHAGOSCOPY, GASTROSCOPY, DUODENOSCOPY (EGD), COMBINED      6/28/11,Normal     NISSEN FUNDOPLICATION  08/22/2000    Dr. Sales     SIGMOIDOSCOPY FLEXIBLE      1996,And [facility],  negative, for rectal bleeding.  Fissures and hemorrhoids noted     SURGICAL PATHOLOGY EXAM Left 2016    Left flank       ALLERGIES:  Allergies   Allergen Reactions     Codeine Other (See Comments)     Medroxyprogesterone Other (See Comments)     Caused patient's brain to swell.     Azithromycin Palpitations     Latex Rash     hands     Penicillins Rash     Proline Rash     Prolene: Sutures; caused keloid     Sulfamethoxazole W/Trimethoprim Rash     Sulfamethoxazole-Trimethoprim Rash       CURRENT MEDICATIONS:  Current Outpatient Medications   Medication Sig Dispense Refill     acyclovir (ZOVIRAX) 5 % ointment        ascorbic acid (VITAMIN C) 500 MG tablet Take 500 mg by mouth daily       buPROPion (WELLBUTRIN XL) 300 MG 24 hr tablet Take 1 tablet (300 mg) by mouth every morning 90 tablet 3     calcium carbonate-vitamin D (OSCAL W/D) 500-200 MG-UNIT tablet Take 0.5 mg daily.       Cyanocobalamin 2500 MCG CHEW Take 2,500 mcg by mouth daily       FIBER, GUAR GUM, PO        Glucosamine-Chondroitin 750 & 400 MG MISC Take 1 tablet by mouth daily       L-Lysine 500 MG TABS Take 1 tablet by mouth daily       other medical supplies Tennis elbow strap.  Diagnosis:  M77.11.  Length of need:  99. 1 each 0     polyethylene glycol (MIRALAX) powder Take by mouth daily       triamcinolone (NASACORT) 55 MCG/ACT Inhaler 2 sprays         SOCIAL HISTORY:  Marital Status: .  Children: yes.  Occupation: Works for [employer].  Alcohol use:yes.  Tobacco use: Smoker: former.      FAMILY HISTORY:  Family History   Problem Relation Age of Onset     Arthritis Mother         Arthritis,Osteoarthritis;  of 76 pneumonia but no autopsy     Diabetes Father         Diabetes     Other - See Comments Father         COPD/Benign colon polyps/AAA -  of ruptured AAA     Colon Cancer Paternal Grandmother         Cancer-colon     Colon Cancer Paternal Aunt         Cancer-colon     Diabetes Paternal Aunt         Diabetes     Obesity Sister          bariatric surgery     Other - See Comments Sister         TBI     Depression Sister      Anxiety Disorder Sister      Diabetes Sister      Hyperlipidemia Sister      Diabetes Brother      Hyperlipidemia Brother      Other - See Comments Maternal Grandmother          of old age     Dementia Maternal Grandfather         possible dementia,  of pneumonia     Other - See Comments Brother         MVA - at age 50     Hypertension Brother      Diabetes Brother      Hyperlipidemia Brother      Depression Brother      Diabetes Brother      Hyperlipidemia Brother      Macular Degeneration Brother      Diabetes Brother      Hyperlipidemia Brother      Suicide Brother 49     Cerebrovascular Disease Paternal Grandfather          of stroke       Patient Active Problem List   Diagnosis     Other congenital anomalies of great veins     Allergic rhinitis due to pollen     Cervical disc herniation     Esophageal reflux     Irritable bowel syndrome     Major depression     Menorrhagia with regular cycle     Other urinary incontinence     Partial congenital anomalous pulmonary venous connection     Segmental and somatic dysfunction of cervical region     Abscess of anal and rectal regions     Mild renal insufficiency     Hyperlipidemia LDL goal <100     Microscopic hematuria     Closed displaced fracture of shaft of fifth metacarpal bone of left hand, sequela     Primary osteoarthritis of left foot     Abnormal electrocardiogram     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         ROS:  The patient denies any fevers, chills, nausea, vomiting, diarrhea, constipation,dysuria, hematuria, or urinary hesitancy or incontinence.  No shortness of breath, chest pain, or rashes.    OBJECTIVE:    DIAGNOSTICS:  Study Result     Procedure: MR HIP ARTHROGRAM  LEFT W CONTRAST     HISTORY:  Hip pain, chronic, labral tear suspected, neg xray or  equivocal; Hip pain, left.      TECHNIQUE: Coronal T1 fat-sat  "and, axial T1 fat-sat and T2 fat-sat,  oblique axial T1, sagittal T1 fat-sat MR images of the hip were  obtained. T2 fat-sat MR images of the entire pelvis were obtained..     COMPARISON: None.     FINDINGS:  Entire pelvis: There is no fracture or avascular necrosis. The right  hip is grossly intact. There are mild degenerative changes of the  sacroiliac joints.     Left hip:  Osseous and articular structures: No bone marrow signal abnormality.  No fracture or stress fracture. No avascular necrosis. There is a  normal contour of the femoral head neck junction. The cartilage is  intact.     Labrum: There is fraying of the superior and anterior superior labrum  without labral tear.     Musculotendinous structures: There is a partial tear of the proximal  hamstring origin involving the biceps femoris and semitendinosus  components. There is marked tendinosis of the semimembranosus portion  of the hamstring tendon. There is no significant tendon retraction.  There is moderate tendinosis of the gluteus medius and minimus tendons  without tear.                                                                        IMPRESSION:   1. Marked tendinosis and partial tear of the proximal hamstring  origin.  2. Mild fraying of the superior and anterior superior labrum without  labral tear.  3. Moderate gluteus medius and minimus tendinosis.       JEFF RESENDIZ MD         PHYSICAL EXAM:     GENERAL APPEARANCE: healthy, alert, moderate distress, cooperative and anxious   GAIT:  trendelenberg left side      MUSCULOSKELETAL:   Posture:high left iliac crest compared to the right   Gait:   trendelenberg left side          Thoracic and Lumbar performed actively, measured approximately  ROM:  50/60 flexion 50/60 extension    30/45 RLF    45/45 LLF   AROM produces pain/pulling symptoms along left SI joint      +left iliac compression test  +Leg length inequality: L 1/2\"; Restricted left heel to buttock   Other:  +left " Ely's    +Tenderness:left PSIS  +Muscle spasm: left quadratus lumborum  +Joint asymmetry and restriction: Sacrum with extension and left lateral flexion    ASSESSMENT: Soraida Suresh is a 54 year old female presenting with primary complaint left-sided SI joint pain.  Patient was under our care for similar complaints following initial injury.  Symptoms did show temporary improvement after chiropractic visits however symptoms continued to be present.  Because of this MRI was ordered.  MRI revealed tearing of proximal hamstring as well as tendinosis of the gluteus medius and minimus muscles.  Patient was then discharged from our care.  This is why I am performing full examination on patient today.  Segmental/somatic dysfunction present of the left SI joint which I do believe to be contributing to patient's symptoms.  As patient is currently under physical therapy I do anticipate symptoms will show improvement, however this may also cause aberrant motion of the left SI joint which may require follow-up visits.    Following today's visit I will be unable to provide follow-up care for the patient due to my own medical leave.  This was informed to the patient.  In my absence Dr. Chacha Mckeon will be the covering chiropractor.  I will also inform this to patient's physical therapist should follow-up care be needed.     1. Left-sided low back pain without sciatica, unspecified chronicity    2. Segmental and somatic dysfunction of sacral region    3. Degeneration of lumbar or lumbosacral intervertebral disc        PLAN    Evaluation and Management:  22344 Low to Moderate exam established patient 10 min    Procedures:  Modalities:  None performed this visit    CMT:  01294 Chiropractic manipulative treatment 1-2 regions performed   Pelvis: Diversified, Sacrum , Side posture    Therapeutic procedures:  None    Response to Treatment  Reduction in symptoms as reported by patient    Prognosis: Good    1/30/2020 Plan of Care:  6-8  visits of Chiropractic Care including Spinal Adjustments and/or physiotherapy and active rehabilitation, to include exercises in the office and/or at home to meet care plan goals.     Frequency: 1-2xweek for up to 6 weeks. A reevaluation would be clinically appropriate in 6-8 visits, to determine progress and further course of care.    POC discussed and patient agreeable to plan of care.      1/30/2020 Goals:      Patient will report improved left hip/lower back pain by 50%.   Patient will report able to lift without painful limits.   Patient will report able to walk without painful limits.   Patient will demonstrate an improved ability to complete Activities of Daily Living  as shown by a reported 10-30% reduced score on  back index.    Patient will demonstrate improved lumbar ROM.        INSTRUCTIONS   ice 20 minutes every other hour as needed and heat 15 minutes every other hour as needed    Follow-up:  Continue treatment PRN.        Disclaimer: This note consists of symbols derived from keyboarding, dictation and/or voice recognition software. As a result, there may be errors in the script that have gone undetected. Please consider this when interpreting information found in this chart.

## 2020-01-31 ENCOUNTER — HOSPITAL ENCOUNTER (OUTPATIENT)
Dept: PHYSICAL THERAPY | Facility: OTHER | Age: 55
Setting detail: THERAPIES SERIES
End: 2020-01-31
Attending: FAMILY MEDICINE
Payer: COMMERCIAL

## 2020-01-31 PROCEDURE — 97035 APP MDLTY 1+ULTRASOUND EA 15: CPT | Mod: GP

## 2020-01-31 PROCEDURE — 97140 MANUAL THERAPY 1/> REGIONS: CPT | Mod: GP

## 2020-02-04 ENCOUNTER — HOSPITAL ENCOUNTER (OUTPATIENT)
Dept: PHYSICAL THERAPY | Facility: OTHER | Age: 55
Setting detail: THERAPIES SERIES
End: 2020-02-04
Attending: FAMILY MEDICINE
Payer: COMMERCIAL

## 2020-02-04 PROCEDURE — 97140 MANUAL THERAPY 1/> REGIONS: CPT | Mod: GP

## 2020-02-04 PROCEDURE — 97035 APP MDLTY 1+ULTRASOUND EA 15: CPT | Mod: GP

## 2020-02-06 ENCOUNTER — HOSPITAL ENCOUNTER (OUTPATIENT)
Dept: PHYSICAL THERAPY | Facility: OTHER | Age: 55
Setting detail: THERAPIES SERIES
End: 2020-02-06
Attending: FAMILY MEDICINE
Payer: COMMERCIAL

## 2020-02-06 PROCEDURE — 97035 APP MDLTY 1+ULTRASOUND EA 15: CPT | Mod: GP

## 2020-02-06 PROCEDURE — 97140 MANUAL THERAPY 1/> REGIONS: CPT | Mod: GP

## 2020-02-10 ENCOUNTER — HOSPITAL ENCOUNTER (OUTPATIENT)
Dept: PHYSICAL THERAPY | Facility: OTHER | Age: 55
Setting detail: THERAPIES SERIES
End: 2020-02-10
Attending: FAMILY MEDICINE
Payer: COMMERCIAL

## 2020-02-10 PROCEDURE — 97140 MANUAL THERAPY 1/> REGIONS: CPT | Mod: GP

## 2020-02-10 PROCEDURE — 97035 APP MDLTY 1+ULTRASOUND EA 15: CPT | Mod: GP

## 2020-02-10 PROCEDURE — 97110 THERAPEUTIC EXERCISES: CPT | Mod: GP

## 2020-02-12 ENCOUNTER — HOSPITAL ENCOUNTER (OUTPATIENT)
Dept: PHYSICAL THERAPY | Facility: OTHER | Age: 55
Setting detail: THERAPIES SERIES
End: 2020-02-12
Attending: FAMILY MEDICINE
Payer: COMMERCIAL

## 2020-02-12 PROCEDURE — 97140 MANUAL THERAPY 1/> REGIONS: CPT | Mod: GP

## 2020-02-17 ENCOUNTER — HOSPITAL ENCOUNTER (OUTPATIENT)
Dept: PHYSICAL THERAPY | Facility: OTHER | Age: 55
Setting detail: THERAPIES SERIES
End: 2020-02-17
Attending: FAMILY MEDICINE
Payer: COMMERCIAL

## 2020-02-17 PROCEDURE — 97110 THERAPEUTIC EXERCISES: CPT | Mod: GP

## 2020-02-24 ENCOUNTER — HOSPITAL ENCOUNTER (OUTPATIENT)
Dept: PHYSICAL THERAPY | Facility: OTHER | Age: 55
Setting detail: THERAPIES SERIES
End: 2020-02-24
Attending: FAMILY MEDICINE
Payer: COMMERCIAL

## 2020-02-24 PROCEDURE — 97110 THERAPEUTIC EXERCISES: CPT | Mod: GP

## 2020-02-24 NOTE — PROGRESS NOTES
Outpatient Physical Therapy Progress Note     Patient: Soraida Suresh  : 1965    Beginning/End Dates of Reporting Period:  20 to 2020    Referring Provider: Dr. Brooke    Therapy Diagnosis: Partial hamstring tear, left, initial encounter S76.312A      Client Self Report: Pt reports continued improvement, feels she can be independent with HEP and rehab from here on out, had some questions about core exercises on most recent visit    Objective Measurements:  Objective Measure: Subjective pain rating   Details: 2-3/10 on average over last day or two                Goals:  Goal Identifier ROM   Goal Description Demonstrate 95 degrees hip flexion to allow squatting without pain   Target Date 20   Date Met  20   Progress: Demonstrated ~105 hip flexion, still ~15-20 degrees less than contralateral hip     Goal Identifier Pain    Goal Description Report pain at 2/10 or less to allow sitting at work for 60 minutes    Target Date 20   Date Met      Progress: Reports pain has been 2-3/10 over the last few days     Goal Identifier weakness   Goal Description Improve left hamstring strength to 4/5 or greater to allow stairs without difficulty    Target Date 20   Date Met      Progress: N/T, did not want to aggravate tissues involved, has generally been improving in strength and tolerance to activity       Progress Toward Goals:   Progress this reporting period: Improvement in AROM, PROM, strength, pain levels, tolerance to activity, gradual return to pt's own HEP, improved tolerance to walking and stairs.          Plan:  Other: Pt states she is likely ready to proceed independently, will be discharged in a few weeks if she does not contact therapist requesting follow up    Discharge:  No, see above

## 2020-04-30 NOTE — PROGRESS NOTES
Outpatient Physical Therapy Discharge Note     Patient: Soraida Suresh  : 1965    Beginning/End Dates of Reporting Period:  2020 to 2020    Referring Provider: Dr. Brooke     Therapy Diagnosis: Partial hamstring tear, left, initial encounter S76.312A      Client Self Report on 2020: Pt reports continued improvement, feels she can be independent with HEP and rehab from here on out, had some questions about core exercises    Objective Measurements: see note on 2020    Goals: see note on 2020      Plan:  Discharge from therapy.    Discharge:    Reason for Discharge: Patient chooses to discontinue therapy.    Discharge Plan: Patient to continue home program.

## 2020-09-08 DIAGNOSIS — F33.42 RECURRENT MAJOR DEPRESSIVE DISORDER, IN FULL REMISSION (H): ICD-10-CM

## 2020-09-09 ENCOUNTER — TELEPHONE (OUTPATIENT)
Dept: FAMILY MEDICINE | Facility: OTHER | Age: 55
End: 2020-09-09

## 2020-09-09 DIAGNOSIS — M19.072 PRIMARY OSTEOARTHRITIS OF LEFT FOOT: Primary | ICD-10-CM

## 2020-09-09 NOTE — TELEPHONE ENCOUNTER
Reason for call: Request for a referral.    Referral requested for what concern?  Arthritis in foot    Have you already been seen by the specialty you need the referral for?  No    If yes, Date:   ,  Location:   ,  Provider:       If no,  Where do you want to go?   Dr. Chirinos    Additional comments:   States it's for an injection in her foot due to arthritis and has seen Dr. Chirinos previously for it. She is wanted to have that done again and said she needs a referral     Preferred method for responding to this message: Telephone Call    Phone number patient can be reached at? Home number on file [phone] (home)    If we can't reach you directly, may we leave a detailed response at the number you provided? Yes

## 2020-09-10 RX ORDER — BUPROPION HYDROCHLORIDE 300 MG/1
300 TABLET ORAL EVERY MORNING
Qty: 90 TABLET | Refills: 3 | Status: SHIPPED | OUTPATIENT
Start: 2020-09-10 | End: 2021-09-02

## 2020-09-10 NOTE — TELEPHONE ENCOUNTER
Let patient know that referral was placed and they will be reaching out to her to make an appointment.   Magalys Sheth LPN.......  9/10/2020  8:47 AM

## 2020-09-10 NOTE — TELEPHONE ENCOUNTER
"[facility] sent Rx request for the following:   buPROPion (WELLBUTRIN XL) 300 MG 24 hr tablet  Sig:Take 1 tablet (300 mg) by mouth every morning    Last Prescription Date:   09/19/2019  Last Fill Qty/Refills:         90, R-3    Last Office Visit:              10/29/2019 (Mendy)   Future Office visit:           None noted    Routing refill request to provider for review/approval because:   SSRIs Protocol Failed -         Failed - PHQ-9 score less than 5 in past 6 months             Failed - Recent (6 mo) or future (30 days) visit within the authorizing provider's specialty     Patient had office visit in the last 6 months or has a visit in the next 30 days with authorizing provider or within the authorizing provider's specialty.  See \"Patient Info\" tab in inbasket, or \"Choose Columns\" in Meds & Orders section of the refill encounter.         Unable to complete prescription refill per RN Medication Refill Policy.................... Tess Mendez RN ....................  9/10/2020   9:21 AM        "

## 2020-09-17 ENCOUNTER — OFFICE VISIT (OUTPATIENT)
Dept: ORTHOPEDICS | Facility: OTHER | Age: 55
End: 2020-09-17
Attending: FAMILY MEDICINE
Payer: COMMERCIAL

## 2020-09-17 DIAGNOSIS — M19.072 PRIMARY OSTEOARTHRITIS OF LEFT FOOT: ICD-10-CM

## 2020-09-17 PROCEDURE — 20550 NJX 1 TENDON SHEATH/LIGAMENT: CPT | Mod: LT | Performed by: PODIATRIST

## 2020-09-17 PROCEDURE — 25000125 ZZHC RX 250: Performed by: PODIATRIST

## 2020-09-17 PROCEDURE — 20600 DRAIN/INJ JOINT/BURSA W/O US: CPT | Mod: LT | Performed by: PODIATRIST

## 2020-09-17 RX ORDER — LIDOCAINE HYDROCHLORIDE 10 MG/ML
1 INJECTION, SOLUTION EPIDURAL; INFILTRATION; INTRACAUDAL; PERINEURAL ONCE
Status: COMPLETED | OUTPATIENT
Start: 2020-09-17 | End: 2020-09-17

## 2020-09-17 RX ORDER — TRIAMCINOLONE ACETONIDE 40 MG/ML
20 INJECTION, SUSPENSION INTRA-ARTICULAR; INTRAMUSCULAR ONCE
Status: COMPLETED | OUTPATIENT
Start: 2020-09-17 | End: 2020-09-17

## 2020-09-17 RX ADMIN — LIDOCAINE HYDROCHLORIDE 1 ML: 10 INJECTION, SOLUTION EPIDURAL; INFILTRATION; INTRACAUDAL; PERINEURAL at 13:12

## 2020-09-17 RX ADMIN — TRIAMCINOLONE ACETONIDE 20 MG: 40 INJECTION, SUSPENSION INTRA-ARTICULAR; INTRAMUSCULAR at 13:12

## 2020-09-17 NOTE — PROGRESS NOTES
Patient is here for follow up on her left foot.  Kaykay Patterson LPN .....................9/17/2020 1:05 PM

## 2020-09-18 NOTE — PROGRESS NOTES
Visit Date:   09/17/2020      HISTORY OF PRESENT ILLNESS:  Soraida is a patient familiar to me.  We have done a number of injections on her great toe and heel.  She comes in today with nagging pain on both.  Last injection was in November.  She has had 2 injections in each site up until this point, here today to discuss options and request another injection.      HISTORY REVIEW:  I have reviewed this patient's past medical history, family history, social history as well as medications and allergies.  Any changes/additions were appropriately charted in the patient's electronic medical record.        PHYSICAL EXAMINATION:    CONSTITUTIONAL:  The patient is alert and oriented x3, well appearing and in no apparent distress.  Affect is pleasant and answers questions appropriately.   VASCULAR:  Circulation is intact with palpable pedal pulses and adequate capillary fill time to all digits.  Hair growth is present and appropriate to mid foot and digits.   NEUROLOGIC:  Light touch sensation is intact to digits.  There is a negative Tinel sign.  There are no signs of apparent nerve entrapment of superficial peroneal or common peroneal nerves.   INTEGUMENT:  No abnormal dermatologic lesions are noted.  Skin has normal texture and turgor.     MUSCULOSKELETAL:  Hallux limitus to continue to be appreciated.  Dorsal spurring evident.  Crepitus within the joint.  Otherwise, rectus foot type.  Again a slight equinus is appreciated with knee extended, improved with the knee flexed.  Pain to palpate the medial plantar heel at insertion of plantar fascia with a tight plantar fascial band.  The patient is otherwise ambulatory in normal shoegear without assistive device.      ASSESSMENT:  Left hallux limitus and arthrosis, left foot plantar fasciosis.      PLAN:  I discussed condition and treatment with the patient today.  We did discuss other options.  At this point, she has treated this appropriately.  These injections do last her, but  clearly there is significant pathology here that is recurrent.  I did recommend a first MPJ fusion versus Arthrosurface replacement and a plantar fasciotomy.  She would like to get an injection today and consider this the next time.  She will reappoint and discuss further and likely get her scheduled from there.      PROCEDURES:   1.  The patient's plantar medial instep of heel prepped with alcohol.  Injected this interval in and around the insertion of plantar fascia with 0.5 mL Kenalog 40, and 1 mL 1% lidocaine plain.   2.  The patient's dorsal lateral aspect of first MPJ prepped with alcohol.  Injected this interval in the first MPJ again with 0.5 mL Kenalog 40 and 1 mL of 1% lidocaine plain.  Procedure was tolerated well.  Sterile Band-Aid applied.         HOLLAND ZARATE DPM             D: 2020   T: 2020   MT: JANET      Name:     HERRERA JACKSON   MRN:      [id number]        Account:      [id number]   :      1965           Visit Date:   2020      Document: [id number]

## 2020-09-29 ENCOUNTER — THERAPY VISIT (OUTPATIENT)
Dept: CHIROPRACTIC MEDICINE | Facility: OTHER | Age: 55
End: 2020-09-29
Attending: CHIROPRACTOR
Payer: COMMERCIAL

## 2020-09-29 DIAGNOSIS — M99.04 SEGMENTAL AND SOMATIC DYSFUNCTION OF SACRAL REGION: ICD-10-CM

## 2020-09-29 DIAGNOSIS — M54.50 RIGHT-SIDED LOW BACK PAIN WITHOUT SCIATICA, UNSPECIFIED CHRONICITY: Primary | ICD-10-CM

## 2020-09-29 DIAGNOSIS — M62.830 BACK MUSCLE SPASM: ICD-10-CM

## 2020-09-29 PROCEDURE — 98940 CHIROPRACT MANJ 1-2 REGIONS: CPT | Mod: AT | Performed by: CHIROPRACTOR

## 2020-09-29 PROCEDURE — 99212 OFFICE O/P EST SF 10 MIN: CPT | Mod: 25 | Performed by: CHIROPRACTOR

## 2020-09-29 NOTE — PROGRESS NOTES
Visit #:  1/6-8  New Episode 9/29/2020    Subjective:  Soraida Suresh is a 55 year old female who is seen in f/u up for:        Right-sided low back pain without sciatica, unspecified chronicity  Segmental and somatic dysfunction of sacral region  Back muscle spasm.     Since last visit on Visit date not found,  Soraida Suresh reports: Experiencing increasing levels of right hip/lower back pain over period of several weeks/months.  Patient finished course of physical therapy for partial hamstring tear past winter/early spring.  Symptoms have been doing quite well.  Patient unable to follow-up with our office for reassessment due to provider being unavailable due to medical leave and due to COVID outbreak.    Patient has been attempting to manage symptoms at home with ice.  This does provide some temporary relief but patient still is having quite a bit of trouble.  Patient has been unable to return to her stretching routine due to hamstring tear.  Patient has been attempting to utilize massage therapy.  Patient presents massage therapy forms requesting our office fill out.    Area of chief complaint:  Lumbar :  Symptoms are graded at 3-4/10. The quality is described as occasionally burning, achey, and cramping      Objective:  The following was observed:  Oswestry (SWATHI) Questionnaire    OSWESTRY DISABILITY INDEX 9/29/2020   Count 10   Sum 10   Oswestry Score (%) 20   Some recent data might be hidden   Sitting and traveling are limited due to pain  Lumbar AROM: Mild restriction with right lateral flexion approximately 5 to 10 degrees.  All with range of motion unremarkable    Ely's: + Right, -left    P: palpatory tenderness Right PSIS:    A: static palpation demonstrates intersegmental asymmetry , pelvis  R: motion palpation notes restricted motion, Sacrum   T: muscle spasm at level(s): Mild of the gluteus medius right side predominant, quadratus lumborum right side.:      Segmental spinal dysfunction/restrictions  found at:  :  Sacrum P-R listing.      Assessment: Flareup of ongoing right-sided low back and hip pain symptoms.  Patient been under our care in the past with similar complaints and does respond favorably with occasional chiropractic adjustments.  Massage therapy request forms were filled out and provided to patient today.  Plan for massage therapy to include 1 massage per month for up to 1 year.    Plan of chiropractic care to include 6-8 visits over the next 6 weeks.  Diagnoses:      1. Right-sided low back pain without sciatica, unspecified chronicity    2. Segmental and somatic dysfunction of sacral region    3. Back muscle spasm        Patient's condition:  Patient had restrictions pre-manipulation    Treatment effectiveness:  Post manipulation there is better intersegmental movement and Patient claims to feel looser post manipulation      Procedures:  76251 Established patient, straight forward complexity, 10 minutes    CMT:  73403 Chiropractic manipulative treatment 1-2 regions performed   Pelvis: Diversified, Sacrum , Side posture    Modalities:  None performed this visit    Therapeutic procedures:  None    Response to Treatment  Reduction in symptoms as reported by patient    Prognosis: Good    Progress towards Goals: Reduce lower back pain by 50%  Improve lumbar AROM  Patient will report being able to sit for greater than 1 hour        Recommendations:    Instructions:ice 20 minutes every other hour as needed    Follow-up:  Return to care if symptoms persist.

## 2020-10-05 ENCOUNTER — ALLIED HEALTH/NURSE VISIT (OUTPATIENT)
Dept: FAMILY MEDICINE | Facility: OTHER | Age: 55
End: 2020-10-05
Attending: FAMILY MEDICINE
Payer: COMMERCIAL

## 2020-10-05 DIAGNOSIS — Z23 NEED FOR PROPHYLACTIC VACCINATION AND INOCULATION AGAINST INFLUENZA: Primary | ICD-10-CM

## 2020-10-05 PROCEDURE — 90471 IMMUNIZATION ADMIN: CPT

## 2020-10-05 PROCEDURE — 90686 IIV4 VACC NO PRSV 0.5 ML IM: CPT

## 2020-10-05 NOTE — PROGRESS NOTES
Immunization Documentation    Prior to Immunization administration, verified patients identity using patient's name and date of birth. Please see IMMUNIZATIONS  and order for additional information.  Patient / Parent instructed to remain in clinic for 15 minutes and report any adverse reaction to staff immediately.    Was entire vial of medication used? Yes  Vial/Syringe: Sierra Young LPN  10/5/2020   8:11 AM

## 2020-11-05 ENCOUNTER — HOSPITAL ENCOUNTER (OUTPATIENT)
Dept: MAMMOGRAPHY | Facility: OTHER | Age: 55
End: 2020-11-05
Attending: FAMILY MEDICINE
Payer: COMMERCIAL

## 2020-11-05 ENCOUNTER — OFFICE VISIT (OUTPATIENT)
Dept: FAMILY MEDICINE | Facility: OTHER | Age: 55
End: 2020-11-05
Attending: FAMILY MEDICINE
Payer: COMMERCIAL

## 2020-11-05 VITALS
HEIGHT: 66 IN | SYSTOLIC BLOOD PRESSURE: 108 MMHG | HEART RATE: 97 BPM | BODY MASS INDEX: 26.76 KG/M2 | TEMPERATURE: 97.7 F | OXYGEN SATURATION: 100 % | WEIGHT: 166.5 LBS | DIASTOLIC BLOOD PRESSURE: 64 MMHG | RESPIRATION RATE: 16 BRPM

## 2020-11-05 DIAGNOSIS — Z00.00 HEALTH CARE MAINTENANCE: ICD-10-CM

## 2020-11-05 DIAGNOSIS — J30.1 ALLERGIC RHINITIS DUE TO POLLEN, UNSPECIFIED SEASONALITY: ICD-10-CM

## 2020-11-05 DIAGNOSIS — Z12.31 VISIT FOR SCREENING MAMMOGRAM: ICD-10-CM

## 2020-11-05 DIAGNOSIS — Z13.1 SCREENING FOR DIABETES MELLITUS: ICD-10-CM

## 2020-11-05 DIAGNOSIS — Z78.0 POSTMENOPAUSAL STATUS: ICD-10-CM

## 2020-11-05 DIAGNOSIS — B00.1 COLD SORE: ICD-10-CM

## 2020-11-05 DIAGNOSIS — Z13.0 SCREENING FOR DEFICIENCY ANEMIA: ICD-10-CM

## 2020-11-05 DIAGNOSIS — Z23 NEED FOR TDAP VACCINATION: ICD-10-CM

## 2020-11-05 DIAGNOSIS — Z13.220 SCREENING FOR LIPID DISORDERS: ICD-10-CM

## 2020-11-05 DIAGNOSIS — L98.9 SKIN LESION: Primary | ICD-10-CM

## 2020-11-05 DIAGNOSIS — Z13.29 SCREENING FOR THYROID DISORDER: ICD-10-CM

## 2020-11-05 LAB
ALBUMIN SERPL-MCNC: 4.2 G/DL (ref 3.5–5.7)
ALP SERPL-CCNC: 35 U/L (ref 34–104)
ALT SERPL W P-5'-P-CCNC: 19 U/L (ref 7–52)
ANION GAP SERPL CALCULATED.3IONS-SCNC: 8 MMOL/L (ref 3–14)
AST SERPL W P-5'-P-CCNC: 19 U/L (ref 13–39)
BASOPHILS # BLD AUTO: 0 10E9/L (ref 0–0.2)
BASOPHILS NFR BLD AUTO: 0.4 %
BILIRUB SERPL-MCNC: 0.6 MG/DL (ref 0.3–1)
BUN SERPL-MCNC: 16 MG/DL (ref 7–25)
CALCIUM SERPL-MCNC: 9.1 MG/DL (ref 8.6–10.3)
CHLORIDE SERPL-SCNC: 103 MMOL/L (ref 98–107)
CHOLEST SERPL-MCNC: 233 MG/DL
CO2 SERPL-SCNC: 30 MMOL/L (ref 21–31)
CREAT SERPL-MCNC: 0.74 MG/DL (ref 0.6–1.2)
DIFFERENTIAL METHOD BLD: ABNORMAL
EOSINOPHIL # BLD AUTO: 0 10E9/L (ref 0–0.7)
EOSINOPHIL NFR BLD AUTO: 0.6 %
ERYTHROCYTE [DISTWIDTH] IN BLOOD BY AUTOMATED COUNT: 12.5 % (ref 10–15)
GFR SERPL CREATININE-BSD FRML MDRD: 81 ML/MIN/{1.73_M2}
GLUCOSE SERPL-MCNC: 110 MG/DL (ref 70–105)
HCT VFR BLD AUTO: 42 % (ref 35–47)
HDLC SERPL-MCNC: 98 MG/DL (ref 23–92)
HGB BLD-MCNC: 13.8 G/DL (ref 11.7–15.7)
IMM GRANULOCYTES # BLD: 0 10E9/L (ref 0–0.4)
IMM GRANULOCYTES NFR BLD: 0.2 %
LDLC SERPL CALC-MCNC: 112 MG/DL
LYMPHOCYTES # BLD AUTO: 1.7 10E9/L (ref 0.8–5.3)
LYMPHOCYTES NFR BLD AUTO: 30.8 %
MCH RBC QN AUTO: 33.2 PG (ref 26.5–33)
MCHC RBC AUTO-ENTMCNC: 32.9 G/DL (ref 31.5–36.5)
MCV RBC AUTO: 101 FL (ref 78–100)
MONOCYTES # BLD AUTO: 0.4 10E9/L (ref 0–1.3)
MONOCYTES NFR BLD AUTO: 6.5 %
NEUTROPHILS # BLD AUTO: 3.3 10E9/L (ref 1.6–8.3)
NEUTROPHILS NFR BLD AUTO: 61.5 %
NONHDLC SERPL-MCNC: 135 MG/DL
PLATELET # BLD AUTO: 218 10E9/L (ref 150–450)
POTASSIUM SERPL-SCNC: 4 MMOL/L (ref 3.5–5.1)
PROT SERPL-MCNC: 6.8 G/DL (ref 6.4–8.9)
RBC # BLD AUTO: 4.16 10E12/L (ref 3.8–5.2)
SODIUM SERPL-SCNC: 141 MMOL/L (ref 134–144)
TRIGL SERPL-MCNC: 113 MG/DL
TSH SERPL DL<=0.05 MIU/L-ACNC: 1.33 IU/ML (ref 0.34–5.6)
WBC # BLD AUTO: 5.4 10E9/L (ref 4–11)

## 2020-11-05 PROCEDURE — 85025 COMPLETE CBC W/AUTO DIFF WBC: CPT | Mod: ZL | Performed by: FAMILY MEDICINE

## 2020-11-05 PROCEDURE — 80053 COMPREHEN METABOLIC PANEL: CPT | Mod: ZL | Performed by: FAMILY MEDICINE

## 2020-11-05 PROCEDURE — 90715 TDAP VACCINE 7 YRS/> IM: CPT | Performed by: FAMILY MEDICINE

## 2020-11-05 PROCEDURE — 77063 BREAST TOMOSYNTHESIS BI: CPT

## 2020-11-05 PROCEDURE — 36415 COLL VENOUS BLD VENIPUNCTURE: CPT | Mod: ZL | Performed by: FAMILY MEDICINE

## 2020-11-05 PROCEDURE — 90471 IMMUNIZATION ADMIN: CPT | Performed by: FAMILY MEDICINE

## 2020-11-05 PROCEDURE — 99396 PREV VISIT EST AGE 40-64: CPT | Mod: 25 | Performed by: FAMILY MEDICINE

## 2020-11-05 PROCEDURE — 80061 LIPID PANEL: CPT | Mod: ZL | Performed by: FAMILY MEDICINE

## 2020-11-05 PROCEDURE — 84443 ASSAY THYROID STIM HORMONE: CPT | Mod: ZL | Performed by: FAMILY MEDICINE

## 2020-11-05 RX ORDER — ACYCLOVIR 50 MG/G
OINTMENT TOPICAL
Qty: 30 G | Refills: 11 | Status: SHIPPED | OUTPATIENT
Start: 2020-11-05

## 2020-11-05 RX ORDER — TRIAMCINOLONE ACETONIDE 55 UG/1
2 SPRAY, METERED NASAL 2 TIMES DAILY
Qty: 16.9 ML | Refills: 11 | Status: SHIPPED | OUTPATIENT
Start: 2020-11-05 | End: 2022-02-22

## 2020-11-05 ASSESSMENT — PAIN SCALES - GENERAL: PAINLEVEL: NO PAIN (0)

## 2020-11-05 ASSESSMENT — ENCOUNTER SYMPTOMS
SHORTNESS OF BREATH: 0
FEVER: 0
CHILLS: 0
COUGH: 0

## 2020-11-05 ASSESSMENT — MIFFLIN-ST. JEOR: SCORE: 1361.99

## 2020-11-05 NOTE — PROGRESS NOTES
SUBJECTIVE:   Nursing Notes:   Alida Perez LPN  11/5/2020  9:26 AM  Sign at exiting of workspace  Patient presents to clinic for physical exam.  Medication Reconciliation: complete    Alida Perez LPN        Soraida Suresh is a 55 year old female who presents to clinic today for a physical.     Has had a growth on the back of her right calf.  Red and crusty.  Bleeds when she shaves.  Has had some others on her arms and thighs she would like looked at.  She has a red spot on her left shin that has also bled a lot when she has nicked it shaving.    Has a lump on her right lateral thigh.  Sore if she presses on it.  Feels about the size of a nickel.  No trauma to the area.  She has had a lipoma in another area removed in the past.    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         Cervical disc herniation 07/23/2013     Priority: Medium     Overview:   C6/7 on left;  S/p injection Dr. 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     Priority: Medium     Past Medical History:   Diagnosis Date     Abnormal cytological finding in specimen from cervix uteri     10/11/2011     Allergic rhinitis     No Comments Provided     Benign lipomatous neoplasm of skin and subcutaneous tissue of trunk     1/20/2016     Contact dermatitis     No Comments Provided     Dyshidrosis     No Comments Provided     Encounter for screening for malignant neoplasm of colon     8/27/2014     Gastro-esophageal reflux disease without esophagitis     No Comments Provided     Major depressive disorder, single episode     6/24/2011     Other congenital malformations of great veins (CODE)     7/10/2006     Other specified diseases of anus and rectum (CODE)     5/20/2015     Panic disorder without agoraphobia     resolved after heart surgery     Partial anomalous pulmonary venous connection     No Comments Provided     Residual hemorrhoidal skin tags     3/3/2011      Past Surgical History:   Procedure Laterality Date     anorectal exam anesthesia  05/20/2015    MI ANORECTAL EXAM SURG REQ ANESTH  DIAG     APPENDECTOMY OPEN      5/1987     ARTHROSCOPY KNEE      7/2006, 2013,Debridement, partial medial meniscectomy, and anterior cruciate ligament reconstruction using tibialis anterior allograft, right knee     AS HYSTEROSCOPY, SURGICAL; W/ ENDOMETRIAL ABLATION, ANY METHOD  12/14/2015    Kathya     CARDIAC SURGERY  11/2006    Repair of anomalous pulmonary venous return by anastomosis of the anomalous vein to the left atrial  appendage, done at [facility]     COLONOSCOPY  2014 Follow up in 10 years 24 normal     ECHO LIMITED      mild left atrial enlargement;  normal ef     ESOPHAGOSCOPY, GASTROSCOPY, DUODENOSCOPY (EGD), COMBINED           ESOPHAGOSCOPY, GASTROSCOPY, DUODENOSCOPY (EGD), COMBINED           ESOPHAGOSCOPY, GASTROSCOPY, DUODENOSCOPY (EGD), COMBINED           ESOPHAGOSCOPY, GASTROSCOPY, DUODENOSCOPY (EGD), COMBINED      11,Normal     NISSEN FUNDOPLICATION  2000    Dr. Sales     SIGMOIDOSCOPY FLEXIBLE      ,And BE, negative, for rectal bleeding.  Fissures and hemorrhoids noted     SURGICAL PATHOLOGY EXAM Left 2016    Left flank     Family History   Problem Relation Age of Onset     Arthritis Mother         Arthritis,Osteoarthritis;  of 76 pneumonia but no autopsy     Diabetes Father         Diabetes     Other - See Comments Father         COPD/Benign colon polyps/AAA -  of ruptured AAA     Colon Cancer Paternal Grandmother         Cancer-colon     Colon Cancer Paternal Aunt         Cancer-colon     Diabetes Paternal Aunt         Diabetes     Obesity Sister         bariatric surgery     Other - See Comments Sister         TBI     Depression Sister      Anxiety Disorder Sister      Diabetes Sister      Hyperlipidemia Sister      Diabetes Brother      Hyperlipidemia Brother      Other - See Comments Maternal Grandmother          of old age     Dementia Maternal Grandfather         possible dementia,  of pneumonia     Other - See Comments Brother         MVA - at age 50     Hypertension Brother      Diabetes Brother      Hyperlipidemia Brother      Depression Brother      Diabetes Brother      Hyperlipidemia Brother      Macular Degeneration Brother      Diabetes Brother      Hyperlipidemia Brother      Suicide Brother 49     Cerebrovascular Disease Paternal Grandfather          of stroke     Social History     Tobacco Use     Smoking  status: Former Smoker     Quit date: 10/10/2005     Years since quitting: 15.0     Smokeless tobacco: Never Used   Substance Use Topics     Alcohol use: Yes     Alcohol/week: 14.0 standard drinks     Comment: Alcoholic Drinks/day: 2-3 per day     Social History     Social History Narrative    This patient was  in 2002.  She was remarried on 03/17/04.  Works at "[employer] work #[phone].          Justen Suresh       Keri Flowers Daughter, born 1987      Mina Flowers Son, born 1990         Current Outpatient Medications   Medication Sig Dispense Refill     acyclovir (ZOVIRAX) 5 % external ointment Apply to affected area 6 times a day for 7 days. 30 g 11     ascorbic acid (VITAMIN C) 500 MG tablet Take 500 mg by mouth daily       buPROPion (WELLBUTRIN XL) 300 MG 24 hr tablet Take 1 tablet (300 mg) by mouth every morning 90 tablet 3     calcium carbonate-vitamin D (OSCAL W/D) 500-200 MG-UNIT tablet Take 0.5 mg daily.       Cyanocobalamin 2500 MCG CHEW Take 2,500 mcg by mouth daily       FIBER, GUAR GUM, PO        Glucosamine-Chondroitin 750 & 400 MG MISC Take 1 tablet by mouth daily       L-Lysine 500 MG TABS Take 1 tablet by mouth daily       polyethylene glycol (MIRALAX) powder Take by mouth daily       triamcinolone (NASACORT) 55 MCG/ACT nasal aerosol Spray 2 sprays into both nostrils 2 times daily 16.9 mL 11     Allergies   Allergen Reactions     Codeine Other (See Comments)     Medroxyprogesterone Other (See Comments)     Caused patient's brain to swell.     Azithromycin Palpitations     Latex Rash     hands     Penicillins Rash     Proline Rash     Prolene: Sutures; caused keloid     Sulfamethoxazole W/Trimethoprim Rash     Sulfamethoxazole-Trimethoprim Rash       Review of Systems   Constitutional: Negative for chills and fever.   Respiratory: Negative for cough and shortness of breath.    Cardiovascular: Negative for peripheral edema.   Psychiatric/Behavioral: Negative for mood changes.     "    OBJECTIVE:     /64 (BP Location: Right arm, Patient Position: Sitting, Cuff Size: Adult Regular)   Pulse 97   Temp 97.7  F (36.5  C) (Tympanic)   Resp 16   Ht 1.676 m (5' 6\")   Wt 75.5 kg (166 lb 8 oz)   LMP  (LMP Unknown)   SpO2 100%   Breastfeeding No   BMI 26.87 kg/m    Body mass index is 26.87 kg/m .  Physical Exam  Constitutional:       General: She is not in acute distress.     Appearance: She is well-developed.   HENT:      Head: Normocephalic.      Right Ear: Tympanic membrane and external ear normal.      Left Ear: Tympanic membrane and external ear normal.      Nose: Nose normal.      Mouth/Throat:      Pharynx: No oropharyngeal exudate.   Eyes:      General:         Right eye: No discharge.         Left eye: No discharge.      Conjunctiva/sclera: Conjunctivae normal.      Pupils: Pupils are equal, round, and reactive to light.   Neck:      Musculoskeletal: Neck supple.      Thyroid: No thyromegaly.      Trachea: No tracheal deviation.   Cardiovascular:      Rate and Rhythm: Normal rate and regular rhythm.      Pulses: Normal pulses.      Heart sounds: Normal heart sounds, S1 normal and S2 normal. No murmur. No friction rub. No gallop. No S3 or S4 sounds.    Pulmonary:      Effort: Pulmonary effort is normal. No respiratory distress.      Breath sounds: Normal breath sounds. No wheezing or rales.      Comments: Breast exam:  No masses palpable bilaterally.  No skin changes, tethering or axillary lymphadenopathy bilaterally.    Abdominal:      General: Bowel sounds are normal. There is no distension.      Palpations: Abdomen is soft. There is no mass.      Tenderness: There is no abdominal tenderness.   Genitourinary:     Comments: Pelvic/Rectal exams deferred per patient.  Musculoskeletal: Normal range of motion.      Comments: On the side of her right thigh is an approximate 1 cm rubbery, mobile subcutaneous mass.  It feels consistent with a lipoma.    On the back of her right calf is " an approximate 5-6 mm, raised, red, crusty lesion.    On her left shin is a 2 mm red vascular slightly raised lesion.      She has several other scattered rough lesions on her legs and arms that look consistent with seborrheic keratoses.   Lymphadenopathy:      Cervical: No cervical adenopathy.   Skin:     General: Skin is warm and dry.      Findings: No rash.   Neurological:      Mental Status: She is alert and oriented to person, place, and time.      Motor: No abnormal muscle tone.      Deep Tendon Reflexes: Reflexes are normal and symmetric.   Psychiatric:         Thought Content: Thought content normal.         Judgment: Judgment normal.           PHQ-9 SCORE 9/9/2016 8/20/2018 11/25/2019   PHQ-9 Total Score 3 0 0       PHQ-2 Score:     PHQ-2 ( 1999 Pfizer) 11/5/2020 10/8/2018   Q1: Little interest or pleasure in doing things 0 0   Q2: Feeling down, depressed or hopeless 0 0   PHQ-2 Score 0 0       CECI-7 SCORE 6/2/2015 8/21/2015 6/7/2016   Total Score 14 2 5           I personally reviewed results withpatient as listed below:   Diagnostic Test Results:  none     ASSESSMENT/PLAN:       ICD-10-CM    1. Skin lesion  L98.9    2. Cold sore  B00.1 acyclovir (ZOVIRAX) 5 % external ointment   3. Allergic rhinitis due to pollen, unspecified seasonality  J30.1 triamcinolone (NASACORT) 55 MCG/ACT nasal aerosol   4. Postmenopausal status  Z78.0 DX Hip/Pelvis/Spine   5. Need for Tdap vaccination  Z23 TDAP VACCINE (Adacel, Boostrix)  [[id number]]   6. Screening for lipid disorders  Z13.220 Lipid Profile     Lipid Profile   7. Screening for thyroid disorder  Z13.29 TSH Reflex GH     TSH Reflex GH   8. Screening for diabetes mellitus  Z13.1 Comprehensive metabolic panel     Comprehensive metabolic panel   9. Screening for deficiency anemia  Z13.0 CBC with platelets differential     CBC with platelets differential   10. Health care maintenance  Z00.00        1.  Lesion on the back of her right calf looks suspicious and should be  removed.  She will return to clinic at a later date to have this excised, likely with elliptical excision.  The lesion on her left shin looks like a small capillary hemangioma, but due to its recurrent bleeding, this could be cauterized at the same time as well.  The other lesion she points out look consistent with seborrheic keratoses.  2.  Acyclovir ointment refilled.  3.  Nasacort refilled.  4.  DEXA ordered.  5.  Tdap updated.  6.  Fasting lipid file ordered.  7.  TSH ordered.  8.  Comprehensive metabolic profile ordered.  9.  CBC ordered.  10.  Mammogram is scheduled for today.  DEXA as above.  Last colonoscopy was completed 8/28/2014.  She did have FIT testing last year which was normal.  This was done at [facility].  Pap was last completed in 2019 at [facility] also, which was normal.  Tdap as above.  Flu shot is already been received this season.    Patricia Brooke MD  [facility] AND [facility]    Portions of this dictation were created using the Dragon Nuance voice recognition system. Proofreading was completed but there may be errors in text.

## 2020-11-05 NOTE — NURSING NOTE
Patient presents to clinic for physical exam.  Medication Reconciliation: complete    Alida Perez, NAMN

## 2020-12-08 ENCOUNTER — OFFICE VISIT (OUTPATIENT)
Dept: FAMILY MEDICINE | Facility: OTHER | Age: 55
End: 2020-12-08
Attending: FAMILY MEDICINE
Payer: COMMERCIAL

## 2020-12-08 VITALS
TEMPERATURE: 97.9 F | RESPIRATION RATE: 18 BRPM | WEIGHT: 169 LBS | BODY MASS INDEX: 27.16 KG/M2 | DIASTOLIC BLOOD PRESSURE: 72 MMHG | OXYGEN SATURATION: 98 % | HEART RATE: 73 BPM | SYSTOLIC BLOOD PRESSURE: 118 MMHG | HEIGHT: 66 IN

## 2020-12-08 DIAGNOSIS — L98.9 SKIN LESION: Primary | ICD-10-CM

## 2020-12-08 DIAGNOSIS — I78.1 CAPILLARY HEMANGIOMA: ICD-10-CM

## 2020-12-08 PROCEDURE — 17110 DESTRUCTION B9 LES UP TO 14: CPT | Performed by: FAMILY MEDICINE

## 2020-12-08 PROCEDURE — 11601 EXC TR-EXT MAL+MARG 0.6-1 CM: CPT | Performed by: FAMILY MEDICINE

## 2020-12-08 PROCEDURE — 88305 TISSUE EXAM BY PATHOLOGIST: CPT

## 2020-12-08 ASSESSMENT — ENCOUNTER SYMPTOMS
SHORTNESS OF BREATH: 0
NERVOUS/ANXIOUS: 0
CHILLS: 0
FEVER: 0
COUGH: 0

## 2020-12-08 ASSESSMENT — PAIN SCALES - GENERAL: PAINLEVEL: NO PAIN (0)

## 2020-12-08 ASSESSMENT — MIFFLIN-ST. JEOR: SCORE: 1373.33

## 2020-12-08 NOTE — PROGRESS NOTES
SUBJECTIVE:   Nursing Notes:   Alida Perez LPN  12/8/2020 11:38 AM  Signed  Patient presents to clinic for removal of skin lesions on back of right calf and front of left calf.  Medication Reconciliation: complete    Alida Perez LPN        Soraida Suresh is a 55 year old female who presents to clinic today for removal of 2 skin lesions.  One is on the back of her right leg.  It is slightly raised and gets nicked when she shaves her legs and bleeds.  It has not resolved and looked suspicious for a skin cancer and wanted it removed.  She also has a small red lesion on the front of her left shin that also gets nicked with her razor and bleeds easily.  Discussed that risks would include bleeding, infection, scarring among others.  She understands and wishes to proceed.  She has had issues with forming keloids in the past.    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       Hyperlipidemia LDL goal <100 10/08/2018     Priority: Medium     Microscopic hematuria 10/08/2018     Priority: Medium     Closed displaced fracture of shaft of fifth metacarpal bone of left hand, sequela 10/08/2018     Priority: Medium     Segmental and somatic dysfunction of cervical region 08/16/2018     Priority: Medium     Allergic rhinitis due to pollen 01/16/2018     Priority: Medium     Esophageal reflux 01/16/2018     Priority: Medium     Irritable bowel syndrome 01/16/2018     Priority: Medium     Partial congenital anomalous pulmonary venous connection 01/16/2018     Priority: Medium      Overview:   Repaired       Menorrhagia with regular cycle 12/14/2015     Priority: Medium     Abscess of anal and rectal regions 07/14/2015     Priority: Medium     Major depression 08/07/2014     Priority: Medium     Overview:    Riaz Cheung.         Cervical disc herniation 07/23/2013     Priority: Medium     Overview:   C6/7 on left;  S/p injection Dr. 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     Priority: Medium     Past Medical History:   Diagnosis Date     Abnormal cytological finding in specimen from cervix uteri     10/11/2011     Allergic rhinitis     No Comments Provided     Benign lipomatous neoplasm of skin and subcutaneous tissue of trunk     1/20/2016     Contact dermatitis     No Comments Provided     Dyshidrosis     No Comments Provided     Encounter for screening for malignant neoplasm of colon     8/27/2014     Gastro-esophageal reflux disease without esophagitis     No Comments Provided     Major depressive disorder, single episode     6/24/2011     Other congenital malformations of great veins (CODE)     7/10/2006     Other specified diseases of anus and rectum (CODE)     5/20/2015     Panic disorder without agoraphobia     resolved after heart surgery     Partial anomalous pulmonary venous connection     No Comments Provided     Residual hemorrhoidal skin tags     3/3/2011      Past Surgical History:   Procedure Laterality Date     anorectal exam anesthesia  05/20/2015    DC ANORECTAL EXAM SURG REQ ANESTH  DIAG     APPENDECTOMY OPEN      5/1987     ARTHROSCOPY KNEE      7/2006, 2013,Debridement, partial medial meniscectomy, and anterior cruciate ligament reconstruction using tibialis anterior allograft, right knee     AS HYSTEROSCOPY, SURGICAL; W/ ENDOMETRIAL ABLATION, ANY METHOD  12/14/2015    Kathya     CARDIAC SURGERY  11/2006    Repair of  anomalous pulmonary venous return by anastomosis of the anomalous vein to the left atrial appendage, done at [facility]     COLONOSCOPY  2014 Follow up in 10 years 24 normal     ECHO LIMITED      mild left atrial enlargement;  normal ef     ESOPHAGOSCOPY, GASTROSCOPY, DUODENOSCOPY (EGD), COMBINED           ESOPHAGOSCOPY, GASTROSCOPY, DUODENOSCOPY (EGD), COMBINED           ESOPHAGOSCOPY, GASTROSCOPY, DUODENOSCOPY (EGD), COMBINED           ESOPHAGOSCOPY, GASTROSCOPY, DUODENOSCOPY (EGD), COMBINED      11,Normal     NISSEN FUNDOPLICATION  2000    Dr. Sales     SIGMOIDOSCOPY FLEXIBLE      ,And BE, negative, for rectal bleeding.  Fissures and hemorrhoids noted     SURGICAL PATHOLOGY EXAM Left 2016    Left flank     Family History   Problem Relation Age of Onset     Arthritis Mother         Arthritis,Osteoarthritis;  of 76 pneumonia but no autopsy     Diabetes Father         Diabetes     Other - See Comments Father         COPD/Benign colon polyps/AAA -  of ruptured AAA     Colon Cancer Paternal Grandmother         Cancer-colon     Colon Cancer Paternal Aunt         Cancer-colon     Diabetes Paternal Aunt         Diabetes     Obesity Sister         bariatric surgery     Other - See Comments Sister         TBI     Depression Sister      Anxiety Disorder Sister      Diabetes Sister      Hyperlipidemia Sister      Diabetes Brother      Hyperlipidemia Brother      Other - See Comments Maternal Grandmother          of old age     Dementia Maternal Grandfather         possible dementia,  of pneumonia     Other - See Comments Brother         MVA - at age 50     Hypertension Brother      Diabetes Brother      Hyperlipidemia Brother      Depression Brother      Diabetes Brother      Hyperlipidemia Brother      Macular Degeneration Brother      Diabetes Brother      Hyperlipidemia Brother      Suicide Brother 49     Cerebrovascular Disease  Paternal Grandfather          of stroke     Social History     Tobacco Use     Smoking status: Former Smoker     Quit date: 10/10/2005     Years since quitting: 15.1     Smokeless tobacco: Never Used   Substance Use Topics     Alcohol use: Yes     Alcohol/week: 14.0 standard drinks     Comment: Alcoholic Drinks/day: 2-3 per day     Social History     Social History Narrative    This patient was  in .  She was remarried on 04.  Works at [employer], work #[phone].          Justen Suresh       Keri Flowers Daughter, born       Mina Flowers Son, born          Current Outpatient Medications   Medication Sig Dispense Refill     acyclovir (ZOVIRAX) 5 % external ointment Apply to affected area 6 times a day for 7 days. 30 g 11     ascorbic acid (VITAMIN C) 500 MG tablet Take 500 mg by mouth daily       buPROPion (WELLBUTRIN XL) 300 MG 24 hr tablet Take 1 tablet (300 mg) by mouth every morning 90 tablet 3     calcium carbonate-vitamin D (OSCAL W/D) 500-200 MG-UNIT tablet Take 0.5 mg daily.       Cyanocobalamin 2500 MCG CHEW Take 2,500 mcg by mouth daily       FIBER, GUAR GUM, PO        Glucosamine-Chondroitin 750 & 400 MG MISC Take 1 tablet by mouth daily       L-Lysine 500 MG TABS Take 1 tablet by mouth daily       polyethylene glycol (MIRALAX) powder Take by mouth daily       triamcinolone (NASACORT) 55 MCG/ACT nasal aerosol Spray 2 sprays into both nostrils 2 times daily 16.9 mL 11     Allergies   Allergen Reactions     Codeine Other (See Comments)     Medroxyprogesterone Other (See Comments)     Caused patient's brain to swell.     Azithromycin Palpitations     Latex Rash     hands     Penicillins Rash     Proline Rash     Prolene: Sutures; caused keloid     Sulfamethoxazole W/Trimethoprim Rash     Sulfamethoxazole-Trimethoprim Rash       Review of Systems   Constitutional: Negative for chills and fever.   Respiratory: Negative for cough and shortness of breath.   "  Cardiovascular: Negative for peripheral edema.   Psychiatric/Behavioral: Negative for mood changes. The patient is not nervous/anxious.         OBJECTIVE:     /72 (BP Location: Right arm, Patient Position: Sitting, Cuff Size: Adult Regular)   Pulse 73   Temp 97.9  F (36.6  C) (Tympanic)   Resp 18   Ht 1.676 m (5' 6\")   Wt 76.7 kg (169 lb)   LMP  (LMP Unknown)   SpO2 98%   Breastfeeding No   BMI 27.28 kg/m    Body mass index is 27.28 kg/m .  Physical Exam  Constitutional:       Appearance: Normal appearance.   Skin:     Comments: On the back of her right calf is a raised, pink-red lesion, which is shiny.  It measures 7x6 mm in diameter.  On the front of her left shin is a 3x3 mm slightly raised, red, vascular appearing papule.    Procedure:  The lesion on her right posterior calf was prepped and draped in usual fashion.  It was anesthetized with 1% lidocaine.  The area was removed in an elliptical fashion with a 15 blade, leaving a 14x20 mm defect.  This was closed with 6 interrupted 4-0 ethilon sutures.      Attention was then turned to the lesion on her left anterior calf.  This also was prepped in usual fashion.  It also was anesthetized with 1% lidocaine.  It was cauterized using a cautery pen.  She tolerated both procedures well.    Dressing were applied.   Neurological:      Mental Status: She is alert.           PHQ-9 SCORE 9/9/2016 8/20/2018 11/25/2019   PHQ-9 Total Score 3 0 0       PHQ-2 Score:     PHQ-2 ( 1999 [employer]) 12/8/2020 11/5/2020   Q1: Little interest or pleasure in doing things 0 0   Q2: Feeling down, depressed or hopeless 0 0   PHQ-2 Score 0 0       CECI-7 SCORE 6/2/2015 8/21/2015 6/7/2016   Total Score 14 2 5         No flowsheet data found.      I personally reviewed results withpatient as listed below:   Diagnostic Test Results:  none     ASSESSMENT/PLAN:       ICD-10-CM    1. Skin lesion  L98.9 Surgical pathology exam     EXC MALIG SKIN LESION TRUNK/ARM/LEG 0.6-1.0 CM   2. " Capillary hemangioma  I78.1 LESION DESTRUCTION       1.  Right posterior calf lesion removed today as above.  Wound care discussed.  Recommend removing sutures in 10-14 days.  Follow up if any signs of infection or other concerns arise.  She will be contacted with pathology results when available.  2.  Lesion destruction completed today as above.  Follow up if any further concerns.    Patricia Brooke MD  [facility]    Portions of this dictation were created using the Dragon Nuance voice recognition system. Proofreading was completed but there may be errors in text.

## 2020-12-08 NOTE — NURSING NOTE
Patient presents to clinic for removal of skin lesions on back of right calf and front of left calf.  Medication Reconciliation: complete    Alida Perez LPN

## 2020-12-09 ENCOUNTER — THERAPY VISIT (OUTPATIENT)
Dept: CHIROPRACTIC MEDICINE | Facility: OTHER | Age: 55
End: 2020-12-09
Attending: CHIROPRACTOR
Payer: COMMERCIAL

## 2020-12-09 DIAGNOSIS — M54.2 CERVICALGIA: ICD-10-CM

## 2020-12-09 DIAGNOSIS — M99.01 SEGMENTAL AND SOMATIC DYSFUNCTION OF CERVICAL REGION: ICD-10-CM

## 2020-12-09 DIAGNOSIS — G44.209 TENSION HEADACHE: Primary | ICD-10-CM

## 2020-12-09 PROCEDURE — 99212 OFFICE O/P EST SF 10 MIN: CPT | Mod: 25 | Performed by: CHIROPRACTOR

## 2020-12-09 PROCEDURE — 98940 CHIROPRACT MANJ 1-2 REGIONS: CPT | Performed by: CHIROPRACTOR

## 2020-12-09 NOTE — PROGRESS NOTES
Visit #:  1  New Episode 12/9/2020    Subjective:  Soraida Suresh is a 55 year old female who is seen in f/u up for:        Tension headache  Cervicalgia  Segmental and somatic dysfunction of cervical region.     Since last visit on 9/29/2020,  Soraida Suresh reports: Has been experiencing increasing amounts of neck pain with headache symptoms over the past several days/weeks.  Just after Thanksgiving patient reports traveling to [place] and sleeping on an unfamiliar pillow.  This is when patient first began experiencing left-sided neck pain.  Since onset has been attempting to manage symptoms with heat and ice as well as home stretching.  Patient notes that pain began to increase despite these efforts.  Currently patient states that she needed to take ibuprofen due to how bad her symptoms are.  Pain is extending into the left side of the face primarily left eye and ear.  No traumatic events noted from patient history.    Patient reports that due to levels of pain she was unable to do her workout earlier today and that her sleep has also been disrupted.    Area of chief complaint:  Cervical :  Symptoms are graded at 3-10/10. The quality is described as constant tightness and headaches.       Objective:  The following was observed:  Neck Disability Index (  Marcus H. and Raphael C. 1991. All rights reserved.; used with permission) 12/9/2020   SECTION 1 - PAIN INTENSITY 1   SECTION 2 - PERSONAL CARE 0   SECTION 3 - LIFTING 3   SECTION 4 - READING 4   SECTION 5 - HEADACHES 5   SECTION 6 - CONCENTRATION 4   SECTION 7 - WORK 3   SECTION 8 - DRIVING 2   SECTION 9 - SLEEPING 4   SECTION 10 - RECREATION 5   Count 10   Sum 31   Raw Score: /50 31   Neck Disability Index Score: (%) 62       Cervical AROM: Flexion limited approximately 10 degrees with endrange pain and crepitus.  Extension restriction approximately 10 degrees.  Left rotation restriction 5 to 10 degrees.  Left lateral flexion restriction approximately 10 to 15  degrees with endrange pain.    Cervical Compression: - focal pain, - radicular symptoms  Cervical Distraction: -    P: palpatory tendernessPresent along left side C1, right side C7:    A: static palpation demonstrates intersegmental asymmetry , cervical  R: motion palpation notes restricted motion, C1   T: Muscle spasms apparent of the left suboccipital musculature, cervical paraspinal musculature bilaterally, right levator scapula, taut tender fibers noted of the upper trapezius bilaterally    Segmental spinal dysfunction/restrictions found at:  :  C1 Right rotation restricted and Left lateral flexion restricted.      Assessment: Acute flareup of neck pain and headache symptoms.  Patient has been under our care in the past with similar complaints and has responded favorably with chiropractic adjustments which I do believe to be the case currently.  Plan of care to include up to 6 chiropractic visits over the next 4 weeks.  Consider use of spinal decompression/traction if symptoms fail to improve with chiropractic adjustments alone.    Diagnoses:      1. Tension headache    2. Cervicalgia    3. Segmental and somatic dysfunction of cervical region        Patient's condition:  Patient had restrictions pre-manipulation and Patient symptoms are worsed due to sleeping on an unfamiliar pillow from a recent trip.    Treatment effectiveness:  Post manipulation there is better intersegmental movement and Patient claims to feel looser post manipulation      Procedures:  E/M 37648: Established Patient, straight forward complexity, 10 minutes    CMT:  20830 Chiropractic manipulative treatment 1-2 regions performed   Cervical: Diversified, Activator and Mobilization, C1 , Supine, Diversified utilized mobilization and low force adjustment.    Modalities:  72169: MSTM:  To Sub-occipital  for 4 min    Therapeutic procedures:  None    Response to Treatment  Reduction in symptoms as reported by patient    Prognosis: Good     Goals:  Reduce neck and headache symptoms by 75%  Patient will be able to return to exercise routine without painful limitations  Patient will be able to sleep without painful limitations  Improve cervical AROM  Decrease neck disability index by 30%     Recommendations:    Instructions:ice 20 minutes every other hour as needed    Follow-up:  Return to care in 5 days.

## 2020-12-11 ENCOUNTER — MYC MEDICAL ADVICE (OUTPATIENT)
Dept: FAMILY MEDICINE | Facility: OTHER | Age: 55
End: 2020-12-11

## 2020-12-11 ENCOUNTER — HOSPITAL ENCOUNTER (OUTPATIENT)
Dept: BONE DENSITY | Facility: OTHER | Age: 55
Discharge: HOME | End: 2020-12-11
Attending: FAMILY MEDICINE | Admitting: FAMILY MEDICINE
Payer: COMMERCIAL

## 2020-12-11 DIAGNOSIS — Z78.0 POSTMENOPAUSAL STATUS: ICD-10-CM

## 2020-12-11 PROCEDURE — 77080 DXA BONE DENSITY AXIAL: CPT

## 2020-12-13 NOTE — TELEPHONE ENCOUNTER
Patient inquiring about results from pathology sample taken on 12/08/2020.    Alida Perez LPN............12/13/2020 12:48 PM

## 2020-12-14 ENCOUNTER — THERAPY VISIT (OUTPATIENT)
Dept: CHIROPRACTIC MEDICINE | Facility: OTHER | Age: 55
End: 2020-12-14
Attending: CHIROPRACTOR
Payer: COMMERCIAL

## 2020-12-14 DIAGNOSIS — M99.01 SEGMENTAL AND SOMATIC DYSFUNCTION OF CERVICAL REGION: ICD-10-CM

## 2020-12-14 DIAGNOSIS — G44.209 TENSION HEADACHE: Primary | ICD-10-CM

## 2020-12-14 DIAGNOSIS — M54.2 CERVICALGIA: ICD-10-CM

## 2020-12-14 PROCEDURE — 98940 CHIROPRACT MANJ 1-2 REGIONS: CPT | Mod: AT | Performed by: CHIROPRACTOR

## 2020-12-14 NOTE — TELEPHONE ENCOUNTER
Patient notified earlier today of MRI results per Patricia Brooke MD and she verbally understood.  Please refer to result note.    Alida Perez LPN............12/14/2020 11:50 AM

## 2020-12-14 NOTE — PROGRESS NOTES
Visit #:  2/6    Subjective:  Soraida Suresh is a 55 year old female who is seen in f/u up for:        Tension headache  Cervicalgia  Segmental and somatic dysfunction of cervical region.     Since last visit on 12/9/2020,  oSraida Suresh reports: Symptoms did quite well over the past few days.  Patient states that headache symptoms improved quite a bit.  By Saturday patient notes that neck pain seem to be returning.  Sleep has been disrupted since Saturday night.  Patient is also noticed that pain seems to be lower pointing to the left CT junction.    Pain also noted into the left pectoralis muscle.  Patient states that she does have history of heart related issues.  Patient also states that the symptoms do not feel similar to when she experienced on prior episodes of cardiovascular related issues    Has been trying ice to help, makes her cold. No ibuprofen but has been using tylenol. Patient states these seem to help.    Area of chief complaint:  Cervical :  Symptoms are graded at 2-9/10. The quality is described as locks constantly.       Objective:  The following was observed:    P: palpatory tenderness/Jump sign elicited left side C1, palpatory tenderness present of the left CT junction:    A: static palpation demonstrates intersegmental asymmetry , cervical  R: motion palpation notes restricted motion, C1   T: Spasms present left upper trapezius, left levator scapula, left suboccipitals.  Taut tender fibers noted of the splenius muscle group and paraspinal musculature cervical into upper thoracic region left side predominant    Segmental spinal dysfunction/restrictions found at:  :  C1 Right rotation restricted and Left lateral flexion restricted.      Assessment: Patient showing signs of progress.  Patient's sensitivity upon palpation still quite elevated.  Patient has not undergone massage therapy since September.  Patient usually receives 1 massage a month to help with ongoing symptoms.    Left pectoralis  pain likely stemming from pectoralis minor.  Do not believe that this is due to cardiovascular related issues.  Continue to monitor.        Diagnoses:      1. Tension headache    2. Cervicalgia    3. Segmental and somatic dysfunction of cervical region        Patient's condition:  Patient had restrictions pre-manipulation and Patient symptoms are gradually improving    Treatment effectiveness:  Post manipulation there is better intersegmental movement, Patient claims to feel looser post manipulation and Patients symptoms are getting better      Procedures:  CMT:  27195 Chiropractic manipulative treatment 1-2 regions performed   Cervical: Activator, C1 , Supine    Modalities:  08069: MSTM:  To Levator scapulae, Sub-occipital and Traps  for 4 min    Therapeutic procedures:  None    Response to Treatment  Reduction in symptoms as reported by patient    Prognosis: Good    Progress towards Goals: neck and headache symptoms by 75%  Patient will be able to return to exercise routine without painful limitations  Patient will be able to sleep without painful limitations  Improve cervical AROM  Decrease neck disability index by 30%    Recommendations:    Instructions:ice 20 minutes every other hour as needed    Follow-up:  Return to care in 2 days.

## 2020-12-21 ENCOUNTER — THERAPY VISIT (OUTPATIENT)
Dept: CHIROPRACTIC MEDICINE | Facility: OTHER | Age: 55
End: 2020-12-21
Attending: CHIROPRACTOR
Payer: COMMERCIAL

## 2020-12-21 DIAGNOSIS — M99.01 SEGMENTAL AND SOMATIC DYSFUNCTION OF CERVICAL REGION: ICD-10-CM

## 2020-12-21 DIAGNOSIS — G44.209 TENSION HEADACHE: Primary | ICD-10-CM

## 2020-12-21 DIAGNOSIS — M54.2 CERVICALGIA: ICD-10-CM

## 2020-12-21 PROCEDURE — 98940 CHIROPRACT MANJ 1-2 REGIONS: CPT | Mod: AT | Performed by: CHIROPRACTOR

## 2020-12-21 NOTE — PROGRESS NOTES
Visit #:  3/6    Subjective:  Soraida Suresh is a 55 year old female who is seen in f/u up for:        Tension headache  Cervicalgia  Segmental and somatic dysfunction of cervical region.     Since last visit on 12/14/2020,  Soraida Suresh reports: Symptoms are showing signs of improvement.  Patient states that headache symptoms are no longer constantly present.  Headache symptoms only present during the night.  Patient notes that she is consistently waking between 2-3 AM with headache symptoms.  Because of this patient still is taking ibuprofen sporadically to help sleep.    Patient was scheduled to follow-up with her office last week but due to provider being unavailable appointment was canceled.  Followed up with Dr. Lionel mcdaniel as well as massage therapist in [place].    Continues to help manage symptoms with TENS unit as well.    Area of chief complaint:  Cervical :  Symptoms are graded at 5-10/10. The quality is described as constantly tight on the left side.      Objective:  The following was observed:    P: palpatory tenderness Left side C1:    A: static palpation demonstrates intersegmental asymmetry , cervical  R: motion palpation notes restricted motion, C1   T: Muscle spasms present suboccipitals bilaterally, left scalenes, left splenius muscle group.  Taut tender fibers noted of the cervical paraspinal musculature primarily of the upper region    Segmental spinal dysfunction/restrictions found at:  :  C1 Right rotation restricted and Left lateral flexion restricted.      Assessment: Patient showing signs of progress at this time.  Recommend 1 additional visit this week as I believe further progress can be made.  Consider undergoing spinal decompression/traction therapy if headache symptoms continue to be present.    Diagnoses:      1. Tension headache    2. Cervicalgia    3. Segmental and somatic dysfunction of cervical region        Patient's condition:  Patient had restrictions  pre-manipulation and Patient symptoms are gradually improving    Treatment effectiveness:  Post manipulation there is better intersegmental movement, Patient claims to feel looser post manipulation and Patients symptoms are getting better      Procedures:  CMT:  78478 Chiropractic manipulative treatment 1-2 regions performed   Cervical: Diversified, C1 , Seated    Modalities:  88520: MSTM:  To Sub-occipital  for 3 min    Therapeutic procedures:  None    Response to Treatment  Reduction in symptoms as reported by patient    Prognosis: Good    Progress towards Goals: neck and headache symptoms by 75%. In progress  Patient will be able to return to exercise routine without painful limitations. In progress  Patient will be able to sleep without painful limitations. In progress  Improve cervical AROM. In progress  Decrease neck disability index by 30%    Recommendations:    Instructions:ice 20 minutes every other hour as needed and heat 15 minutes every other hour as needed    Follow-up:  Return to care in 2 days.

## 2020-12-23 ENCOUNTER — THERAPY VISIT (OUTPATIENT)
Dept: CHIROPRACTIC MEDICINE | Facility: OTHER | Age: 55
End: 2020-12-23
Attending: CHIROPRACTOR
Payer: COMMERCIAL

## 2020-12-23 DIAGNOSIS — M54.2 CERVICALGIA: ICD-10-CM

## 2020-12-23 DIAGNOSIS — G44.209 TENSION HEADACHE: Primary | ICD-10-CM

## 2020-12-23 DIAGNOSIS — M99.01 SEGMENTAL AND SOMATIC DYSFUNCTION OF CERVICAL REGION: ICD-10-CM

## 2020-12-23 PROCEDURE — 98940 CHIROPRACT MANJ 1-2 REGIONS: CPT | Mod: AT | Performed by: CHIROPRACTOR

## 2020-12-23 PROCEDURE — 97012 MECHANICAL TRACTION THERAPY: CPT | Performed by: CHIROPRACTOR

## 2020-12-23 NOTE — PROGRESS NOTES
Visit #:  4/6    Subjective:  Soraida Suresh is a 55 year old female who is seen in f/u up for:        Tension headache  Cervicalgia  Segmental and somatic dysfunction of cervical region.     Since last visit on 12/21/2020,  Soraida Suresh reports: Symptoms continue to show improvement.  Headache symptoms are still present at night however most notably between the hours of 2-3 AM.  Symptoms will oftentimes cause patient to wake.  During the day however symptoms have shown quite a bit of improvement and are not as bad.  Continuing to treat symptoms at home with ice, TENS unit.    Area of chief complaint:  Cervical :  Symptoms are graded at 3-10/10. The quality is described as tight intermittently.      Objective:  The following was observed:    P: palpatory tendernessLeft side C1:    A: static palpation demonstrates intersegmental asymmetry , cervical  R: motion palpation notes restricted motion, C1   T: muscle spasm at level(s): Right suboccipitals.  Taut and tender fibers noted of the cervical paraspinal musculature R>>L:      Segmental spinal dysfunction/restrictions found at:  :  C1 Right rotation restricted and Left lateral flexion restricted.      Assessment: Symptoms are showing signs of improvement.  Headache symptoms still most problematic when patient is attempting to sleep however.  Because of this we will attempt trial of spinal traction/decompression therapy.  Patient has been under the this treatment in the past with beneficial results.  Following today's visit we will follow-up with the patient as needed if symptoms fail to improve.  Patient may still benefit from follow-up care as I do not believe that we have reached MMI regarding current episode of care.    Diagnoses:      1. Tension headache    2. Cervicalgia    3. Segmental and somatic dysfunction of cervical region        Patient's condition:  Patient had restrictions pre-manipulation and Patient symptoms are gradually improving    Treatment  effectiveness:  Post manipulation there is better intersegmental movement and Patients symptoms are getting better      Procedures:  CMT:  66564 Chiropractic manipulative treatment 1-2 regions performed   Cervical: Diversified, C1 , Seated    Modalities:  67390: Mechanical traction, cervical spine, 10/8, intermittent pull, 16 minutes    Therapeutic procedures:  None    Response to Treatment  Reduction in symptoms as reported by patient    Prognosis: Good    Progress towards Goals: neck and headache symptoms by 75%. In progress  Patient will be able to return to exercise routine without painful limitations. In progress  Patient will be able to sleep without painful limitations. In progress  Improve cervical AROM. In progress  Decrease neck disability index by 30%    Recommendations:    Instructions:ice 20 minutes every other hour as needed and heat 15 minutes every other hour as needed    Follow-up:  Return to care if symptoms persist.

## 2021-01-14 ENCOUNTER — MYC MEDICAL ADVICE (OUTPATIENT)
Dept: FAMILY MEDICINE | Facility: OTHER | Age: 56
End: 2021-01-14

## 2021-01-14 DIAGNOSIS — L60.0 INGROWN TOENAIL: Primary | ICD-10-CM

## 2021-03-05 ENCOUNTER — MYC MEDICAL ADVICE (OUTPATIENT)
Dept: FAMILY MEDICINE | Facility: OTHER | Age: 56
End: 2021-03-05

## 2021-03-05 DIAGNOSIS — M79.676 PAIN OF TOE, UNSPECIFIED LATERALITY: Primary | ICD-10-CM

## 2021-03-05 DIAGNOSIS — M72.2 PLANTAR FASCIITIS: ICD-10-CM

## 2021-03-05 NOTE — TELEPHONE ENCOUNTER
I believe once a referral is used for treatment, anything regarding that health issue would go under the same referral.    Alida Perez LPN............3/5/2021 1:33 PM

## 2021-03-05 NOTE — TELEPHONE ENCOUNTER
Left message for patient to return call to clinic.  Alida Perez LPN............3/5/2021 11:46 AM

## 2021-04-09 ENCOUNTER — IMMUNIZATION (OUTPATIENT)
Dept: FAMILY MEDICINE | Facility: OTHER | Age: 56
End: 2021-04-09
Attending: FAMILY MEDICINE
Payer: COMMERCIAL

## 2021-04-09 PROCEDURE — 91300 PR COVID VAC PFIZER DIL RECON 30 MCG/0.3 ML IM: CPT

## 2021-04-09 PROCEDURE — 0001A PR COVID VAC PFIZER DIL RECON 30 MCG/0.3 ML IM: CPT

## 2021-04-13 ENCOUNTER — MYC MEDICAL ADVICE (OUTPATIENT)
Dept: FAMILY MEDICINE | Facility: OTHER | Age: 56
End: 2021-04-13

## 2021-04-13 DIAGNOSIS — K13.79 MOUTH SORES: Primary | ICD-10-CM

## 2021-04-13 RX ORDER — NYSTATIN 100000/ML
500000 SUSPENSION, ORAL (FINAL DOSE FORM) ORAL 4 TIMES DAILY
Qty: 400 ML | Refills: 0 | Status: SHIPPED | OUTPATIENT
Start: 2021-04-13 | End: 2021-11-15

## 2021-04-30 ENCOUNTER — IMMUNIZATION (OUTPATIENT)
Dept: FAMILY MEDICINE | Facility: OTHER | Age: 56
End: 2021-04-30
Attending: FAMILY MEDICINE
Payer: COMMERCIAL

## 2021-04-30 PROCEDURE — 91300 PR COVID VAC PFIZER DIL RECON 30 MCG/0.3 ML IM: CPT

## 2021-04-30 PROCEDURE — 0002A PR COVID VAC PFIZER DIL RECON 30 MCG/0.3 ML IM: CPT

## 2021-08-30 DIAGNOSIS — F33.42 RECURRENT MAJOR DEPRESSIVE DISORDER, IN FULL REMISSION (H): ICD-10-CM

## 2021-09-01 NOTE — TELEPHONE ENCOUNTER
"[facility]  sent Rx request for the following:   Requested Prescriptions   Pending Prescriptions Disp Refills     buPROPion (WELLBUTRIN XL) 300 MG 24 hr tablet [Pharmacy Med Name: bupropion HCl  mg 24 hr tablet, extended release] 90 tablet 3     Sig: Take 1 tablet (300 mg) by mouth every morning      Last Prescription Date:   9/10/2020  Last Fill Qty/Refills:         90, R-3    Last Office Visit:              12/8/2020   Future Office visit:           none    Routing refill request to provider for review/approval because:    PHQ-9 and GAD7 Scores 11/25/2019   PHQ-9 Total Score 0     Associated Diagnoses    Recurrent major depressive disorder, in full remission (H) [F33.42]               SSRIs Protocol Failed - 8/30/2021  8:00 AM        Failed - PHQ-9 score less than 5 in past 6 months     Please review last PHQ-9 score.           Failed - Recent (6 mo) or future (30 days) visit within the authorizing provider's specialty     Patient had office visit in the last 6 months or has a visit in the next 30 days with authorizing provider or within the authorizing provider's specialty.  See \"Patient Info\" tab in inbasket, or \"Choose Columns\" in Meds & Orders section of the refill encounter.          Unable to complete prescription refill per RN Medication Refill Policy.................... Mayi Guerrero RN ....................  9/1/2021   11:43 AM        "

## 2021-09-02 RX ORDER — BUPROPION HYDROCHLORIDE 300 MG/1
300 TABLET ORAL EVERY MORNING
Qty: 90 TABLET | Refills: 3 | Status: SHIPPED | OUTPATIENT
Start: 2021-09-02 | End: 2021-11-15

## 2021-10-19 ENCOUNTER — ALLIED HEALTH/NURSE VISIT (OUTPATIENT)
Dept: FAMILY MEDICINE | Facility: OTHER | Age: 56
End: 2021-10-19
Attending: FAMILY MEDICINE
Payer: COMMERCIAL

## 2021-10-19 DIAGNOSIS — Z23 NEED FOR PROPHYLACTIC VACCINATION AND INOCULATION AGAINST INFLUENZA: Primary | ICD-10-CM

## 2021-10-19 PROCEDURE — 90682 RIV4 VACC RECOMBINANT DNA IM: CPT

## 2021-10-19 PROCEDURE — 90471 IMMUNIZATION ADMIN: CPT

## 2021-11-10 ENCOUNTER — THERAPY VISIT (OUTPATIENT)
Dept: CHIROPRACTIC MEDICINE | Facility: OTHER | Age: 56
End: 2021-11-10
Attending: CHIROPRACTOR
Payer: COMMERCIAL

## 2021-11-10 VITALS
HEART RATE: 82 BPM | OXYGEN SATURATION: 97 % | SYSTOLIC BLOOD PRESSURE: 116 MMHG | TEMPERATURE: 97.4 F | RESPIRATION RATE: 16 BRPM | DIASTOLIC BLOOD PRESSURE: 64 MMHG

## 2021-11-10 DIAGNOSIS — M99.01 SEGMENTAL AND SOMATIC DYSFUNCTION OF CERVICAL REGION: ICD-10-CM

## 2021-11-10 DIAGNOSIS — M54.2 CERVICALGIA: ICD-10-CM

## 2021-11-10 DIAGNOSIS — G44.209 TENSION HEADACHE: Primary | ICD-10-CM

## 2021-11-10 DIAGNOSIS — M62.838 MUSCLE SPASMS OF NECK: ICD-10-CM

## 2021-11-10 PROCEDURE — 99212 OFFICE O/P EST SF 10 MIN: CPT | Mod: 25 | Performed by: CHIROPRACTOR

## 2021-11-10 PROCEDURE — 98940 CHIROPRACT MANJ 1-2 REGIONS: CPT | Mod: AT | Performed by: CHIROPRACTOR

## 2021-11-10 NOTE — PROGRESS NOTES
Visit #:  1/4-6    Subjective:  Soraida Suresh is a 56 year old female who is seen in f/u up for:        Tension headache  Segmental and somatic dysfunction of cervical region  Cervicalgia  Muscle spasms of neck.     Since last visit on Visit date not found,  Soraida Suresh reports: Began experiencing left-sided neck, headache and jaw pain on Sunday.  Reports that she was doing quite a bit embroidery which kept her head in a forward flexed position for extended period of time.  In the past this happened has aggravated symptoms for the patient.  States that since flareup of symptoms she has been utilizing Tylenol and Salonpas with some level of success.  Symptoms have progressively worsened to current levels.  No reported upper extremity radicular complaints, chest pain, mild nausea is noted.  Pain localized from left lateral neck along the jawline and into the suboccipital ridge.  States that pain has been affecting her ability to sleep.        Area of chief complaint:  Cervical :  Symptoms are graded at 6-9/10. The quality is described as constant headaches, dizziness and nausea.       Objective:  The following was observed:  /64 (BP Location: Left arm)   Pulse 82   Temp 97.4  F (36.3  C) (Tympanic)   Resp 16   SpO2 97%      Neck Disability Index (  Marcus H. and Raphael C. 1991. All rights reserved.; used with permission) 11/10/2021   SECTION 1 - PAIN INTENSITY 2   SECTION 2 - PERSONAL CARE 0   SECTION 3 - LIFTING 2   SECTION 4 - READING 4   SECTION 5 - HEADACHES 5   SECTION 6 - CONCENTRATION 1   SECTION 7 - WORK 2   SECTION 8 - DRIVING 1   SECTION 9 - SLEEPING 4   SECTION 10 - RECREATION 3   Count 10   Sum 24   Raw Score: /50 24   Neck Disability Index Score: (%) 48     Cervical AROM: Flexion restriction approximately 5-10 degrees, extension restriction 5-10 degrees with pain, left rotation restriction approximately 15 degrees with pain, lateral flexion restriction noted bilaterally approximately 10  degrees    Cervical compression: - focal neck pain, -radicular symptoms  Cervical distraction: increases neck pain    P: palpatory tenderness left side C2/3:    A: static palpation demonstrates intersegmental asymmetry , cervical  R: motion palpation notes restricted motion, C3   T: muscle spasm at level(s): Left suboccipitals, left splenius capitis, mild spasm upper portion cervical paraspinals left greater than right:      Segmental spinal dysfunction/restrictions found at:  :  C3 Left lateral flexion restricted and Extension restriction.      Assessment: Acute reaggravation of chronic neck and headache symptoms.  Patient is been under care with similar complaints in the past and typically responds favorably with chiropractic treatments.  Consider use of spinal decompression/traction therapy if adjustments alone are not helping enough.  Current plan of care to include 4-6 visits within the next 4 weeks barring acute exacerbation.  This to be done on as needed basis.    Diagnoses:      1. Tension headache    2. Segmental and somatic dysfunction of cervical region    3. Cervicalgia    4. Muscle spasms of neck        Patient's condition:  Patient had restrictions pre-manipulation and Patient symptoms are worsed due to bending head to do embroidery.    Treatment effectiveness:  Post manipulation there is better intersegmental movement      Procedures:  E/M 49972    CMT:  17644 Chiropractic manipulative treatment 1-2 regions performed   Cervical: Activator and Mobilization, C3 , Supine    Modalities:  87491: MSTM:  To Sub-occipital and splenius capitus on left:   for 3 min    Therapeutic procedures:  None    Response to Treatment  Reduction in symptoms as reported by patient    Prognosis: Good    Goals: Reduce neck pain by 75%  Improve cervical spine AROM by 50%  Patient will be able to sleep without painful limits  Reduce oswestry score by 20-30%    Recommendations:    Instructions:ice 20 minutes every other hour as  needed    Follow-up:  Return to care if symptoms persist.

## 2021-11-15 ENCOUNTER — IMMUNIZATION (OUTPATIENT)
Dept: FAMILY MEDICINE | Facility: OTHER | Age: 56
End: 2021-11-15
Attending: FAMILY MEDICINE
Payer: COMMERCIAL

## 2021-11-15 ENCOUNTER — HOSPITAL ENCOUNTER (OUTPATIENT)
Dept: MAMMOGRAPHY | Facility: OTHER | Age: 56
End: 2021-11-15
Attending: FAMILY MEDICINE
Payer: COMMERCIAL

## 2021-11-15 VITALS
BODY MASS INDEX: 26.33 KG/M2 | HEART RATE: 84 BPM | OXYGEN SATURATION: 100 % | TEMPERATURE: 97.5 F | SYSTOLIC BLOOD PRESSURE: 108 MMHG | WEIGHT: 163.8 LBS | DIASTOLIC BLOOD PRESSURE: 84 MMHG | RESPIRATION RATE: 16 BRPM | HEIGHT: 66 IN

## 2021-11-15 DIAGNOSIS — F51.01 PRIMARY INSOMNIA: ICD-10-CM

## 2021-11-15 DIAGNOSIS — Z13.1 SCREENING FOR DIABETES MELLITUS: ICD-10-CM

## 2021-11-15 DIAGNOSIS — Z13.220 SCREENING FOR LIPID DISORDERS: ICD-10-CM

## 2021-11-15 DIAGNOSIS — Z12.31 VISIT FOR SCREENING MAMMOGRAM: ICD-10-CM

## 2021-11-15 DIAGNOSIS — Z00.00 HEALTH CARE MAINTENANCE: Primary | ICD-10-CM

## 2021-11-15 DIAGNOSIS — Z13.29 SCREENING FOR THYROID DISORDER: ICD-10-CM

## 2021-11-15 DIAGNOSIS — Z23 NEED FOR SHINGLES VACCINE: ICD-10-CM

## 2021-11-15 DIAGNOSIS — Z13.0 SCREENING FOR DEFICIENCY ANEMIA: ICD-10-CM

## 2021-11-15 DIAGNOSIS — L98.9 SKIN LESION: ICD-10-CM

## 2021-11-15 DIAGNOSIS — F33.42 RECURRENT MAJOR DEPRESSIVE DISORDER, IN FULL REMISSION (H): ICD-10-CM

## 2021-11-15 LAB
ALBUMIN SERPL-MCNC: 4.6 G/DL (ref 3.5–5.7)
ALP SERPL-CCNC: 28 U/L (ref 34–104)
ALT SERPL W P-5'-P-CCNC: 14 U/L (ref 7–52)
ANION GAP SERPL CALCULATED.3IONS-SCNC: 6 MMOL/L (ref 3–14)
AST SERPL W P-5'-P-CCNC: 15 U/L (ref 13–39)
BASOPHILS # BLD AUTO: 0 10E3/UL (ref 0–0.2)
BASOPHILS NFR BLD AUTO: 0 %
BILIRUB SERPL-MCNC: 0.6 MG/DL (ref 0.3–1)
BUN SERPL-MCNC: 13 MG/DL (ref 7–25)
CALCIUM SERPL-MCNC: 9.9 MG/DL (ref 8.6–10.3)
CHLORIDE BLD-SCNC: 101 MMOL/L (ref 98–107)
CHOLEST SERPL-MCNC: 284 MG/DL
CO2 SERPL-SCNC: 32 MMOL/L (ref 21–31)
CREAT SERPL-MCNC: 0.74 MG/DL (ref 0.6–1.2)
EOSINOPHIL # BLD AUTO: 0 10E3/UL (ref 0–0.7)
EOSINOPHIL NFR BLD AUTO: 1 %
ERYTHROCYTE [DISTWIDTH] IN BLOOD BY AUTOMATED COUNT: 12.7 % (ref 10–15)
FASTING STATUS PATIENT QL REPORTED: YES
GFR SERPL CREATININE-BSD FRML MDRD: 90 ML/MIN/1.73M2
GLUCOSE BLD-MCNC: 106 MG/DL (ref 70–105)
HCT VFR BLD AUTO: 43.7 % (ref 35–47)
HDLC SERPL-MCNC: 111 MG/DL (ref 23–92)
HGB BLD-MCNC: 14.5 G/DL (ref 11.7–15.7)
IMM GRANULOCYTES # BLD: 0 10E3/UL
IMM GRANULOCYTES NFR BLD: 0 %
LDLC SERPL CALC-MCNC: 153 MG/DL
LYMPHOCYTES # BLD AUTO: 1.6 10E3/UL (ref 0.8–5.3)
LYMPHOCYTES NFR BLD AUTO: 30 %
MCH RBC QN AUTO: 33.7 PG (ref 26.5–33)
MCHC RBC AUTO-ENTMCNC: 33.2 G/DL (ref 31.5–36.5)
MCV RBC AUTO: 102 FL (ref 78–100)
MONOCYTES # BLD AUTO: 0.4 10E3/UL (ref 0–1.3)
MONOCYTES NFR BLD AUTO: 7 %
NEUTROPHILS # BLD AUTO: 3.3 10E3/UL (ref 1.6–8.3)
NEUTROPHILS NFR BLD AUTO: 62 %
NONHDLC SERPL-MCNC: 173 MG/DL
NRBC # BLD AUTO: 0 10E3/UL
NRBC BLD AUTO-RTO: 0 /100
PLATELET # BLD AUTO: 241 10E3/UL (ref 150–450)
POTASSIUM BLD-SCNC: 4.5 MMOL/L (ref 3.5–5.1)
PROT SERPL-MCNC: 6.9 G/DL (ref 6.4–8.9)
RBC # BLD AUTO: 4.3 10E6/UL (ref 3.8–5.2)
SODIUM SERPL-SCNC: 139 MMOL/L (ref 134–144)
TRIGL SERPL-MCNC: 98 MG/DL
TSH SERPL DL<=0.005 MIU/L-ACNC: 2.22 MU/L (ref 0.4–4)
WBC # BLD AUTO: 5.3 10E3/UL (ref 4–11)

## 2021-11-15 PROCEDURE — 80061 LIPID PANEL: CPT | Mod: ZL | Performed by: FAMILY MEDICINE

## 2021-11-15 PROCEDURE — 91300 PR COVID VAC PFIZER DIL RECON 30 MCG/0.3 ML IM: CPT

## 2021-11-15 PROCEDURE — 0004A PR COVID VAC PFIZER DIL RECON 30 MCG/0.3 ML IM: CPT

## 2021-11-15 PROCEDURE — 17110 DESTRUCTION B9 LES UP TO 14: CPT | Performed by: FAMILY MEDICINE

## 2021-11-15 PROCEDURE — 36415 COLL VENOUS BLD VENIPUNCTURE: CPT | Mod: ZL | Performed by: FAMILY MEDICINE

## 2021-11-15 PROCEDURE — 82040 ASSAY OF SERUM ALBUMIN: CPT | Mod: ZL | Performed by: FAMILY MEDICINE

## 2021-11-15 PROCEDURE — 84443 ASSAY THYROID STIM HORMONE: CPT | Mod: ZL | Performed by: FAMILY MEDICINE

## 2021-11-15 PROCEDURE — 77063 BREAST TOMOSYNTHESIS BI: CPT

## 2021-11-15 PROCEDURE — 85004 AUTOMATED DIFF WBC COUNT: CPT | Mod: ZL | Performed by: FAMILY MEDICINE

## 2021-11-15 PROCEDURE — 99396 PREV VISIT EST AGE 40-64: CPT | Mod: 25 | Performed by: FAMILY MEDICINE

## 2021-11-15 RX ORDER — TRAZODONE HYDROCHLORIDE 50 MG/1
50 TABLET, FILM COATED ORAL AT BEDTIME
Qty: 90 TABLET | Refills: 3 | Status: SHIPPED | OUTPATIENT
Start: 2021-11-15 | End: 2022-11-28

## 2021-11-15 RX ORDER — BUPROPION HYDROCHLORIDE 300 MG/1
300 TABLET ORAL EVERY MORNING
Qty: 90 TABLET | Refills: 3 | Status: SHIPPED | OUTPATIENT
Start: 2021-11-15 | End: 2022-11-27

## 2021-11-15 ASSESSMENT — PATIENT HEALTH QUESTIONNAIRE - PHQ9
10. IF YOU CHECKED OFF ANY PROBLEMS, HOW DIFFICULT HAVE THESE PROBLEMS MADE IT FOR YOU TO DO YOUR WORK, TAKE CARE OF THINGS AT HOME, OR GET ALONG WITH OTHER PEOPLE: NOT DIFFICULT AT ALL
SUM OF ALL RESPONSES TO PHQ QUESTIONS 1-9: 3
SUM OF ALL RESPONSES TO PHQ QUESTIONS 1-9: 3

## 2021-11-15 ASSESSMENT — ENCOUNTER SYMPTOMS
SHORTNESS OF BREATH: 0
FEVER: 0
COUGH: 0
CHILLS: 0
NERVOUS/ANXIOUS: 0

## 2021-11-15 ASSESSMENT — PAIN SCALES - GENERAL: PAINLEVEL: NO PAIN (0)

## 2021-11-15 ASSESSMENT — MIFFLIN-ST. JEOR: SCORE: 1340.77

## 2021-11-15 NOTE — PROGRESS NOTES
"  SUBJECTIVE:   Nursing Notes:   Magaly Roa LPN  11/15/2021  8:31 AM  Sign at exiting of workspace  Chief Complaint   Patient presents with     Physical       Initial /84   Pulse 84   Temp 97.5  F (36.4  C) (Temporal)   Resp 16   Ht 1.67 m (5' 5.75\")   Wt 74.3 kg (163 lb 12.8 oz)   SpO2 100%   Breastfeeding No   BMI 26.64 kg/m   Estimated body mass index is 26.64 kg/m  as calculated from the following:    Height as of this encounter: 1.67 m (5' 5.75\").    Weight as of this encounter: 74.3 kg (163 lb 12.8 oz).  Medication Reconciliation: complete    FOOD SECURITY SCREENING QUESTIONS  Hunger Vital Signs:  Within the past 12 months we worried whether our food would run out before we got money to buy more. Never  Within the past 12 months the food we bought just didn't last and we didn't have money to get more. Never    Magaly Roa LPN      Soraida Suresh is a 56 year old female who presents to clinic today for a physical.      She has a raised skin lesion on her fight upper back that is irritating her.  It gets itchy, then will hurt if she scratches it.  No bleeding. It has not changed significantly.      Falling asleep easily, but cannot stay asleep. She is drinking chammomile tea and taking benadryl and melatonin at night, which has helped.  Does not snore or wake herself up.  Doesn't have much stress in her life now that she is retired.  Hard to shut her mind off once she wakes up.  Had tried trazodone in the past.        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         Cervical disc herniation 07/23/2013     Priority: Medium     Overview:   C6/7 on left;  S/p injection Dr. 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     Priority: Medium     Past Medical History:   Diagnosis Date     Abnormal cytological finding in specimen from cervix uteri     10/11/2011     Allergic rhinitis     No Comments Provided     Benign lipomatous neoplasm of skin and subcutaneous tissue of trunk     1/20/2016     Contact dermatitis     No Comments Provided     Dyshidrosis     No Comments Provided     Encounter for screening for malignant neoplasm of colon     8/27/2014     Gastro-esophageal reflux disease without esophagitis     No Comments Provided     Major depressive disorder, single episode     6/24/2011     Other congenital malformations of great veins (CODE)     7/10/2006     Other specified diseases of anus and rectum (CODE)     5/20/2015     Panic disorder without agoraphobia     resolved  after heart surgery     Partial anomalous pulmonary venous connection     No Comments Provided     Residual hemorrhoidal skin tags     3/3/2011      Past Surgical History:   Procedure Laterality Date     anorectal exam anesthesia  2015    MT ANORECTAL EXAM SURG REQ ANESTH  DIAG     APPENDECTOMY OPEN      1987     ARTHROSCOPY KNEE      2006, 2013,Debridement, partial medial meniscectomy, and anterior cruciate ligament reconstruction using tibialis anterior allograft, right knee     AS HYSTEROSCOPY, SURGICAL; W/ ENDOMETRIAL ABLATION, ANY METHOD  2015    Kathya     CARDIAC SURGERY  2006    Repair of anomalous pulmonary venous return by anastomosis of the anomalous vein to the left atrial appendage, done at [facility]     COLONOSCOPY  2014 Follow up in 10 years 24 normal     ECHO LIMITED      mild left atrial enlargement;  normal ef     ESOPHAGOSCOPY, GASTROSCOPY, DUODENOSCOPY (EGD), COMBINED           ESOPHAGOSCOPY, GASTROSCOPY, DUODENOSCOPY (EGD), COMBINED           ESOPHAGOSCOPY, GASTROSCOPY, DUODENOSCOPY (EGD), COMBINED           ESOPHAGOSCOPY, GASTROSCOPY, DUODENOSCOPY (EGD), COMBINED      11,Normal     NISSEN FUNDOPLICATION  2000    Dr. Sales     OTHER SURGICAL HISTORY Left 2021    Tenex procedure for plantar fasciitis with Dr. Reveles     SIGMOIDOSCOPY FLEXIBLE      ,And BE, negative, for rectal bleeding.  Fissures and hemorrhoids noted     SURGICAL PATHOLOGY EXAM Left 2016    Left flank     Family History   Problem Relation Age of Onset     Arthritis Mother         Arthritis,Osteoarthritis;  of 76 pneumonia but no autopsy     Diabetes Father         Diabetes     Other - See Comments Father         COPD/Benign colon polyps/AAA -  of ruptured AAA     Colon Cancer Paternal Grandmother         Cancer-colon     Colon Cancer Paternal Aunt         Cancer-colon     Diabetes Paternal Aunt         Diabetes     Obesity  Sister         bariatric surgery     Other - See Comments Sister         TBI     Depression Sister      Anxiety Disorder Sister      Diabetes Sister      Hyperlipidemia Sister      Diabetes Brother      Hyperlipidemia Brother      Other - See Comments Maternal Grandmother          of old age     Dementia Maternal Grandfather         possible dementia,  of pneumonia     Other - See Comments Brother         MVA - at age 50     Hypertension Brother      Diabetes Brother      Hyperlipidemia Brother      Depression Brother      Diabetes Brother      Hyperlipidemia Brother      Macular Degeneration Brother      Diabetes Brother      Hyperlipidemia Brother      Suicide Brother 49     Cerebrovascular Disease Paternal Grandfather          of stroke     Social History     Tobacco Use     Smoking status: Former Smoker     Quit date: 10/10/2005     Years since quittin.1     Smokeless tobacco: Never Used   Substance Use Topics     Alcohol use: Yes     Alcohol/week: 14.0 standard drinks     Comment: Alcoholic Drinks/day: 2-3 per day     Social History     Social History Narrative    This patient was  in .  She was remarried on 04.  Works at [employer], work #[phone].          Justen Suresh       Keri Flowers Daughter, born       Mina Flowers Son, born          Current Outpatient Medications   Medication Sig Dispense Refill     acyclovir (ZOVIRAX) 5 % external ointment Apply to affected area 6 times a day for 7 days. 30 g 11     buPROPion (WELLBUTRIN XL) 300 MG 24 hr tablet Take 1 tablet (300 mg) by mouth every morning 90 tablet 3     FIBER, GUAR GUM, PO        Glucosamine-Chondroitin 750 & 400 MG MISC Take 1 tablet by mouth daily       L-Lysine 500 MG TABS Take 1 tablet by mouth daily       other medical supplies Shingrix.  Diagnosis:  Z23. 1 each 1     polyethylene glycol (MIRALAX) powder Take by mouth daily       traZODone (DESYREL) 50 MG tablet Take 1 tablet (50 mg) by  "mouth At Bedtime 90 tablet 3     triamcinolone (NASACORT) 55 MCG/ACT nasal aerosol Spray 2 sprays into both nostrils 2 times daily 16.9 mL 11     Allergies   Allergen Reactions     Codeine Other (See Comments)     Medroxyprogesterone Other (See Comments)     Caused patient's brain to swell.     Azithromycin Palpitations     Latex Rash     hands     Penicillins Rash     Proline Rash     Prolene: Sutures; caused keloid     Sulfamethoxazole W/Trimethoprim Rash     Sulfamethoxazole-Trimethoprim Rash       Review of Systems   Constitutional: Negative for chills and fever.   Respiratory: Negative for cough and shortness of breath.    Cardiovascular: Negative for peripheral edema.   Psychiatric/Behavioral: Negative for mood changes. The patient is not nervous/anxious.         OBJECTIVE:     /84   Pulse 84   Temp 97.5  F (36.4  C) (Temporal)   Resp 16   Ht 1.67 m (5' 5.75\")   Wt 74.3 kg (163 lb 12.8 oz)   SpO2 100%   Breastfeeding No   BMI 26.64 kg/m    Body mass index is 26.64 kg/m .  Physical Exam  Constitutional:       General: She is not in acute distress.     Appearance: Normal appearance. She is well-developed.   HENT:      Head: Normocephalic.      Right Ear: Tympanic membrane and external ear normal.      Left Ear: Tympanic membrane and external ear normal.      Nose: Nose normal.      Mouth/Throat:      Pharynx: No oropharyngeal exudate.   Eyes:      General:         Right eye: No discharge.         Left eye: No discharge.      Conjunctiva/sclera: Conjunctivae normal.      Pupils: Pupils are equal, round, and reactive to light.   Neck:      Thyroid: No thyromegaly.      Trachea: No tracheal deviation.   Cardiovascular:      Rate and Rhythm: Normal rate and regular rhythm.      Pulses: Normal pulses.      Heart sounds: Normal heart sounds, S1 normal and S2 normal. No murmur heard.  No friction rub. No gallop. No S3 or S4 sounds.    Pulmonary:      Effort: Pulmonary effort is normal. No respiratory " distress.      Breath sounds: Normal breath sounds. No wheezing or rales.      Comments: Breast exam:  No masses palpable bilaterally.  No skin changes, tethering or axillary lymphadenopathy bilaterally.    Abdominal:      General: Bowel sounds are normal. There is no distension.      Palpations: Abdomen is soft. There is no mass.      Tenderness: There is no abdominal tenderness.   Genitourinary:     Comments: Pelvic/Rectal exams deferred per patient.  Musculoskeletal:         General: Normal range of motion.      Cervical back: Neck supple.   Lymphadenopathy:      Cervical: No cervical adenopathy.   Skin:     General: Skin is warm and dry.      Findings: No rash.      Comments: On her right upper back is a fleshy toned, raised, rough lesion.  This was frozen x 3 with liquid nitrogen.  She tolerated this well.   Neurological:      Mental Status: She is alert and oriented to person, place, and time.      Motor: No abnormal muscle tone.      Deep Tendon Reflexes: Reflexes are normal and symmetric.   Psychiatric:         Thought Content: Thought content normal.         Judgment: Judgment normal.           PHQ-9 SCORE 8/20/2018 11/25/2019 11/15/2021   PHQ-9 Total Score MyChart - - 3 (Minimal depression)   PHQ-9 Total Score 0 0 3       PHQ-2 Score:     PHQ-2 ( 1999 Pf[employer]) 12/8/2020 11/5/2020   Q1: Little interest or pleasure in doing things 0 0   Q2: Feeling down, depressed or hopeless 0 0   PHQ-2 Score 0 0       CECI-7 SCORE 6/2/2015 8/21/2015 6/7/2016   Total Score 14 2 5         I personally reviewed results withpatient as listed below:   Diagnostic Test Results:  none     ASSESSMENT/PLAN:       ICD-10-CM    1. Health care maintenance  Z00.00    2. Need for shingles vaccine  Z23 other medical supplies   3. Screening for lipid disorders  Z13.220 Lipid Panel     Lipid Panel   4. Screening for diabetes mellitus  Z13.1 Comprehensive metabolic panel     Comprehensive metabolic panel   5. Screening for thyroid disorder   Z13.29 TSH Reflex GH     TSH Reflex GH   6. Screening for deficiency anemia  Z13.0 CBC and Differential     CBC and Differential   7. Skin lesion  L98.9 DESTRUCT BENIGN LESION, UP TO 14   8. Primary insomnia  F51.01 traZODone (DESYREL) 50 MG tablet   9. Recurrent major depressive disorder, in full remission (H)  F33.42 buPROPion (WELLBUTRIN XL) 300 MG 24 hr tablet       1.  Mammogram is being done today.  DEXA last completed on 12/11/2020 was normal.  cologuard last completed in 2019 was normal.  Pap Smear/HPV were last completed in 2019 at [facility] and were normal.  Tdap is up to date, last completed on 11/5/2020.  Flu shot is up to date this season.  Covid x 2 up to date.  She plans to get booster today.  2.  Prescription given to obtain shingrix at outside pharmacy.  3.  Lipid profile ordered.  4.  Comprehensive Metabolic Profile ordered as above.  5.  TSH completed as above.  6.  Complete Blood Count as above.  7.  Right upper back lesion treated with cryotherapy today.  If this persists, could consider removal via shave biopsy.  8.  Trial of trazodone.  9.  Wellbutrin XL refilled.    Patricia Brooke MD  [facility] AND \[facility] [place] [facility]\""

## 2021-11-16 ASSESSMENT — PATIENT HEALTH QUESTIONNAIRE - PHQ9: SUM OF ALL RESPONSES TO PHQ QUESTIONS 1-9: 3

## 2021-11-26 ENCOUNTER — OFFICE VISIT (OUTPATIENT)
Dept: FAMILY MEDICINE | Facility: OTHER | Age: 56
End: 2021-11-26
Attending: PHYSICIAN ASSISTANT
Payer: COMMERCIAL

## 2021-11-26 VITALS
TEMPERATURE: 97.3 F | RESPIRATION RATE: 18 BRPM | HEART RATE: 81 BPM | WEIGHT: 166 LBS | BODY MASS INDEX: 27 KG/M2 | DIASTOLIC BLOOD PRESSURE: 82 MMHG | SYSTOLIC BLOOD PRESSURE: 120 MMHG | OXYGEN SATURATION: 99 %

## 2021-11-26 DIAGNOSIS — R05.9 COUGH: ICD-10-CM

## 2021-11-26 DIAGNOSIS — J20.8 VIRAL BRONCHITIS: Primary | ICD-10-CM

## 2021-11-26 DIAGNOSIS — J02.9 SORE THROAT: ICD-10-CM

## 2021-11-26 LAB — GROUP A STREP BY PCR: NOT DETECTED

## 2021-11-26 PROCEDURE — 99213 OFFICE O/P EST LOW 20 MIN: CPT | Performed by: PHYSICIAN ASSISTANT

## 2021-11-26 PROCEDURE — U0003 INFECTIOUS AGENT DETECTION BY NUCLEIC ACID (DNA OR RNA); SEVERE ACUTE RESPIRATORY SYNDROME CORONAVIRUS 2 (SARS-COV-2) (CORONAVIRUS DISEASE [COVID-19]), AMPLIFIED PROBE TECHNIQUE, MAKING USE OF HIGH THROUGHPUT TECHNOLOGIES AS DESCRIBED BY CMS-2020-01-R: HCPCS | Mod: ZL | Performed by: PHYSICIAN ASSISTANT

## 2021-11-26 PROCEDURE — 87651 STREP A DNA AMP PROBE: CPT | Mod: ZL | Performed by: PHYSICIAN ASSISTANT

## 2021-11-26 PROCEDURE — C9803 HOPD COVID-19 SPEC COLLECT: HCPCS

## 2021-11-26 RX ORDER — ALBUTEROL SULFATE 90 UG/1
2 AEROSOL, METERED RESPIRATORY (INHALATION) EVERY 4 HOURS PRN
Qty: 18 G | Refills: 1 | Status: SHIPPED | OUTPATIENT
Start: 2021-11-26

## 2021-11-26 ASSESSMENT — ANXIETY QUESTIONNAIRES
8. IF YOU CHECKED OFF ANY PROBLEMS, HOW DIFFICULT HAVE THESE MADE IT FOR YOU TO DO YOUR WORK, TAKE CARE OF THINGS AT HOME, OR GET ALONG WITH OTHER PEOPLE?: NOT DIFFICULT AT ALL
1. FEELING NERVOUS, ANXIOUS, OR ON EDGE: NOT AT ALL
7. FEELING AFRAID AS IF SOMETHING AWFUL MIGHT HAPPEN: NOT AT ALL
4. TROUBLE RELAXING: SEVERAL DAYS
6. BECOMING EASILY ANNOYED OR IRRITABLE: NOT AT ALL
5. BEING SO RESTLESS THAT IT IS HARD TO SIT STILL: SEVERAL DAYS
2. NOT BEING ABLE TO STOP OR CONTROL WORRYING: SEVERAL DAYS
3. WORRYING TOO MUCH ABOUT DIFFERENT THINGS: NOT AT ALL
GAD7 TOTAL SCORE: 3
7. FEELING AFRAID AS IF SOMETHING AWFUL MIGHT HAPPEN: NOT AT ALL
GAD7 TOTAL SCORE: 3
GAD7 TOTAL SCORE: 3

## 2021-11-26 ASSESSMENT — PAIN SCALES - GENERAL: PAINLEVEL: EXTREME PAIN (9)

## 2021-11-26 ASSESSMENT — PATIENT HEALTH QUESTIONNAIRE - PHQ9
SUM OF ALL RESPONSES TO PHQ QUESTIONS 1-9: 3
10. IF YOU CHECKED OFF ANY PROBLEMS, HOW DIFFICULT HAVE THESE PROBLEMS MADE IT FOR YOU TO DO YOUR WORK, TAKE CARE OF THINGS AT HOME, OR GET ALONG WITH OTHER PEOPLE: NOT DIFFICULT AT ALL
SUM OF ALL RESPONSES TO PHQ QUESTIONS 1-9: 3

## 2021-11-26 NOTE — PATIENT INSTRUCTIONS
May use symptomatic care with tylenol or ibuprofen. Sudafed or mucinex work well for congestion. May use cough syrup or cough drops.  Using a humidifier works well to break up the congestion. You can also sleep propped up on a couple pillows to decrease symptoms at night.    Please take tylenol as needed up to 4 times daily.  Treat symptomatically with warm salt water gargles.  Lozenges, Tylenol, Advil or Aleve as needed. Frequent swallows of cool liquid.  Oatmeal coats the throat and some patients find it soothes the pain. Encouraged warm teas or fluids with honey.     If you have sinus congestion -  Use a Neti Pot/sinus flush (Dameon Med Sinus Rinse) 3 times daily to irrigate sinuses/mucosal tissue.     Monitor for any fevers or chills.  Please call clinic with any questions or concerns. Return to clinic with change/worsening of symptoms.   Encouraged fluids and rest.    Call 9-1-1 or go to the emergency room if you:  Have trouble breathing   Are drooling because you cannot swallow your saliva   Have swelling of the neck or tongue   Cannot move your neck or have trouble opening your mouth

## 2021-11-26 NOTE — PROGRESS NOTES
"Nursing Notes:   Yonathan Cerrato LPN  11/26/2021  2:20 PM  Signed  Chief Complaint   Patient presents with     Sinus Problem   Since Monday- chest tightness when lying down. Mostly non-productive cough at times. Sore throat and headache as well.     Initial /82   Pulse 81   Temp 97.3  F (36.3  C) (Tympanic)   Resp 18   Wt 75.3 kg (166 lb)   SpO2 99%   Breastfeeding No   BMI 27.00 kg/m   Estimated body mass index is 27 kg/m  as calculated from the following:    Height as of 11/15/21: 1.67 m (5' 5.75\").    Weight as of this encounter: 75.3 kg (166 lb).  Medication Reconciliation: complete    FOOD SECURITY SCREENING QUESTIONS  Hunger Vital Signs:  Within the past 12 months we worried whether our food would run out before we got money to buy more. Never  Within the past 12 months the food we bought just didn't last and we didn't have money to get more. Never    Advanced Care Directive Reviewed    Yonathan Cerrato LPN        HPI:    Soraida Suresh is a 56 year old female who presents for cold symptoms.  Patient has been having tightness and her chest and a sore throat that started on Monday 11/22.  Having some postnasal drip.  Having a hard time coughing productively.  Has been using the Therese pot.  Gargling with salt water.  Has a hard time sleeping without coughing.  The cough is almost like a seal type cough.  Had a little wheezing yesterday.  No fevers.  Had some chills and ear pain on both sides.  Keeping food and fluids down.  No dehydration concerns.  Exposure to sick grandchildren a couple days ago.  They had negative COVID-19 test.  No change in taste or smell.    Past Medical History:   Diagnosis Date     Abnormal cytological finding in specimen from cervix uteri     10/11/2011     Allergic rhinitis     No Comments Provided     Benign lipomatous neoplasm of skin and subcutaneous tissue of trunk     1/20/2016     Contact dermatitis     No Comments Provided     Dyshidrosis     No Comments " Provided     Encounter for screening for malignant neoplasm of colon     8/27/2014     Gastro-esophageal reflux disease without esophagitis     No Comments Provided     Major depressive disorder, single episode     6/24/2011     Other congenital malformations of great veins (CODE)     7/10/2006     Other specified diseases of anus and rectum (CODE)     5/20/2015     Panic disorder without agoraphobia     resolved after heart surgery     Partial anomalous pulmonary venous connection     No Comments Provided     Residual hemorrhoidal skin tags     3/3/2011       Past Surgical History:   Procedure Laterality Date     anorectal exam anesthesia  05/20/2015    [place] ANORECTAL EXAM SURG REQ ANESTH  DIAG     APPENDECTOMY OPEN      5/1987     ARTHROSCOPY KNEE      7/2006, 2013,Debridement, partial medial meniscectomy, and anterior cruciate ligament reconstruction using tibialis anterior allograft, right knee     AS HYSTEROSCOPY, SURGICAL; W/ ENDOMETRIAL ABLATION, ANY METHOD  12/14/2015    [facility]     CARDIAC SURGERY  11/2006    Repair of anomalous pulmonary venous return by anastomosis of the anomalous vein to the left atrial appendage, done at [facility]     COLONOSCOPY  08/28/2014 8/28/2014 Follow up in 10 years 8/28/24 normal     ECHO LIMITED      mild left atrial enlargement;  normal ef     ESOPHAGOSCOPY, GASTROSCOPY, DUODENOSCOPY (EGD), COMBINED      1996     ESOPHAGOSCOPY, GASTROSCOPY, DUODENOSCOPY (EGD), COMBINED      2001     ESOPHAGOSCOPY, GASTROSCOPY, DUODENOSCOPY (EGD), COMBINED      2006     ESOPHAGOSCOPY, GASTROSCOPY, DUODENOSCOPY (EGD), COMBINED      6/28/11,Normal     NISSEN FUNDOPLICATION  08/22/2000    Dr. Sales     OTHER SURGICAL HISTORY Left 09/2021    Tenex procedure for plantar fasciitis with Dr. Reveles     SIGMOIDOSCOPY FLEXIBLE      1996,And [facility], negative, for rectal bleeding.  Fissures and hemorrhoids noted     SURGICAL PATHOLOGY EXAM Left 02/02/2016    Left flank       Family History    Problem Relation Age of Onset     Arthritis Mother         Arthritis,Osteoarthritis;  of 76 pneumonia but no autopsy     Diabetes Father         Diabetes     Other - See Comments Father         COPD/Benign colon polyps/AAA -  of ruptured AAA     Colon Cancer Paternal Grandmother         Cancer-colon     Colon Cancer Paternal Aunt         Cancer-colon     Diabetes Paternal Aunt         Diabetes     Obesity Sister         bariatric surgery     Other - See Comments Sister         TBI     Depression Sister      Anxiety Disorder Sister      Diabetes Sister      Hyperlipidemia Sister      Diabetes Brother      Hyperlipidemia Brother      Other - See Comments Maternal Grandmother          of old age     Dementia Maternal Grandfather         possible dementia,  of pneumonia     Other - See Comments Brother         MVA - at age 50     Hypertension Brother      Diabetes Brother      Hyperlipidemia Brother      Depression Brother      Diabetes Brother      Hyperlipidemia Brother      Macular Degeneration Brother      Diabetes Brother      Hyperlipidemia Brother      Suicide Brother 49     Cerebrovascular Disease Paternal Grandfather          of stroke       Social History     Tobacco Use     Smoking status: Former Smoker     Quit date: 10/10/2005     Years since quittin.1     Smokeless tobacco: Never Used   Substance Use Topics     Alcohol use: Yes     Alcohol/week: 14.0 standard drinks     Comment: Alcoholic Drinks/day: 2-3 per day       Current Outpatient Medications   Medication Sig Dispense Refill     acyclovir (ZOVIRAX) 5 % external ointment Apply to affected area 6 times a day for 7 days. 30 g 11     albuterol (PROAIR HFA/PROVENTIL HFA/VENTOLIN HFA) 108 (90 Base) MCG/ACT inhaler Inhale 2 puffs into the lungs every 4 hours as needed for shortness of breath / dyspnea or wheezing 18 g 1     buPROPion (WELLBUTRIN XL) 300 MG 24 hr tablet Take 1 tablet (300 mg) by mouth every morning 90 tablet 3      FIBER, GUAR GUM, PO        Glucosamine-Chondroitin 750 & 400 MG MISC Take 1 tablet by mouth daily       L-Lysine 500 MG TABS Take 1 tablet by mouth daily       other medical supplies Shingrix.  Diagnosis:  Z23. 1 each 1     polyethylene glycol (MIRALAX) powder Take by mouth daily       traZODone (DESYREL) 50 MG tablet Take 1 tablet (50 mg) by mouth At Bedtime 90 tablet 3     triamcinolone (NASACORT) 55 MCG/ACT nasal aerosol Spray 2 sprays into both nostrils 2 times daily 16.9 mL 11       Allergies   Allergen Reactions     Codeine Other (See Comments)     Medroxyprogesterone Other (See Comments)     Caused patient's brain to swell.     Azithromycin Palpitations     Latex Rash     hands     Penicillins Rash     Proline Rash     Prolene: Sutures; caused keloid     Sulfamethoxazole W/Trimethoprim Rash     Sulfamethoxazole-Trimethoprim Rash       REVIEW OF SYSTEMS:  Refer to HPI.    EXAM:   Vitals:    /82   Pulse 81   Temp 97.3  F (36.3  C) (Tympanic)   Resp 18   Wt 75.3 kg (166 lb)   SpO2 99%   Breastfeeding No   BMI 27.00 kg/m      General Appearance: Pleasant, alert, appropriate appearance for age. No acute distress  Ear Exam: Normal TM's bilaterally, grey, translucent, bony landmarks appreciated.   Left/Right TM: Effusion is not present. TM is not bulging. There is no pus appreciated.    Normal auditory canals and external ears. Non-tender.  Nose Exam: Normal external nose, mucus membranes, and septum.  OroPharynx Exam: Mildly erythematous posterior pharynx with no exudates. No sinus pain upon palpation of frontal, ethmoid, and maxillary sinuses.  Chest/Respiratory Exam: Normal chest wall and respirations. Clear to auscultation. No retractions appreciated.  Cardiovascular Exam: Regular rate and rhythm. S1, S2, no murmur, click, gallop, or rubs.  Lymphatic Exam: ACLN.  Skin: no rash or abnormalities  Psychiatric Exam: Alert and oriented - appropriate affect.    PHQ Depression Screen  PHQ-9 SCORE  11/25/2019 11/15/2021 11/26/2021   PHQ-9 Total Score MyChart - 3 (Minimal depression) 3 (Minimal depression)   PHQ-9 Total Score 0 3 3       Answers for HPI/ROS submitted by the patient on 11/26/2021  If you checked off any problems, how difficult have these problems made it for you to do your work, take care of things at home, or get along with other people?: Not difficult at all  PHQ9 TOTAL SCORE: 3  CECI 7 TOTAL SCORE: 3    LABS:    Results for orders placed or performed in visit on 11/26/21   Group A Streptococcus PCR Throat Swab     Status: Normal    Specimen: Throat; Swab   Result Value Ref Range    Group A strep by PCR Not Detected Not Detected    Narrative    The Xpert Xpress Strep A test, performed on the [employer], is a rapid, qualitative in vitro diagnostic test for the detection of Streptococcus pyogenes (Group A ß-hemolytic Streptococcus, Strep A) in throat swab specimens from patients with signs and symptoms of pharyngitis. The Xpert Xpress Strep A test can be used as an aid in the diagnosis of Group A Streptococcal pharyngitis. The assay is not intended to monitor treatment for Group A Streptococcus infections. The Xpert Xpress Strep A test utilizes an automated real-time polymerase chain reaction (PCR) to detect Streptococcus pyogenes DNA.         ASSESSMENT AND PLAN:      ICD-10-CM    1. Viral bronchitis  J20.8    2. Sore throat  J02.9 Group A Streptococcus PCR Throat Swab   3. Cough  R05.9 albuterol (PROAIR HFA/PROVENTIL HFA/VENTOLIN HFA) 108 (90 Base) MCG/ACT inhaler     Symptomatic COVID-19 Virus (Coronavirus) by PCR Nose         Completed rapid strep test which was negative.  Completed COVID-19 test which is pending.    Symptoms likely viral.  Patient was given an albuterol inhaler to use as needed for symptomatic relief.  Gave warning signs and symptoms.  Encourage increase of fluids and rest.    May use symptomatic care with tylenol or ibuprofen. Sudafed or mucinex work well  for congestion. May use cough syrup or cough drops.  Using a humidifier works well to break up the congestion. You can also sleep propped up on a couple pillows to decrease symptoms at night.    Please take tylenol as needed up to 4 times daily.  Treat symptomatically with warm salt water gargles.  Lozenges, Tylenol, Advil or Aleve as needed. Frequent swallows of cool liquid.  Oatmeal coats the throat and some patients find it soothes the pain. Encouraged warm teas or fluids with honey.     If you have sinus congestion -  Use a Neti Pot/sinus flush (Dameon Med Sinus Rinse) 3 times daily to irrigate sinuses/mucosal tissue.     Monitor for any fevers or chills.  Please call clinic with any questions or concerns. Return to clinic with change/worsening of symptoms.   Encouraged fluids and rest.    Call 9-1-1 or go to the emergency room if you:  Have trouble breathing   Are drooling because you cannot swallow your saliva   Have swelling of the neck or tongue   Cannot move your neck or have trouble opening your mouth      Patient Instructions   May use symptomatic care with tylenol or ibuprofen. Sudafed or mucinex work well for congestion. May use cough syrup or cough drops.  Using a humidifier works well to break up the congestion. You can also sleep propped up on a couple pillows to decrease symptoms at night.    Please take tylenol as needed up to 4 times daily.  Treat symptomatically with warm salt water gargles.  Lozenges, Tylenol, Advil or Aleve as needed. Frequent swallows of cool liquid.  Oatmeal coats the throat and some patients find it soothes the pain. Encouraged warm teas or fluids with honey.     If you have sinus congestion -  Use a Neti Pot/sinus flush (Dameon Med Sinus Rinse) 3 times daily to irrigate sinuses/mucosal tissue.     Monitor for any fevers or chills.  Please call clinic with any questions or concerns. Return to clinic with change/worsening of symptoms.   Encouraged fluids and rest.    Call 9-1-1  or go to the emergency room if you:  Have trouble breathing   Are drooling because you cannot swallow your saliva   Have swelling of the neck or tongue   Cannot move your neck or have trouble opening your mouth           Brit Shell PA-C ..................11/26/2021 2:49 PM

## 2021-11-26 NOTE — NURSING NOTE
"Chief Complaint   Patient presents with     Sinus Problem   Since Monday- chest tightness when lying down. Mostly non-productive cough at times. Sore throat and headache as well.     Initial /82   Pulse 81   Temp 97.3  F (36.3  C) (Tympanic)   Resp 18   Wt 75.3 kg (166 lb)   SpO2 99%   Breastfeeding No   BMI 27.00 kg/m   Estimated body mass index is 27 kg/m  as calculated from the following:    Height as of 11/15/21: 1.67 m (5' 5.75\").    Weight as of this encounter: 75.3 kg (166 lb).  Medication Reconciliation: complete    FOOD SECURITY SCREENING QUESTIONS  Hunger Vital Signs:  Within the past 12 months we worried whether our food would run out before we got money to buy more. Never  Within the past 12 months the food we bought just didn't last and we didn't have money to get more. Never    Advanced Care Directive Reviewed    Yonathan Cerrato LPN    "

## 2021-11-27 ASSESSMENT — PATIENT HEALTH QUESTIONNAIRE - PHQ9: SUM OF ALL RESPONSES TO PHQ QUESTIONS 1-9: 3

## 2021-11-27 ASSESSMENT — ANXIETY QUESTIONNAIRES: GAD7 TOTAL SCORE: 3

## 2021-11-28 LAB — SARS-COV-2 RNA RESP QL NAA+PROBE: NEGATIVE

## 2021-12-02 ENCOUNTER — ALLIED HEALTH/NURSE VISIT (OUTPATIENT)
Dept: FAMILY MEDICINE | Facility: OTHER | Age: 56
End: 2021-12-02
Attending: FAMILY MEDICINE
Payer: COMMERCIAL

## 2021-12-02 ENCOUNTER — MYC MEDICAL ADVICE (OUTPATIENT)
Dept: FAMILY MEDICINE | Facility: OTHER | Age: 56
End: 2021-12-02
Payer: COMMERCIAL

## 2021-12-02 DIAGNOSIS — Z20.822 COVID-19 RULED OUT: Primary | ICD-10-CM

## 2021-12-02 DIAGNOSIS — J40 BRONCHITIS: Primary | ICD-10-CM

## 2021-12-02 PROCEDURE — C9803 HOPD COVID-19 SPEC COLLECT: HCPCS

## 2021-12-02 PROCEDURE — U0003 INFECTIOUS AGENT DETECTION BY NUCLEIC ACID (DNA OR RNA); SEVERE ACUTE RESPIRATORY SYNDROME CORONAVIRUS 2 (SARS-COV-2) (CORONAVIRUS DISEASE [COVID-19]), AMPLIFIED PROBE TECHNIQUE, MAKING USE OF HIGH THROUGHPUT TECHNOLOGIES AS DESCRIBED BY CMS-2020-01-R: HCPCS | Mod: ZL

## 2021-12-02 RX ORDER — CEFDINIR 300 MG/1
300 CAPSULE ORAL 2 TIMES DAILY
Qty: 20 CAPSULE | Refills: 0 | Status: SHIPPED | OUTPATIENT
Start: 2021-12-02 | End: 2021-12-12

## 2021-12-03 LAB — SARS-COV-2 RNA RESP QL NAA+PROBE: NEGATIVE

## 2021-12-04 ENCOUNTER — MYC MEDICAL ADVICE (OUTPATIENT)
Dept: FAMILY MEDICINE | Facility: OTHER | Age: 56
End: 2021-12-04
Payer: COMMERCIAL

## 2021-12-06 NOTE — TELEPHONE ENCOUNTER
S: Taking Cefdinir, Started on 12/2/2021. Flew to [place]. After the 2nd dose had diarrhea and stomach cramps.   B: Dx of Bronchitis Given antibiotics.   A:  Started having moderate amount of Rectal bleeding with bowel movements. Appetite poor. Eating with antibiotics. She is having 20 + bowel movements a day. No odor, green and bile, coffee grounds, no substance, no solid. Denies weakness, dizziness or extreme fatigue.  Continues with sore throat, using mucinex for cold, flu and sore throat, using inhaler, no SOB, overall other doing better than she was except the severe bloody diarrhea is feeling better.   R: Encouraged going to ER/ urgent care in [place]. Drink lots of water, decrease alcohol intake, Hold antibiotic until seen by a provider, try loperamide to slow bowel movements.

## 2022-01-06 ENCOUNTER — ALLIED HEALTH/NURSE VISIT (OUTPATIENT)
Dept: FAMILY MEDICINE | Facility: OTHER | Age: 57
End: 2022-01-06
Attending: FAMILY MEDICINE
Payer: COMMERCIAL

## 2022-01-06 DIAGNOSIS — Z23 NEED FOR ZOSTER VACCINATION: Primary | ICD-10-CM

## 2022-01-06 PROCEDURE — 90750 HZV VACC RECOMBINANT IM: CPT

## 2022-01-06 PROCEDURE — 90471 IMMUNIZATION ADMIN: CPT

## 2022-01-06 NOTE — PROGRESS NOTES
Immunization Documentation - Shingrix #1  Verified patient's first and last name, and . Stated reason for visit today is to receive the Shingrix vaccine. Denied any concerns with previous immunizations. Allergies reviewed. VIS handout(s) reviewed and given to take home. Shingrix prepared and administered IM per standing order. Administration documented in IMMUNIZATIONS (see flowsheet and order for further information). Patient Instructed to wait in lobby for 15 minutes post-injection and notify staff immediately of any reaction.     Xiomy Caruso RN ....................  2022   10:53 AM

## 2022-01-14 ENCOUNTER — ALLIED HEALTH/NURSE VISIT (OUTPATIENT)
Dept: FAMILY MEDICINE | Facility: OTHER | Age: 57
End: 2022-01-14
Attending: FAMILY MEDICINE
Payer: COMMERCIAL

## 2022-01-14 DIAGNOSIS — R05.9 COUGH: Primary | ICD-10-CM

## 2022-01-14 DIAGNOSIS — Z20.822 EXPOSURE TO 2019 NOVEL CORONAVIRUS: ICD-10-CM

## 2022-01-14 PROCEDURE — U0005 INFEC AGEN DETEC AMPLI PROBE: HCPCS | Mod: ZL

## 2022-01-14 PROCEDURE — C9803 HOPD COVID-19 SPEC COLLECT: HCPCS

## 2022-01-14 NOTE — PROGRESS NOTES
Patient swabbed for COVID-19 testing.  Cough home test positive  Shea Villeda MA on 1/14/2022 at 7:30 AM

## 2022-01-16 LAB — SARS-COV-2 RNA RESP QL NAA+PROBE: POSITIVE

## 2022-02-11 ENCOUNTER — MYC MEDICAL ADVICE (OUTPATIENT)
Dept: FAMILY MEDICINE | Facility: OTHER | Age: 57
End: 2022-02-11
Payer: COMMERCIAL

## 2022-02-11 DIAGNOSIS — K22.89 ESOPHAGEAL PAIN: Primary | ICD-10-CM

## 2022-02-16 ENCOUNTER — OFFICE VISIT (OUTPATIENT)
Dept: SURGERY | Facility: OTHER | Age: 57
End: 2022-02-16
Attending: SURGERY
Payer: COMMERCIAL

## 2022-02-16 VITALS
HEART RATE: 79 BPM | DIASTOLIC BLOOD PRESSURE: 82 MMHG | WEIGHT: 168.2 LBS | BODY MASS INDEX: 26.4 KG/M2 | SYSTOLIC BLOOD PRESSURE: 118 MMHG | OXYGEN SATURATION: 99 % | HEIGHT: 67 IN | RESPIRATION RATE: 18 BRPM | TEMPERATURE: 97.4 F

## 2022-02-16 DIAGNOSIS — R10.13 EPIGASTRIC PAIN: Primary | ICD-10-CM

## 2022-02-16 PROCEDURE — 99244 OFF/OP CNSLTJ NEW/EST MOD 40: CPT | Performed by: SURGERY

## 2022-02-16 ASSESSMENT — PAIN SCALES - GENERAL: PAINLEVEL: NO PAIN (0)

## 2022-02-16 NOTE — PROGRESS NOTES
Surgical Clinic Consult  Referring physician:  Patricia Brooke   Primary physician:     Patricia Brooke    Chief complaint:   Esophageal pain    History of present illness:  This is a 56 year old female I am seeing in consultation for esophageal pain.  The patient underwent a laparoscopic Nissen fundoplication in 2000.  She has had good relief of heartburn since then.  Over the past year and a half she is started to develop a sensation of a pill getting caught in the esophagus and this is associated with gas bloating.  Once she passes gas symptoms improve.  She has some epigastric discomfort when this occurs.  She has not vomited in 22 years until recently and that concerns her.  She has irritable bowel syndrome with constipation and diarrhea.  She had a colonoscopy in 2014.  She is done a Cologuard since then which is negative.     Past medical history:   Past Medical History:   Diagnosis Date     Abnormal cytological finding in specimen from cervix uteri     10/11/2011     Allergic rhinitis     No Comments Provided     Benign lipomatous neoplasm of skin and subcutaneous tissue of trunk     1/20/2016     Contact dermatitis     No Comments Provided     Dyshidrosis     No Comments Provided     Encounter for screening for malignant neoplasm of colon     8/27/2014     Gastro-esophageal reflux disease without esophagitis     No Comments Provided     Major depressive disorder, single episode     6/24/2011     Other congenital malformations of great veins (CODE)     7/10/2006     Other specified diseases of anus and rectum (CODE)     5/20/2015     Panic disorder without agoraphobia     resolved after heart surgery     Partial anomalous pulmonary venous connection     No Comments Provided     Residual hemorrhoidal skin tags     3/3/2011       Pastsurgical history:  Past Surgical History:   Procedure Laterality Date     anorectal exam anesthesia  05/20/2015    [place] ANORECTAL EXAM SURG REQ ANESTH  DIAG      APPENDECTOMY OPEN      5/1987     ARTHROSCOPY KNEE      7/2006, 2013,Debridement, partial medial meniscectomy, and anterior cruciate ligament reconstruction using tibialis anterior allograft, right knee     AS HYSTEROSCOPY, SURGICAL; W/ ENDOMETRIAL ABLATION, ANY METHOD  12/14/2015    [facility]     CARDIAC SURGERY  11/2006    Repair of anomalous pulmonary venous return by anastomosis of the anomalous vein to the left atrial appendage, done at [facility]     COLONOSCOPY  08/28/2014 8/28/2014 Follow up in 10 years 8/28/24 normal     ECHO LIMITED      mild left atrial enlargement;  normal ef     ESOPHAGOSCOPY, GASTROSCOPY, DUODENOSCOPY (EGD), COMBINED      1996     ESOPHAGOSCOPY, GASTROSCOPY, DUODENOSCOPY (EGD), COMBINED      2001     ESOPHAGOSCOPY, GASTROSCOPY, DUODENOSCOPY (EGD), COMBINED      2006     ESOPHAGOSCOPY, GASTROSCOPY, DUODENOSCOPY (EGD), COMBINED      6/28/11,Normal     NISSEN FUNDOPLICATION  08/22/2000    Dr. Sales     OTHER SURGICAL HISTORY Left 09/2021    Tenex procedure for plantar fasciitis with Dr. Reveles     SIGMOIDOSCOPY FLEXIBLE      1996,And [facility], negative, for rectal bleeding.  Fissures and hemorrhoids noted     SURGICAL PATHOLOGY EXAM Left 02/02/2016    Left flank       Current medications:  Current Outpatient Medications   Medication Sig Dispense Refill     acyclovir (ZOVIRAX) 5 % external ointment Apply to affected area 6 times a day for 7 days. 30 g 11     albuterol (PROAIR HFA/PROVENTIL HFA/VENTOLIN HFA) 108 (90 Base) MCG/ACT inhaler Inhale 2 puffs into the lungs every 4 hours as needed for shortness of breath / dyspnea or wheezing 18 g 1     buPROPion (WELLBUTRIN XL) 300 MG 24 hr tablet Take 1 tablet (300 mg) by mouth every morning 90 tablet 3     FIBER, GUAR GUM, PO        Glucosamine-Chondroitin 750 & 400 MG MISC Take 1 tablet by mouth daily       L-Lysine 500 MG TABS Take 1 tablet by mouth daily       other medical supplies Shingrix.  Diagnosis:  Z23. 1 each 1      polyethylene glycol (MIRALAX) powder Take by mouth daily       traZODone (DESYREL) 50 MG tablet Take 1 tablet (50 mg) by mouth At Bedtime 90 tablet 3     triamcinolone (NASACORT) 55 MCG/ACT nasal aerosol Spray 2 sprays into both nostrils 2 times daily 16.9 mL 11       Allergies:  Allergies   Allergen Reactions     Codeine Other (See Comments)     Medroxyprogesterone Other (See Comments)     Caused patient's brain to swell.     Azithromycin Palpitations     Latex Rash     hands     Penicillins Rash     Proline Rash     Prolene: Sutures; caused keloid     Sulfamethoxazole W/Trimethoprim Rash     Sulfamethoxazole-Trimethoprim Rash       Family history:  Family History   Problem Relation Age of Onset     Arthritis Mother         Arthritis,Osteoarthritis;  of 76 pneumonia but no autopsy     Diabetes Father         Diabetes     Other - See Comments Father         COPD/Benign colon polyps/AAA -  of ruptured AAA     Colon Cancer Paternal Grandmother         Cancer-colon     Colon Cancer Paternal Aunt         Cancer-colon     Diabetes Paternal Aunt         Diabetes     Obesity Sister         bariatric surgery     Other - See Comments Sister         TBI     Depression Sister      Anxiety Disorder Sister      Diabetes Sister      Hyperlipidemia Sister      Diabetes Brother      Hyperlipidemia Brother      Other - See Comments Maternal Grandmother          of old age     Dementia Maternal Grandfather         possible dementia,  of pneumonia     Other - See Comments Brother         MVA - at age 50     Hypertension Brother      Diabetes Brother      Hyperlipidemia Brother      Depression Brother      Diabetes Brother      Hyperlipidemia Brother      Macular Degeneration Brother      Diabetes Brother      Hyperlipidemia Brother      Suicide Brother 49     Cerebrovascular Disease Paternal Grandfather          of stroke       Social history:  Social History     Socioeconomic History     Marital status:       Spouse name: Not on file     Number of children: Not on file     Years of education: Not on file     Highest education level: Not on file   Occupational History     Occupation:      Employer: OTHER   Tobacco Use     Smoking status: Former Smoker     Quit date: 10/10/2005     Years since quittin.3     Smokeless tobacco: Never Used   Vaping Use     Vaping Use: Never used   Substance and Sexual Activity     Alcohol use: Yes     Alcohol/week: 14.0 standard drinks     Comment: Alcoholic Drinks/day: 2-3 per day     Drug use: No     Sexual activity: Yes     Partners: Male   Other Topics Concern     Parent/sibling w/ CABG, MI or angioplasty before 65F 55M? Not Asked   Social History Narrative    This patient was  in .  She was remarried on 04.  Works at [employer], work #[phone].          Jsuten Suresh       Keri Flowers Daughter, born       Mina Flowers Son, born          Social Determinants of Health     Financial Resource Strain: Not on file   Food Insecurity: Not on file   Transportation Needs: Not on file   Physical Activity: Not on file   Stress: Not on file   Social Connections: Not on file   Intimate Partner Violence: Not on file   Housing Stability: Not on file       PROBLEM LIST:  Patient Active Problem List   Diagnosis     Other congenital anomalies of great veins     Allergic rhinitis due to pollen     Cervical disc herniation     Esophageal reflux     Irritable bowel syndrome     Major depression     Menorrhagia with regular cycle     Partial congenital anomalous pulmonary venous connection     Segmental and somatic dysfunction of cervical region     Abscess of anal and rectal regions     Mild renal insufficiency     Hyperlipidemia LDL goal <100     Microscopic hematuria     Closed displaced fracture of shaft of fifth metacarpal bone of left hand, sequela     Primary osteoarthritis of left foot     Abnormal electrocardiogram     Status post repair of  "partial anomalous pulmonary venous connection     Review of systems:  COMPLETE REVIEW OF SYSTEMS: Denies problems  Gastrointestinal: See HPI  Cardiovascular: Denies problems  Respiratory: Denies problems  Genitourinary: Denies problems  Musculoskeletal: Denies problems  Skin: Denies problems  Neurologic: Denies problems  Psychiatric: Denies problems  Endocrine: Denies problems  Heme/Lymphatic: Denies problems  Vascular: Denies problems      Physical exam: /82 (BP Location: Right arm, Patient Position: Sitting, Cuff Size: Adult Large)   Pulse 79   Temp 97.4  F (36.3  C) (Temporal)   Resp 18   Ht 1.689 m (5' 6.5\")   Wt 76.3 kg (168 lb 3.2 oz)   SpO2 99%   Breastfeeding No   BMI 26.74 kg/m        General: this is a pleasant female patient in no acute distress.  Patient is awake alert and oriented x3 .   Respiratory: Clear  Cardiovascular: Regular rate and rhythm  Abdominal: Right lower quadrant scar.  Laparoscopic scars.  Soft and nontender.    Assessment:   Epigastric pain with a history of Nissen fundoplication.  Certainly need to evaluate for late complications as well as new diagnoses such as gallbladder disease.     Plan:    1.  Ultrasound of gallbladder  2.  EGD.  Risks of bleeding, aspiration, perforation discussed and wishes to proceed.       Ramon Sales MD FACS          "

## 2022-02-16 NOTE — NURSING NOTE
"Chief Complaint   Patient presents with     Consult     issues with swallowing       Initial /82 (BP Location: Right arm, Patient Position: Sitting, Cuff Size: Adult Large)   Pulse 79   Temp 97.4  F (36.3  C) (Temporal)   Resp 18   Ht 1.689 m (5' 6.5\")   Wt 76.3 kg (168 lb 3.2 oz)   SpO2 99%   Breastfeeding No   BMI 26.74 kg/m   Estimated body mass index is 26.74 kg/m  as calculated from the following:    Height as of this encounter: 1.689 m (5' 6.5\").    Weight as of this encounter: 76.3 kg (168 lb 3.2 oz).  Medication Reconciliation: complete    Betty Daley LPN  "

## 2022-02-22 DIAGNOSIS — J30.1 ALLERGIC RHINITIS DUE TO POLLEN, UNSPECIFIED SEASONALITY: ICD-10-CM

## 2022-02-22 NOTE — TELEPHONE ENCOUNTER
"Routing to provider as directed. Dora Kim RN on 2/22/2022 at 10:04 AM    Last Prescription Date: 11/5/2020  Last Qty/Refills: 16.9 ml / 11  Last Office Visit: 11/26/21 [facility]  Future Office Visit: none     Requested Prescriptions   Pending Prescriptions Disp Refills     triamcinolone (NASACORT) 55 MCG/ACT nasal aerosol [Pharmacy Med Name: triamcinolone acetonide 55 mcg nasal spray aerosol] 16.9 mL 11     Sig: Spray 2 sprays into both nostrils 2 times daily       Nasal Allergy Protocol Passed - 2/22/2022  8:50 AM        Passed - Patient is age 12 or older        Passed - Recent (12 mo) or future (30 days) visit within the authorizing provider's specialty     Patient has had an office visit with the authorizing provider or a provider within the authorizing providers department within the previous 12 mos or has a future within next 30 days. See \"Patient Info\" tab in inbasket, or \"Choose Columns\" in Meds & Orders section of the refill encounter.              Passed - Medication is active on med list             "

## 2022-02-23 RX ORDER — TRIAMCINOLONE ACETONIDE 55 UG/1
2 SPRAY, METERED NASAL 2 TIMES DAILY
Qty: 16.9 ML | Refills: 9 | Status: SHIPPED | OUTPATIENT
Start: 2022-02-23

## 2022-02-24 ENCOUNTER — HOSPITAL ENCOUNTER (OUTPATIENT)
Dept: ULTRASOUND IMAGING | Facility: OTHER | Age: 57
Discharge: HOME | End: 2022-02-24
Attending: SURGERY | Admitting: SURGERY
Payer: COMMERCIAL

## 2022-02-24 DIAGNOSIS — R10.13 EPIGASTRIC PAIN: ICD-10-CM

## 2022-02-24 PROCEDURE — 76700 US EXAM ABDOM COMPLETE: CPT

## 2022-03-29 PROBLEM — R10.13 EPIGASTRIC PAIN: Status: ACTIVE | Noted: 2022-03-29

## 2022-04-04 ENCOUNTER — ANESTHESIA (OUTPATIENT)
Dept: SURGERY | Facility: OTHER | Age: 57
End: 2022-04-04
Payer: COMMERCIAL

## 2022-04-04 ENCOUNTER — HOSPITAL ENCOUNTER (OUTPATIENT)
Facility: OTHER | Age: 57
Discharge: HOME | End: 2022-04-04
Attending: SURGERY | Admitting: SURGERY
Payer: COMMERCIAL

## 2022-04-04 ENCOUNTER — ANESTHESIA EVENT (OUTPATIENT)
Dept: SURGERY | Facility: OTHER | Age: 57
End: 2022-04-04
Payer: COMMERCIAL

## 2022-04-04 VITALS
HEART RATE: 78 BPM | OXYGEN SATURATION: 96 % | TEMPERATURE: 97.5 F | SYSTOLIC BLOOD PRESSURE: 113 MMHG | DIASTOLIC BLOOD PRESSURE: 81 MMHG | WEIGHT: 163 LBS | BODY MASS INDEX: 25.91 KG/M2 | RESPIRATION RATE: 16 BRPM

## 2022-04-04 DIAGNOSIS — K29.70 GASTRITIS WITHOUT BLEEDING, UNSPECIFIED CHRONICITY, UNSPECIFIED GASTRITIS TYPE: Primary | ICD-10-CM

## 2022-04-04 PROCEDURE — 250N000013 HC RX MED GY IP 250 OP 250 PS 637: Performed by: SURGERY

## 2022-04-04 PROCEDURE — 258N000003 HC RX IP 258 OP 636: Performed by: SURGERY

## 2022-04-04 PROCEDURE — 43239 EGD BIOPSY SINGLE/MULTIPLE: CPT | Performed by: SURGERY

## 2022-04-04 PROCEDURE — 250N000009 HC RX 250: Performed by: NURSE ANESTHETIST, CERTIFIED REGISTERED

## 2022-04-04 PROCEDURE — 250N000009 HC RX 250: Performed by: SURGERY

## 2022-04-04 PROCEDURE — 88305 TISSUE EXAM BY PATHOLOGIST: CPT

## 2022-04-04 PROCEDURE — 250N000011 HC RX IP 250 OP 636: Performed by: NURSE ANESTHETIST, CERTIFIED REGISTERED

## 2022-04-04 PROCEDURE — 43239 EGD BIOPSY SINGLE/MULTIPLE: CPT | Performed by: NURSE ANESTHETIST, CERTIFIED REGISTERED

## 2022-04-04 RX ORDER — SODIUM CHLORIDE, SODIUM LACTATE, POTASSIUM CHLORIDE, CALCIUM CHLORIDE 600; 310; 30; 20 MG/100ML; MG/100ML; MG/100ML; MG/100ML
INJECTION, SOLUTION INTRAVENOUS CONTINUOUS
Status: CANCELLED | OUTPATIENT
Start: 2022-04-04

## 2022-04-04 RX ORDER — NALOXONE HYDROCHLORIDE 0.4 MG/ML
0.4 INJECTION, SOLUTION INTRAMUSCULAR; INTRAVENOUS; SUBCUTANEOUS
Status: CANCELLED | OUTPATIENT
Start: 2022-04-04

## 2022-04-04 RX ORDER — OMEPRAZOLE 40 MG/1
40 CAPSULE, DELAYED RELEASE ORAL DAILY
Qty: 60 CAPSULE | Refills: 0 | Status: SHIPPED | OUTPATIENT
Start: 2022-04-04 | End: 2022-05-24

## 2022-04-04 RX ORDER — NALOXONE HYDROCHLORIDE 0.4 MG/ML
0.2 INJECTION, SOLUTION INTRAMUSCULAR; INTRAVENOUS; SUBCUTANEOUS
Status: CANCELLED | OUTPATIENT
Start: 2022-04-04

## 2022-04-04 RX ORDER — PROPOFOL 10 MG/ML
INJECTION, EMULSION INTRAVENOUS CONTINUOUS PRN
Status: DISCONTINUED | OUTPATIENT
Start: 2022-04-04 | End: 2022-04-04

## 2022-04-04 RX ORDER — PROPOFOL 10 MG/ML
INJECTION, EMULSION INTRAVENOUS PRN
Status: DISCONTINUED | OUTPATIENT
Start: 2022-04-04 | End: 2022-04-04

## 2022-04-04 RX ORDER — LIDOCAINE HYDROCHLORIDE 20 MG/ML
INJECTION, SOLUTION INFILTRATION; PERINEURAL PRN
Status: DISCONTINUED | OUTPATIENT
Start: 2022-04-04 | End: 2022-04-04

## 2022-04-04 RX ORDER — SODIUM CHLORIDE, SODIUM LACTATE, POTASSIUM CHLORIDE, CALCIUM CHLORIDE 600; 310; 30; 20 MG/100ML; MG/100ML; MG/100ML; MG/100ML
INJECTION, SOLUTION INTRAVENOUS CONTINUOUS
Status: DISCONTINUED | OUTPATIENT
Start: 2022-04-04 | End: 2022-04-04 | Stop reason: HOSPADM

## 2022-04-04 RX ORDER — FLUMAZENIL 0.1 MG/ML
0.2 INJECTION, SOLUTION INTRAVENOUS
Status: CANCELLED | OUTPATIENT
Start: 2022-04-04 | End: 2022-04-04

## 2022-04-04 RX ORDER — SIMETHICONE
LIQUID (ML) MISCELLANEOUS PRN
Status: DISCONTINUED | OUTPATIENT
Start: 2022-04-04 | End: 2022-04-04 | Stop reason: HOSPADM

## 2022-04-04 RX ORDER — ONDANSETRON 2 MG/ML
4 INJECTION INTRAMUSCULAR; INTRAVENOUS
Status: DISCONTINUED | OUTPATIENT
Start: 2022-04-04 | End: 2022-04-04 | Stop reason: HOSPADM

## 2022-04-04 RX ORDER — LIDOCAINE 40 MG/G
CREAM TOPICAL
Status: DISCONTINUED | OUTPATIENT
Start: 2022-04-04 | End: 2022-04-04 | Stop reason: HOSPADM

## 2022-04-04 RX ADMIN — PROPOFOL 160 MCG/KG/MIN: 10 INJECTION, EMULSION INTRAVENOUS at 11:37

## 2022-04-04 RX ADMIN — SODIUM CHLORIDE, POTASSIUM CHLORIDE, SODIUM LACTATE AND CALCIUM CHLORIDE: 600; 310; 30; 20 INJECTION, SOLUTION INTRAVENOUS at 11:07

## 2022-04-04 RX ADMIN — LIDOCAINE HYDROCHLORIDE 60 MG: 20 INJECTION, SOLUTION INFILTRATION; PERINEURAL at 11:37

## 2022-04-04 RX ADMIN — PROPOFOL 100 MG: 10 INJECTION, EMULSION INTRAVENOUS at 11:37

## 2022-04-04 ASSESSMENT — LIFESTYLE VARIABLES: TOBACCO_USE: 1

## 2022-04-04 NOTE — ANESTHESIA POSTPROCEDURE EVALUATION
Patient: Soraida Suresh    Procedure: Procedure(s):  ESOPHAGOGASTRODUODENOSCOPY, WITH BIOPSY       Anesthesia Type:  MAC    Note:  Disposition: Outpatient   Postop Pain Control: Uneventful            Sign Out: Well controlled pain   PONV: No   Neuro/Psych: Uneventful            Sign Out: Acceptable/Baseline neuro status   Airway/Respiratory: Uneventful            Sign Out: Acceptable/Baseline resp. status   CV/Hemodynamics: Uneventful            Sign Out: Acceptable CV status; No obvious hypovolemia; No obvious fluid overload   Other NRE: NONE   DID A NON-ROUTINE EVENT OCCUR? No           Last vitals:  Vitals Value Taken Time   BP     Temp     Pulse     Resp     SpO2 99 % 04/04/22 1154   Vitals shown include unvalidated device data.    Electronically Signed By: AGUSTINA Madden CRNA  April 4, 2022  11:55 AM

## 2022-04-04 NOTE — OR NURSING
Patient and responsible adult given discharge instructions with no questions regarding instructions. Tova score 20. Pain level 0/10.  Discharged from unit via walking . Patient discharged to home .

## 2022-04-04 NOTE — DISCHARGE INSTRUCTIONS
[facility]  Adult Discharge Orders & Instructions      For 24 hours after surgery:  1. Get plenty of rest.  A responsible adult must stay with you for at least 24 hours after you leave the hospital.   2. You may feel lightheaded.  IF so, sit for a few minutes before standing.  Have someone help you get up.   3. You may have a slight fever. Call the doctor if your fever is over 101 F (38.3 C) (taken under the tongue) or lasts longer than 24 hours.  4. You may have a dry mouth, a sore throat, muscle aches or trouble sleeping.  These should go away after 24 hours.  5. Do not make important or legal decisions.  6.   Do not drive or use heavy equipment.  If you have weakness or tingling, don't drive or use heavy equipment until this feeling goes away.                                                                                                                                                                         To contact a doctor, call    [phone]______________      UPPER ENDOSCOPY    AFTER THE PROCEDURE    You will return to Same Day Surgery to rest for about an hour before you go home.    The doctor will talk with you and your family.    A family member/friend may visit with you.    You may burp up any air remaining in your stomach.    You may feel dizzy or light-headed from the medicine.    Your nurse will go over the discharge instructions with you and your caregiver and answer any of your questions.      You will be contacted the next day to check on how you are doing.    If biopsies were taken, you will be contacted with the results usually within 3 days.  BACK AT HOME    Rest for an hour or two after you get home.    When your throat is no longer numb and you have a gag reflex, take a few sips of cool water.  If you can swallow comfortably, you may start eating again.    You may have a mild sore throat for the rest of the day.    You will be contacted with results by the surgeons office  within a week. If not contacted in one week, call the surgeons office.  WHAT TO WATCH FOR:  Problems rarely occur after the exam, but it is important to be aware of the early signs of a complication.  Call your doctor immediately if you have:    Difficulty swallowing or breathing    Unusual pain in your stomach or chest    Vomiting blood or dark material that looks like coffee grounds    Black or tarry stools    Temperature over 101.5 degrees    MORE QUESTIONS?  Please ask your doctor or nurse before the exam begins  or call your doctor at the clinic.

## 2022-04-04 NOTE — ANESTHESIA PREPROCEDURE EVALUATION
Anesthesia Pre-Procedure Evaluation    Patient: Soraida Suresh   MRN: [id number] : 1965        Procedure : Procedure(s):  ESOPHAGOGASTRODUODENOSCOPY (EGD)          Past Medical History:   Diagnosis Date     Abnormal cytological finding in specimen from cervix uteri     10/11/2011     Allergic rhinitis     No Comments Provided     Benign lipomatous neoplasm of skin and subcutaneous tissue of trunk     2016     Contact dermatitis     No Comments Provided     Dyshidrosis     No Comments Provided     Encounter for screening for malignant neoplasm of colon     2014     Gastro-esophageal reflux disease without esophagitis     No Comments Provided     Major depressive disorder, single episode     2011     Other congenital malformations of great veins (CODE)     7/10/2006     Other specified diseases of anus and rectum (CODE)     2015     Panic disorder without agoraphobia     resolved after heart surgery     Partial anomalous pulmonary venous connection     No Comments Provided     Residual hemorrhoidal skin tags     3/3/2011      Past Surgical History:   Procedure Laterality Date     anorectal exam anesthesia  2015    [place] ANORECTAL EXAM SURG REQ ANESTH  DIAG     APPENDECTOMY OPEN      1987     ARTHROSCOPY KNEE      2006, ,Debridement, partial medial meniscectomy, and anterior cruciate ligament reconstruction using tibialis anterior allograft, right knee     AS HYSTEROSCOPY, SURGICAL; W/ ENDOMETRIAL ABLATION, ANY METHOD  2015    [facility]     CARDIAC SURGERY  2006    Repair of anomalous pulmonary venous return by anastomosis of the anomalous vein to the left atrial appendage, done at [facility]     COLONOSCOPY  2014 Follow up in 10 years 24 normal     ECHO LIMITED      mild left atrial enlargement;  normal ef     ESOPHAGOSCOPY, GASTROSCOPY, DUODENOSCOPY (EGD), COMBINED           ESOPHAGOSCOPY, GASTROSCOPY, DUODENOSCOPY (EGD),  COMBINED           ESOPHAGOSCOPY, GASTROSCOPY, DUODENOSCOPY (EGD), COMBINED           ESOPHAGOSCOPY, GASTROSCOPY, DUODENOSCOPY (EGD), COMBINED      11,Normal     NISSEN FUNDOPLICATION  2000    Dr. Sales     OTHER SURGICAL HISTORY Left 2021    Tenex procedure for plantar fasciitis with Dr. Reveles     SIGMOIDOSCOPY FLEXIBLE      ,And BE, negative, for rectal bleeding.  Fissures and hemorrhoids noted     SURGICAL PATHOLOGY EXAM Left 2016    Left flank      Allergies   Allergen Reactions     Codeine Other (See Comments)     Medroxyprogesterone Other (See Comments)     Caused patient's brain to swell.     Azithromycin Palpitations     Latex Rash     hands     Penicillins Rash     Proline Rash     Prolene: Sutures; caused keloid     Sulfamethoxazole W/Trimethoprim Rash     Sulfamethoxazole-Trimethoprim Rash      Social History     Tobacco Use     Smoking status: Former Smoker     Quit date: 10/10/2005     Years since quittin.4     Smokeless tobacco: Never Used   Substance Use Topics     Alcohol use: Yes     Alcohol/week: 14.0 standard drinks     Comment: Alcoholic Drinks/day: 2-3 per day      Wt Readings from Last 1 Encounters:   22 73.9 kg (163 lb)        Anesthesia Evaluation   Pt has had prior anesthetic.     No history of anesthetic complications       ROS/MED HX  ENT/Pulmonary: Comment: Hx anomalous pulmonary venous connection - s/p surgical correction in early .     (+) allergic rhinitis, tobacco use, Past use,     Neurologic:  - neg neurologic ROS     Cardiovascular:     (+) Dyslipidemia -----    METS/Exercise Tolerance: >4 METS    Hematologic:  - neg hematologic  ROS     Musculoskeletal: Comment: Cervical disc herniation hx      GI/Hepatic: Comment: S/p Lap Nissen   Epigastric pain    (+) GERD, Inflammatory bowel disease,     Renal/Genitourinary:     (+) renal disease, type: CRI, Pt does not require dialysis,     Endo:  - neg endo ROS     Psychiatric/Substance Use:  Comment: Panic disorder    (+) psychiatric history anxiety and depression     Infectious Disease:  - neg infectious disease ROS     Malignancy:  - neg malignancy ROS     Other:  - neg other ROS          Physical Exam    Airway        Mallampati: II   TM distance: > 3 FB   Neck ROM: full   Mouth opening: > 3 cm    Respiratory Devices and Support         Dental  no notable dental history         Cardiovascular   cardiovascular exam normal       Rhythm and rate: regular and normal     Pulmonary   pulmonary exam normal        breath sounds clear to auscultation           OUTSIDE LABS:  CBC:   Lab Results   Component Value Date    WBC 5.3 11/15/2021    WBC 5.4 11/05/2020    HGB 14.5 11/15/2021    HGB 13.8 11/05/2020    HCT 43.7 11/15/2021    HCT 42.0 11/05/2020     11/15/2021     11/05/2020     BMP:   Lab Results   Component Value Date     11/15/2021     11/05/2020    POTASSIUM 4.5 11/15/2021    POTASSIUM 4.0 11/05/2020    CHLORIDE 101 11/15/2021    CHLORIDE 103 11/05/2020    CO2 32 (H) 11/15/2021    CO2 30 11/05/2020    BUN 13 11/15/2021    BUN 16 11/05/2020    CR 0.74 11/15/2021    CR 0.74 11/05/2020     (H) 11/15/2021     (H) 11/05/2020     COAGS: No results found for: PTT, INR, FIBR  POC:   Lab Results   Component Value Date    HCG Negative 02/02/2016     HEPATIC:   Lab Results   Component Value Date    ALBUMIN 4.6 11/15/2021    PROTTOTAL 6.9 11/15/2021    ALT 14 11/15/2021    AST 15 11/15/2021    ALKPHOS 28 (L) 11/15/2021    BILITOTAL 0.6 11/15/2021     OTHER:   Lab Results   Component Value Date    JOSÉ MIGUEL 9.9 11/15/2021    PHOS 3.7 03/19/2015    MAG 2.0 03/19/2015    TSH 2.22 11/15/2021    T4 0.80 12/15/2014       Anesthesia Plan    ASA Status:  2   NPO Status:  NPO Appropriate    Anesthesia Type: MAC.     - Reason for MAC: chronic cardiopulmonary disease, straight local not clinically adequate              Consents    Anesthesia Plan(s) and associated risks, benefits, and  realistic alternatives discussed. Questions answered and patient/representative(s) expressed understanding.     - Discussed: Risks, Benefits and Alternatives for BOTH SEDATION and the PROCEDURE were discussed     - Discussed with:  Patient      - Extended Intubation/Ventilatory Support Discussed: No.      - Patient is DNR/DNI Status: No    Use of blood products discussed: No .     Postoperative Care            Comments:                AGUSTINA Madden CRNA

## 2022-04-04 NOTE — H&P
History and Physical    CHIEF COMPLAINT / REASON FOR PROCEDURE:  Epigastric pain    PERTINENT HISTORY   Patient is a 56 year old female who presents today for upper endoscopy for epigastric pain.   Had Nissen in 2000. Recently vomited and has had some epigastric pain. US gallbladder negative.      Past Medical History:   Diagnosis Date     Abnormal cytological finding in specimen from cervix uteri     10/11/2011     Allergic rhinitis     No Comments Provided     Benign lipomatous neoplasm of skin and subcutaneous tissue of trunk     1/20/2016     Contact dermatitis     No Comments Provided     Dyshidrosis     No Comments Provided     Encounter for screening for malignant neoplasm of colon     8/27/2014     Gastro-esophageal reflux disease without esophagitis     No Comments Provided     Major depressive disorder, single episode     6/24/2011     Other congenital malformations of great veins (CODE)     7/10/2006     Other specified diseases of anus and rectum (CODE)     5/20/2015     Panic disorder without agoraphobia     resolved after heart surgery     Partial anomalous pulmonary venous connection     No Comments Provided     Residual hemorrhoidal skin tags     3/3/2011     Past Surgical History:   Procedure Laterality Date     anorectal exam anesthesia  05/20/2015    [place] ANORECTAL EXAM SURG REQ ANESTH  DIAG     APPENDECTOMY OPEN      5/1987     ARTHROSCOPY KNEE      7/2006, 2013,Debridement, partial medial meniscectomy, and anterior cruciate ligament reconstruction using tibialis anterior allograft, right knee     AS HYSTEROSCOPY, SURGICAL; W/ ENDOMETRIAL ABLATION, ANY METHOD  12/14/2015    [place]     CARDIAC SURGERY  11/2006    Repair of anomalous pulmonary venous return by anastomosis of the anomalous vein to the left atrial appendage, done at [facility]     COLONOSCOPY  08/28/2014 8/28/2014 Follow up in 10 years 8/28/24 normal     ECHO LIMITED      mild left atrial enlargement;  normal ef      ESOPHAGOSCOPY, GASTROSCOPY, DUODENOSCOPY (EGD), COMBINED      1996     ESOPHAGOSCOPY, GASTROSCOPY, DUODENOSCOPY (EGD), COMBINED      2001     ESOPHAGOSCOPY, GASTROSCOPY, DUODENOSCOPY (EGD), COMBINED      2006     ESOPHAGOSCOPY, GASTROSCOPY, DUODENOSCOPY (EGD), COMBINED      6/28/11,Normal     NISSEN FUNDOPLICATION  08/22/2000    Dr. Sales     OTHER SURGICAL HISTORY Left 09/2021    Tenex procedure for plantar fasciitis with Dr. Reveles     SIGMOIDOSCOPY FLEXIBLE      1996,And BE, negative, for rectal bleeding.  Fissures and hemorrhoids noted     SURGICAL PATHOLOGY EXAM Left 02/02/2016    Left flank       Bleeding tendencies:  No    ALLERGIES/SENSITIVITIES:   Allergies   Allergen Reactions     Codeine Other (See Comments)     Medroxyprogesterone Other (See Comments)     Caused patient's brain to swell.     Azithromycin Palpitations     Latex Rash     hands     Penicillins Rash     Proline Rash     Prolene: Sutures; caused keloid     Sulfamethoxazole W/Trimethoprim Rash     Sulfamethoxazole-Trimethoprim Rash        CURRENT MEDICATIONS:    Prior to Admission medications    Medication Sig Start Date End Date Taking? Authorizing Provider   albuterol (PROAIR HFA/PROVENTIL HFA/VENTOLIN HFA) 108 (90 Base) MCG/ACT inhaler Inhale 2 puffs into the lungs every 4 hours as needed for shortness of breath / dyspnea or wheezing 11/26/21  Yes Brit Shell PA-C   buPROPion (WELLBUTRIN XL) 300 MG 24 hr tablet Take 1 tablet (300 mg) by mouth every morning 11/15/21  Yes Patricia Brooke MD   Glucosamine-Chondroitin 750 & 400 MG MISC Take 1 tablet by mouth daily   Yes Reported, Patient   L-Lysine 500 MG TABS Take 1 tablet by mouth daily   Yes Reported, Patient   polyethylene glycol (MIRALAX) powder Take by mouth daily   Yes Reported, Patient   traZODone (DESYREL) 50 MG tablet Take 1 tablet (50 mg) by mouth At Bedtime 11/15/21  Yes Patricia Brooke MD   triamcinolone (NASACORT) 55 MCG/ACT nasal aerosol Spray 2 sprays  into both nostrils 2 times daily 2/23/22  Yes Patricia Brooke MD   acyclovir (ZOVIRAX) 5 % external ointment Apply to affected area 6 times a day for 7 days. 11/5/20   Patricia Brooke MD   other medical supplies Shingrix.  Diagnosis:  Z23. 11/15/21   Patricia Brooke MD       Physical Exam:  /81   Pulse 89   Temp 97.5  F (36.4  C) (Tympanic)   Resp 16   Wt 73.9 kg (163 lb)   SpO2 96%   BMI 25.91 kg/m    EXAM:  Chest/Respiratory Exam: Normal - Clear to auscultation without rales, rhonchi, or wheezing.  Cardiovascular Exam: normal, regular rate and rhythm      PLAN: UPPER ENDOSCOPY .  Patient understands risks of bleeding, perforation,  aspiration and wishes to proceed.

## 2022-04-04 NOTE — OP NOTE
Procedure note  Date of service: 4/4/2022    Preoperative diagnosis: Epigastric pain and vomiting    Postoperative diagnosis: Gastritis  Intact post-Nissen anatomy    Procedure:   EGD with biopsy    Anesthesia:   MARÍA Perdomo CRNA    Indication for the procedure:This is a 56 year old female with epigastric pain and vomiting. H/o Nissen 22 years ago.  After explaining the risks to include bleeding, aspiration, perforation, the patient wished to proceed.    Procedure:After adequate sedation, the patient was in the left lateral decubitus position.  The esophagoscope was passed under direct vision into the esophagus and onto the second portion of the duodenum.  The duodenum was unremarkable and biopsied.  The antrum was erythematous and mildly striped and granular.  Biopsies were taken from the antrum to look for occult H. Pylori.  The scope was retroflexed.  The GE junction and fundus were with intact wrap, photos taken .  Scope was straightened and pulled back.  The distal esophagus was with irregular z-line .  Biopsies x 8 were taken from the distal esophagus.  The remaining esophagus was unremarkable . The scope was removed.The patient tolerated the procedure well.    Recommendations:    PPI and await pathology    Surgeon: Ramon Sales MD FACS

## 2022-04-04 NOTE — ANESTHESIA CARE TRANSFER NOTE
Patient: Soraida Suresh    Procedure: Procedure(s):  ESOPHAGOGASTRODUODENOSCOPY, WITH BIOPSY       Diagnosis: Epigastric pain [R10.13]  Diagnosis Additional Information: No value filed.    Anesthesia Type:   MAC     Note:    Oropharynx: oropharynx clear of all foreign objects  Level of Consciousness: awake  Oxygen Supplementation: nasal cannula  Level of Supplemental Oxygen (L/min / FiO2): 2  Independent Airway: airway patency satisfactory and stable  Dentition: dentition unchanged  Vital Signs Stable: post-procedure vital signs reviewed and stable  Report to RN Given: handoff report given  Patient transferred to: Phase II    Handoff Report: Identifed the Patient, Identified the Reponsible Provider, Reviewed the pertinent medical history, Discussed the surgical course, Reviewed Intra-OP anesthesia mangement and issues during anesthesia, Set expectations for post-procedure period and Allowed opportunity for questions and acknowledgement of understanding      Vitals:  Vitals Value Taken Time   BP     Temp     Pulse     Resp     SpO2         Electronically Signed By: AGUSTINA Lee CRNA  April 4, 2022  11:51 AM

## 2022-04-05 LAB
PATH REPORT.COMMENTS IMP SPEC: NORMAL
PATH REPORT.FINAL DX SPEC: NORMAL
PATH REPORT.RELEVANT HX SPEC: NORMAL
PHOTO IMAGE: NORMAL

## 2022-04-18 ENCOUNTER — MYC MEDICAL ADVICE (OUTPATIENT)
Dept: FAMILY MEDICINE | Facility: OTHER | Age: 57
End: 2022-04-18
Payer: COMMERCIAL

## 2022-04-18 DIAGNOSIS — L60.0 INGROWN TOENAIL: Primary | ICD-10-CM

## 2022-05-13 ENCOUNTER — ALLIED HEALTH/NURSE VISIT (OUTPATIENT)
Dept: FAMILY MEDICINE | Facility: OTHER | Age: 57
End: 2022-05-13
Attending: FAMILY MEDICINE
Payer: COMMERCIAL

## 2022-05-13 DIAGNOSIS — Z23 NEED FOR ZOSTER VACCINATION: Primary | ICD-10-CM

## 2022-05-13 PROCEDURE — 90471 IMMUNIZATION ADMIN: CPT

## 2022-05-13 PROCEDURE — 90750 HZV VACC RECOMBINANT IM: CPT

## 2022-05-13 NOTE — PROGRESS NOTES
Immunization Documentation - Shingrix #2  Verified patient's first and last name, and . Stated reason for visit today is to receive the Shingrix vaccine. Denied any concerns with previous immunizations. Allergies reviewed. VIS handout(s) reviewed and given to take home. Shingrix prepared and administered IM per standing order. Administration documented in IMMUNIZATIONS (see flowsheet and order for further information). Patient Instructed to wait in lobby for 15 minutes post-injection and notify staff immediately of any reaction.     Madison Man RN ....................  2022   1:28 PM

## 2022-05-24 ENCOUNTER — MYC MEDICAL ADVICE (OUTPATIENT)
Dept: FAMILY MEDICINE | Facility: OTHER | Age: 57
End: 2022-05-24
Payer: COMMERCIAL

## 2022-05-24 DIAGNOSIS — K29.70 GASTRITIS WITHOUT BLEEDING, UNSPECIFIED CHRONICITY, UNSPECIFIED GASTRITIS TYPE: ICD-10-CM

## 2022-05-24 RX ORDER — OMEPRAZOLE 40 MG/1
40 CAPSULE, DELAYED RELEASE ORAL DAILY
Qty: 90 CAPSULE | Refills: 3 | Status: SHIPPED | OUTPATIENT
Start: 2022-05-24 | End: 2022-11-28

## 2022-05-25 ENCOUNTER — THERAPY VISIT (OUTPATIENT)
Dept: CHIROPRACTIC MEDICINE | Facility: OTHER | Age: 57
End: 2022-05-25
Attending: CHIROPRACTOR
Payer: COMMERCIAL

## 2022-05-25 VITALS
RESPIRATION RATE: 16 BRPM | SYSTOLIC BLOOD PRESSURE: 110 MMHG | DIASTOLIC BLOOD PRESSURE: 62 MMHG | TEMPERATURE: 97 F | OXYGEN SATURATION: 96 % | HEART RATE: 77 BPM

## 2022-05-25 DIAGNOSIS — M99.05 SEGMENTAL AND SOMATIC DYSFUNCTION OF PELVIC REGION: ICD-10-CM

## 2022-05-25 DIAGNOSIS — M62.838 MUSCLE SPASMS OF NECK: ICD-10-CM

## 2022-05-25 DIAGNOSIS — M54.41 ACUTE RIGHT-SIDED LOW BACK PAIN WITH RIGHT-SIDED SCIATICA: Primary | ICD-10-CM

## 2022-05-25 PROCEDURE — 99212 OFFICE O/P EST SF 10 MIN: CPT | Mod: 25 | Performed by: CHIROPRACTOR

## 2022-05-25 PROCEDURE — 98940 CHIROPRACT MANJ 1-2 REGIONS: CPT | Mod: AT | Performed by: CHIROPRACTOR

## 2022-05-25 NOTE — PROGRESS NOTES
Last week flare up lower back on the right side with Intermittent aching and some occasional sharp pain in the right side buttock. 2/10 W24 10/10. Pt has been Repositioning with a very temporary relief.   Taya Matthews on 5/25/2022 at 2:39 PM    Reviewed by EW    Visit #:  1/4-6  New Episode 5/25/22    Subjective:  Soraida Suresh is a 57 year old female who is seen  for:        Acute right-sided low back pain with right-sided sciatica  Muscle spasms of neck  Segmental and somatic dysfunction of pelvic region.     Soraida Suresh reports: Low back symptoms began to be reaggravated about a week ago.  No specific causation such as overuse or traumatic events.  Has been experiencing some numbness and tingling into the right second digit.  No loss of bowel or bladder or saddle paresthesia.  No weakness of the lower extremities.  Notes that symptoms did better when she is up walking.  When patient is sitting in her chair this does cause a posterior pelvic tilt which does reaggravated symptoms further.  Patient is concerned because last time she had radicular concerns this resulted in her undergoing course of traction/decompression therapy with Dr. Johnson.    Patient reports that neck symptoms continue to be present.  Less focused on this at this time however.    (DVPRS) Pain Rating Score : Notice pain, does not interfere with activities (W24 10/10) (05/25/22 1429)     Objective:  The following was observed:  /62 (BP Location: Left arm, Patient Position: Sitting, Cuff Size: Adult Regular)   Pulse 77   Temp 97  F (36.1  C) (Tympanic)   Resp 16   SpO2 96%    Oswestry (SWATHI) Questionnaire    OSWESTRY DISABILITY INDEX 5/25/2022   Count 9   Sum 11   Oswestry Score (%) 24.44   Some recent data might be hidden      Thoracic/lumbar AROM: Flexion restriction 10 degrees approximate, extension restriction 10 degrees approximate, right lateral flexion restriction 10 degrees approximate, left lateral flexion appears WNL does  cause pulling on the right lower back    SLR: +right, -left  Ely's: +right, -left    P: palpatory tenderness jump sign right PSIS:    A: static palpation demonstrates intersegmental asymmetry , pelvis  R: motion palpation notes restricted motion, right ilium  T: muscle spasm at level(s): Right quadratus lumborum, right gluteus medius, cervical paraspinal musculature and suboccipital musculature bilaterally:      Segmental spinal dysfunction/restrictions found at:  :  PSIS Right PI listing.      Assessment: SI joint dysfunction right side consistent with patient's symptoms.  Consider implementation of traction therapy if progress not seen after first chiropractic visit or if radiating symptoms of the right side do not improve/worsen.  Current plan of care to include 4-6 visits of chiropractic treatment with passive modalities such as mechanical traction and ultrasound.    Palpatory exam of patient's cervical spine does not show segmental/somatic dysfunction but rather moderate amounts of spasms.  Patient has benefited in the past from massage to this area.  Chiropractic care sometimes is beneficial for the patient cervical spine.  However her neck is quite touchy.    Diagnoses:      1. Acute right-sided low back pain with right-sided sciatica    2. Muscle spasms of neck    3. Segmental and somatic dysfunction of pelvic region        Patient's condition:  Patient had restrictions pre-manipulation    Treatment effectiveness:  Post manipulation there is better intersegmental movement and Patient claims to feel looser post manipulation      Procedures:  E/M [id number]    [facility]:  [id number] Chiropractic manipulative treatment 1-2 regions performed   Pelvis: Diversified, PSIS Right , Side posture    Modalities:  None performed this visit    Therapeutic procedures:  Reviewed resisted knee adduction    Response to Treatment  Reduction in symptoms as reported by patient    Prognosis: Good    Goals: Reduce pain 50%  Improve spinal AROM  5-10 degrees  Reduce Oswestry score 20-30%  Decrease positive Ortho neuro findings    Recommendations:    Instructions:ice 20 minutes every other hour as needed and walk 10 minutes    Follow-up: Return to care in 1 week if symptoms have not fully resolved

## 2022-05-26 NOTE — PATIENT INSTRUCTIONS
ice 20 minutes every other hour as needed and walk 10 minutes    Return to care in 1 week if symptoms fail to improve or worsen.

## 2022-06-06 ENCOUNTER — THERAPY VISIT (OUTPATIENT)
Dept: CHIROPRACTIC MEDICINE | Facility: OTHER | Age: 57
End: 2022-06-06
Attending: CHIROPRACTOR
Payer: COMMERCIAL

## 2022-06-06 VITALS — HEART RATE: 74 BPM | TEMPERATURE: 97.6 F | OXYGEN SATURATION: 99 % | RESPIRATION RATE: 16 BRPM

## 2022-06-06 DIAGNOSIS — M54.50 ACUTE LOW BACK PAIN WITHOUT SCIATICA, UNSPECIFIED BACK PAIN LATERALITY: ICD-10-CM

## 2022-06-06 DIAGNOSIS — M99.03 SEGMENTAL AND SOMATIC DYSFUNCTION OF LUMBAR REGION: Primary | ICD-10-CM

## 2022-06-06 DIAGNOSIS — M62.838 MUSCLE SPASMS OF NECK: ICD-10-CM

## 2022-06-06 DIAGNOSIS — G44.209 TENSION HEADACHE: ICD-10-CM

## 2022-06-06 DIAGNOSIS — M99.01 SEGMENTAL AND SOMATIC DYSFUNCTION OF CERVICAL REGION: ICD-10-CM

## 2022-06-06 PROCEDURE — 98940 CHIROPRACT MANJ 1-2 REGIONS: CPT | Mod: AT | Performed by: CHIROPRACTOR

## 2022-06-06 PROCEDURE — 97012 MECHANICAL TRACTION THERAPY: CPT | Performed by: CHIROPRACTOR

## 2022-06-06 NOTE — PROGRESS NOTES
Flared up because she went on the boat. Left sided neck with intermittent tightness. Radiating to the head causing a headache. 4/10 W24 9/10. Using TENS, Tylenol, and hot moist towel with a decrease.    Right lower back with intermittent pinching. Worst after activity and sitting. radiating down into the butt and down into the knee more on the left side. 5/10 W24 9/10.   Taya Matthews on 6/6/2022 at 7:41 AM    Reviewed by EW    Visit #:  2/4-6    Subjective:  Soraida Suresh is a 57 year old female who is seen in f/u up for:        Segmental and somatic dysfunction of lumbar region  Acute low back pain without sciatica, unspecified back pain laterality  Segmental and somatic dysfunction of cervical region  Muscle spasms of neck  Tension headache.     Since last visit on 5/25/2022,  Soraida Suresh reports: did well after last treatment. Lots of walking recently seems to have contributed to symptoms returning.  Patient also notes that she was on 2 separate boat rides.  This seems to have caused symptoms also to be reaggravated.  Began experiencing some tension headache symptoms.  Low back symptoms from last encounter no longer extending into the foot, rather these are extending into the hamstring musculature.  Pain did affect patient's ability to participate in family activities such as playing corn hole.  Home stretches performed, provide some level of relief but not enough.  Patient contacted our office on Friday but was unable to obtain appointment until today.    Low back symptoms difficult to pinpoint, patient notes that there is some pain on both the right and left side.    (DVPRS) Pain Rating Score : Interrupts some activities (W24 9/10) (06/06/22 0737)     Objective:  The following was observed:  Pulse 74   Temp 97.6  F (36.4  C) (Tympanic)   Resp 16   SpO2 99%      Neck Disability Index (  Marcus H. and Raphael C. 1991. All rights reserved.; used with permission) 6/6/2022   SECTION 1 - PAIN INTENSITY 2    SECTION 2 - PERSONAL CARE 0   SECTION 3 - LIFTING 3   SECTION 4 - READING 2   SECTION 5 - HEADACHES 2   SECTION 6 - CONCENTRATION 1   SECTION 7 - WORK 1   SECTION 8 - DRIVING 2   SECTION 9 - SLEEPING 1   SECTION 10 - RECREATION 1   Count 10   Sum 15   Raw Score: /50 15   Neck Disability Index Score: (%) 30     Cervical AROM: Flexion and extension generally unremarkable, lateral flexion restriction noted left greater than right between 10-15 degrees, rotation restriction noted right greater than left 5-10 degrees approximate      P: palpatory tenderness Left side C2, right side L5:   Left side L4  A: static palpation demonstrates intersegmental asymmetry , cervical, lumbar  R: motion palpation notes restricted motion, C2  and L5   T: muscle spasm at level(s): Spasms present cervical paraspinal musculature and suboccipital musculature bilaterally left greater than right, right quadratus lumborum:      Segmental spinal dysfunction/restrictions found at:  :  C2 Left lateral flexion restricted and Extension restriction  L5 Left rotation restricted, Right lateral flexion restricted and Extension restriction.      Assessment: Low back symptoms are showing signs of progress, SI joint dysfunction noted on last encounter and no longer apparent.  Suspect patient's boat trips contributing to aggravation of symptoms.  Segmental/somatic dysfunction present of the cervical spine.  This was not present on assessment of patient's neck on last encounter.  Continue to monitor symptoms.  Patient may still benefit from chiropractic treatment within a week if symptoms fail to improve.  Due to patient's history of disc related issues in the lumbar spine and successful treatment with mechanical traction it was a determined to perform this technique today to help with patient's symptoms.  As patient is not back on mechanical traction for quite some time poundage was quite low.  Patient may still benefit from additional treatments at this  if symptoms continue.    Diagnoses:      1. Segmental and somatic dysfunction of lumbar region    2. Acute low back pain without sciatica, unspecified back pain laterality    3. Segmental and somatic dysfunction of cervical region    4. Muscle spasms of neck    5. Tension headache        Patient's condition:  Patient had restrictions pre-manipulation, Patient symptoms are gradually improving and Symptoms come and go    Treatment effectiveness:  Post manipulation there is better intersegmental movement and Post manipulation the patient improves and then feels more restricted motion over time      Procedures:  CMT:  56457 Chiropractic manipulative treatment 1-2 regions performed   Cervical: Diversified, C2, Seated  Lumbar: Diversified, L5, Side posture    Modalities:  36390: Mechanical traction: lumbar, 30/17, intermittent pull    Therapeutic procedures:  None    Response to Treatment  Reduction in symptoms as reported by patient    Prognosis: Good    Progress towards Goals: Patient is making progress towards the goal.   Reduce pain 50%  Improve spinal AROM 5-10 degrees  Reduce Oswestry score 20-30%  Decrease positive Ortho neuro findings    Recommendations:    Instructions:ice 20 minutes every other hour as needed    Follow-up:  Return to care in 1 week if symptoms fail to improve

## 2022-06-07 NOTE — PATIENT INSTRUCTIONS
Instructions:ice 20 minutes every other hour as needed    Follow-up:  Return to care in 1 week if symptoms fail to improve

## 2022-06-20 ENCOUNTER — THERAPY VISIT (OUTPATIENT)
Dept: CHIROPRACTIC MEDICINE | Facility: OTHER | Age: 57
End: 2022-06-20
Attending: CHIROPRACTOR
Payer: COMMERCIAL

## 2022-06-20 VITALS
SYSTOLIC BLOOD PRESSURE: 114 MMHG | RESPIRATION RATE: 16 BRPM | DIASTOLIC BLOOD PRESSURE: 72 MMHG | HEART RATE: 65 BPM | OXYGEN SATURATION: 93 %

## 2022-06-20 DIAGNOSIS — M54.50 ACUTE LOW BACK PAIN WITHOUT SCIATICA, UNSPECIFIED BACK PAIN LATERALITY: ICD-10-CM

## 2022-06-20 DIAGNOSIS — M99.03 SEGMENTAL AND SOMATIC DYSFUNCTION OF LUMBAR REGION: Primary | ICD-10-CM

## 2022-06-20 PROCEDURE — 98940 CHIROPRACT MANJ 1-2 REGIONS: CPT | Mod: AT | Performed by: CHIROPRACTOR

## 2022-06-20 PROCEDURE — 97012 MECHANICAL TRACTION THERAPY: CPT | Performed by: CHIROPRACTOR

## 2022-06-20 NOTE — PROGRESS NOTES
Bilateral lower back with intermittent aching. Radiating into the butt. 3/10 W24 7/10. Using tylenol with no change.   Taya Matthews on 6/20/2022 at 8:03 AM    Reviewed by EW    Visit #:  3/4-6    Subjective:  Soraida Suresh is a 57 year old female who is seen in f/u up for:        Segmental and somatic dysfunction of lumbar region  Acute low back pain without sciatica, unspecified back pain laterality.     Since last visit on 6/6/2022,  Soraida Suresh reports: Patient notes that symptoms are showing improvement, notes that the symptoms no longer radiate into the right leg only to the buttocks.  Pain is bilateral however right side is greater than left.  Notes that pain is most prevalent when she is walking or when she is attempting to lift her grandkids.      Reports no adverse pain following last spinal decompression/traction treatment.    Neck symptoms although not painful at this time however patient does feel that her head is not moving quite right.    (DVPRS) Pain Rating Score : Sometimes distracts me (W24 7/10) (06/20/22 0758)     Objective:  The following was observed:  /72 (BP Location: Right arm, Patient Position: Sitting, Cuff Size: Adult Regular)   Pulse 65   Resp 16   SpO2 93%      P: palpatory tendernessRight side L5:    A: static palpation demonstrates intersegmental asymmetry , lumbar  R: motion palpation notes restricted motion, L5   T: muscle spasm at level(s): Splenius capitis left side, right suboccipitals, mild right gluteus medius:      Segmental spinal dysfunction/restrictions found at:  :  L5 Left rotation restricted, Right lateral flexion restricted and Extension restriction.      Assessment: Symptoms are showing quite a bit of progress since last time.  Patient may still benefit from follow-up treatment.  As traction poundage was tolerated well we will increase by 2 pounds on today's visit.  Plan to follow-up again with patient within 1 week if symptoms fail to  improve.    Diagnoses:      1. Segmental and somatic dysfunction of lumbar region    2. Acute low back pain without sciatica, unspecified back pain laterality        Patient's condition:  Patient had restrictions pre-manipulation    Treatment effectiveness:  Post manipulation there is better intersegmental movement, Patient claims to feel looser post manipulation and Patients symptoms are getting better      Procedures:  CMT:  76226 Chiropractic manipulative treatment 1-2 regions performed   Lumbar: Diversified, L5, Side posture    Modalities:  06093: mechanical traction, 32/20, lumbar spine, intermittent pull, 16 minutes    Therapeutic procedures:  None    Response to Treatment  Reduction in symptoms as reported by patient    Prognosis: Good    Progress towards Goals: Patient is making progress towards the goal.   Reduce pain 50%  Improve spinal AROM 5-10 degrees  Reduce Oswestry score 20-30%  Decrease positive Ortho neuro findings    Recommendations:    Instructions:ice 20 minutes every other hour as needed and Continue to stretch as tolerated    Follow-up:  Return to care in 1 week if symptoms fail to improve.

## 2022-06-20 NOTE — PATIENT INSTRUCTIONS
Instructions:ice 20 minutes every other hour as needed and Continue to stretch as tolerated    Follow-up:  Return to care in 1 week if symptoms fail to improve.

## 2022-06-22 ENCOUNTER — MYC MEDICAL ADVICE (OUTPATIENT)
Dept: FAMILY MEDICINE | Facility: OTHER | Age: 57
End: 2022-06-22

## 2022-06-22 DIAGNOSIS — G89.29 CHRONIC PAIN OF RIGHT KNEE: Primary | ICD-10-CM

## 2022-06-22 DIAGNOSIS — M25.561 CHRONIC PAIN OF RIGHT KNEE: Primary | ICD-10-CM

## 2022-07-20 ENCOUNTER — OFFICE VISIT (OUTPATIENT)
Dept: FAMILY MEDICINE | Facility: OTHER | Age: 57
End: 2022-07-20
Attending: FAMILY MEDICINE
Payer: COMMERCIAL

## 2022-07-20 ENCOUNTER — HOSPITAL ENCOUNTER (OUTPATIENT)
Dept: GENERAL RADIOLOGY | Facility: OTHER | Age: 57
Discharge: HOME | End: 2022-07-20
Attending: FAMILY MEDICINE
Payer: COMMERCIAL

## 2022-07-20 VITALS
HEIGHT: 67 IN | DIASTOLIC BLOOD PRESSURE: 72 MMHG | RESPIRATION RATE: 16 BRPM | WEIGHT: 155.6 LBS | HEART RATE: 72 BPM | OXYGEN SATURATION: 100 % | BODY MASS INDEX: 24.42 KG/M2 | TEMPERATURE: 97.3 F | SYSTOLIC BLOOD PRESSURE: 100 MMHG

## 2022-07-20 DIAGNOSIS — Z98.890 HISTORY OF REPAIR OF ANTERIOR CRUCIATE LIGAMENT OF LEFT KNEE: ICD-10-CM

## 2022-07-20 DIAGNOSIS — Z98.890 HISTORY OF REPAIR OF ANTERIOR CRUCIATE LIGAMENT OF RIGHT KNEE: ICD-10-CM

## 2022-07-20 DIAGNOSIS — M23.203 DEGENERATIVE TEAR OF MEDIAL MENISCUS OF RIGHT KNEE: Primary | ICD-10-CM

## 2022-07-20 DIAGNOSIS — Z98.890 HISTORY OF MENISCECTOMY OF RIGHT KNEE: ICD-10-CM

## 2022-07-20 PROCEDURE — 20610 DRAIN/INJ JOINT/BURSA W/O US: CPT | Mod: RT | Performed by: FAMILY MEDICINE

## 2022-07-20 PROCEDURE — 99214 OFFICE O/P EST MOD 30 MIN: CPT | Mod: 25 | Performed by: FAMILY MEDICINE

## 2022-07-20 PROCEDURE — 250N000011 HC RX IP 250 OP 636: Performed by: FAMILY MEDICINE

## 2022-07-20 PROCEDURE — 250N000009 HC RX 250: Performed by: FAMILY MEDICINE

## 2022-07-20 PROCEDURE — 73560 X-RAY EXAM OF KNEE 1 OR 2: CPT | Mod: LT

## 2022-07-20 RX ORDER — BUPIVACAINE HYDROCHLORIDE 5 MG/ML
2 INJECTION, SOLUTION EPIDURAL; INTRACAUDAL ONCE
Status: COMPLETED | OUTPATIENT
Start: 2022-07-20 | End: 2022-07-20

## 2022-07-20 RX ORDER — TRIAMCINOLONE ACETONIDE 40 MG/ML
40 INJECTION, SUSPENSION INTRA-ARTICULAR; INTRAMUSCULAR ONCE
Status: COMPLETED | OUTPATIENT
Start: 2022-07-20 | End: 2022-07-20

## 2022-07-20 RX ORDER — LIDOCAINE HYDROCHLORIDE 10 MG/ML
2 INJECTION, SOLUTION EPIDURAL; INFILTRATION; INTRACAUDAL; PERINEURAL ONCE
Status: COMPLETED | OUTPATIENT
Start: 2022-07-20 | End: 2022-07-20

## 2022-07-20 RX ADMIN — TRIAMCINOLONE ACETONIDE 40 MG: 40 INJECTION, SUSPENSION INTRA-ARTICULAR; INTRAMUSCULAR at 09:49

## 2022-07-20 RX ADMIN — LIDOCAINE HYDROCHLORIDE 2 ML: 10 INJECTION, SOLUTION INFILTRATION; PERINEURAL at 09:49

## 2022-07-20 RX ADMIN — BUPIVACAINE HYDROCHLORIDE 10 MG: 5 INJECTION, SOLUTION EPIDURAL; INTRACAUDAL; PERINEURAL at 09:49

## 2022-07-20 ASSESSMENT — PAIN SCALES - GENERAL: PAINLEVEL: SEVERE PAIN (6)

## 2022-07-20 NOTE — NURSING NOTE
"Chief Complaint   Patient presents with     Knee Pain     Right knee pain     Patient presents for right knee pain ongoing for 4-5 weeks. Pain 6/10. No known recent injury. She has had past right knee surgery about 15-20 years for ACL and meniscus repair. She also has an injection in her right knee in 2017.     Initial /72 (BP Location: Right arm, Patient Position: Sitting, Cuff Size: Adult Regular)   Pulse 72   Temp 97.3  F (36.3  C) (Tympanic)   Resp 16   Ht 1.689 m (5' 6.5\")   Wt 70.6 kg (155 lb 9.6 oz)   SpO2 100%   BMI 24.74 kg/m   Estimated body mass index is 24.74 kg/m  as calculated from the following:    Height as of this encounter: 1.689 m (5' 6.5\").    Weight as of this encounter: 70.6 kg (155 lb 9.6 oz).       Medication Reconciliation: Scottie Rollins LPN .......  7/20/2022  8:49 AM   "

## 2022-07-20 NOTE — PROGRESS NOTES
"Sports Medicine Office Note    HPI:  57-year-old female coming in for evaluation of right knee pain.  She has a history of chronic knee pain with a previous ACL reconstruction approximately 20 years ago.  She has had intermittent pain and painful clicking since that time.  Her symptoms have worsened in the last several weeks, with more clicking/popping and increased pain intensity.  She localizes the pain to the anterior/medial aspect of the knee.  She rates her pain a 6/10.  She characterized the pain as sharp, stabbing, and aching.  Pain is made worse by pivoting.  She has minimal symptoms while walking in a straight line.  She has been using ice, APAP, Voltaren, and several OTC natural products.  She will occasionally have swelling and pain extending into the popliteal region.  She had a CSI into her right knee approximately 5 years ago which provided significant pain relief for several years.  She also has a history of previous ACL reconstruction on the left knee.      EXAM:  /72 (BP Location: Right arm, Patient Position: Sitting, Cuff Size: Adult Regular)   Pulse 72   Temp 97.3  F (36.3  C) (Tympanic)   Resp 16   Ht 1.689 m (5' 6.5\")   Wt 70.6 kg (155 lb 9.6 oz)   SpO2 100%   BMI 24.74 kg/m    MUSCULOSKELETAL EXAM:  RIGHT KNEE  Inspection:  -No gross deformity  -No bruising or soft tissue swelling  -Scars: Scars present from previous ACL reconstruction, well-healed    Tenderness to palpation of the:  -Quadriceps musculature:  Negative  -Quadriceps tendon:  Negative  -Patella:  Negative  -Medial patellar facet: Positive  -Lateral patellar facet:  Negative  -Inferior pole of the patella:  Negative  -Patellar tendon:  Negative  -Tibial tuberosity:  Negative  -Medial joint line: Mild pain posterior to the MCL  -Medial collateral ligament:  Negative  -Medial hamstring tendons:  Negative  -Medial femoral condyle:  Negative  -Medial tibial plateau:  Negative  -Pes anserine bursa:  Negative  -Lateral joint " line:  Negative  -Distal IT band:  Negative  -Gerdy's tubercle:  Negative  -Lateral collateral ligament:  Negative  -Lateral hamstring tendons:  Negative  -Lateral femoral condyle:  Negative  -Lateral tibial plateau:  Negative  -Posterior lateral corner:  Negative  -Popliteal fossa:  Negative    Range of Motion:  -Passive flexion:  135  -Passive extension:  0    Strength:  -Extension:  5/5  -Flexion:  5/5    Special Tests:  -Effusion:  Absent  -Medial patellar glide:  Negative  -Lateral patellar glide:  Negative  -Patellar apprehension:  Negative  -Medial Benoit's: Weakly positive  -Lateral Benoit's:  Negative  -Valgus stress:  Negative  -Varus stress:  Negative  -Lachman test:  Negative  -Anterior drawer:  Negative  -Posterior drawer:  Negative    Other:  -Intact sensation to light touch distally.  -No signs of cyanosis. Normal skin temperature of the lower extremity.  -Foot/ankle:  No gross deformity. Full range of motion.  -Left knee:  No gross deformity. No palpable tenderness. Normal strength and ROM.  Lachman and anterior drawer testing normal.      IMAGIN2022: 4 view right knee x-ray  - Tunneling from previous ACL reconstruction noted on the right.  Mild-moderate medial tibiofemoral compartment joint space narrowing with subtle osteophytosis.  Hardware from previous ACL reconstruction noted on the left knee.      ASSESSMENT/PLAN:  Diagnoses and all orders for this visit:  Degenerative tear of medial meniscus of right knee  -     XR Knee Standing 2v  Bilateral & 2v Right  -     Orthopedic  Referral  -     DRAIN/INJECT LARGE JOINT/BURSA  -     triamcinolone (KENALOG-40) injection 40 mg  -     lidocaine (PF) (XYLOCAINE) 1 % injection 2 mL  -     Bupivacaine 0.5% 2mL Intra-articular  History of reconstruction of anterior cruciate ligament of right knee  History of meniscectomy of right knee  History of reconstruction of anterior cruciate ligament of left knee    57-year-old female with mild  arthritis and likely a degenerative meniscal tear of the medial meniscus of the right knee.  X-rays were performed in the office today and personally reviewed by me with the findings as demonstrated above by my interpretation.  We discussed treatment options for this condition to include ice, heat, topical medications, oral medications, injections, and surgery.  After reviewing the risks/benefits, patient elects to proceed with a corticosteroid injection into the right intra-articular knee.  See procedure note below:    PROCEDURE:  Intraarticular Injection of the Right Knee     PROCEDURAL PAUSE:    A procedural pause was conducted to verify:  correct patient identity, procedure to be performed, and as applicable, correct side, site, and correct patient position.      INFORMED CONSENT:   I discussed the risks, possible benefits, and alternatives to injection.  Following denial of allergy and review of potential side effects and complications (including but not necessarily limited to infection, allergic reaction, fat necrosis, skin depigmentation, local tissue breakdown, systemic effects of corticosteroids, elevation of blood glucose, injury to soft tissue and/or nerves, and seizure), all questions were answered, and consent was given to proceed.  Patient verbalized understanding.      PROCEDURE DETAILS:    Side and site were marked, and a time out was performed to verify appropriate patient identifiers.  Following this the right knee, just superior to the tibial plateau and medial to the patellar tendon, was prepped with chlorhexidine.  Utilizing a 22-gauge needle the right intraarticular knee was injected using a anteromedial to posterolateral approach with 2mL of 1% Lidocaine, 2mL of 0.5% bupivacaine, and 1mL triamcinolone (40mg/mL).  0mL was wasted.      The patient tolerated the procedure without complication and was discharged in good condition after a short observation period.  The patient was instructed to  contact me regarding any questions pertaining to the procedure.       DIAGNOSIS:    -Successful injection of the right intraarticular knee without immediate complication      PLAN:   -Post-procedure care reviewed, including avoiding submersion of the injection site for 48 hours   -Return precautions reviewed for signs/symptoms that would be concerning for infection   -The patient was instructed to ice and take APAP this evening if needed  -Return to clinic as needed      Dejuan Vallejo MD  7/20/2022  8:44 AM    Total time spent with this patient was 38 minutes which included chart review, visualization and independent interpretation of images, time spent with the patient, and documentation.    Procedure time:  3 minute(s)

## 2022-09-06 ENCOUNTER — THERAPY VISIT (OUTPATIENT)
Dept: CHIROPRACTIC MEDICINE | Facility: OTHER | Age: 57
End: 2022-09-06
Attending: CHIROPRACTOR
Payer: COMMERCIAL

## 2022-09-06 VITALS — OXYGEN SATURATION: 99 % | HEART RATE: 82 BPM | RESPIRATION RATE: 16 BRPM | TEMPERATURE: 97.4 F

## 2022-09-06 DIAGNOSIS — M62.838 MUSCLE SPASMS OF NECK: ICD-10-CM

## 2022-09-06 DIAGNOSIS — M62.830 BACK MUSCLE SPASM: ICD-10-CM

## 2022-09-06 DIAGNOSIS — M99.03 SEGMENTAL AND SOMATIC DYSFUNCTION OF LUMBAR REGION: Primary | ICD-10-CM

## 2022-09-06 DIAGNOSIS — M51.379 DEGENERATION OF LUMBAR OR LUMBOSACRAL INTERVERTEBRAL DISC: ICD-10-CM

## 2022-09-06 DIAGNOSIS — M54.50 ACUTE LEFT-SIDED LOW BACK PAIN WITHOUT SCIATICA: ICD-10-CM

## 2022-09-06 PROCEDURE — 99212 OFFICE O/P EST SF 10 MIN: CPT | Mod: 25 | Performed by: CHIROPRACTOR

## 2022-09-06 PROCEDURE — 97012 MECHANICAL TRACTION THERAPY: CPT | Performed by: CHIROPRACTOR

## 2022-09-06 PROCEDURE — 98940 CHIROPRACT MANJ 1-2 REGIONS: CPT | Mod: AT | Performed by: CHIROPRACTOR

## 2022-09-06 NOTE — PROGRESS NOTES
Started yesterday after lifting grand kids at the parade. Left lower back is constantly sore and tender. 7/10 W24 10/10. Took Tylenol with no change in pain.  Carmina Markham on 9/6/2022 at 7:56 AM    Reviewed by EW    Visit #:  1/4-6  New Episode 9/6/2022    Subjective:  Soraida Suresh is a 57 year old female who is seen in f/u up for:        Segmental and somatic dysfunction of lumbar region  Acute left-sided low back pain without sciatica  Muscle spasms of neck  Back muscle spasm  Degeneration of lumbar or lumbosacral intervertebral disc.     Since last visit on 6/20/2022,  Soraida Suresh reports: Patient presents with primary complaints of left-sided low back pain.  Symptoms began over the weekend after she lifted numerous grandchildren at the parade and [employer].  Denies any cracking or clicking sensation when pain occurred.  Denies any radicular complaints of the lower extremities, loss of bowel or bladder control, saddle paresthesia or weakness of the lower extremities.  Patient does admit that she is having more constipation following aggravation of back pain.     Prior to this weekend patient has been helping with tree cleanup around her property.  This is required that she do a lot of bending, and lifting.  No immediate pain following these activities however patient believes this may be contributing factor to symptoms as well.    Patient reports that pain most noticed when she is statically sitting or standing.  When she is moving pain seems to subside.    Paperwork recently filled out for medical necessity for massage therapy.    (DVPRS) Pain Rating Score : Focus of attention, prevents doing daily activities (W24 10/10) (09/06/22 0752)     Objective:  The following was observed:  Pulse 82   Temp 97.4  F (36.3  C) (Tympanic)   Resp 16   SpO2 99%    Oswestry (SWATHI) Questionnaire    OSWESTRY DISABILITY INDEX 9/6/2022   Count 9   Sum 28   Oswestry Score (%) 62.22   Some recent data might be hidden      Right  antalgic posture    Thoracic/lumbar AROM: mild loss of left lumbar flexion, general restriction with all AROM mild.    SLR: -right, +left  Iliac compression: negative    Study Result    Narrative & Impression   PROCEDURE: MR LUMBAR SPINE W/O CONTRAST 11/3/2018 11:29 AM     HISTORY: left sided sciatica; Left-sided low back pain with left-sided  sciatica, unspecified chronicity     COMPARISONS: None.     Meds/Dose Given:     TECHNIQUE: Images were obtained sagittally T1 STIR and T2 weighted.  Images were obtained axially T2 weighted.     FINDINGS: The T12 L1 L1 L2 L2 L3 L3-L4 discs are normal. At L4-L5  there is some mild annular bulging mild asymmetry greater on the left  than the right. This minimally impinges on the left L5 nerve root. At  L5-S1 there is a lateral disc protrusion on the left which mildly  impinges on the left L5 nerve root in the neural foramina. The lumbar  facet joints appear normal. Intradurally the nerves of cauda equina  and conus appear normal. The paravertebral soft tissues appear normal.                                                                        IMPRESSION:   1. Minimal asymmetric disc protrusion L4-L5 on the left minimally  impinging on the left L5 nerve root.  2. Lateral disc protrusion L5-S1 on the left mildly impinging on the  left L5 nerve root in the neural foramina.     SACHA FARR MD       P: palpatory tenderness left side L5  A: static palpation demonstrates intersegmental asymmetry , lumbar  R: motion palpation notes restricted motion, L5   T: muscle spasm at level(s): left quadratus lumborum, cervical paraspinals and suboccitipals bilaterally, posterior scalenes bilaterally, upper trapezius and levator scapulae bilaterally:  lumbar paraspinals bilaterally    Segmental spinal dysfunction/restrictions found at:  :  L5 Right rotation restricted, Left lateral flexion restricted and Extension restriction.      Assessment: Segmental/somatic dysfunction present of the  L5 vertebra consistent with patient's symptoms.  Patient has been under our care in the past with similar complaints and typically responds favorably with chiropractic care in this case I am recommending traction therapy in addition to chiropractic treatments.  Current plan of care to include 4-6 visits in the next 4-6 weeks barring acute exacerbation of symptoms.  Palpable spasms noted of the cervical spine.  Continue to monitor patient cervical spine at this time.  Diagnoses:      1. Segmental and somatic dysfunction of lumbar region    2. Acute left-sided low back pain without sciatica    3. Muscle spasms of neck    4. Back muscle spasm    5. Degeneration of lumbar or lumbosacral intervertebral disc        Patient's condition:  Patient had restrictions pre-manipulation    Treatment effectiveness:  Post manipulation there is better intersegmental movement and Patient claims to feel looser post manipulation      Procedures:  E/M 10004    CMT:  59872 Chiropractic manipulative treatment 1-2 regions performed   Lumbar: Diversified, L5, Side posture    Modalities:  40992: Mechanical traction, lumbar spine, 34/20, intermittent pull, 16 minutes    Therapeutic procedures:  None    Response to Treatment  Reduction in symptoms , no antalgic posture post treatment    Prognosis: Good    Goals: reduce pain by 50%  Patient will be able to statically sit/stand without painful limits.  Reduce oswestry score 20-30%     Recommendations:    Instructions:ice 20 minutes every other hour as needed and heat 15 minutes every other hour as needed    Follow-up:  Return to care in 3 days.

## 2022-09-08 ENCOUNTER — THERAPY VISIT (OUTPATIENT)
Dept: CHIROPRACTIC MEDICINE | Facility: OTHER | Age: 57
End: 2022-09-08
Attending: CHIROPRACTOR
Payer: COMMERCIAL

## 2022-09-08 VITALS
HEART RATE: 82 BPM | TEMPERATURE: 97.5 F | RESPIRATION RATE: 16 BRPM | SYSTOLIC BLOOD PRESSURE: 110 MMHG | DIASTOLIC BLOOD PRESSURE: 68 MMHG | OXYGEN SATURATION: 96 %

## 2022-09-08 DIAGNOSIS — M51.379 DEGENERATION OF LUMBAR OR LUMBOSACRAL INTERVERTEBRAL DISC: ICD-10-CM

## 2022-09-08 DIAGNOSIS — M54.50 ACUTE LEFT-SIDED LOW BACK PAIN WITHOUT SCIATICA: ICD-10-CM

## 2022-09-08 DIAGNOSIS — M99.03 SEGMENTAL AND SOMATIC DYSFUNCTION OF LUMBAR REGION: Primary | ICD-10-CM

## 2022-09-08 PROCEDURE — 98940 CHIROPRACT MANJ 1-2 REGIONS: CPT | Mod: AT | Performed by: CHIROPRACTOR

## 2022-09-08 PROCEDURE — 97012 MECHANICAL TRACTION THERAPY: CPT | Performed by: CHIROPRACTOR

## 2022-09-08 NOTE — PROGRESS NOTES
Left lower back rates 4/10 W24 7/10. Constant [id number]. Using cold with some relief. It helps to keep moving and walking.  Rosemarie Trimble on 9/8/2022 at 7:08 AM    Reviewed by EW    Visit #:  2    Subjective:  Soraida Suresh is a 57 year old female who is seen in f/u up for:        Segmental and somatic dysfunction of lumbar region  Acute left-sided low back pain without sciatica  Degeneration of lumbar or lumbosacral intervertebral disc.     Since last visit on 9/6/2022,  Soraida Suresh reports: Symptoms showing some improvement since last encounter.  Back pain still present however patient was able to go kayaking with a friend after last encounter.  States that kayaking did not aggravate symptoms however when she was in a paddle boat this did seem to aggravate symptoms further.        (DVPRS) Pain Rating Score : Distracts me, can do usual activities (W24 7/10) (09/08/22 0702)     Objective:  The following was observed:  /68 (BP Location: Right arm, Patient Position: Sitting, Cuff Size: Adult Regular)   Pulse 82   Temp 97.5  F (36.4  C) (Tympanic)   Resp 16   SpO2 96%      P: palpatory tenderness left side L5:    A: static palpation demonstrates intersegmental asymmetry , lumbar  R: motion palpation notes restricted motion, L5   T: hypertonicity left quadratus lumborum    Segmental spinal dysfunction/restrictions found at:  :  L5 Right rotation restricted, Left lateral flexion restricted and Extension restriction.      Assessment: Patient is showing good signs of progress.  Very noticeable improvement of less muscle spasms.  Patient does have massage therapy later today.  Patient may still benefit from follow-up care which be done on as-needed basis at this time.    Diagnoses:      1. Segmental and somatic dysfunction of lumbar region    2. Acute left-sided low back pain without sciatica    3. Degeneration of lumbar or lumbosacral intervertebral disc        Patient's  condition:  Patient had restrictions pre-manipulation    Treatment effectiveness:  Post manipulation there is better intersegmental movement and Patient claims to feel looser post manipulation      Procedures:  CMT:  63269 Chiropractic manipulative treatment 1-2 regions performed   Lumbar: Diversified, L5, Side posture    Modalities:  00620: Mechanical traction, lumbar spine, 36/25, intermittent pull, 16 minutes    Therapeutic procedures:  None    Response to Treatment  Reduction in symptoms as reported by patient    Prognosis: Good    Progress towards Goals: Patient is making progress towards the goal.     Recommendations:    Instructions:ice 20 minutes every other hour as needed and heat 15 minutes every other hour as needed    Follow-up:  Continue treatment PRN.

## 2022-09-17 ENCOUNTER — HEALTH MAINTENANCE LETTER (OUTPATIENT)
Age: 57
End: 2022-09-17

## 2022-11-10 ENCOUNTER — MYC MEDICAL ADVICE (OUTPATIENT)
Dept: FAMILY MEDICINE | Facility: OTHER | Age: 57
End: 2022-11-10

## 2022-11-23 ASSESSMENT — PATIENT HEALTH QUESTIONNAIRE - PHQ9
SUM OF ALL RESPONSES TO PHQ QUESTIONS 1-9: 1
10. IF YOU CHECKED OFF ANY PROBLEMS, HOW DIFFICULT HAVE THESE PROBLEMS MADE IT FOR YOU TO DO YOUR WORK, TAKE CARE OF THINGS AT HOME, OR GET ALONG WITH OTHER PEOPLE: NOT DIFFICULT AT ALL
SUM OF ALL RESPONSES TO PHQ QUESTIONS 1-9: 1

## 2022-11-23 ASSESSMENT — ENCOUNTER SYMPTOMS
HEMATOCHEZIA: 0
FREQUENCY: 1
ARTHRALGIAS: 0
DIARRHEA: 1
PALPITATIONS: 0
DIZZINESS: 0
NERVOUS/ANXIOUS: 0
SHORTNESS OF BREATH: 0
ABDOMINAL PAIN: 1
HEMATURIA: 0
CHILLS: 0
JOINT SWELLING: 1
CONSTIPATION: 0
MYALGIAS: 0
HEARTBURN: 0
EYE PAIN: 0
COUGH: 0
FEVER: 0
HEADACHES: 0
NAUSEA: 0
BREAST MASS: 0
DYSURIA: 0
WEAKNESS: 0
SORE THROAT: 0
PARESTHESIAS: 1

## 2022-11-24 DIAGNOSIS — F33.42 RECURRENT MAJOR DEPRESSIVE DISORDER, IN FULL REMISSION (H): ICD-10-CM

## 2022-11-25 NOTE — TELEPHONE ENCOUNTER
[facility] sent Rx request for the following:      bupropion HCl  mg 24 hr tablet, extended release      Last Prescription Date:   11/15/2021  Last Fill Qty/Refills:         90, R-3    Last Office Visit:              11/26/2021   Future Office visit:           11/28/2022    Nils Mccann RN, BSN  ....................  11/25/2022   3:29 PM

## 2022-11-27 RX ORDER — BUPROPION HYDROCHLORIDE 300 MG/1
300 TABLET ORAL EVERY MORNING
Qty: 90 TABLET | Refills: 3 | Status: SHIPPED | OUTPATIENT
Start: 2022-11-27 | End: 2023-12-01

## 2022-11-28 ENCOUNTER — OFFICE VISIT (OUTPATIENT)
Dept: FAMILY MEDICINE | Facility: OTHER | Age: 57
End: 2022-11-28
Attending: FAMILY MEDICINE
Payer: COMMERCIAL

## 2022-11-28 ENCOUNTER — HOSPITAL ENCOUNTER (OUTPATIENT)
Dept: MAMMOGRAPHY | Facility: OTHER | Age: 57
Discharge: HOME | End: 2022-11-28
Attending: FAMILY MEDICINE
Payer: COMMERCIAL

## 2022-11-28 VITALS
SYSTOLIC BLOOD PRESSURE: 122 MMHG | HEIGHT: 66 IN | DIASTOLIC BLOOD PRESSURE: 80 MMHG | HEART RATE: 74 BPM | RESPIRATION RATE: 16 BRPM | OXYGEN SATURATION: 97 % | TEMPERATURE: 96.6 F | BODY MASS INDEX: 24.91 KG/M2 | WEIGHT: 155 LBS

## 2022-11-28 DIAGNOSIS — Z13.220 SCREENING FOR LIPID DISORDERS: ICD-10-CM

## 2022-11-28 DIAGNOSIS — Z00.00 HEALTH CARE MAINTENANCE: Primary | ICD-10-CM

## 2022-11-28 DIAGNOSIS — Z23 NEEDS FLU SHOT: ICD-10-CM

## 2022-11-28 DIAGNOSIS — K29.70 GASTRITIS WITHOUT BLEEDING, UNSPECIFIED CHRONICITY, UNSPECIFIED GASTRITIS TYPE: ICD-10-CM

## 2022-11-28 DIAGNOSIS — Z12.11 SCREEN FOR COLON CANCER: ICD-10-CM

## 2022-11-28 DIAGNOSIS — Z13.1 SCREENING FOR DIABETES MELLITUS: ICD-10-CM

## 2022-11-28 DIAGNOSIS — Z13.0 SCREENING FOR DEFICIENCY ANEMIA: ICD-10-CM

## 2022-11-28 DIAGNOSIS — Z13.29 SCREENING FOR THYROID DISORDER: ICD-10-CM

## 2022-11-28 DIAGNOSIS — F33.42 RECURRENT MAJOR DEPRESSIVE DISORDER, IN FULL REMISSION (H): ICD-10-CM

## 2022-11-28 DIAGNOSIS — Z23 NEED FOR COVID-19 VACCINE: ICD-10-CM

## 2022-11-28 DIAGNOSIS — Z12.31 VISIT FOR SCREENING MAMMOGRAM: ICD-10-CM

## 2022-11-28 LAB
ALBUMIN SERPL BCG-MCNC: 4.5 G/DL (ref 3.5–5.2)
ALP SERPL-CCNC: 42 U/L (ref 35–104)
ALT SERPL W P-5'-P-CCNC: 12 U/L (ref 10–35)
ANION GAP SERPL CALCULATED.3IONS-SCNC: 8 MMOL/L (ref 7–15)
AST SERPL W P-5'-P-CCNC: 18 U/L (ref 10–35)
BASOPHILS # BLD AUTO: 0 10E3/UL (ref 0–0.2)
BASOPHILS NFR BLD AUTO: 0 %
BILIRUB SERPL-MCNC: 0.4 MG/DL
BUN SERPL-MCNC: 11 MG/DL (ref 6–20)
CALCIUM SERPL-MCNC: 9.5 MG/DL (ref 8.6–10)
CHLORIDE SERPL-SCNC: 102 MMOL/L (ref 98–107)
CHOLEST SERPL-MCNC: 267 MG/DL
CREAT SERPL-MCNC: 0.66 MG/DL (ref 0.51–0.95)
DEPRECATED HCO3 PLAS-SCNC: 30 MMOL/L (ref 22–29)
EOSINOPHIL # BLD AUTO: 0.1 10E3/UL (ref 0–0.7)
EOSINOPHIL NFR BLD AUTO: 1 %
ERYTHROCYTE [DISTWIDTH] IN BLOOD BY AUTOMATED COUNT: 12.2 % (ref 10–15)
GFR SERPL CREATININE-BSD FRML MDRD: >90 ML/MIN/1.73M2
GLUCOSE SERPL-MCNC: 108 MG/DL (ref 70–99)
HCT VFR BLD AUTO: 41.6 % (ref 35–47)
HDLC SERPL-MCNC: 108 MG/DL
HGB BLD-MCNC: 13.9 G/DL (ref 11.7–15.7)
IMM GRANULOCYTES # BLD: 0 10E3/UL
IMM GRANULOCYTES NFR BLD: 0 %
LDLC SERPL CALC-MCNC: 140 MG/DL
LYMPHOCYTES # BLD AUTO: 1.7 10E3/UL (ref 0.8–5.3)
LYMPHOCYTES NFR BLD AUTO: 38 %
MCH RBC QN AUTO: 33 PG (ref 26.5–33)
MCHC RBC AUTO-ENTMCNC: 33.4 G/DL (ref 31.5–36.5)
MCV RBC AUTO: 99 FL (ref 78–100)
MONOCYTES # BLD AUTO: 0.3 10E3/UL (ref 0–1.3)
MONOCYTES NFR BLD AUTO: 7 %
NEUTROPHILS # BLD AUTO: 2.4 10E3/UL (ref 1.6–8.3)
NEUTROPHILS NFR BLD AUTO: 54 %
NONHDLC SERPL-MCNC: 159 MG/DL
NRBC # BLD AUTO: 0 10E3/UL
NRBC BLD AUTO-RTO: 0 /100
PLATELET # BLD AUTO: 265 10E3/UL (ref 150–450)
POTASSIUM SERPL-SCNC: 4.1 MMOL/L (ref 3.4–5.3)
PROT SERPL-MCNC: 6.5 G/DL (ref 6.4–8.3)
RBC # BLD AUTO: 4.21 10E6/UL (ref 3.8–5.2)
SODIUM SERPL-SCNC: 140 MMOL/L (ref 136–145)
TRIGL SERPL-MCNC: 96 MG/DL
TSH SERPL DL<=0.005 MIU/L-ACNC: 1.64 UIU/ML (ref 0.3–4.2)
WBC # BLD AUTO: 4.5 10E3/UL (ref 4–11)

## 2022-11-28 PROCEDURE — 80061 LIPID PANEL: CPT | Mod: ZL | Performed by: FAMILY MEDICINE

## 2022-11-28 PROCEDURE — 36415 COLL VENOUS BLD VENIPUNCTURE: CPT | Mod: ZL | Performed by: FAMILY MEDICINE

## 2022-11-28 PROCEDURE — 91312 COVID-19 VACCINE BIVALENT BOOSTER 12+ (PFIZER): CPT | Performed by: FAMILY MEDICINE

## 2022-11-28 PROCEDURE — 0124A COVID-19 VACCINE BIVALENT BOOSTER 12+ (PFIZER): CPT | Performed by: FAMILY MEDICINE

## 2022-11-28 PROCEDURE — 84443 ASSAY THYROID STIM HORMONE: CPT | Mod: ZL | Performed by: FAMILY MEDICINE

## 2022-11-28 PROCEDURE — 90471 IMMUNIZATION ADMIN: CPT | Performed by: FAMILY MEDICINE

## 2022-11-28 PROCEDURE — 80053 COMPREHEN METABOLIC PANEL: CPT | Mod: ZL | Performed by: FAMILY MEDICINE

## 2022-11-28 PROCEDURE — 99396 PREV VISIT EST AGE 40-64: CPT | Mod: 25 | Performed by: FAMILY MEDICINE

## 2022-11-28 PROCEDURE — 77067 SCR MAMMO BI INCL CAD: CPT

## 2022-11-28 PROCEDURE — 85025 COMPLETE CBC W/AUTO DIFF WBC: CPT | Mod: ZL | Performed by: FAMILY MEDICINE

## 2022-11-28 PROCEDURE — 90682 RIV4 VACC RECOMBINANT DNA IM: CPT | Performed by: FAMILY MEDICINE

## 2022-11-28 RX ORDER — OMEPRAZOLE 40 MG/1
40 CAPSULE, DELAYED RELEASE ORAL DAILY PRN
Qty: 90 CAPSULE | Refills: 3 | COMMUNITY
Start: 2022-11-28

## 2022-11-28 ASSESSMENT — ENCOUNTER SYMPTOMS
CONSTIPATION: 0
FREQUENCY: 1
SORE THROAT: 0
ARTHRALGIAS: 0
DIARRHEA: 1
SHORTNESS OF BREATH: 0
PALPITATIONS: 0
ABDOMINAL PAIN: 1
PARESTHESIAS: 1
DIZZINESS: 0
MYALGIAS: 0
FEVER: 0
HEARTBURN: 0
WEAKNESS: 0
NERVOUS/ANXIOUS: 0
HEADACHES: 0
NAUSEA: 0
JOINT SWELLING: 1
BREAST MASS: 0
HEMATOCHEZIA: 0
CHILLS: 0
COUGH: 0
EYE PAIN: 0
HEMATURIA: 0
DYSURIA: 0

## 2022-11-28 ASSESSMENT — PAIN SCALES - GENERAL: PAINLEVEL: NO PAIN (0)

## 2022-11-28 NOTE — PROGRESS NOTES
SUBJECTIVE:   CC: Soraida is an 57 year old who presents for preventive health visit.   Patient has been advised of split billing requirements and indicates understanding: Yes     Healthy Habits:     Getting at least 3 servings of Calcium per day:  Yes    Bi-annual eye exam:  Yes    Dental care twice a year:  Yes    Sleep apnea or symptoms of sleep apnea:  None    Diet:  Low salt, Low fat/cholesterol and Carbohydrate counting    Frequency of exercise:  6-7 days/week    Duration of exercise:  45-60 minutes    Taking medications regularly:  Yes    Medication side effects:  Not applicable    PHQ-2 Total Score: 0    Additional concerns today:  Yes       Had an EGD with Dr. Sales on 4/4/22.  She had gastritis noted.  No H. Pylori noted.  She was to take omeprazole at max dosing for 4-6 weeks.  She has been able to back off on using this only as needed now.  She does have to be very careful in what she eats.  She already avoids dairy and gluten.  She does eat oats and corn and some other grains.  She has tried also to avoid sugar.  She still gets very bloated and gassy.  She also avoids beans, onions, garlic and other foods that tend to produce a lot of gas for her.    She has a couple of moles on her abdomen that she would like to have looked at.  One is located where her lower bra band rubs and the other is at her waist line where her pants also rub.  They both get very irritated due to their location.  She sometimes has to wear a bandaid on top of them to keep her clothes from irritating them.    Today's PHQ-2 Score:   PHQ-2 ( 1999 Pfizer) 11/23/2022   Q1: Little interest or pleasure in doing things 0   Q2: Feeling down, depressed or hopeless 0   PHQ-2 Score 0   PHQ-2 Total Score (12-17 Years)- Positive if 3 or more points; Administer PHQ-A if positive -   Q1: Little interest or pleasure in doing things Not at all   Q2: Feeling down, depressed or hopeless Not at all   PHQ-2 Score 0       PHQ 11/15/2021 11/26/2021  2022   PHQ-9 Total Score 3 3 1   Q9: Thoughts of better off dead/self-harm past 2 weeks Not at all Not at all Not at all       Have you ever done Advance Care Planning? (For example, a Health Directive, POLST, or a discussion with a medical provider or your loved ones about your wishes): Yes, patient states has an Advance Care Planning document and will bring a copy to the clinic.    Social History     Tobacco Use     Smoking status: Former     Types: Cigarettes     Quit date: 10/10/2005     Years since quittin.1     Smokeless tobacco: Never   Substance Use Topics     Alcohol use: Yes     Alcohol/week: 14.0 standard drinks     Comment: Alcoholic Drinks/day: 2-3 per day     If you drink alcohol do you typically have >3 drinks per day or >7 drinks per week? No    Alcohol Use 2022   Prescreen: >3 drinks/day or >7 drinks/week? -   Prescreen: >3 drinks/day or >7 drinks/week? No       Reviewed orders with patient.  Reviewed health maintenance and updated orders accordingly - Yes  Labs reviewed in EPIC  BP Readings from Last 3 Encounters:   22 122/80   22 110/68   22 100/72    Wt Readings from Last 3 Encounters:   22 70.3 kg (155 lb)   22 70.6 kg (155 lb 9.6 oz)   22 73.9 kg (163 lb)                  Patient Active Problem List   Diagnosis     Other congenital anomalies of great veins     Allergic rhinitis due to pollen     Cervical disc herniation     Esophageal reflux     Irritable bowel syndrome     Major depression     Menorrhagia with regular cycle     Partial congenital anomalous pulmonary venous connection     Segmental and somatic dysfunction of cervical region     Abscess of anal and rectal regions     Mild renal insufficiency     Hyperlipidemia LDL goal <100     Microscopic hematuria     Closed displaced fracture of shaft of fifth metacarpal bone of left hand, sequela     Primary osteoarthritis of left foot     Abnormal electrocardiogram     Status post repair  of partial anomalous pulmonary venous connection     Epigastric pain     Gastritis     Recurrent major depressive disorder, in full remission (H)     Past Surgical History:   Procedure Laterality Date     anorectal exam anesthesia  2015    PA ANORECTAL EXAM SURG REQ ANESTH  DIAG     APPENDECTOMY OPEN      1987     ARTHROSCOPY KNEE      2006, 2013,Debridement, partial medial meniscectomy, and anterior cruciate ligament reconstruction using tibialis anterior allograft, right knee     AS HYSTEROSCOPY, SURGICAL; W/ ENDOMETRIAL ABLATION, ANY METHOD  2015    Kathya     CARDIAC SURGERY  2006    Repair of anomalous pulmonary venous return by anastomosis of the anomalous vein to the left atrial appendage, done at [facility]     COLONOSCOPY  2014 Follow up in 10 years 24 normal     ECHO LIMITED      mild left atrial enlargement;  normal ef     ESOPHAGOSCOPY, GASTROSCOPY, DUODENOSCOPY (EGD), COMBINED           ESOPHAGOSCOPY, GASTROSCOPY, DUODENOSCOPY (EGD), COMBINED           ESOPHAGOSCOPY, GASTROSCOPY, DUODENOSCOPY (EGD), COMBINED           ESOPHAGOSCOPY, GASTROSCOPY, DUODENOSCOPY (EGD), COMBINED      11,Normal     ESOPHAGOSCOPY, GASTROSCOPY, DUODENOSCOPY (EGD), COMBINED N/A 2022    Procedure: ESOPHAGOGASTRODUODENOSCOPY, WITH BIOPSY;  Surgeon: Ramon Sales MD;  Location: [facility] OR     NISSEN FUNDOPLICATION  2000    Dr. Sales     OTHER SURGICAL HISTORY Left 2021    Tenex procedure for plantar fasciitis with Dr. Reveles     SIGMOIDOSCOPY FLEXIBLE      ,And BE, negative, for rectal bleeding.  Fissures and hemorrhoids noted     SURGICAL PATHOLOGY EXAM Left 2016    Left flank       Social History     Tobacco Use     Smoking status: Former     Types: Cigarettes     Quit date: 10/10/2005     Years since quittin.1     Smokeless tobacco: Never   Substance Use Topics     Alcohol use: Yes     Alcohol/week: 14.0 standard drinks      Comment: Alcoholic Drinks/day: 2-3 per day     Family History   Problem Relation Age of Onset     Arthritis Mother         Arthritis,Osteoarthritis;  of 76 pneumonia but no autopsy     Diabetes Father         Diabetes     Other - See Comments Father         COPD/Benign colon polyps/AAA -  of ruptured AAA     Colon Cancer Paternal Grandmother         Cancer-colon     Colon Cancer Paternal Aunt         Cancer-colon     Diabetes Paternal Aunt         Diabetes     Obesity Sister         bariatric surgery     Other - See Comments Sister         TBI     Depression Sister      Anxiety Disorder Sister      Diabetes Sister      Hyperlipidemia Sister      Diabetes Brother      Hyperlipidemia Brother      Other - See Comments Maternal Grandmother          of old age     Dementia Maternal Grandfather         possible dementia,  of pneumonia     Other - See Comments Brother         MVA - at age 50     Hypertension Brother      Diabetes Brother      Hyperlipidemia Brother      Depression Brother      Diabetes Brother      Hyperlipidemia Brother      Macular Degeneration Brother      Diabetes Brother      Hyperlipidemia Brother      Suicide Brother 49     Cerebrovascular Disease Paternal Grandfather          of stroke         Current Outpatient Medications   Medication Sig Dispense Refill     acyclovir (ZOVIRAX) 5 % external ointment Apply to affected area 6 times a day for 7 days. 30 g 11     albuterol (PROAIR HFA/PROVENTIL HFA/VENTOLIN HFA) 108 (90 Base) MCG/ACT inhaler Inhale 2 puffs into the lungs every 4 hours as needed for shortness of breath / dyspnea or wheezing 18 g 1     buPROPion (WELLBUTRIN XL) 300 MG 24 hr tablet Take 1 tablet (300 mg) by mouth every morning 90 tablet 3     diphenhydrAMINE HCl (BENADRYL ALLERGY PO)        Glucosamine-Chondroitin 750 & 400 MG MISC Take 1 tablet by mouth daily       L-Lysine 500 MG TABS Take 1 tablet by mouth daily       MELATONIN PO        omeprazole (PRILOSEC) 40  MG DR capsule Take 1 capsule (40 mg) by mouth daily as needed 90 capsule 3     other medical supplies takokat.  Diagnosis:  Z23. 1 each 1     polyethylene glycol (MIRALAX) 17 GM/Dose powder Take by mouth daily       triamcinolone (NASACORT) 55 MCG/ACT nasal aerosol Spray 2 sprays into both nostrils 2 times daily 16.9 mL 9     Allergies   Allergen Reactions     Codeine Other (See Comments)     Medroxyprogesterone Other (See Comments)     Caused patient's brain to swell.     Azithromycin Palpitations     Latex Rash     hands     Penicillins Rash     Proline Rash     Prolene: Sutures; caused keloid     Sulfamethoxazole W/Trimethoprim Rash     Sulfamethoxazole-Trimethoprim Rash       Breast Cancer Screening:  Any new diagnosis of family breast, ovarian, or bowel cancer? No    FHS-7:   Breast CA Risk Assessment (FHS-7) 11/15/2021   Did any of your first-degree relatives have breast or ovarian cancer? No   Did any of your relatives have bilateral breast cancer? No   Did any man in your family have breast cancer? No   Did any woman in your family have breast and ovarian cancer? No   Did any woman in your family have breast cancer before age 50 y? No   Do you have 2 or more relatives with breast and/or ovarian cancer? No   Do you have 2 or more relatives with breast and/or bowel cancer? No       Mammogram Screening: Recommended mammography every 1-2 years with patient discussion and risk factor consideration  Pertinent mammograms are reviewed under the imaging tab.    History of abnormal Pap smear: NO - age 30-65 PAP every 5 years with negative HPV co-testing recommended     Reviewed and updated as needed this visit by clinical staff    Allergies  Meds              Reviewed and updated as needed this visit by Provider                 Past Medical History:   Diagnosis Date     Abnormal cytological finding in specimen from cervix uteri     10/11/2011     Allergic rhinitis     No Comments Provided     Benign lipomatous  neoplasm of skin and subcutaneous tissue of trunk     1/20/2016     Contact dermatitis     No Comments Provided     Dyshidrosis     No Comments Provided     Encounter for screening for malignant neoplasm of colon     8/27/2014     Gastro-esophageal reflux disease without esophagitis     No Comments Provided     Major depressive disorder, single episode     6/24/2011     Other congenital malformations of great veins (CODE)     7/10/2006     Other specified diseases of anus and rectum (CODE)     5/20/2015     Panic disorder without agoraphobia     resolved after heart surgery     Partial anomalous pulmonary venous connection     No Comments Provided     Residual hemorrhoidal skin tags     3/3/2011      Past Surgical History:   Procedure Laterality Date     anorectal exam anesthesia  05/20/2015    NE ANORECTAL EXAM SURG REQ ANESTH  DIAG     APPENDECTOMY OPEN      5/1987     ARTHROSCOPY KNEE      7/2006, 2013,Debridement, partial medial meniscectomy, and anterior cruciate ligament reconstruction using tibialis anterior allograft, right knee     AS HYSTEROSCOPY, SURGICAL; W/ ENDOMETRIAL ABLATION, ANY METHOD  12/14/2015    Kathya     CARDIAC SURGERY  11/2006    Repair of anomalous pulmonary venous return by anastomosis of the anomalous vein to the left atrial appendage, done at [facility]     COLONOSCOPY  08/28/2014 8/28/2014 Follow up in 10 years 8/28/24 normal     ECHO LIMITED      mild left atrial enlargement;  normal ef     ESOPHAGOSCOPY, GASTROSCOPY, DUODENOSCOPY (EGD), COMBINED      1996     ESOPHAGOSCOPY, GASTROSCOPY, DUODENOSCOPY (EGD), COMBINED      2001     ESOPHAGOSCOPY, GASTROSCOPY, DUODENOSCOPY (EGD), COMBINED      2006     ESOPHAGOSCOPY, GASTROSCOPY, DUODENOSCOPY (EGD), COMBINED      6/28/11,Normal     ESOPHAGOSCOPY, GASTROSCOPY, DUODENOSCOPY (EGD), COMBINED N/A 4/4/2022    Procedure: ESOPHAGOGASTRODUODENOSCOPY, WITH BIOPSY;  Surgeon: Ramon Sales MD;  Location:  OR     NISSEN  "FUNDOPLICATION  08/22/2000    Dr. Sales     OTHER SURGICAL HISTORY Left 09/2021    Tenex procedure for plantar fasciitis with Dr. Reveles     SIGMOIDOSCOPY FLEXIBLE      1996,And BE, negative, for rectal bleeding.  Fissures and hemorrhoids noted     SURGICAL PATHOLOGY EXAM Left 02/02/2016    Left flank       Review of Systems   Constitutional: Negative for chills and fever.   HENT: Negative for congestion, ear pain, hearing loss and sore throat.    Eyes: Negative for pain and visual disturbance.   Respiratory: Negative for cough and shortness of breath.    Cardiovascular: Negative for chest pain, palpitations and peripheral edema.   Gastrointestinal: Positive for abdominal pain and diarrhea. Negative for constipation, heartburn, hematochezia and nausea.   Breasts:  Negative for tenderness, breast mass and discharge.   Genitourinary: Positive for frequency. Negative for dysuria, genital sores, hematuria, pelvic pain, urgency, vaginal bleeding and vaginal discharge.   Musculoskeletal: Positive for joint swelling. Negative for arthralgias and myalgias.   Skin: Negative for rash.   Neurological: Positive for paresthesias. Negative for dizziness, weakness and headaches.   Psychiatric/Behavioral: Negative for mood changes. The patient is not nervous/anxious.            OBJECTIVE:   /80   Pulse 74   Temp (!) 96.6  F (35.9  C) (Tympanic)   Resp 16   Ht 1.664 m (5' 5.5\")   Wt 70.3 kg (155 lb)   LMP  (LMP Unknown)   SpO2 97%   Breastfeeding No   BMI 25.40 kg/m    Physical Exam  Constitutional:       General: She is not in acute distress.     Appearance: She is well-developed.   HENT:      Head: Normocephalic.      Right Ear: Tympanic membrane and external ear normal.      Left Ear: Tympanic membrane and external ear normal.      Nose: Nose normal.      Mouth/Throat:      Pharynx: No oropharyngeal exudate.   Eyes:      General:         Right eye: No discharge.         Left eye: No discharge.      " Conjunctiva/sclera: Conjunctivae normal.      Pupils: Pupils are equal, round, and reactive to light.   Neck:      Thyroid: No thyromegaly.      Trachea: No tracheal deviation.   Cardiovascular:      Rate and Rhythm: Normal rate and regular rhythm.      Pulses: Normal pulses.      Heart sounds: Normal heart sounds, S1 normal and S2 normal. No murmur heard.    No friction rub. No gallop. No S3 or S4 sounds.   Pulmonary:      Effort: Pulmonary effort is normal. No respiratory distress.      Breath sounds: Normal breath sounds. No wheezing or rales.      Comments: Breast exam:  No masses palpable bilaterally.  No skin changes, tethering or axillary lymphadenopathy bilaterally.    Abdominal:      General: Bowel sounds are normal. There is no distension.      Palpations: Abdomen is soft. There is no mass.      Tenderness: There is no abdominal tenderness.   Genitourinary:     Comments: Pelvic/Rectal exams deferred per patient.  Musculoskeletal:         General: Normal range of motion.      Cervical back: Neck supple.   Lymphadenopathy:      Cervical: No cervical adenopathy.   Skin:     General: Skin is warm and dry.      Findings: No rash.      Comments: On her upper abdomen and lower abdomen there are 2 raised rough lesions that are brown in color and somewhat inflamed appearing.  These are both consistent with seborrheic keratoses.   Neurological:      Mental Status: She is alert and oriented to person, place, and time.      Motor: No abnormal muscle tone.      Deep Tendon Reflexes: Reflexes are normal and symmetric.   Psychiatric:         Thought Content: Thought content normal.         Judgment: Judgment normal.             ASSESSMENT/PLAN:       ICD-10-CM    1. Health care maintenance  Z00.00       2. Screen for colon cancer  Z12.11 COLOGUARD(EXACT SCIENCES)      3. Need for COVID-19 vaccine  Z23 COVID-19 VACCINE BIVALENT BOOSTER 12+ (PFIZER)      4. Needs flu shot  Z23 INFLUENZA VACCINE 50-64 OR 18-64 W/EGG ALLERGY  "(FLUBLOK)      5. Screening for diabetes mellitus  Z13.1 Comprehensive metabolic panel     Comprehensive metabolic panel      6. Screening for thyroid disorder  Z13.29 TSH with free T4 reflex     TSH with free T4 reflex      7. Screening for deficiency anemia  Z13.0 CBC with Platelets & Differential     CBC with Platelets & Differential      8. Screening for lipid disorders  Z13.220 Lipid Profile     Lipid Profile      9. Gastritis without bleeding, unspecified chronicity, unspecified gastritis type  K29.70 omeprazole (PRILOSEC) 40 MG DR capsule      10. Recurrent major depressive disorder, in full remission (H)  F33.42       1.  Mammogram is scheduled for today.  DEXA last completed 12/11/2020 was normal.  Cologuard is due this year and is ordered.  Pap/HPV last completed in 2019 were normal.  Tdap last completed 11/5/2020.  Flu and covid vaccines are updated.  Shingrix is up to date.  2.  See #1.  3.  See #1.  4.  See #1.  5.  Comprehensive Metabolic Profile as above.  6.  TSH as above.  7.  Complete Blood Count as above.  8.  Lipid profile as above.  9.  Omeprazole dosing to daily as needed for gastroesophageal reflux disease symptoms updated on medication list.  She did not need a refill today.  10.  Stable.  On Wellbutrin.  Depression Action Plan updated today.      Patient has been advised of split billing requirements and indicates understanding: Yes      COUNSELING:  Reviewed preventive health counseling, as reflected in patient instructions       Regular exercise       Healthy diet/nutrition       Vision screening       Osteoporosis prevention/bone health       Colorectal Cancer Screening      BMI:   Estimated body mass index is 25.4 kg/m  as calculated from the following:    Height as of this encounter: 1.664 m (5' 5.5\").    Weight as of this encounter: 70.3 kg (155 lb).   Weight management plan: Discussed healthy diet and exercise guidelines      She reports that she quit smoking about 17 years ago. She " has never used smokeless tobacco.      Patricia Brooke MD  [facility]  Answers for HPI/ROS submitted by the patient on 11/23/2022  If you checked off any problems, how difficult have these problems made it for you to do your work, take care of things at home, or get along with other people?: Not difficult at all  PHQ9 TOTAL SCORE: 1

## 2022-11-28 NOTE — NURSING NOTE
Chief Complaint   Patient presents with     Physical     Is fasting. Took morning medication. Has a few concerns she would like to talk about.     Medication Reconciliation: complete    Beatrice Lopez, LPN

## 2022-11-28 NOTE — LETTER
My Depression Action Plan  Name: Soraida Suresh   Date of Birth 1965  Date: 11/28/2022    My doctor: Patricia Brooke   My clinic: [facility]  [address]  [place] [address]  [phone]          GREEN    ZONE   Good Control    What it looks like:     Things are going generally well. You have normal ups and downs. You may even feel depressed from time to time, but bad moods usually last less than a day.   What you need to do:  1. Continue to care for yourself (see self care plan)  2. Check your depression survival kit and update it as needed  3. Follow your physician s recommendations including any medication.  4. Do not stop taking medication unless you consult with your physician first.           YELLOW         ZONE Getting Worse    What it looks like:     Depression is starting to interfere with your life.     It may be hard to get out of bed; you may be starting to isolate yourself from others.    Symptoms of depression are starting to last most all day and this has happened for several days.     You may have suicidal thoughts but they are not constant.   What you need to do:     1. Call your care team. Your response to treatment will improve if you keep your care team informed of your progress. Yellow periods are signs an adjustment may need to be made.     2. Continue your self-care.  Just get dressed and ready for the day.  Don't give yourself time to talk yourself out of it.    3. Talk to someone in your support network.    4. Open up your Depression Self-Care Plan/Wellness Kit.           RED    ZONE Medical Alert - Get Help    What it looks like:     Depression is seriously interfering with your life.     You may experience these or other symptoms: You can t get out of bed most days, can t work or engage in other necessary activities, you have trouble taking care of basic hygiene, or basic responsibilities, thoughts of suicide or death that  will not go away, self-injurious behavior.     What you need to do:  1. Call your care team and request a same-day appointment. If they are not available (weekends or after hours) call your local crisis line, emergency room or 911.          Depression Self-Care Plan / Wellness Kit    Many people find that medication and therapy are helpful treatments for managing depression. In addition, making small changes to your everyday life can help to boost your mood and improve your wellbeing. Below are some tips for you to consider. Be sure to talk with your medical provider and/or behavioral health consultant if your symptoms are worsening or not improving.     Sleep   Sleep hygiene  means all of the habits that support good, restful sleep. It includes maintaining a consistent bedtime and wake time, using your bedroom only for sleeping or sex, and keeping the bedroom dark and free of distractions like a computer, smartphone, or television.     Develop a Healthy Routine  Maintain good hygiene. Get out of bed in the morning, make your bed, brush your teeth, take a shower, and get dressed. Don t spend too much time viewing media that makes you feel stressed. Find time to relax each day.    Exercise  Get some form of exercise every day. This will help reduce pain and release endorphins, the  feel good  chemicals in your brain. It can be as simple as just going for a walk or doing some gardening, anything that will get you moving.      Diet  Strive to eat healthy foods, including fruits and vegetables. Drink plenty of water. Avoid excessive sugar, caffeine, alcohol, and other mood-altering substances.     Stay Connected with Others  Stay in touch with friends and family members.    Manage Your Mood  Try deep breathing, massage therapy, biofeedback, or meditation. Take part in fun activities when you can. Try to find something to smile about each day.     Psychotherapy  Be open to working with a therapist if your provider  recommends it.     Medication  Be sure to take your medication as prescribed. Most anti-depressants need to be taken every day. It usually takes several weeks for medications to work. Not all medicines work for all people. It is important to follow-up with your provider to make sure you have a treatment plan that is working for you. Do not stop your medication abruptly without first discussing it with your provider.    Crisis Resources   These hotlines are for both adults and children. They and are open 24 hours a day, 7 days a week unless noted otherwise.      National Suicide Prevention Lifeline   988 or 0-684-080-TEPD (5921)      Crisis Text Line    www.crisistextline.org  Text HOME to 432702 from anywhere in the [place], anytime, about any type of crisis. A live, trained crisis counselor will receive the text and respond quickly.      [place] Lifeline for LGBTQ Youth  A national crisis intervention and suicide lifeline for LGBTQ youth under 25. Provides a safe place to talk without judgement. Call 1-403.484.6966; text START to 174525 or visit www.thetrevorproject.org to talk to a trained counselor.      For [facility] crisis numbers, visit the [facility] website at:  https://[place].gov/dhs/people-we-serve/adults/health-care/mental-health/resources/crisis-contacts.jsp

## 2022-11-28 NOTE — PATIENT INSTRUCTIONS
Cologuard Patient Instructions    You received an order for a Cologuard test. You will receive your kit in the mail. Please follow the instructions that are provided in the kit.      Reminder: Ship Cologuard test the same day or the next day to allow enough delivery time. The lab must receive your specimen within 4 days for successful testing. If not delivered in time, you may have to complete the process again.    Home Pick-up:  Once you complete the kit, call [email or web address] at [phone] and they will schedule a UPS pickup for you.    OR    Drop Off Locations:  Once you have completed the collection and have it ready to be shipped, bring it to one of the following sites listed below. *Note: none of the sites ship out later than mid-morning. Please do not drop off Fridays, as they will not go out until Monday which will make your specimen inacceptable.     - [facility] ([address], [place] [address])      Drop off: Monday-Thursday, 8:00am-4:30pm at the [address] check-in desk      - [facility] ([address], [place] [address])   Drop off: Monday-Thursday, 8:00am-5:45pm     - [employer] ([address], [place] [address])     Drop off: Monday-Thursday, 3:00pm-5:30pm       Try probiotics containing lactobacillus/acidophilus, bifidobacterium, and saccharomyces.     Consider trying a 30 day elimination diet such as the Whole 30 (www.whole30.com) or AIP (auotimmune paleo protocol) to see if you can identify any other foods you may be sensitive to.

## 2022-11-29 ENCOUNTER — MYC MEDICAL ADVICE (OUTPATIENT)
Dept: FAMILY MEDICINE | Facility: OTHER | Age: 57
End: 2022-11-29

## 2022-11-29 PROBLEM — N28.9 MILD RENAL INSUFFICIENCY: Status: RESOLVED | Noted: 2018-10-08 | Resolved: 2022-11-29

## 2022-12-16 LAB — NONINV COLON CA DNA+OCC BLD SCRN STL QL: NORMAL

## 2023-01-02 ENCOUNTER — OFFICE VISIT (OUTPATIENT)
Dept: FAMILY MEDICINE | Facility: OTHER | Age: 58
End: 2023-01-02
Attending: FAMILY MEDICINE
Payer: COMMERCIAL

## 2023-01-02 ENCOUNTER — HOSPITAL ENCOUNTER (OUTPATIENT)
Dept: GENERAL RADIOLOGY | Facility: OTHER | Age: 58
Discharge: HOME | End: 2023-01-02
Attending: FAMILY MEDICINE
Payer: COMMERCIAL

## 2023-01-02 ENCOUNTER — MYC MEDICAL ADVICE (OUTPATIENT)
Dept: FAMILY MEDICINE | Facility: OTHER | Age: 58
End: 2023-01-02

## 2023-01-02 VITALS
SYSTOLIC BLOOD PRESSURE: 114 MMHG | TEMPERATURE: 96.8 F | HEART RATE: 66 BPM | RESPIRATION RATE: 16 BRPM | OXYGEN SATURATION: 95 % | DIASTOLIC BLOOD PRESSURE: 86 MMHG | WEIGHT: 153.6 LBS | BODY MASS INDEX: 25.17 KG/M2

## 2023-01-02 DIAGNOSIS — M79.661 PAIN IN RIGHT SHIN: ICD-10-CM

## 2023-01-02 DIAGNOSIS — L98.9 SKIN LESION: Primary | ICD-10-CM

## 2023-01-02 DIAGNOSIS — S90.852D FOREIGN BODY IN LEFT FOOT, SUBSEQUENT ENCOUNTER: ICD-10-CM

## 2023-01-02 PROCEDURE — 11301 SHAVE SKIN LESION 0.6-1.0 CM: CPT | Performed by: FAMILY MEDICINE

## 2023-01-02 PROCEDURE — 99213 OFFICE O/P EST LOW 20 MIN: CPT | Mod: 25 | Performed by: FAMILY MEDICINE

## 2023-01-02 PROCEDURE — 88305 TISSUE EXAM BY PATHOLOGIST: CPT

## 2023-01-02 PROCEDURE — 73590 X-RAY EXAM OF LOWER LEG: CPT | Mod: RT

## 2023-01-02 PROCEDURE — 250N000009 HC RX 250: Performed by: FAMILY MEDICINE

## 2023-01-02 RX ORDER — LIDOCAINE HCL/EPINEPHRINE/PF 2%-1:200K
5 VIAL (ML) INJECTION ONCE
Status: COMPLETED | OUTPATIENT
Start: 2023-01-02 | End: 2023-01-02

## 2023-01-02 RX ADMIN — LIDOCAINE HYDROCHLORIDE,EPINEPHRINE BITARTRATE 5 ML: 20; .005 INJECTION, SOLUTION EPIDURAL; INFILTRATION; INTRACAUDAL; PERINEURAL at 11:40

## 2023-01-02 ASSESSMENT — PAIN SCALES - GENERAL: PAINLEVEL: NO PAIN (0)

## 2023-01-02 NOTE — PROGRESS NOTES
Nursing Notes:   Mary Anne Scott LPN  1/2/2023  8:34 AM  Sign at exiting of workspace  Pt presents to clinic today for a lesion removal of the abdomen, there are 2 lesions on the abdomen.   States she would like to dicussed foot and low right leg pain discussed in mychart message.     FOOD SECURITY SCREENING QUESTIONS:    The next two questions are to help us understand your food security.  If you are feeling you need any assistance in this area, we have resources available to support you today.    Hunger Vital Signs:  Within the past 12 months we worried whether our food would run out before we got money to buy more. Never  Within the past 12 months the food we bought just didn't last and we didn't have money to get more. Never            Medication Reconciliation: Tyler Jesus LPN,LPN on 1/2/2023 at 8:33 AM          Westley Quigley is a 57 year old, presenting for the following health issues:  Lesion Removal      History of Present Illness       Reason for visit:  Removal of two moles, removal of sliver in bottom left foot and discuss pain in lower right leg shin area down to ankle    She eats 2-3 servings of fruits and vegetables daily.She consumes 1 sweetened beverage(s) daily.She exercises with enough effort to increase her heart rate 60 or more minutes per day.  She exercises with enough effort to increase her heart rate 5 days per week.   She is taking medications regularly.       Skin Lesion  Onset/Duration: years   Description  Location: 2 on abdomen  Color: brown and black  Border description: irregularly pigmented, raised  Character: raised  Itching: moderate  Bleeding:  YES  Intensity:  moderate  Progression of Symptoms:  worsening  Accompanying signs and symptoms:   Bleeding: YES  Scaling: No  Excessive sun exposure/tanning: YES  Sunscreen used: No  History:           Any previous history of skin cancer: YES  Any family history of melanoma: No  Previous episodes of similar  lesion: yes  Precipitating or alleviating factors: none   Therapies tried and outcome: none    A few weeks ago, she stepped on some broken glass and feels like there still may be a tiny piece in her left foot.      She has had some pain in her right shin over the past few weeks.  She walks several miles per day and also is active in yoga.  There has been no change in activity and no injury.  She has been waking up in the middle of the night with pain.  She has had a slight amount of relief with ice and ibuprofen.  Heat makes it worse.          Review of Systems   Constitutional, HEENT, cardiovascular, pulmonary, GI, , musculoskeletal, neuro, skin, endocrine and psych systems are negative, except as otherwise noted.      Objective    /86 (BP Location: Right arm, Patient Position: Sitting, Cuff Size: Adult Large)   Pulse 66   Temp 96.8  F (36  C) (Tympanic)   Resp 16   Wt 69.7 kg (153 lb 9.6 oz)   LMP  (LMP Unknown)   SpO2 95%   BMI 25.17 kg/m    Body mass index is 25.17 kg/m .  Physical Exam  Musculoskeletal:      Comments: Right shin with point tenderness mid shaft.   Skin:     Comments: Patient has 2 raised brown lesions that are oval/circular.  The one on her upper abdomen is about 1 cm in diameter, the other on her lower abdomen is about 6 mm in diameter.  The areas were cleansed with chloraprep and anesthetized with 2% lidocaine with epi . Then a dermablade wasused to remove the lesions in a shave fastion and sent to pathology.  Bleeding was cauterized. Patient tolerated procedure well.  Dressings applied.    On the plantar surface of her left foot is a small pinpoint lesion with slight surrounding callus.  There is no visible/palpable foreign body noted.   Psychiatric:         Mood and Affect: Mood normal.         Behavior: Behavior normal.        Results for orders placed or performed during the hospital encounter of 01/02/23   XR Tibia and Fibula Right 2 Views     Status: None    Narrative     PROCEDURE:  XR TIBIA AND FIBULA RIGHT 2 VIEWS    HISTORY: right shin pain; Pain in right shin    COMPARISON:  None.    TECHNIQUE:  2 views of the right tibia and fibula were obtained.    FINDINGS:  Postoperative changes are seen in the proximal tibia. No  tibial or fibular fracture, dislocation or destructive lesion is  noted.       Impression    IMPRESSION: No acute tibial abnormality      SACHA FARR MD         SYSTEM ID:  [id number]          Assessment & Plan   Soraida Suresh is a 57 year old, presenting for the following health issues:      ICD-10-CM    1. Skin lesion  L98.9 Surgical Pathology Exam     SHAV SKIN LESION TRUNK/ARM/LEG 0.6-1.0 CM     lidocaine 2%-EPINEPHrine 1:200,000 injection 5 mL      2. Foreign body in left foot, subsequent encounter  S90.852D Orthopedic  Referral      3. Pain in right shin  M79.661 XR Tibia and Fibula Right 2 Views        1.  2 skin lesions which were causing irritation were removed from her abdomen as above.  Sent to pathology.  Follow up as needed.  2.  Referred to podiatry to evaluate for the potential for a foreign body.  3.  X-rays completed as above and were normal.  She is going to try backing off on her activity for a couple of weeks to see if this makes a difference with her pain.  If it does not, discussed that she could consider a trial of Physical Therapy and also an MRI to evaluate further.        Patricia Brooke MD  [facility]

## 2023-01-02 NOTE — NURSING NOTE
Pt presents to clinic today for a lesion removal of the abdomen, there are 2 lesions on the abdomen.   States she would like to dicussed foot and low right leg pain discussed in mychart message.     FOOD SECURITY SCREENING QUESTIONS:    The next two questions are to help us understand your food security.  If you are feeling you need any assistance in this area, we have resources available to support you today.    Hunger Vital Signs:  Within the past 12 months we worried whether our food would run out before we got money to buy more. Never  Within the past 12 months the food we bought just didn't last and we didn't have money to get more. Never            Medication Reconciliation: complete  Tyler Fischer LPN,LPN on 1/2/2023 at 8:33 AM

## 2023-01-04 LAB
NONINV COLON CA DNA+OCC BLD SCRN STL QL: NEGATIVE
PATH REPORT.COMMENTS IMP SPEC: NORMAL
PATH REPORT.FINAL DX SPEC: NORMAL
PHOTO IMAGE: NORMAL

## 2023-01-09 ENCOUNTER — MYC MEDICAL ADVICE (OUTPATIENT)
Dept: FAMILY MEDICINE | Facility: OTHER | Age: 58
End: 2023-01-09

## 2023-01-17 ENCOUNTER — THERAPY VISIT (OUTPATIENT)
Dept: CHIROPRACTIC MEDICINE | Facility: OTHER | Age: 58
End: 2023-01-17
Attending: CHIROPRACTOR
Payer: COMMERCIAL

## 2023-01-17 VITALS — RESPIRATION RATE: 16 BRPM | HEART RATE: 88 BPM | OXYGEN SATURATION: 97 % | TEMPERATURE: 96.9 F

## 2023-01-17 DIAGNOSIS — M54.2 CERVICALGIA: ICD-10-CM

## 2023-01-17 DIAGNOSIS — M62.838 MUSCLE SPASMS OF NECK: ICD-10-CM

## 2023-01-17 DIAGNOSIS — M50.20 DISPLACEMENT OF CERVICAL INTERVERTEBRAL DISC WITHOUT MYELOPATHY: ICD-10-CM

## 2023-01-17 DIAGNOSIS — M99.02 SEGMENTAL AND SOMATIC DYSFUNCTION OF THORACIC REGION: Primary | ICD-10-CM

## 2023-01-17 PROCEDURE — 99212 OFFICE O/P EST SF 10 MIN: CPT | Mod: 25 | Performed by: CHIROPRACTOR

## 2023-01-17 PROCEDURE — 97012 MECHANICAL TRACTION THERAPY: CPT | Performed by: CHIROPRACTOR

## 2023-01-17 PROCEDURE — 98940 CHIROPRACT MANJ 1-2 REGIONS: CPT | Mod: AT | Performed by: CHIROPRACTOR

## 2023-01-17 NOTE — PROGRESS NOTES
Neck is constantly sharp. Radiating around into left side of chest and down left arm. 9/10 W24 10/10. Using ice, heat, stretches, TENS unit, Tylenol, and pain patches. These are taking the edge off.   Carmina Markham on 1/17/2023 at 10:09 AM    Reviewed by EW    Visit #:  1/4-6    Subjective:  Soraida Suresh is a 57 year old female who is seen for:        Segmental and somatic dysfunction of thoracic region  Muscle spasms of neck  Cervicalgia  Displacement of cervical intervertebral disc without myelopathy.      Soraida Suresh reports: Neck pain and mid back pain aggravated recently.  Patient informs me that she recently traveled to [place] where she slept on poor mattresses and poor pillows which seem to contribute to symptoms.  Patient immediately tried to manage symptoms on her own which provided a little help however symptoms continued to progress and worsen.  Patient contacted chiropractic provider Chaitanya Flowers [facility] in [place] yesterday for chiropractic treatment.  Patient informs me adjustments went well however last night around 3-4 AM she turned over in bed and began noticing increased levels of neck pain.  Patient is having pain that wraps into the front of her left chest and left arm.  Denies any nausea or cardiac symptoms.  Slight headaches are apparent.       (DVPRS) Pain Rating Score : Can't bear the pain unable to do anything (W24 10/10) (01/17/23 1005)     Objective:  The following was observed:  Pulse 88   Temp 96.9  F (36.1  C) (Tympanic)   Resp 16   LMP  (LMP Unknown)   SpO2 97%    Neck Disability Index (  Marcus H. and Jefino C. 1991. All rights reserved.; used with permission) 1/17/2023   SECTION 1 - PAIN INTENSITY 4   SECTION 2 - PERSONAL CARE 2   SECTION 3 - LIFTING 3   SECTION 4 - READING 4   SECTION 5 - HEADACHES 3   SECTION 6 - CONCENTRATION 1   SECTION 7 - WORK 2   SECTION 8 - DRIVING 3   SECTION 9 - SLEEPING 3   SECTION 10 - RECREATION 3   Count 10   Sum 28   Raw Score: /50 28    Neck Disability Index Score: (%) 56     Cervical AROM: Extremely limited due to pain    Cervical compression: Unable to assess  Cervical distraction: Unable to assess  Study Result    Narrative & Impression   MRI CERVICAL SPINE, 1/22/2014     HISTORY:  48-year-old female with neck pain and bilateral upper extremity  left greater than right radiculopathy. There has been no prior surgery.     COMPARISON:  12/31/2012.     TECHNIQUE:  Routine.     FINDINGS:  Alignment of the cervical spine is normal. There is no evidence  of subluxation or fracture. Signal intensity and caliber of the cervical  spinal cord is normal. The cervicomedullary junction is normal in  position.     C2-C3:  There is normal disc height and signal. The central spinal canal  and neural foramen are patent.     C3-C4:  There is normal disc space height. There is a mild broad based  disc bulge. The central canal and neural foramen are patent. There is no  significant change since the prior study.     C4-C5:  There is mild to mo derate loss of disc space height. There is a  posterior broad based disc bulge. This indents the ventral thecal sac and  imparts very mild mass effect upon the ventral surface of the cervical  spinal cord. This has not significantly changed from the prior study. The  neural foramen are widely patent.     C5-C6:  There is mild loss of disc height and signal. There is a central  disc protrusion versus herniation. This is of small to moderate size. This  imparts mild mass effect upon the ventral surface of the cervical spinal  cord at the midline. This has developed since the prior study.  The neural  foramen are widely patent.     C6-C7:  There is mild loss of disc height and signal. There is a left  paracentral disc herniation of moderate size. This narrows the left  lateral recess and imparts mass effect upon the left anterolateral  convexity of the cervical spinal cord. This was present on the prior study  but now appears to  be slightly larger. The neural foramen are patent.     C7-T1:  There is normal disc height and signal. The central spinal canal  and neural foramen are patent.     IMPRESSION:  1.  Moderate sized disc herniation in the left lateral recess at C6-C7.  This is unchanged to slightly larger compared to the prior study. There is  mild to moderate mass effect upon the left side of the cervical spinal  cord.  2.  There is a central disc herniation of small to moderate size at C5-C6.  This has developed since the prior study. This imparts mild mass effect  upon the cervical spinal cord at the midline. A mild broad based disc  protrusion was present at this level on the prior study.  3.  There is a broad based disc bulge at C4-C5 without significant change  from the prior study.     Moe Rodriguez M.D.  [facility].  [facility]  D:1/23/2014 T:1/23/2014     P: palpatory tendernessLeft splenius capitis, T4 on left:    A: static palpation demonstrates intersegmental asymmetry , thoracic  R: motion palpation notes restricted motion, T4   T: muscle spasm at level(s): Marked spasms splenius capitis and suboccipital musculature left greater than right, cervical paraspinals bilaterally moderate/severe, spasms noted intercostals left intrascapular T-spine and left rhomboids    Segmental spinal dysfunction/restrictions found at:  :  T4 Left lateral flexion restricted and Extension restriction.      Assessment: Exacerbation of neck symptoms most likely stemming from patient's history of degenerative disc disease and disc herniations as listed above from previous cervical spine MRI.  Patient has responded favorably in the past with chiropractic treatments along with spinal decompression/traction therapy with previous provider as well as our office.  As patient underwent adjustment yesterday traction was performed without incident.  With 1 minute remaining patient did request to discontinue treatment at the  15-minute marisela as treatment was aggravating symptoms.  Plan to follow-up with patient again later this week for continuation of care.    Diagnoses:      1. Segmental and somatic dysfunction of thoracic region    2. Muscle spasms of neck    3. Cervicalgia    4. Displacement of cervical intervertebral disc without myelopathy        Patient's condition:  Patient had restrictions pre-manipulation    Treatment effectiveness:  Post manipulation there is better intersegmental movement      Procedures:  E/M [address]    CMT:  13911 Chiropractic manipulative treatment 1-2 regions performed   Thoracic: Diversified, T4, Anterior dorsal, Standing    Modalities:  43794: Mechanical traction: cervical spine, 10/5, intermittent pull, treatment time 15 minutes. Patient requested to discontinue treatment at the 15 minutes marisela as symptoms were becoming aggravated    Therapeutic procedures:  None    Response to Treatment  Slight improvement    Prognosis: Good    Progress towards Goals: Reduce pain 50%  Patient will be able to perform cervical spine AROM  Reduce neck disability index score 20-30%    Recommendations:    Instructions:ice 20 minutes every other hour as needed and heat 15 minutes every other hour as needed    Follow-up:  Return to care in 2 days.

## 2023-01-19 ENCOUNTER — THERAPY VISIT (OUTPATIENT)
Dept: CHIROPRACTIC MEDICINE | Facility: OTHER | Age: 58
End: 2023-01-19
Attending: CHIROPRACTOR
Payer: COMMERCIAL

## 2023-01-19 VITALS
RESPIRATION RATE: 16 BRPM | OXYGEN SATURATION: 99 % | DIASTOLIC BLOOD PRESSURE: 84 MMHG | HEART RATE: 75 BPM | SYSTOLIC BLOOD PRESSURE: 116 MMHG | TEMPERATURE: 97.2 F

## 2023-01-19 DIAGNOSIS — M99.02 SEGMENTAL AND SOMATIC DYSFUNCTION OF THORACIC REGION: Primary | ICD-10-CM

## 2023-01-19 DIAGNOSIS — M62.838 MUSCLE SPASMS OF NECK: ICD-10-CM

## 2023-01-19 DIAGNOSIS — M50.20 DISPLACEMENT OF CERVICAL INTERVERTEBRAL DISC WITHOUT MYELOPATHY: ICD-10-CM

## 2023-01-19 DIAGNOSIS — M54.2 CERVICALGIA: ICD-10-CM

## 2023-01-19 PROCEDURE — 98940 CHIROPRACT MANJ 1-2 REGIONS: CPT | Mod: AT | Performed by: CHIROPRACTOR

## 2023-01-19 PROCEDURE — 97012 MECHANICAL TRACTION THERAPY: CPT | Performed by: CHIROPRACTOR

## 2023-01-19 NOTE — PROGRESS NOTES
Neck mostly left side rates 4/10 W24 7/10. Frequent stabbing using Tylenol-TENS unit-cold-heat with some relief. Did use cold this morning.   Rosemarie Trimble on 1/19/2023 at 7:08 AM    Reviewed by EW    Visit #:  2    Subjective:  Soraida Suresh is a 57 year old female who is seen in f/u up for:        Segmental and somatic dysfunction of thoracic region  Muscle spasms of neck  Cervicalgia  Displacement of cervical intervertebral disc without myelopathy.     Since last visit on 1/17/2023,  Soraida Suresh reports: Following last treatment patient was sore during that day.  But the next day symptoms were showing some improvement.  Pain seems to be returning at this time but overall patient does feel that progress is being made.    (DVPRS) Pain Rating Score : Distracts me, can do usual activities (W24 7/10) (01/19/23 0701)     Objective:  The following was observed:  /84 (BP Location: Right arm, Patient Position: Sitting, Cuff Size: Adult Regular)   Pulse 75   Temp 97.2  F (36.2  C) (Tympanic)   Resp 16   LMP  (LMP Unknown)   SpO2 99%      P: palpatory tenderness  T4, right posterior scalenes and cervical paraspinals  A: static palpation demonstrates intersegmental asymmetry , thoracic  R: motion palpation notes restricted motion, T4   T: muscle spasm at level(s): Cervical paraspinals and scalene musculature right greater than left, rhomboids:      Segmental spinal dysfunction/restrictions found at:  :  T4 Extension restriction.      Assessment: Patient is showing signs of progress.  Continue 1-2 times a week next week.    Diagnoses:      1. Segmental and somatic dysfunction of thoracic region    2. Muscle spasms of neck    3. Cervicalgia    4. Displacement of cervical intervertebral disc without myelopathy        Patient's condition:  Patient had restrictions pre-manipulation    Treatment effectiveness:  Post manipulation there is better intersegmental movement      Procedures:  CMT:  26668 Chiropractic  manipulative treatment 1-2 regions performed   Thoracic: Diversified, T4, Anterior dorsal, standing    Modalities:  57475: Mechanical traction: cervical spine, 10/5, intermittent pull, 12 minutes, stopped due to aggravating patient symptoms.     Therapeutic procedures:  None    Response to Treatment  Improved intersegmental motion posttreatment    Prognosis: Good    Progress towards Goals: Patient is making progress towards the goal.     Recommendations:    Instructions:ice 20 minutes every other hour as needed and heat 15 minutes every other hour as needed    Follow-up:  Return to care in 5 days.

## 2023-01-24 ENCOUNTER — THERAPY VISIT (OUTPATIENT)
Dept: CHIROPRACTIC MEDICINE | Facility: OTHER | Age: 58
End: 2023-01-24
Attending: CHIROPRACTOR
Payer: COMMERCIAL

## 2023-01-24 VITALS — TEMPERATURE: 96.9 F | OXYGEN SATURATION: 97 % | RESPIRATION RATE: 16 BRPM | HEART RATE: 73 BPM

## 2023-01-24 DIAGNOSIS — M50.20 DISPLACEMENT OF CERVICAL INTERVERTEBRAL DISC WITHOUT MYELOPATHY: ICD-10-CM

## 2023-01-24 DIAGNOSIS — M54.2 CERVICALGIA: ICD-10-CM

## 2023-01-24 DIAGNOSIS — M62.838 MUSCLE SPASMS OF NECK: ICD-10-CM

## 2023-01-24 DIAGNOSIS — M99.02 SEGMENTAL AND SOMATIC DYSFUNCTION OF THORACIC REGION: Primary | ICD-10-CM

## 2023-01-24 DIAGNOSIS — M99.01 SEGMENTAL AND SOMATIC DYSFUNCTION OF CERVICAL REGION: ICD-10-CM

## 2023-01-24 PROCEDURE — 97012 MECHANICAL TRACTION THERAPY: CPT | Performed by: CHIROPRACTOR

## 2023-01-24 PROCEDURE — 98940 CHIROPRACT MANJ 1-2 REGIONS: CPT | Mod: AT | Performed by: CHIROPRACTOR

## 2023-01-24 NOTE — PROGRESS NOTES
Left side of neck is intermittently pinching. 0/10 W24 3/10. Using heat, TENS unit, Tylenol and Ibuprofen. These are providing relief.  Carminaalexander Markham on 1/24/2023 at 9:07 AM    Reviewed by EW    Visit #:  3    Subjective:  Soraida Suresh is a 57 year old female who is seen in f/u up for:        Segmental and somatic dysfunction of thoracic region  Segmental and somatic dysfunction of cervical region  Muscle spasms of neck  Cervicalgia  Displacement of cervical intervertebral disc without myelopathy.     Since last visit on 1/19/2023,  Soraida Suresh reports: Symptoms showing great improvement.  No headaches currently.  Noted that over the weekend symptoms seem to come and go.  Continues to have some upper back discomfort and neck pain on the left side.    (DVPRS) Pain Rating Score : No pain (W24 3/10) (01/24/23 0904)     Objective:  The following was observed:  Pulse 73   Temp 96.9  F (36.1  C) (Tympanic)   Resp 16   LMP  (LMP Unknown)   SpO2 97%      P: palpatory tenderness left side C6:    A: static palpation demonstrates intersegmental asymmetry , cervical, thoracic  R: motion palpation notes restricted motion, C6  and T3   T: muscle spasm at level(s): Left levator scapula, paraspinal musculature for cervical and upper thoracic region bilaterally:      Segmental spinal dysfunction/restrictions found at:  :  C6 Left lateral flexion restricted and Extension restriction  T3 Extension restriction.      Assessment: Patient showing very good signs of progress.  Plan for follow-up visit in 1 week.  If symptoms continue to show improvement consider tapering patient care down or transferring to as needed status.  However if symptoms fail to improve or continue to be symptomatic/problematic consider continuation of treatment.  Patient seems in favor of this course of treatment    Diagnoses:      1. Segmental and somatic dysfunction of thoracic region    2. Segmental and somatic dysfunction of cervical region    3.  Muscle spasms of neck    4. Cervicalgia    5. Displacement of cervical intervertebral disc without myelopathy        Patient's condition:  Patient had restrictions pre-manipulation and Patient symptoms are gradually improving    Treatment effectiveness:  Post manipulation there is better intersegmental movement, Patient claims to feel looser post manipulation and Symptoms appear to be decreasing      Procedures:  CMT:  94923 Chiropractic manipulative treatment 1-2 regions performed   Cervical: light diversified, C6, Seated  Thoracic: Diversified, T3, Anterior dorsal, standing    Modalities:  60975: Mechanical traction, cervical spine, 11/5, intermittent pull, 16 minutes    Therapeutic procedures:  None    Response to Treatment  Reduction in symptoms as reported by patient    Prognosis: Good    Progress towards Goals: Patient is making progress towards the goal.     Recommendations:    Instructions:Monitor symptoms and perform activities as tolerated    Follow-up:  Return to care in 1 week.

## 2023-02-05 ENCOUNTER — MYC MEDICAL ADVICE (OUTPATIENT)
Dept: FAMILY MEDICINE | Facility: OTHER | Age: 58
End: 2023-02-05
Payer: COMMERCIAL

## 2023-02-23 ENCOUNTER — THERAPY VISIT (OUTPATIENT)
Dept: CHIROPRACTIC MEDICINE | Facility: OTHER | Age: 58
End: 2023-02-23
Attending: CHIROPRACTOR
Payer: COMMERCIAL

## 2023-02-23 VITALS — HEART RATE: 62 BPM | OXYGEN SATURATION: 93 % | TEMPERATURE: 96.9 F | RESPIRATION RATE: 16 BRPM

## 2023-02-23 DIAGNOSIS — M99.02 SEGMENTAL AND SOMATIC DYSFUNCTION OF THORACIC REGION: Primary | ICD-10-CM

## 2023-02-23 DIAGNOSIS — M62.830 BACK MUSCLE SPASM: ICD-10-CM

## 2023-02-23 DIAGNOSIS — M54.6 PAIN IN THORACIC SPINE: ICD-10-CM

## 2023-02-23 PROCEDURE — 98940 CHIROPRACT MANJ 1-2 REGIONS: CPT | Mod: AT | Performed by: CHIROPRACTOR

## 2023-02-23 NOTE — PROGRESS NOTES
Started a couple days ago. Mid medial back with intermittent catching. 4/10 W24 7/10. Using heat and tylenol with no change.   Taya Matthews on 2/23/2023 at 8:54 AM    Reviewed by EW    Visit #:  4    Subjective:  Soraida Suresh is a 57 year old female who is seen in f/u up for:        Segmental and somatic dysfunction of thoracic region  Back muscle spasm  Pain in thoracic spine.     Since last visit on 1/24/2023,  Soraida Suresh reports: Neck symptoms doing quite a bit better.  Presents with new concern more into her mid back.  Patient has been very active lately doing several exercise classes including yoga, strength training and aerobics.  Also snowshoeing a lot.  Unsure of any exact cause believes that she may have moved around during 1 of these classes exacerbating back concerns.  Denies any radiculopathy of the upper or lower extremities at this time.      (DVPRS) Pain Rating Score : Distracts me, can do usual activities (W24 7/10) (02/23/23 0849)     Objective:  The following was observed:  Pulse 62   Temp 96.9  F (36.1  C) (Tympanic)   Resp 16   SpO2 93%      P: palpatory tenderness Thoracic paraspinals bilaterally approximately T6-12 region:    A: static palpation demonstrates intersegmental asymmetry , thoracic  R: motion palpation notes restricted motion, T8   T: muscle spasm at level(s): Thoracic paraspinals bilaterally from T6-T12 :      Segmental spinal dysfunction/restrictions found at:  :  T8 Extension restriction.      Assessment: Segmental/somatic dysfunction present thoracic region with accompanying muscular spasms.  Patient may still benefit from chiropractic care.  Follow-up with patient on as-needed basis.    Neck symptoms appear to be stable at this time    Diagnoses:      1. Segmental and somatic dysfunction of thoracic region    2. Back muscle spasm    3. Pain in thoracic spine        Patient's condition:  Patient had restrictions pre-manipulation    Treatment effectiveness:  Post  manipulation there is better intersegmental movement and Patient claims to feel looser post manipulation      Procedures:  CMT:  91605 Chiropractic manipulative treatment 1-2 regions performed   Thoracic: Diversified, T8, Prone    Modalities:  65550: MSTM:  To T-spine paraspinal  for 4 min    Therapeutic procedures:  None    Response to Treatment  Reduction in symptoms as reported by patient    Prognosis: Good    Progress towards Goals: in progress    Recommendations:    Instructions:heat 15 minutes every other hour as needed    Follow-up:  Return to care if symptoms persist.

## 2023-03-27 ENCOUNTER — THERAPY VISIT (OUTPATIENT)
Dept: CHIROPRACTIC MEDICINE | Facility: OTHER | Age: 58
End: 2023-03-27
Attending: CHIROPRACTOR
Payer: COMMERCIAL

## 2023-03-27 VITALS
TEMPERATURE: 97.1 F | SYSTOLIC BLOOD PRESSURE: 118 MMHG | HEART RATE: 95 BPM | DIASTOLIC BLOOD PRESSURE: 72 MMHG | RESPIRATION RATE: 18 BRPM | OXYGEN SATURATION: 96 %

## 2023-03-27 DIAGNOSIS — M77.12 LEFT LATERAL EPICONDYLITIS: Primary | ICD-10-CM

## 2023-03-27 DIAGNOSIS — G56.32 RADIAL NERVE ENTRAPMENT, LEFT: ICD-10-CM

## 2023-03-27 PROCEDURE — 99212 OFFICE O/P EST SF 10 MIN: CPT | Mod: 25 | Performed by: CHIROPRACTOR

## 2023-03-27 PROCEDURE — 97035 APP MDLTY 1+ULTRASOUND EA 15: CPT | Performed by: CHIROPRACTOR

## 2023-03-27 NOTE — PROGRESS NOTES
Left arm and elbow started 3/23/23. Constant aching and burning rates 6/10 W24 9/10. Using cold-alpha stim-bio-freeze with no relief.  Rosemarie Trimble on 3/27/2023 at 7:07 AM    Reviewed by EW    Visit #:  1/3-5    Subjective:  Soraida Suresh is a 57 year old female who is seen or:        Left lateral epicondylitis  Radial nerve entrapment, left.      Soraida Suresh reports: Last Thursday patient began noticing pain of the left arm and elbow.  Patient was at her exercise class and found it extremely difficult to do any activities involving weights/resistance.  No traumatic events around time of onset.  Likes to snowsh[employer], utilizes balance poles, found some discomfort with using these prior to onset of symptoms.  Has been experiencing some numbness and tingling of the third-fifth digits.  Pain also present along the back of the left arm.  Right-sided neck/CT junction pain.  Denies any clicking or catching of the elbow.    Has been trying to manage symptoms with ice, Tylenol and TENS unit.  Also tried compression which does help and allows the patient to sleep.    (DVPRS) Pain Rating Score : Hard to ignore, avoid usual activities (W24 9/10) (03/27/23 0659)     Objective:  The following was observed:  /72 (BP Location: Right arm, Patient Position: Sitting, Cuff Size: Adult Regular)   Pulse 95   Temp 97.1  F (36.2  C) (Tympanic)   Resp 18   SpO2 96%    Neck Disability Index (  Marcus H. and Raphael C. 1991. All rights reserved.; used with permission) 1/17/2023   SECTION 1 - PAIN INTENSITY 4   SECTION 2 - PERSONAL CARE 2   SECTION 3 - LIFTING 3   SECTION 4 - READING 4   SECTION 5 - HEADACHES 3   SECTION 6 - CONCENTRATION 1   SECTION 7 - WORK 2   SECTION 8 - DRIVING 3   SECTION 9 - SLEEPING 3   SECTION 10 - RECREATION 3   Count 10   Sum 28   Raw Score: /50 28   Neck Disability Index Score: (%) 56     Upper Extremity Functional Index (  1996 PW Sid) 3/27/2023   a.  Any of your usual work, housework or school  activities 1-Quite a bit of Difficulty   b.  Your usual hobbies, recreational or sporting activities 1-Quite a bit of Difficulty   c.  Lifting a bag of groceries to waist level 0-Extreme Difficulty   d.  Placing an object onto, or removing it from an overhead shelf 0-Extreme Difficulty   e.  Washing your hair or scalp 1-Quite a bit of Difficulty   f.   Pushing up on your hands (e.g., from bathtub or chair) 1-Quite a bit of Difficulty   g.  Preparing food (e.g., peeling, cutting) 3-A Little bit of Difficulty   h.  Driving 3-A Little bit of Difficulty   I.   Vacuuming, sweeping, or raking 3-A Little bit of Difficulty   j.   Dressing 3-A Little bit of Difficulty   k.  Doing up buttons 4-No Difficulty   l.   Using tools or appliances 3-A Little bit of Difficulty   m. Opening doors 3-A Little bit of Difficulty   n.  Cleaning 3-A Little bit of Difficulty   o.  Tying or lacing shoes 3-A Little bit of Difficulty   p.  Sleeping 0-Extreme Difficulty   q.  Laundering clothes. (e.g., washing, ironing, folding) 2-Moderate Difficulty   r.   Opening a jar 4-No Difficulty   s.  Throwing a ball 0-Extreme Difficulty   t.   Carrying a small suitcase with your affected limb  0-Extreme Difficulty   Column Totals: /80 38     Lateral stress test: -left elbow  Medial stress test: -left elbow  Cozens:+left    Upper extremity strength:   Finger abduction: 5/5 right, 3+/5 left (painful)  Finger adduction: 5/5 right, 5/5 left  Wrist extension: 5/5 right, 3/5 left (painful)  Wrist flexion: 5/5 right, 4/5 left (painful)  Elbow extension: not assessed due to pain  Elbow flexion: 5/5 right, 4/5 left (painful)    Tinel's negative wrist and elbow    Cervical compression: -  Cervical distraction: +    P: palpatory tenderness extensor muscles left forearm:    A: asymmetric joints, not apparent  R: no restriction of intersegmental motion  T: muscle spasm at level(s): Extensor muscles left forearm, distal triceps muscle left arm, mild spasm right upper  trapezius:      Segmental spinal dysfunction/restrictions found at:  None performed.      Assessment: Lateral epicondylitis of the left elbow seems most likely.  DDx to include but not limited to arthritis, radial nerve entrapment, ligamentous sprain.  Consider use of passive modalities such as, but not limited to ultrasound, graston technique, acupuncture, chiropractic adjustments.     Patient's primary provider messaged for co-signature to refer to OT, xray. Consider referral to sports medicine/PCP for medical management.    Diagnoses:      1. Left lateral epicondylitis    2. Radial nerve entrapment, left          Procedures:  E/M 38190    CMT:  None performed    Modalities:  14070: US:  3 Hernandes/cm squared for 8 minutes at .72mhz  continuous, Location:left lateral elbow/triceps    Therapeutic procedures:  None    Response to Treatment  Reduction in symptoms as reported by patient    Prognosis: Good    Progress towards Goals: reduce pain  Refer patient for OT  Patient will be able to return to exercise classes without painful limits    Recommendations:    Instructions:rest    Follow-up:  Return to care in 2 days if symptoms continue.

## 2023-03-29 ENCOUNTER — THERAPY VISIT (OUTPATIENT)
Dept: CHIROPRACTIC MEDICINE | Facility: OTHER | Age: 58
End: 2023-03-29
Attending: CHIROPRACTOR
Payer: COMMERCIAL

## 2023-03-29 VITALS — OXYGEN SATURATION: 96 % | RESPIRATION RATE: 16 BRPM | HEART RATE: 94 BPM | TEMPERATURE: 96.8 F

## 2023-03-29 DIAGNOSIS — M77.12 LEFT LATERAL EPICONDYLITIS: Primary | ICD-10-CM

## 2023-03-29 PROCEDURE — 97035 APP MDLTY 1+ULTRASOUND EA 15: CPT | Performed by: CHIROPRACTOR

## 2023-03-29 NOTE — PROGRESS NOTES
Left elbow and arm are frequently aching and burning with intermittent sharp pains with some motions.7/10 W24 9/10.  Using ice, TENS unit, and pain relief gels which help to provide relief.   Carmina Markham on 3/29/2023 at 9:11 AM    Reviewed by EW    Visit #:  2/3-5    Subjective:  Soraida Suresh is a 57 year old female who is seen in f/u up for:     Left lateral epicondylitis.     Since last visit on 3/27/2023,  Soraida Suresh reports: Found relief of symptoms utilizing ultrasound after last visit.  Orders have been placed for occupational therapy, has not yet scheduled appointments.  Continues to notice that ice aggravates symptoms.  Elbow pain continuing to affect ability to sleep.      (DVPRS) Pain Rating Score : Focus of attention, prevents doing daily activities (W24 9/10) (03/29/23 0859)     Objective:  The following was observed:  Pulse 94   Temp 96.8  F (36  C) (Tympanic)   Resp 16   SpO2 96%      P: palpatory tenderness extensor muscles left forearm:    A: asymmetric joints, not apparent  R: no restriction of intersegmental motion  T: muscle spasm at level(s): Extensor muscles left forearm, distal triceps muscle left arm, mild spasm right upper trapezius:       Segmental spinal dysfunction/restrictions found at:  None performed.      Assessment: Lateral epicondylitis left elbow.  Continue to provide supportive care.  We will check with scheduling to see if patient can set up occupational therapy appointments.  No signs of segmental or somatic dysfunction of the upper extremity at this time.  Continue to follow-up with patient on as-needed basis.    Diagnoses:      1. Left lateral epicondylitis        Patient's condition:  Patient is noticing a decrease in their symptoms    Treatment effectiveness:  Post manipulation there is better intersegmental movement, Symptoms appear to be decreasing and Tenderness is decreasing      Procedures:  CMT:  None performed      Modalities:  58971: US:  3 Hernandes/cm  squared for 10 minutes at .72mhz  continuous, Location:left forearm extensors  90334: IASTM: To left brachialis, left brachioradialis, left common extensor tendon:  for 2 min    Therapeutic procedures:  None    Response to Treatment  Reduction in symptoms as reported by patient    Prognosis: Good    Progress towards Goals: Patient is making progress towards the goal.     Recommendations:    Instructions:monitor symptoms and follow up with OT    Follow-up:  Pending appointment with OT

## 2023-03-30 ENCOUNTER — HOSPITAL ENCOUNTER (OUTPATIENT)
Dept: GENERAL RADIOLOGY | Facility: OTHER | Age: 58
Discharge: HOME | End: 2023-03-30
Attending: CHIROPRACTOR | Admitting: CHIROPRACTOR
Payer: COMMERCIAL

## 2023-03-30 ENCOUNTER — HOSPITAL ENCOUNTER (OUTPATIENT)
Dept: OCCUPATIONAL THERAPY | Facility: OTHER | Age: 58
Setting detail: THERAPIES SERIES
Discharge: HOME | End: 2023-03-30
Attending: CHIROPRACTOR
Payer: COMMERCIAL

## 2023-03-30 DIAGNOSIS — M77.12 LEFT LATERAL EPICONDYLITIS: ICD-10-CM

## 2023-03-30 PROCEDURE — 73080 X-RAY EXAM OF ELBOW: CPT | Mod: LT

## 2023-03-30 PROCEDURE — 97140 MANUAL THERAPY 1/> REGIONS: CPT | Mod: GO

## 2023-03-30 PROCEDURE — 97165 OT EVAL LOW COMPLEX 30 MIN: CPT | Mod: GO

## 2023-03-30 PROCEDURE — 97035 APP MDLTY 1+ULTRASOUND EA 15: CPT | Mod: GO

## 2023-03-30 PROCEDURE — 97010 HOT OR COLD PACKS THERAPY: CPT | Mod: GO

## 2023-03-30 NOTE — PROGRESS NOTES
03/30/23 0900   General Information/History   Start Of Care Date 03/30/23   Referring Physician Dr. Gomez  PCP: Dr. Brooke   Medical Diagnosis lateral peicondyltitis   Additional Occupational Profile Info/Pertinent history of current problem Soraida is a 57 year old female who reports for OT evluation with left lateral elbow pain.  She reports that it started a little over a week ago.   How/Where did it occur   (unknown)   Onset date of current episode/exacerbation 03/17/23   Chronicity New   Hand Dominance Right   Affected side Left   Functional limitations perform activities of daily living;perform desired leisure / sports activities   Reported Symptoms Pain;Loss of strength   Prior level of function Independent ADL   Important Activities yoga, crafting, cooking   Patient role/Employment history Retired   Patient/Family goals statement Use left arm without pain, be able to do my exercise class again.   Fall Risk Screen   Fall screen completed by OT   Have you fallen 2 or more times in the past year? No   Have you fallen and had an injury in the past year? No   Is patient a fall risk? No   Abuse Screen (yes response referral indicated)   Feels Unsafe at Home or Work/School no   Feels Threatened by Someone no   Does Anyone Try to Keep You From Having Contact with Others or Doing Things Outside Your Home? no   Physical Signs of Abuse Present no   Pain   Pain Primary Pain Report   Primary Pain Report   Location left elbow   Pain Quality Burning;Aching;Stabbing;Shooting   Frequency Intermittent   Scale 3/10;8/10   Pain Is Worse At Night   Pain Is Exacerbated By Pinching;Gripping;Twisting , Pulling;Lifting;Carrying   Tenderness   Overall - Left point tender to left lateral epicondyle   Special Tests   Special Tests Assessed   Left Hand Positive For The Following Special Tests Resisted Long Finger   Strength   Strength Strength    Avg - Left 27    Avg - Right 70   Lateral Pinch - Left 12   Lateral Pinch -  Right 15   3 Point Pinch - Left 10   3 Point Pinch - Right 15   Therapy Interventions   Planned Therapy Interventions Cryotherapy;Ultrasound;Strengthening;Stretching;Manual Therapy;Self Care/Home Management;Home Program;Education of splint wear, care, fit and precautions;Electrical Stimulation   Therapy plan comments 10% Ktoprofen with phonophoresis/and/or 4% dexamethasone with iontophoresis as needed for pain/inflammation control   Clinical Impression   Criteria for Skilled Therapeutic Interventions Met yes   OT Diagnosis Decreased performance with ADL/IADL tasks   Influenced by the following impairments Pain;Decreased range of motion;Decreased strength   Assessment of Occupational Performance 1-3 Performance Deficits   Clinical Decision Making (Complexity) Low complexity   Therapy Frequency 2x week   Predicted Duration of Therapy Intervention (days/wks) 6 weeks   Risks and Benefits of Treatment have been explained. Yes   Patient, Family & other staff in agreement with plan of care Yes   Hand Eval Goals   Hand Eval Goals 1;2;3   Hand Goal 1   Goal Identifier  strength   Goal Description Soraida will have improved left  strength up to at least 50# without pain in order improve independence with home management tasks.   Target Date 05/11/23   Hand Goal 2   Goal Identifier pinch strength   Goal Description Soraida will have improved left pinch strength of at least 15# pain free in order to improve independence with dressing tasks.   Target Date 05/11/23   Hand Goal 3   Goal Identifier meal prep   Goal Description Soraida will subjectively report ability to cook a full meal without pain for at least 3 days.   Target Date 05/11/23

## 2023-04-03 ENCOUNTER — HOSPITAL ENCOUNTER (OUTPATIENT)
Dept: OCCUPATIONAL THERAPY | Facility: OTHER | Age: 58
Setting detail: THERAPIES SERIES
Discharge: HOME | End: 2023-04-03
Attending: CHIROPRACTOR
Payer: COMMERCIAL

## 2023-04-03 PROCEDURE — 97035 APP MDLTY 1+ULTRASOUND EA 15: CPT | Mod: GO

## 2023-04-03 PROCEDURE — 97110 THERAPEUTIC EXERCISES: CPT | Mod: GO

## 2023-04-03 PROCEDURE — 97010 HOT OR COLD PACKS THERAPY: CPT | Mod: GO

## 2023-04-03 PROCEDURE — 97140 MANUAL THERAPY 1/> REGIONS: CPT | Mod: GO

## 2023-04-05 NOTE — PROGRESS NOTES
[facility]      OUTPATIENT OCCUPATIONAL THERAPY HAND EVALUATION  PLAN OF TREATMENT FOR OUTPATIENT REHABILITATION  (COMPLETE FOR INITIAL CLAIMS ONLY)  Patient's Last Name, First Name, M.I.  YOB: 1965  Demetrice,Soraida RODGERS                        Provider s Name: [facility] Medical Record No.  [id number]     Onset Date: 03/17/23    Start of Care Date: 03/30/23   Type:     ___PT  _X_OT   ___SLP    Medical Diagnosis:     Occupational Therapy Diagnosis:  Decreased performance with ADL/IADL tasks    Visits from SOC: 1      _________________________________________________________________________________  Plan of Treatment/Functional Goals:  Planned Therapy Interventions:  Cryotherapy, Ultrasound, Strengthening, Stretching, Manual Therapy, Self Care/Home Management, Home Program, Education of splint wear, care, fit and precautions, Electrical Stimulation    10% Ketoprofen with phonophoresis/4% Dexamethasone with iontophoresis as needed for pain/inflammation control      Goals  1.  Goal Identifier:  strength       Goal Description: Soraida will have improved left  strength up to at least 50# without pain in order improve independence with home management tasks.       Target Date: 05/11/23   2. Goal Identifier: pinch strength       Goal Description: Soraida will have improved left pinch strength of at least 15# pain free in order to improve independence with dressing tasks.       Target Date: 05/11/23   3. Goal Identifier: meal prep       Goal Description: Soraida will subjectively report ability to cook a full meal without pain for at least 3 days.       Target Date: 05/11/23                                      Treatment Frequency: Therapy Frequency: 2x week  Predicated Duration of Therapy Intervention:  Predicted Duration of Therapy Intervention (days/wks): 6 weeks    Myra  JOELLE FlahertyR/L         I CERTIFY THE NEED FOR THESE SERVICES FURNISHED UNDER        THIS PLAN OF TREATMENT AND WHILE UNDER MY CARE     (Physician co-signature of this document indicates review and certification of the therapy plan).              Certification Period:  3/30/2023  to  5/11/2023            Referring Physician:  Dr. Gomez  PCP: Dr. Brooke    Initial Assessment        See Epic Evaluation Start of Care Date: 03/30/23

## 2023-04-06 ENCOUNTER — HOSPITAL ENCOUNTER (OUTPATIENT)
Dept: OCCUPATIONAL THERAPY | Facility: OTHER | Age: 58
Setting detail: THERAPIES SERIES
Discharge: HOME | End: 2023-04-06
Attending: CHIROPRACTOR
Payer: COMMERCIAL

## 2023-04-06 PROCEDURE — 97033 APP MDLTY 1+IONTPHRSIS EA 15: CPT | Mod: GO

## 2023-04-06 PROCEDURE — 97110 THERAPEUTIC EXERCISES: CPT | Mod: GO

## 2023-04-06 PROCEDURE — 97140 MANUAL THERAPY 1/> REGIONS: CPT | Mod: GO

## 2023-04-06 PROCEDURE — 97035 APP MDLTY 1+ULTRASOUND EA 15: CPT | Mod: GO

## 2023-04-10 ENCOUNTER — HOSPITAL ENCOUNTER (OUTPATIENT)
Dept: OCCUPATIONAL THERAPY | Facility: OTHER | Age: 58
Setting detail: THERAPIES SERIES
Discharge: HOME | End: 2023-04-10
Attending: CHIROPRACTOR
Payer: COMMERCIAL

## 2023-04-10 PROCEDURE — 97140 MANUAL THERAPY 1/> REGIONS: CPT | Mod: GO

## 2023-04-10 PROCEDURE — 97035 APP MDLTY 1+ULTRASOUND EA 15: CPT | Mod: GO

## 2023-04-10 PROCEDURE — 97033 APP MDLTY 1+IONTPHRSIS EA 15: CPT | Mod: GO

## 2023-04-10 PROCEDURE — 97110 THERAPEUTIC EXERCISES: CPT | Mod: GO

## 2023-04-10 PROCEDURE — 97010 HOT OR COLD PACKS THERAPY: CPT | Mod: GO

## 2023-04-12 ENCOUNTER — HOSPITAL ENCOUNTER (OUTPATIENT)
Dept: OCCUPATIONAL THERAPY | Facility: OTHER | Age: 58
Setting detail: THERAPIES SERIES
Discharge: HOME | End: 2023-04-12
Attending: CHIROPRACTOR
Payer: COMMERCIAL

## 2023-04-12 PROCEDURE — 97140 MANUAL THERAPY 1/> REGIONS: CPT | Mod: GO

## 2023-04-12 PROCEDURE — 97110 THERAPEUTIC EXERCISES: CPT | Mod: GO

## 2023-04-12 PROCEDURE — 97035 APP MDLTY 1+ULTRASOUND EA 15: CPT | Mod: GO

## 2023-04-12 PROCEDURE — 97033 APP MDLTY 1+IONTPHRSIS EA 15: CPT | Mod: GO

## 2023-04-14 ENCOUNTER — THERAPY VISIT (OUTPATIENT)
Dept: CHIROPRACTIC MEDICINE | Facility: OTHER | Age: 58
End: 2023-04-14
Attending: CHIROPRACTOR
Payer: COMMERCIAL

## 2023-04-14 VITALS — TEMPERATURE: 97.6 F | RESPIRATION RATE: 16 BRPM | HEART RATE: 78 BPM | OXYGEN SATURATION: 97 %

## 2023-04-14 DIAGNOSIS — M77.12 LEFT LATERAL EPICONDYLITIS: Primary | ICD-10-CM

## 2023-04-14 PROCEDURE — 97810 ACUP 1/> WO ESTIM 1ST 15 MIN: CPT | Performed by: CHIROPRACTOR

## 2023-04-14 NOTE — PROGRESS NOTES
Left elbow is constantly dull aching with frequent sharp pains. Radiates up and down left arm 8/10 W24 10/10. Using heat and ice which helps some.  Carmina Rashi on 4/14/2023 at 8:05 AM    Reviewed by EW    Visit #:  3/3-5    Subjective:  Soraida Suresh is a 57 year old female who is seen in f/u up for:     Left lateral epicondylitis.     Since last visit on 3/29/2023,  Soraida Suresh reports: doing well with OT. Flare up while caring for grandkids. Overuse contributing to flare up. Sharp pain around the olecranon.  Prior to this pain has been localized to more radial head.      (DVPRS) Pain Rating Score : Awful, hard to do anything (W24 10/10) (04/14/23 0801)     Objective:  The following was observed:  Pulse 78   Temp 97.6  F (36.4  C) (Tympanic)   Resp 16   SpO2 97%      P: palpatory tendernessleft lateral epicondyle musculature, left triceps tendon:    A: asymmetric joints, not apparent  R: restricted motion , not present  T: muscle spasms: Extensor muscles left forearm, distal triceps muscle left arm,    Segmental spinal dysfunction/restrictions found at:  .not present      Assessment: Acupuncture application utilized today.  Patient has been doing quite well with OT.  Plan to follow-up with patient again next week.    Diagnoses:      1. Left lateral epicondylitis        Patient's condition:  Patient symptoms are gradually improving and Patient symptoms are worsed due to playing with grandkids.    Treatment effectiveness:  Patients symptoms are getting better      Procedures:  CMT:  None performed    Modalities:  18557: Acupuncture, for 15 minutes:  Points: Li 7, 9, 11, TW 9, 12  For 15 minutes    Therapeutic procedures:  None  Deferred to OT    Response to Treatment  Reduction in symptoms as reported by patient    Prognosis: Good    Progress towards Goals: Patient is making progress towards the goal.     Recommendations:    Instructions:monitor symptoms and perform activities as tolerated    Follow-up:   Return to care in 1 week.

## 2023-04-20 ENCOUNTER — HOSPITAL ENCOUNTER (OUTPATIENT)
Dept: OCCUPATIONAL THERAPY | Facility: OTHER | Age: 58
Setting detail: THERAPIES SERIES
Discharge: HOME | End: 2023-04-20
Attending: CHIROPRACTOR
Payer: COMMERCIAL

## 2023-04-20 ENCOUNTER — THERAPY VISIT (OUTPATIENT)
Dept: CHIROPRACTIC MEDICINE | Facility: OTHER | Age: 58
End: 2023-04-20
Attending: CHIROPRACTOR
Payer: COMMERCIAL

## 2023-04-20 VITALS — OXYGEN SATURATION: 97 % | RESPIRATION RATE: 16 BRPM | TEMPERATURE: 97.2 F | HEART RATE: 74 BPM

## 2023-04-20 DIAGNOSIS — M75.22 BICEPS TENDONITIS, LEFT: ICD-10-CM

## 2023-04-20 DIAGNOSIS — M25.512 ACUTE PAIN OF LEFT SHOULDER: ICD-10-CM

## 2023-04-20 DIAGNOSIS — M77.12 LEFT LATERAL EPICONDYLITIS: Primary | ICD-10-CM

## 2023-04-20 PROCEDURE — 97140 MANUAL THERAPY 1/> REGIONS: CPT | Mod: GO | Performed by: OCCUPATIONAL THERAPIST

## 2023-04-20 PROCEDURE — 97110 THERAPEUTIC EXERCISES: CPT | Mod: GO | Performed by: OCCUPATIONAL THERAPIST

## 2023-04-20 PROCEDURE — 97035 APP MDLTY 1+ULTRASOUND EA 15: CPT | Mod: GO | Performed by: OCCUPATIONAL THERAPIST

## 2023-04-20 PROCEDURE — 97810 ACUP 1/> WO ESTIM 1ST 15 MIN: CPT | Performed by: CHIROPRACTOR

## 2023-04-20 NOTE — PROGRESS NOTES
Fell yesterday against the wall. Left elbow is constant aching. 5/10 W24 9/10. Using ice and heat which is providing a decrease in pain.  Carmina Markham on 4/20/2023 at 11:40 AM    Reviewed by EW    Visit #:  4/3-5    Subjective:  Soraida Suresh is a 57 year old female who is seen in f/u up for:        Left lateral epicondylitis  Acute pain of left shoulder  Biceps tendonitis, left.     Since last visit on 4/14/2023,  Soraida Suresh reports: Symptoms have been exacerbated from a recent trip and fall incident.  Patient reports falling to avoid tripping over her grandson.  Patient's left arm was bent which caused her to strike her elbow against the wall and then eventually to the ceramic tile floor.  No popping or cracking was noted at the time.  Patient was assessed earlier today by occupational therapy.  Occupational therapist did consult with me about her findings, palpatory tenderness over the AC joint and biceps tendon, increased pain along the medial epicondyles and numbness and tingling following the ulnar nerve.    Prior to fall incident patient noted improvement of symptoms with application of acupuncture.  Patient will be leaving for short period of time.    (DVPRS) Pain Rating Score : Interrupts some activities (W24 9/10) (04/20/23 1133)     Objective:  The following was observed:  Pulse 74   Temp 97.2  F (36.2  C) (Tympanic)   Resp 16   SpO2 97%      Ktape left forearm    Left Shoulder  +A/C palpation  -Shift test  +Yergasons  -Supraspinatus press test    P: palpatory tenderness Left AC joint, left biceps tendon, left forearm extensor musculature, left flexor forearm musculature:    A: asymmetric joints, not apparent  R: restricted motion , not apparent  T: Increased muscular tone extensor forearm musculature left side, flexor forearm musculature left side, left biceps muscle, left triceps musculature    Segmental spinal dysfunction/restrictions found at:  Not apparent.      Assessment: Symptoms do  appear to be exacerbated and slightly different from original assessment of patient.  Increased level of pain along the biceps tendon, AC joint, increased pain medial epicondyle musculature and numbness and tingling following ulnar nerve path.  Continue with chiropractic acupuncture in addition to occupational therapy.  Exacerbation of symptoms will likely prolong course of care.  At this time plan for up to 6-8 visits total of chiropractic acupuncture.    Diagnoses:      1. Left lateral epicondylitis    2. Acute pain of left shoulder    3. Biceps tendonitis, left        Patient's condition:  Patient symptoms are worsed due to falling.    Treatment effectiveness:  Responds favorably with acupuncture      Procedures:  CMT:  None performed    Modalities:  12326: IASTM: To left biceps tendon:  for 1 min  30343: Acupuncture, for 15 minutes:  Points: TW12, 14, LI 11, 15 SI 8  For 15 minutes    Therapeutic procedures:  None  Deferred to OT    Response to Treatment  Reduction in symptoms as reported by patient    Prognosis: Good    Progress towards Goals: acute exacerbation, goals in progress    Recommendations:    Instructions:monitor symptoms and perform activities as tolerated    Follow-up:  Return to care in 1-2 weeks.

## 2023-05-01 ENCOUNTER — THERAPY VISIT (OUTPATIENT)
Dept: CHIROPRACTIC MEDICINE | Facility: OTHER | Age: 58
End: 2023-05-01
Attending: CHIROPRACTOR
Payer: COMMERCIAL

## 2023-05-01 ENCOUNTER — HOSPITAL ENCOUNTER (OUTPATIENT)
Dept: OCCUPATIONAL THERAPY | Facility: OTHER | Age: 58
Setting detail: THERAPIES SERIES
Discharge: HOME | End: 2023-05-01
Attending: CHIROPRACTOR
Payer: COMMERCIAL

## 2023-05-01 VITALS — TEMPERATURE: 96.9 F | HEART RATE: 83 BPM | RESPIRATION RATE: 16 BRPM | OXYGEN SATURATION: 98 %

## 2023-05-01 DIAGNOSIS — M77.12 LEFT LATERAL EPICONDYLITIS: Primary | ICD-10-CM

## 2023-05-01 PROCEDURE — 97110 THERAPEUTIC EXERCISES: CPT | Mod: GO

## 2023-05-01 PROCEDURE — 97810 ACUP 1/> WO ESTIM 1ST 15 MIN: CPT | Performed by: CHIROPRACTOR

## 2023-05-01 PROCEDURE — 97035 APP MDLTY 1+ULTRASOUND EA 15: CPT | Mod: GO

## 2023-05-01 PROCEDURE — 97140 MANUAL THERAPY 1/> REGIONS: CPT | Mod: GO

## 2023-05-01 NOTE — PROGRESS NOTES
Left elbow is intermittent aching. Sharp pains while grasping and moving. 2/10 W24 5/10. Using ice and heat which provides a decrease in pain.   Carmina Markham on 5/1/2023 at 2:54 PM    Reviewed by EW    Visit #:  5    Subjective:  Soraida Suresh is a 58 year old female who is seen in f/u up for:     Left lateral epicondylitis.     Since last visit on 4/20/2023,  Soraida Suresh reports: Symptoms showing some improvement.  Notes that symptoms following ulnar nerve and medial elbow have decreased.  Majority of symptoms are on the dorsum of the hand into the lateral epicondyle.  Continue to work with OT with beneficial results.  Returns from trip to [place].  Notes that she is able to do some more activities which were previously limited.  Gripping and lifting continue to be limited due to pain.    Left shoulder symptoms also improved since recent fall.    (DVPRS) Pain Rating Score : Notice pain, does not interfere with activities (W24 5/10) (05/01/23 1449)     Objective:  The following was observed:  Pulse 83   Temp 96.9  F (36.1  C) (Tympanic)   Resp 16   SpO2 98%      P: palpatory tenderness Left extensor musculature:    A: asymmetric joints, not apparent  R: restricted motion , not apparent  T: Taut and tender fibers noted extensor forearm musculature left side, left triceps    Segmental spinal dysfunction/restrictions found at:  None apparent.      Assessment: Patient symptoms are showing signs of progress.  Continue with once a week care for chiropractic acupuncture.  Chiropractic assessment shows no evidence of segmental/somatic dysfunction, symptoms appear to be soft tissue in nature.  Addition of acupuncture would likely provide benefit for symptom control and for efficacy of occupational therapy.    Diagnoses:      1. Left lateral epicondylitis        Patient's condition:  Patient symptoms are gradually improving and Symptoms come and go    Treatment effectiveness:  Patients symptoms are getting  better      Procedures:  CMT:  None performed    Modalities:  37419: Acupuncture, for 15 minutes:  Points: TW 9, 11, 13, LI 7, 11  For 15 minutes    Therapeutic procedures:  None  Kinesiotaping was utilized today in the left forearm flexor and extensor muscle group to promote proprioception to this area, to improve circulation by lifting the fascia and de-activation of the faulty muscle group    Response to Treatment  Reduction in symptoms as reported by patient    Prognosis: Good    Progress towards Goals: Patient is making progress towards the goal.     Recommendations:    Instructions: monitor symptoms and perform activities as tolerated    Follow-up:  Return to care in 1 week.

## 2023-05-03 ENCOUNTER — HOSPITAL ENCOUNTER (OUTPATIENT)
Dept: OCCUPATIONAL THERAPY | Facility: OTHER | Age: 58
Setting detail: THERAPIES SERIES
Discharge: HOME | End: 2023-05-03
Attending: CHIROPRACTOR
Payer: COMMERCIAL

## 2023-05-03 PROCEDURE — 97110 THERAPEUTIC EXERCISES: CPT | Mod: GO

## 2023-05-03 PROCEDURE — 97035 APP MDLTY 1+ULTRASOUND EA 15: CPT | Mod: GO

## 2023-05-03 PROCEDURE — 97140 MANUAL THERAPY 1/> REGIONS: CPT | Mod: GO

## 2023-05-08 ENCOUNTER — THERAPY VISIT (OUTPATIENT)
Dept: CHIROPRACTIC MEDICINE | Facility: OTHER | Age: 58
End: 2023-05-08
Attending: CHIROPRACTOR
Payer: COMMERCIAL

## 2023-05-08 ENCOUNTER — HOSPITAL ENCOUNTER (OUTPATIENT)
Dept: OCCUPATIONAL THERAPY | Facility: OTHER | Age: 58
Setting detail: THERAPIES SERIES
Discharge: HOME | End: 2023-05-08
Attending: CHIROPRACTOR
Payer: COMMERCIAL

## 2023-05-08 VITALS — RESPIRATION RATE: 16 BRPM | HEART RATE: 94 BPM | OXYGEN SATURATION: 96 % | TEMPERATURE: 96.9 F

## 2023-05-08 DIAGNOSIS — M25.512 ACUTE PAIN OF LEFT SHOULDER: ICD-10-CM

## 2023-05-08 DIAGNOSIS — M77.12 LEFT LATERAL EPICONDYLITIS: Primary | ICD-10-CM

## 2023-05-08 PROCEDURE — 97110 THERAPEUTIC EXERCISES: CPT | Mod: GO

## 2023-05-08 PROCEDURE — 97035 APP MDLTY 1+ULTRASOUND EA 15: CPT | Mod: GO

## 2023-05-08 PROCEDURE — 97810 ACUP 1/> WO ESTIM 1ST 15 MIN: CPT | Performed by: CHIROPRACTOR

## 2023-05-08 PROCEDURE — 97140 MANUAL THERAPY 1/> REGIONS: CPT | Mod: GO

## 2023-05-08 NOTE — PROGRESS NOTES
Left elbow is intermittently pinching. 4/10 W24 4/10. Using ice, heat, and stretches which are helping.   Carmina Markham on 5/8/2023 at 10:39 AM    Reviewed by EW    Visit #:  6    Subjective:  Soraida Suresh is a 58 year old female who is seen in f/u up for:        Left lateral epicondylitis  Acute pain of left shoulder.     Since last visit on 5/1/2023,  Soraida Suresh reports: Overall symptoms are showing good signs of progress.  Patient informing that she was able to do raking in her yard.  While this was tolerable when there was counterpressure patient noted that without any counterpressure to pull against this caused aggravation of symptoms.  Patient notes also that she is able to sleep with less interference due to pain.    Occupational Therapy continuing to go well and patient is finding benefit through course of treatment.  Patient is concerned about long-term prognosis.  Wishes to continue to be active with exercise classes noting resistance training and yoga.  Patient is trying to do yoga in a chair setting.   improving, extension motions aggravate symptoms.    Patient does have some tightness along the left CT junction.      (DVPRS) Pain Rating Score : Distracts me, can do usual activities (W24 4/10) (05/08/23 1029)     Objective:  The following was observed:  Pulse 94   Temp 96.9  F (36.1  C) (Tympanic)   Resp 16   SpO2 96%      P: palpatory tendernessLeft upper trapezius:    A: asymmetric joints, not apparent  R: restricted motion , not apparent  T: Spasm left upper trapezius, increased muscular tone distal triceps, extensor musculature left forearm    Segmental spinal dysfunction/restrictions found at:  Not apparent.      Assessment: Patient is showing signs of progress.  Patient's ADLs are less limited due to pain.  Continue with once a week course of treatment for chiropractic acupuncture.    Left CT junction pain not showing signs of segmental/somatic dysfunction.  Subsequent muscular spasms  most likely secondary to elbow concerns.  Soft tissue treatment given today without incident.    Had a lengthy talk with patient regarding long-term activity.  Encourage patient to continue to perform activities as she tolerates.  Suspect that some yoga poses will likely be not worth attempting as they will aggravate symptoms however I do believe that patient should continue to be as active as she can tolerate with the knowledge that some activities may be changed to accommodate as to not aggravate symptoms.    Diagnoses:      1. Left lateral epicondylitis    2. Acute pain of left shoulder        Patient's condition:  Patient symptoms are gradually improving    Treatment effectiveness:  Patients symptoms are getting better and Patient is able to do some ADL's with less pain      Procedures:  CMT:  Activator to trigger points of the upper trapezius  No adjustment performed to the spine    Modalities:  74605: Acupuncture, for 15 minutes:  Points: TW 9, 11, 12, LI 8, 11  For 15 minutes    Therapeutic procedures:  None  Kinesiotaping was utilized today in the left forearm extensor and flexor muscle groups to promote proprioception to this area, to improve circulation by lifting the fascia and de-activation of the faulty muscle group    Response to Treatment  Reduction in symptoms as reported by patient    Prognosis: Good    Progress towards Goals: Patient is making progress towards the goal.     Recommendations:    Instructions:monitor symptoms and perform activities as tolerated    Follow-up:  Return to care in 1 week.

## 2023-05-12 ENCOUNTER — HOSPITAL ENCOUNTER (OUTPATIENT)
Dept: OCCUPATIONAL THERAPY | Facility: OTHER | Age: 58
Setting detail: THERAPIES SERIES
Discharge: HOME | End: 2023-05-12
Attending: CHIROPRACTOR
Payer: COMMERCIAL

## 2023-05-12 PROCEDURE — 97140 MANUAL THERAPY 1/> REGIONS: CPT | Mod: GO

## 2023-05-12 PROCEDURE — 97035 APP MDLTY 1+ULTRASOUND EA 15: CPT | Mod: GO

## 2023-05-12 PROCEDURE — 97110 THERAPEUTIC EXERCISES: CPT | Mod: GO

## 2023-05-12 NOTE — PROGRESS NOTES
[facility]    Outpatient Occupational Therapy Progress Note  Patient: Soraida Suresh  : 1965    Beginning/End Dates of Reporting Period:  3/30/2023 to 2023    Referring Provider: Dr. Gomez  PCP: Dr. Brooke    Therapy Diagnosis: left lateral epicondylitis     Client Self Report: Soraida reports that her elbow has been feeling really better the last few days.    Objective Measurements:     Objective Measure: strength   Details: Left : 35#  pinch not measured due to pinch guage failure         Goals:     Goal Identifier  strength   Goal Description Soraida will have improved left  strength up to at least 50# without pain in order improve independence with home management tasks.   Target Date 23   Date Met      Progress (detail required for progress note): progressing.  improved from intitial 27# to 35#. Continue goal.     Goal Identifier pinch strength   Goal Description Soraida will have improved left pinch strength of at least 15# pain free in order to improve independence with dressing tasks.   Target Date 23   Date Met      Progress (detail required for progress note): not assessed, however patient reports she can now make a fist without pain. continue goal.     Goal Identifier meal prep   Goal Description Soraida will subjectively report ability to cook a full meal without pain for at least 3 days.   Target Date 23   Date Met      Progress (detail required for progress note): Progressing, however inconsistent.  HAs not been able to go 3 days in a row.  Continue goal.             Plan:  Continue therapy per current plan of care.    Discharge:  No

## 2023-05-15 ENCOUNTER — MYC MEDICAL ADVICE (OUTPATIENT)
Dept: FAMILY MEDICINE | Facility: OTHER | Age: 58
End: 2023-05-15
Payer: COMMERCIAL

## 2023-05-15 ENCOUNTER — HOSPITAL ENCOUNTER (OUTPATIENT)
Dept: OCCUPATIONAL THERAPY | Facility: OTHER | Age: 58
Setting detail: THERAPIES SERIES
Discharge: HOME | End: 2023-05-15
Attending: CHIROPRACTOR
Payer: COMMERCIAL

## 2023-05-15 PROCEDURE — 97110 THERAPEUTIC EXERCISES: CPT | Mod: GO

## 2023-05-15 PROCEDURE — 97035 APP MDLTY 1+ULTRASOUND EA 15: CPT | Mod: GO

## 2023-05-15 PROCEDURE — 97140 MANUAL THERAPY 1/> REGIONS: CPT | Mod: GO

## 2023-05-17 ENCOUNTER — THERAPY VISIT (OUTPATIENT)
Dept: CHIROPRACTIC MEDICINE | Facility: OTHER | Age: 58
End: 2023-05-17
Attending: CHIROPRACTOR
Payer: COMMERCIAL

## 2023-05-17 ENCOUNTER — THERAPY VISIT (OUTPATIENT)
Dept: OCCUPATIONAL THERAPY | Facility: OTHER | Age: 58
End: 2023-05-17
Attending: CHIROPRACTOR
Payer: COMMERCIAL

## 2023-05-17 VITALS — OXYGEN SATURATION: 97 % | HEART RATE: 83 BPM | TEMPERATURE: 96.9 F | RESPIRATION RATE: 16 BRPM

## 2023-05-17 DIAGNOSIS — M77.12 LEFT LATERAL EPICONDYLITIS: Primary | ICD-10-CM

## 2023-05-17 PROCEDURE — 97110 THERAPEUTIC EXERCISES: CPT | Mod: GO

## 2023-05-17 PROCEDURE — 97035 APP MDLTY 1+ULTRASOUND EA 15: CPT | Mod: GO

## 2023-05-17 PROCEDURE — 97810 ACUP 1/> WO ESTIM 1ST 15 MIN: CPT | Performed by: CHIROPRACTOR

## 2023-05-17 PROCEDURE — 97140 MANUAL THERAPY 1/> REGIONS: CPT | Mod: GO

## 2023-05-17 PROCEDURE — 97010 HOT OR COLD PACKS THERAPY: CPT | Mod: GO

## 2023-05-17 NOTE — PROGRESS NOTES
Left elbow is frequently aching. Radiating down left arm. 4/10 W24 8/10. Using ice, heat, and stretches which are helping.  Carmina Markham on 5/17/2023 at 10:38 AM    Reviewed by EW    Visit #:  7    Subjective:  Soraida Suresh is a 58 year old female who is seen in f/u up for:     Left lateral epicondylitis.     Since last visit on 5/8/2023,  Soraida Suresh reports: Improving in some ways.  Cooking recently aggravated.  Continue to work with OT with good results.      (DVPRS) Pain Rating Score : Distracts me, can do usual activities (W24 8/10) (05/17/23 1035)     Objective:  The following was observed:  Pulse 83   Temp 96.9  F (36.1  C) (Tympanic)   Resp 16   SpO2 97%      P: palpatory tendernesscommon extensor tendon on left:    A: asymmetric joints, not apparent  R: restricted motion , not apparent  T: muscle spasm at level(s): left triceps, left aconeus:      Segmental spinal dysfunction/restrictions found at:  Not apparent.      Assessment: patient continuing to show signs of progress. Increased muscular tone today, most likely due to increased use. Continue to follow up on 1x week basis.     Diagnoses:      1. Left lateral epicondylitis        Patient's condition:  Patient symptoms are gradually improving and Symptoms come and go    Treatment effectiveness:  Symptoms appear to be decreasing      Procedures:  CMT:  Not performed    81407: Acupuncture, for 15 minutes:  Points: TW 9, 11, 12, LI 8, 11  For 15 minutes     Therapeutic procedures:  None  Kinesiotaping was utilized today in the left forearm extensor and flexor muscle groups to promote proprioception to this area, to improve circulation by lifting the fascia and de-activation of the faulty muscle group       Response to Treatment  Reduction in symptoms as reported by patient    Prognosis: Good    Progress towards Goals: Patient is making progress towards the goal.     Recommendations:    Instructions:monitor symptoms and perform activities as  tolerated    Follow-up:  Return to care in 1 week.

## 2023-05-22 ENCOUNTER — THERAPY VISIT (OUTPATIENT)
Dept: CHIROPRACTIC MEDICINE | Facility: OTHER | Age: 58
End: 2023-05-22
Attending: CHIROPRACTOR
Payer: COMMERCIAL

## 2023-05-22 ENCOUNTER — THERAPY VISIT (OUTPATIENT)
Dept: OCCUPATIONAL THERAPY | Facility: OTHER | Age: 58
End: 2023-05-22
Attending: CHIROPRACTOR
Payer: COMMERCIAL

## 2023-05-22 VITALS — HEART RATE: 82 BPM | OXYGEN SATURATION: 97 % | RESPIRATION RATE: 16 BRPM | TEMPERATURE: 97.6 F

## 2023-05-22 DIAGNOSIS — M77.12 LEFT LATERAL EPICONDYLITIS: Primary | ICD-10-CM

## 2023-05-22 PROCEDURE — 97035 APP MDLTY 1+ULTRASOUND EA 15: CPT | Mod: GO

## 2023-05-22 PROCEDURE — 97110 THERAPEUTIC EXERCISES: CPT | Mod: GO

## 2023-05-22 PROCEDURE — 97140 MANUAL THERAPY 1/> REGIONS: CPT | Mod: GO

## 2023-05-22 PROCEDURE — 97810 ACUP 1/> WO ESTIM 1ST 15 MIN: CPT | Performed by: CHIROPRACTOR

## 2023-05-22 PROCEDURE — 97010 HOT OR COLD PACKS THERAPY: CPT | Mod: GO

## 2023-05-25 ENCOUNTER — THERAPY VISIT (OUTPATIENT)
Dept: OCCUPATIONAL THERAPY | Facility: OTHER | Age: 58
End: 2023-05-25
Attending: CHIROPRACTOR
Payer: COMMERCIAL

## 2023-05-25 DIAGNOSIS — M77.12 LEFT LATERAL EPICONDYLITIS: Primary | ICD-10-CM

## 2023-05-25 PROCEDURE — 97140 MANUAL THERAPY 1/> REGIONS: CPT | Mod: GO

## 2023-05-25 PROCEDURE — 97110 THERAPEUTIC EXERCISES: CPT | Mod: GO

## 2023-05-25 PROCEDURE — 97035 APP MDLTY 1+ULTRASOUND EA 15: CPT | Mod: GO

## 2023-06-01 ENCOUNTER — THERAPY VISIT (OUTPATIENT)
Dept: CHIROPRACTIC MEDICINE | Facility: OTHER | Age: 58
End: 2023-06-01
Attending: CHIROPRACTOR
Payer: COMMERCIAL

## 2023-06-01 ENCOUNTER — THERAPY VISIT (OUTPATIENT)
Dept: OCCUPATIONAL THERAPY | Facility: OTHER | Age: 58
End: 2023-06-01
Attending: CHIROPRACTOR
Payer: COMMERCIAL

## 2023-06-01 VITALS — TEMPERATURE: 97.6 F | OXYGEN SATURATION: 95 % | HEART RATE: 65 BPM | RESPIRATION RATE: 16 BRPM

## 2023-06-01 DIAGNOSIS — M77.12 LEFT LATERAL EPICONDYLITIS: Primary | ICD-10-CM

## 2023-06-01 DIAGNOSIS — M54.6 PAIN IN THORACIC SPINE: ICD-10-CM

## 2023-06-01 DIAGNOSIS — M99.02 SEGMENTAL AND SOMATIC DYSFUNCTION OF THORACIC REGION: ICD-10-CM

## 2023-06-01 PROCEDURE — 97140 MANUAL THERAPY 1/> REGIONS: CPT | Mod: GO

## 2023-06-01 PROCEDURE — 97810 ACUP 1/> WO ESTIM 1ST 15 MIN: CPT | Performed by: CHIROPRACTOR

## 2023-06-01 PROCEDURE — 98940 CHIROPRACT MANJ 1-2 REGIONS: CPT | Mod: AT | Performed by: CHIROPRACTOR

## 2023-06-01 PROCEDURE — 97035 APP MDLTY 1+ULTRASOUND EA 15: CPT | Mod: GO

## 2023-06-07 ENCOUNTER — HOSPITAL ENCOUNTER (OUTPATIENT)
Dept: GENERAL RADIOLOGY | Facility: OTHER | Age: 58
Discharge: HOME | End: 2023-06-07
Payer: COMMERCIAL

## 2023-06-07 ENCOUNTER — OFFICE VISIT (OUTPATIENT)
Dept: FAMILY MEDICINE | Facility: OTHER | Age: 58
End: 2023-06-07
Attending: PHYSICIAN ASSISTANT
Payer: COMMERCIAL

## 2023-06-07 VITALS
WEIGHT: 155 LBS | BODY MASS INDEX: 24.33 KG/M2 | HEART RATE: 72 BPM | OXYGEN SATURATION: 98 % | TEMPERATURE: 98 F | SYSTOLIC BLOOD PRESSURE: 118 MMHG | RESPIRATION RATE: 16 BRPM | DIASTOLIC BLOOD PRESSURE: 78 MMHG | HEIGHT: 67 IN

## 2023-06-07 DIAGNOSIS — J10.1 INFLUENZA A: ICD-10-CM

## 2023-06-07 DIAGNOSIS — J06.9 UPPER RESPIRATORY TRACT INFECTION, UNSPECIFIED TYPE: Primary | ICD-10-CM

## 2023-06-07 DIAGNOSIS — R05.2 SUBACUTE COUGH: ICD-10-CM

## 2023-06-07 DIAGNOSIS — R06.2 WHEEZING: ICD-10-CM

## 2023-06-07 LAB
FLUAV RNA SPEC QL NAA+PROBE: POSITIVE
FLUBV RNA RESP QL NAA+PROBE: NEGATIVE
RSV RNA SPEC NAA+PROBE: NEGATIVE
SARS-COV-2 RNA RESP QL NAA+PROBE: NEGATIVE

## 2023-06-07 PROCEDURE — C9803 HOPD COVID-19 SPEC COLLECT: HCPCS

## 2023-06-07 PROCEDURE — 87637 SARSCOV2&INF A&B&RSV AMP PRB: CPT | Mod: ZL

## 2023-06-07 PROCEDURE — 71046 X-RAY EXAM CHEST 2 VIEWS: CPT

## 2023-06-07 PROCEDURE — 99213 OFFICE O/P EST LOW 20 MIN: CPT

## 2023-06-07 RX ORDER — DOXYCYCLINE 100 MG/1
100 CAPSULE ORAL 2 TIMES DAILY
Qty: 14 CAPSULE | Refills: 0 | Status: SHIPPED | OUTPATIENT
Start: 2023-06-07 | End: 2023-06-14

## 2023-06-07 RX ORDER — BENZONATATE 100 MG/1
100 CAPSULE ORAL 3 TIMES DAILY PRN
Qty: 30 CAPSULE | Refills: 0 | Status: SHIPPED | OUTPATIENT
Start: 2023-06-07 | End: 2023-06-22

## 2023-06-07 ASSESSMENT — PATIENT HEALTH QUESTIONNAIRE - PHQ9
SUM OF ALL RESPONSES TO PHQ QUESTIONS 1-9: 6
SUM OF ALL RESPONSES TO PHQ QUESTIONS 1-9: 6
10. IF YOU CHECKED OFF ANY PROBLEMS, HOW DIFFICULT HAVE THESE PROBLEMS MADE IT FOR YOU TO DO YOUR WORK, TAKE CARE OF THINGS AT HOME, OR GET ALONG WITH OTHER PEOPLE: NOT DIFFICULT AT ALL

## 2023-06-07 ASSESSMENT — PAIN SCALES - GENERAL: PAINLEVEL: SEVERE PAIN (7)

## 2023-06-07 NOTE — NURSING NOTE
Chief Complaint   Patient presents with     Cough     X 2 weeks - productive cough       Patient tx with inhaler 2 x a day, mucinex 12 hr, and tylenol    Advanced Care Planning on file? yes    Medication Review Completed: complete    FOOD SECURITY SCREENING QUESTIONS:    The next two questions are to help us understand your food security.  If you are feeling you need any assistance in this area, we have resources available to support you today.    Hunger Vital Signs:  Within the past 12 months we worried whether our food would run out before we got money to buy more. Never  Within the past 12 months the food we bought just didn't last and we didn't have money to get more. Never    Tatyana Munson LPN

## 2023-06-07 NOTE — PROGRESS NOTES
ASSESSMENT/PLAN:    I have reviewed the nursing notes.  I have reviewed the findings, diagnosis, plan and need for follow up with the patient.    1. Upper respiratory tract infection, unspecified type  2. Influenza A  3. Subacute cough  4. Wheezing  - doxycycline hyclate (VIBRAMYCIN) 100 MG capsule; Take 1 capsule (100 mg) by mouth 2 times daily for 7 days  Dispense: 14 capsule; Refill: 0  - benzonatate (TESSALON) 100 MG capsule; Take 1 capsule (100 mg) by mouth 3 times daily as needed for cough  Dispense: 30 capsule; Refill: 0  - Symptomatic Influenza A/B, RSV, & SARS-CoV2 PCR (COVID-19) Nose  - XR Chest 2 Views    Patient presents with upper respiratory symptoms.  Patient's vitals are stable and she appears nontoxic.  Her chest x-ray was negative for any signs of pneumonia. Patient tested positive for influenza A.  Discussed results with patient and informed her that she is beyond the treatment window for Tamiflu.  Will treat for possible bacterial upper respiratory infection as she has had symptoms for 2 weeks that are not improving.  Will treat with doxycycline twice a day for 7 days due to her multiple antibiotic allergies.  Will treat patient's cough with Tessalon Perles as needed.  Advised patient that she is beyond the quarantine window as well for her influenza A but should be fever free for 24 hours.    Discussed symptomatic treatment - Encouraged fluids, salt water gargles, honey, elevation, humidifier, sinus rinse/netti pot, lozenges, tea, topical vapor rub, popsicles, rest, etc. May use over-the-counter Tylenol or ibuprofen PRN.    Discussed warning signs/symptoms indicative of need to f/u    Follow up if symptoms persist or worsen or concerns    I explained my diagnostic considerations and recommendations to the patient, who voiced understanding and agreement with the treatment plan. All questions were answered. We discussed potential side effects of any prescribed or recommended therapies, as well as  expectations for response to treatments.    Nils Mccann, AGUSTINA CNP  6/7/2023  9:34 AM    HPI:    Soraida Suresh is a 58 year old female  who presents to [facility] today for concerns of URI symptoms    URI, x 2 weeks    Symptoms:  YES: + chills, no fever  YES: + sore throat/pharyngitis/tonsillitis.   YES: + allergy/URI Symptoms  No muffled sounds/change in hearing  No sensation of fullness in ear(s)  No ringing in ears/tinnitus  No balance changes  YES: + dizziness  YES: + congestion (head/nasal/chest)  YES: + cough/productive cough  No post nasal drip   YES: + headache  No sinus pain/pressure  YES: + myalgias  YES: + otalgia  No rash  Activity Level Changes: Yes: increased fatigue  Appetite/Liquid Intake Changes: Yes: decreased  Changes to Bowel Habits: No  Changes to Bladder Habits: No  Additional Symptoms to Report: Yes: shortness of breath, wheezing, chest tightness  History of similar symptoms: No  Prior workup: No    Treatments tried: Decongestants, Inhaler (name: albuterol), Fluids and Rest, nyquil    Site of exposure: not known.  Type of exposure: not known    Other Pertinent History: asthma ( URI induced ) and s/p tonsillectomy    Allergies: reviewed    PCP: Mendy    Past Medical History:   Diagnosis Date     Abnormal cytological finding in specimen from cervix uteri     10/11/2011     Allergic rhinitis     No Comments Provided     Benign lipomatous neoplasm of skin and subcutaneous tissue of trunk     1/20/2016     Contact dermatitis     No Comments Provided     Dyshidrosis     No Comments Provided     Encounter for screening for malignant neoplasm of colon     8/27/2014     Gastro-esophageal reflux disease without esophagitis     No Comments Provided     Major depressive disorder, single episode     6/24/2011     Other congenital malformations of great veins (CODE)     7/10/2006     Other specified diseases of anus and rectum (CODE)     5/20/2015     Panic disorder without agoraphobia      resolved after heart surgery     Partial anomalous pulmonary venous connection     No Comments Provided     Residual hemorrhoidal skin tags     3/3/2011     Past Surgical History:   Procedure Laterality Date     anorectal exam anesthesia  2015    MT ANORECTAL EXAM SURG REQ ANESTH  DIAG     APPENDECTOMY OPEN      1987     ARTHROSCOPY KNEE      2006, 2013,Debridement, partial medial meniscectomy, and anterior cruciate ligament reconstruction using tibialis anterior allograft, right knee     AS HYSTEROSCOPY, SURGICAL; W/ ENDOMETRIAL ABLATION, ANY METHOD  2015    [facility]     CARDIAC SURGERY  2006    Repair of anomalous pulmonary venous return by anastomosis of the anomalous vein to the left atrial appendage, done at [facility]     COLONOSCOPY  2014 Follow up in 10 years 24 normal     ECHO LIMITED      mild left atrial enlargement;  normal ef     ESOPHAGOSCOPY, GASTROSCOPY, DUODENOSCOPY (EGD), COMBINED           ESOPHAGOSCOPY, GASTROSCOPY, DUODENOSCOPY (EGD), COMBINED           ESOPHAGOSCOPY, GASTROSCOPY, DUODENOSCOPY (EGD), COMBINED           ESOPHAGOSCOPY, GASTROSCOPY, DUODENOSCOPY (EGD), COMBINED      11,Normal     ESOPHAGOSCOPY, GASTROSCOPY, DUODENOSCOPY (EGD), COMBINED N/A 2022    Procedure: ESOPHAGOGASTRODUODENOSCOPY, WITH BIOPSY;  Surgeon: Ramon Sales MD;  Location: [facility] OR     NISSEN FUNDOPLICATION  2000    Dr. Sales     OTHER SURGICAL HISTORY Left 2021    Tenex procedure for plantar fasciitis with Dr. Reveles     SIGMOIDOSCOPY FLEXIBLE      ,And BE, negative, for rectal bleeding.  Fissures and hemorrhoids noted     SURGICAL PATHOLOGY EXAM Left 2016    Left flank     Social History     Tobacco Use     Smoking status: Former     Types: Cigarettes     Quit date: 10/10/2005     Years since quittin.6     Smokeless tobacco: Never   Vaping Use     Vaping status: Never Used   Substance Use Topics     Alcohol use: Yes  "    Alcohol/week: 14.0 standard drinks of alcohol     Comment: Alcoholic Drinks/day: 2-3 per day     Current Outpatient Medications   Medication Sig Dispense Refill     acyclovir (ZOVIRAX) 5 % external ointment Apply to affected area 6 times a day for 7 days. 30 g 11     albuterol (PROAIR HFA/PROVENTIL HFA/VENTOLIN HFA) 108 (90 Base) MCG/ACT inhaler Inhale 2 puffs into the lungs every 4 hours as needed for shortness of breath / dyspnea or wheezing 18 g 1     buPROPion (WELLBUTRIN XL) 300 MG 24 hr tablet Take 1 tablet (300 mg) by mouth every morning 90 tablet 3     diphenhydrAMINE HCl (BENADRYL ALLERGY PO)        Glucosamine-Chondroitin 750 & 400 MG MISC Take 1 tablet by mouth daily       L-Lysine 500 MG TABS Take 1 tablet by mouth daily       MELATONIN PO        omeprazole (PRILOSEC) 40 MG DR capsule Take 1 capsule (40 mg) by mouth daily as needed 90 capsule 3     other medical supplies Shingrix.  Diagnosis:  Z23. 1 each 1     polyethylene glycol (MIRALAX) 17 GM/Dose powder Take by mouth daily       triamcinolone (NASACORT) 55 MCG/ACT nasal aerosol Spray 2 sprays into both nostrils 2 times daily 16.9 mL 9     Allergies   Allergen Reactions     Codeine Other (See Comments)     Medroxyprogesterone Other (See Comments)     Caused patient's brain to swell.     Azithromycin Palpitations     Latex Rash     hands     Penicillins Rash     Proline Rash     Prolene: Sutures; caused keloid     Sulfamethoxazole-Trimethoprim Rash     Sulfamethoxazole-Trimethoprim Rash     Past medical history, past surgical history, current medications and allergies reviewed and accurate to the best of my knowledge.      ROS:  Refer to HPI    /78 (BP Location: Right arm, Patient Position: Sitting, Cuff Size: Adult Regular)   Pulse 72   Temp 98  F (36.7  C) (Tympanic)   Resp 16   Ht 1.689 m (5' 6.5\")   Wt 70.3 kg (155 lb)   SpO2 98%   BMI 24.64 kg/m      EXAM:  General Appearance: Well appearing 58 year old female, appropriate " appearance for age. No acute distress   Ears: Left TM intact, translucent with bony landmarks appreciated, no erythema, no effusion, no bulging, no purulence.  Right TM intact, translucent with bony landmarks appreciated, no erythema, no effusion, no bulging, no purulence.  Left auditory canal clear.  Right auditory canal clear.  Normal external ears, non tender.  Eyes: conjunctivae normal without erythema or irritation, corneas clear, no drainage or crusting, no eyelid swelling, pupils equal   Oropharynx: moist mucous membranes, posterior pharynx without erythema, tonsils absent, no post nasal drip seen, no trismus, voice clear.    Sinuses:  No sinus tenderness upon palpation of the frontal or maxillary sinuses  Nose:  Bilateral nares: no erythema, no edema, no drainage or congestion   Neck: supple without adenopathy  Respiratory: normal chest wall and respirations.  Normal effort.  Clear to auscultation bilaterally, mild wheezing, crackles or rhonchi.  No increased work of breathing.  Moderate productive cough appreciated.  Cardiac: RRR with no murmurs  Musculoskeletal:  Equal movement of bilateral upper extremities.  Equal movement of bilateral lower extremities.  Normal gait.    Dermatological: no rashes noted of exposed skin  Neuro: Alert and oriented to person, place, and time. Psychological: normal affect, alert, oriented, and pleasant.     Labs & Xray:  Results for orders placed or performed in visit on 06/07/23   XR Chest 2 Views     Status: None    Narrative    PROCEDURE:  XR CHEST 2 VIEWS    HISTORY:  Subacute cough; Wheezing.     COMPARISON:  None.    FINDINGS:   There is a nodular density projected overlying the heart in the  lateral view. This nodule may be projection of a sclerotic lesion in  the anterior aspect of the left seventh rib seen on the PA view.. The  sclerotic seventh rib lesion is stable as compared to a CT scan in  2018 and is most likely benign. The lungs are otherwise clear.  Surgical  clips are seen in the mediastinum. No pneumothorax or pleural  effusion is noted.      Impression    IMPRESSION:  Sclerotic lesion in the anterior right seventh rib this  appears stable as compared to a CT scan of 2018. This is most likely  benign.    No active pulmonary infiltrates.      SACHA FARR MD         SYSTEM ID:  [id number]   Symptomatic Influenza A/B, RSV, & SARS-CoV2 PCR (COVID-19) Nose     Status: Abnormal    Specimen: Nose; Swab   Result Value Ref Range    Influenza A PCR Positive (A) Negative    Influenza B PCR Negative Negative    RSV PCR Negative Negative    SARS CoV2 PCR Negative Negative    Narrative    Testing was performed using the Xpert Xpress CoV2/Flu/RSV Assay on the [employer] GeneXpert Instrument. This test should be ordered for the detection of SARS-CoV-2, influenza, and RSV viruses in individuals who meet clinical and/or epidemiological criteria. Test performance is unknown in asymptomatic patients. This test is for in vitro diagnostic use under the FDA EUA for laboratories certified under CLIA to perform high or moderate complexity testing. This test has not been FDA cleared or approved. A negative result does not rule out the presence of PCR inhibitors in the specimen or target RNA in concentration below the limit of detection for the assay. If only one viral target is positive but coinfection with multiple targets is suspected, the sample should be re-tested with another FDA cleared, approved, or authorized test, if coinfection would change clinical management. This test was validated by the [facility]. These laboratories are certified under the Clinical Laboratory Improvement Amendments of 1988 (CLIA-88) as qualified to perform high complexity laboratory testing.

## 2023-06-20 ENCOUNTER — MYC MEDICAL ADVICE (OUTPATIENT)
Dept: FAMILY MEDICINE | Facility: OTHER | Age: 58
End: 2023-06-20
Payer: COMMERCIAL

## 2023-06-20 DIAGNOSIS — R06.2 WHEEZING: ICD-10-CM

## 2023-06-20 DIAGNOSIS — J06.9 UPPER RESPIRATORY TRACT INFECTION, UNSPECIFIED TYPE: ICD-10-CM

## 2023-06-20 DIAGNOSIS — R05.2 SUBACUTE COUGH: ICD-10-CM

## 2023-06-21 ENCOUNTER — THERAPY VISIT (OUTPATIENT)
Dept: OCCUPATIONAL THERAPY | Facility: OTHER | Age: 58
End: 2023-06-21
Attending: FAMILY MEDICINE
Payer: COMMERCIAL

## 2023-06-21 DIAGNOSIS — M77.12 LEFT LATERAL EPICONDYLITIS: Primary | ICD-10-CM

## 2023-06-21 PROCEDURE — 97140 MANUAL THERAPY 1/> REGIONS: CPT | Mod: GO

## 2023-06-21 PROCEDURE — 97110 THERAPEUTIC EXERCISES: CPT | Mod: GO

## 2023-06-21 PROCEDURE — 97035 APP MDLTY 1+ULTRASOUND EA 15: CPT | Mod: GO

## 2023-06-22 RX ORDER — BENZONATATE 100 MG/1
100 CAPSULE ORAL 3 TIMES DAILY PRN
Qty: 30 CAPSULE | Refills: 0 | Status: SHIPPED | OUTPATIENT
Start: 2023-06-22 | End: 2023-12-04

## 2023-06-22 NOTE — TELEPHONE ENCOUNTER
"Patient was seen in [facility] 6/7/23 for respiratory symptoms. She was initially diagnosed with an upper respiratory infection of bacterial origin (bronchitis) and then later was called stating she has influenza A. She completed a 7 day course of doxycycline. She was also prescribed benzonatate.    She states she is feeling \"much better\" but continues to have a \"bad cough making trouble breathing\". Is using prescribed albuterol inhaler.     Patient is questioning if she can have refill on benzonatate for her cough or if she will need to be seen again. States medication is effective in managing symptoms    Due to absence of PCP, routing to a covering provider.    Corinne R Thayer, RN on 6/22/2023 at 10:24 AM    "

## 2023-07-25 ENCOUNTER — THERAPY VISIT (OUTPATIENT)
Dept: CHIROPRACTIC MEDICINE | Facility: OTHER | Age: 58
End: 2023-07-25
Attending: CHIROPRACTOR
Payer: COMMERCIAL

## 2023-07-25 VITALS — OXYGEN SATURATION: 98 % | HEART RATE: 69 BPM | RESPIRATION RATE: 14 BRPM | TEMPERATURE: 97.6 F

## 2023-07-25 DIAGNOSIS — M99.04 SEGMENTAL AND SOMATIC DYSFUNCTION OF SACRAL REGION: Primary | ICD-10-CM

## 2023-07-25 DIAGNOSIS — M54.42 ACUTE LEFT-SIDED LOW BACK PAIN WITH LEFT-SIDED SCIATICA: ICD-10-CM

## 2023-07-25 DIAGNOSIS — M99.01 SEGMENTAL AND SOMATIC DYSFUNCTION OF CERVICAL REGION: ICD-10-CM

## 2023-07-25 DIAGNOSIS — M54.2 DORSALGIA OF CERVICAL REGION: ICD-10-CM

## 2023-07-25 DIAGNOSIS — M99.02 SEGMENTAL AND SOMATIC DYSFUNCTION OF THORACIC REGION: ICD-10-CM

## 2023-07-25 PROCEDURE — 97012 MECHANICAL TRACTION THERAPY: CPT | Performed by: CHIROPRACTOR

## 2023-07-25 PROCEDURE — 99212 OFFICE O/P EST SF 10 MIN: CPT | Mod: 25 | Performed by: CHIROPRACTOR

## 2023-07-25 PROCEDURE — 98941 CHIROPRACT MANJ 3-4 REGIONS: CPT | Mod: AT | Performed by: CHIROPRACTOR

## 2023-07-25 NOTE — PROGRESS NOTES
Flare up last week, Left lower back with constant aching. Radiating tot he left leg down to the ankle. 7/10 W24 9/10. Using ice, repositioning, and Tylenol with no change.    Left neck with constant pinching. 4/10 W24 4/10. Using ice and tylenol with no change.   Taya Matthews  on 7/25/2023 at 7:34 AM    Reviewed by EW    Visit #:  1/4-6  Re-assessment  New Episode of care    Subjective:  Soraida Suresh is a 58 year old female who is seen in f/u up for:        Segmental and somatic dysfunction of sacral region  Acute left-sided low back pain with left-sided sciatica  Segmental and somatic dysfunction of cervical region  Segmental and somatic dysfunction of thoracic region  Dorsalgia of cervical region.     Since last visit on 6/1/2023,  Soraida Suresh reports: Last week patient began experiencing flareup of low back and neck concerns.  States that she has been doing a little more traveling as of recently which seems to be contributing to symptoms.  No traumatic events or overuse apparent at this time.  Beginning to experience some left-sided sciatica symptoms which is why she is presenting to our office at this time and does request spinal traction which has been used successfully to help her in the past.  No red flag concerns such as loss of bowel/bladder, saddle paresthesia or weakness of the lower extremities.      (DVPRS) Pain Rating Score : 4-Distracts me, can do usual activities (W24 4/10) (07/25/23 0732)     Objective:  The following was observed:  Pulse 69   Temp 97.6  F (36.4  C) (Tympanic)   Resp 14   SpO2 98%        7/25/2023     7:37 AM   Neck Disability Index (  Marcus H. and Raphael C. 1991. All rights reserved.; used with permission)   SECTION 1 - PAIN INTENSITY 2   SECTION 2 - PERSONAL CARE 0   SECTION 3 - LIFTING 1   SECTION 4 - READING 0   SECTION 5 - HEADACHES 0   SECTION 6 - CONCENTRATION 1   SECTION 7 - WORK 1   SECTION 8 - DRIVING 2   SECTION 9 - SLEEPING 1   SECTION 10 - RECREATION 2   Count  10   Sum 10   Raw Score: /50 10   Neck Disability Index Score: (%) 20 %      Cervical AROM: Restrictions with left lateral flexion, left rotation, mild restrictions right lateral flexion, right rotation, flexion and extension appear WNL    Cervical compression: -  Cervical distraction: -    Oswestry (SWATHI) Questionnaire        7/25/2023     7:35 AM   OSWESTRY DISABILITY INDEX   Count 9   Sum 15   Oswestry Score (%) 33.33 %       Thoracic/lumbar AROM: Restrictions with left rotation, otherwise generally unremarkable    SLR: -right, +left  Ely's: -right, +left    P: palpatory tenderness left PSIS, left side T4, left side C2:    A: static palpation demonstrates intersegmental asymmetry , cervical, thoracic, pelvis  R: motion palpation notes restricted motion, C2 , T4 , and Sacrum   T: muscle spasm at level(s):  Left rhomboids, left splenius capitis, left quadratus lumborum:      Segmental spinal dysfunction/restrictions found at:  :  C2 Left lateral flexion restricted and Extension restriction  T4 Left lateral flexion restricted and Extension restriction  Sacrum Left lateral flexion restricted and Extension restriction.      Assessment: Segmental/somatic dysfunction present cervical, thoracic, pelvic regions.  Patient has been under our care in the past and typically responds favorably within chiropractic treatment realms.  Due to patient history and current presentation of sciatica like traction does seem appropriate as this has been beneficial in the past.  Plan for up to 6 chiropractic visits in the next 4-6 weeks barring acute exacerbation of symptoms or slower progress with treatment.  Treatment to include chiropractic adjustments, passive modalities such as traction.    Diagnoses:      1. Segmental and somatic dysfunction of sacral region    2. Acute left-sided low back pain with left-sided sciatica    3. Segmental and somatic dysfunction of cervical region    4. Segmental and somatic dysfunction of thoracic  region    5. Dorsalgia of cervical region        Patient's condition:  Patient had restrictions pre-manipulation    Treatment effectiveness:  Post manipulation there is better intersegmental movement      Procedures:  E/M 81434    CMT:  76166 Chiropractic manipulative treatment 3-4 regions performed   Cervical: Activator, C2, Seated  Thoracic: Diversified, T4, Prone  Pelvis: Diversified, Sacrum , Side posture    Modalities:  31538: Mechanical traction, lumbar spine, 25/12, intermittent pull, 16 minutes    Therapeutic procedures:  None    Response to Treatment  Tolerated treatment    Prognosis: Good    Progress towards Goals: Reduce back pain 75%  Reduce neck pain 50%  Improve spinal AROM  Improve disability index scores 20-30%     Recommendations:    Instructions:ice 20 minutes every other hour as needed and heat 15 minutes every other hour as needed    Follow-up:  Return to care in 2 days.

## 2023-07-31 ENCOUNTER — THERAPY VISIT (OUTPATIENT)
Dept: CHIROPRACTIC MEDICINE | Facility: OTHER | Age: 58
End: 2023-07-31
Attending: CHIROPRACTOR
Payer: COMMERCIAL

## 2023-07-31 VITALS — HEART RATE: 77 BPM | TEMPERATURE: 96.8 F | RESPIRATION RATE: 16 BRPM | OXYGEN SATURATION: 98 %

## 2023-07-31 DIAGNOSIS — M99.04 SEGMENTAL AND SOMATIC DYSFUNCTION OF SACRAL REGION: Primary | ICD-10-CM

## 2023-07-31 DIAGNOSIS — M99.01 SEGMENTAL AND SOMATIC DYSFUNCTION OF CERVICAL REGION: ICD-10-CM

## 2023-07-31 DIAGNOSIS — M54.42 ACUTE LEFT-SIDED LOW BACK PAIN WITH LEFT-SIDED SCIATICA: ICD-10-CM

## 2023-07-31 DIAGNOSIS — M54.2 DORSALGIA OF CERVICAL REGION: ICD-10-CM

## 2023-07-31 DIAGNOSIS — M99.02 SEGMENTAL AND SOMATIC DYSFUNCTION OF THORACIC REGION: ICD-10-CM

## 2023-07-31 PROCEDURE — 98941 CHIROPRACT MANJ 3-4 REGIONS: CPT | Mod: AT | Performed by: CHIROPRACTOR

## 2023-07-31 PROCEDURE — 97012 MECHANICAL TRACTION THERAPY: CPT | Performed by: CHIROPRACTOR

## 2023-07-31 NOTE — PROGRESS NOTES
Left side of neck is occasionally pinching and stiff. 2/10 W24 2/10.  Left lower back is occasionally aching. 3/10 W24 3/10. Using ice which provides a decrease in pain.   Carmina Markham on 7/31/2023 at 7:31 AM    Reviewed by EW    Visit #:  2    Subjective:  Soraida Suresh is a 58 year old female who is seen in f/u up for:        Segmental and somatic dysfunction of sacral region  Acute left-sided low back pain with left-sided sciatica  Segmental and somatic dysfunction of cervical region  Segmental and somatic dysfunction of thoracic region  Dorsalgia of cervical region.     Since last visit on 7/25/2023,  Soraida Suresh reports: doing better at this time.  Overall intensity of pain decreasing including sciatica on the left side.  No that she was able to drive to the cities only need to stop once to rest.  Icing seems to be helping.Still having limitations with sitting but this is improving    (DVPRS) Pain Rating Score : 3-Sometimes distracts me (W24 3/10) (07/31/23 0725)     Objective:  The following was observed:  Pulse 77   Temp 96.8  F (36  C) (Tympanic)   Resp 16   SpO2 98%      P: palpatory tenderness left side C2, left PSIS:    A: static palpation demonstrates intersegmental asymmetry , cervical, thoracic, pelvis  R: motion palpation notes restricted motion, C2 , T4 , and Sacrum   T: muscle spasm at level(s):  left quadratus lumborum:      Segmental spinal dysfunction/restrictions found at:  :  C2 Left lateral flexion restricted and Extension restriction  T4 Left rotation restricted and Extension restriction  Sacrum Left lateral flexion restricted and Extension restriction.      Assessment: patient showing signs of progress, follow up in one week.    Diagnoses:      1. Segmental and somatic dysfunction of sacral region    2. Acute left-sided low back pain with left-sided sciatica    3. Segmental and somatic dysfunction of cervical region    4. Segmental and somatic dysfunction of thoracic region    5.  Dorsalgia of cervical region        Patient's condition:  Patient had restrictions pre-manipulation and Symptoms come and go    Treatment effectiveness:  Post manipulation there is better intersegmental movement and Patient claims to feel looser post manipulation      Procedures:  CMT:  10610 Chiropractic manipulative treatment 3-4 regions performed   Cervical: Activator, C2, Prone  Thoracic: Diversified, T4, Prone  Pelvis: Diversified, Sacrum , Side posture    Modalities:  44331: Mechanical traction, lumbar spine, 25/12, intermittent pull, 16 minutes.    Therapeutic procedures:  None    Response to Treatment  Reduction in symptoms as reported by patient    Prognosis: Good    Progress towards Goals: Patient is making progress towards the goal.     Recommendations:    Instructions: none    Follow-up:  Return to care in 1 week.

## 2023-08-02 ENCOUNTER — THERAPY VISIT (OUTPATIENT)
Dept: CHIROPRACTIC MEDICINE | Facility: OTHER | Age: 58
End: 2023-08-02
Attending: CHIROPRACTOR
Payer: COMMERCIAL

## 2023-08-02 VITALS — TEMPERATURE: 97.1 F | HEART RATE: 78 BPM | RESPIRATION RATE: 16 BRPM | OXYGEN SATURATION: 97 %

## 2023-08-02 DIAGNOSIS — M62.838 SPASM OF MUSCLE: ICD-10-CM

## 2023-08-02 DIAGNOSIS — M99.05 SEGMENTAL AND SOMATIC DYSFUNCTION OF PELVIC REGION: ICD-10-CM

## 2023-08-02 DIAGNOSIS — M54.42 ACUTE LEFT-SIDED LOW BACK PAIN WITH LEFT-SIDED SCIATICA: ICD-10-CM

## 2023-08-02 DIAGNOSIS — M99.04 SEGMENTAL AND SOMATIC DYSFUNCTION OF SACRAL REGION: Primary | ICD-10-CM

## 2023-08-02 PROCEDURE — 98940 CHIROPRACT MANJ 1-2 REGIONS: CPT | Mod: AT | Performed by: CHIROPRACTOR

## 2023-08-02 PROCEDURE — 97035 APP MDLTY 1+ULTRASOUND EA 15: CPT | Performed by: CHIROPRACTOR

## 2023-08-07 ENCOUNTER — THERAPY VISIT (OUTPATIENT)
Dept: CHIROPRACTIC MEDICINE | Facility: OTHER | Age: 58
End: 2023-08-07
Payer: COMMERCIAL

## 2023-08-07 VITALS — HEART RATE: 65 BPM | TEMPERATURE: 96.8 F | RESPIRATION RATE: 16 BRPM | OXYGEN SATURATION: 96 %

## 2023-08-07 DIAGNOSIS — M54.50 ACUTE BILATERAL LOW BACK PAIN, UNSPECIFIED WHETHER SCIATICA PRESENT: ICD-10-CM

## 2023-08-07 DIAGNOSIS — M51.379 DEGENERATION OF LUMBAR OR LUMBOSACRAL INTERVERTEBRAL DISC: ICD-10-CM

## 2023-08-07 DIAGNOSIS — M54.2 DORSALGIA OF CERVICAL REGION: ICD-10-CM

## 2023-08-07 DIAGNOSIS — M99.04 SEGMENTAL AND SOMATIC DYSFUNCTION OF SACRAL REGION: Primary | ICD-10-CM

## 2023-08-07 DIAGNOSIS — M99.02 SEGMENTAL AND SOMATIC DYSFUNCTION OF THORACIC REGION: ICD-10-CM

## 2023-08-07 PROCEDURE — 98940 CHIROPRACT MANJ 1-2 REGIONS: CPT | Mod: AT | Performed by: CHIROPRACTOR

## 2023-08-07 NOTE — PROGRESS NOTES
Bilateral lower back is occasionally aching. Radiating into buttocks at times. 0/10 W24 3/10. Using ice and Tylenol which are helping some. Left upper back is constantly tight and restricted ROM. 3/10 W24 3/10. Using ice and Tylenol which is helping to keep headache away.   Carmina Markham on 8/7/2023 at 7:36 AM    Reviewed by EW    Visit #:  4    Subjective:  Soraida Suresh is a 58 year old female who is seen in f/u up for:        Segmental and somatic dysfunction of sacral region  Degeneration of lumbar or lumbosacral intervertebral disc  Segmental and somatic dysfunction of thoracic region  Dorsalgia of cervical region  Acute bilateral low back pain, unspecified whether sciatica present.     Since last visit on 8/2/2023,  Soraida Suresh reports: Symptoms seem to be improving at this time.  Has been noticing a little bit more pain in the left upper back.  Reports that this increased over the weekend citing stress as culprit.  No specific traumatic events.  Was not able to participate in exercise class after last encounter as he was concerned that patient might aggravate symptoms.    Plans on attending exercise class after today's encounter.    (DVPRS) Pain Rating Score : 3-Sometimes distracts me (W24 3/10) (08/07/23 0733)     Objective:  The following was observed:  Pulse 65   Temp 96.8  F (36  C) (Tympanic)   Resp 16   SpO2 96%      P: palpatory tenderness left CT junction, left side PSIS:    A: static palpation demonstrates intersegmental asymmetry , thoracic, pelvis  R: motion palpation notes restricted motion, T1  and Sacrum   T: muscle spasm at level(s):  Left upper trapezius, left quadratus lumborum, lumbar paraspinals bilaterally:      Segmental spinal dysfunction/restrictions found at:  :  T1 Left lateral flexion restricted and Extension restriction  Sacrum Extension restriction.      Assessment: Patient continuing to show good signs of progress.  Plan to follow-up again with patient either later this  week or early next week depending on response to today's treatment.  No traction therapy performed today.    Diagnoses:      1. Segmental and somatic dysfunction of sacral region    2. Degeneration of lumbar or lumbosacral intervertebral disc    3. Segmental and somatic dysfunction of thoracic region    4. Dorsalgia of cervical region    5. Acute bilateral low back pain, unspecified whether sciatica present        Patient's condition:  Patient had restrictions pre-manipulation    Treatment effectiveness:  Post manipulation there is better intersegmental movement and Patient claims to feel looser post manipulation      Procedures:  CMT:  88600 Chiropractic manipulative treatment 1-2 regions performed   Thoracic: Diversified, T1, Prone  Pelvis: Diversified, Sacrum , Side posture    Modalities:  None performed this visit    Therapeutic procedures:  None    Response to Treatment  Improved intersegmental motion    Prognosis: Good    Progress towards Goals: Patient is making progress towards the goal.     Recommendations:    Instructions: perform activities as tolerated    Follow-up:  Return to care in 5-7 days.

## 2023-08-21 ENCOUNTER — THERAPY VISIT (OUTPATIENT)
Dept: CHIROPRACTIC MEDICINE | Facility: OTHER | Age: 58
End: 2023-08-21
Attending: CHIROPRACTOR
Payer: COMMERCIAL

## 2023-08-21 VITALS — OXYGEN SATURATION: 97 % | TEMPERATURE: 96.7 F | HEART RATE: 69 BPM | RESPIRATION RATE: 16 BRPM

## 2023-08-21 DIAGNOSIS — M54.42 ACUTE LEFT-SIDED LOW BACK PAIN WITH LEFT-SIDED SCIATICA: ICD-10-CM

## 2023-08-21 DIAGNOSIS — M51.379 DEGENERATION OF LUMBAR OR LUMBOSACRAL INTERVERTEBRAL DISC: ICD-10-CM

## 2023-08-21 DIAGNOSIS — M99.04 SEGMENTAL AND SOMATIC DYSFUNCTION OF SACRAL REGION: Primary | ICD-10-CM

## 2023-08-21 PROCEDURE — 97012 MECHANICAL TRACTION THERAPY: CPT | Performed by: CHIROPRACTOR

## 2023-08-21 PROCEDURE — 98940 CHIROPRACT MANJ 1-2 REGIONS: CPT | Mod: AT | Performed by: CHIROPRACTOR

## 2023-08-21 NOTE — PROGRESS NOTES
Left lower back is constantly aching. Radiating down left leg. 9/10 W24 9/10. Taking Tylenol with no change in pain.  Carmina Markham on 8/21/2023 at 8:37 AM    Reviewed by EW    Visit #:  5    Subjective:  Soraida Suresh is a 58 year old female who is seen in f/u up for:        Segmental and somatic dysfunction of sacral region  Degeneration of lumbar or lumbosacral intervertebral disc  Acute left-sided low back pain with left-sided sciatica.     Since last visit on 8/7/2023,  Soraida Suresh reports: Symptoms initially did well following last encounter.  Recent travel to the [facility] in helping with mother-in-law do various home projects exacerbated back pain.  No specific traumatic events noted contributing to aggravation of symptoms.  Having left-sided sciatica following S1 dermatome.  No apparent red flags such as weakness.  Patient having some GI distress noting alternating diarrhea and constipation.    Patient voices thought that recent shoe orthotics might be contributing to back pain symptoms we have been seeing recently.       ([facility]) Pain Rating Score : 9-Can't bear the pain unable to do anything (W24 9/10) (08/21/23 0834)     Objective:  The following was observed:  Pulse 69   Temp (!) 96.7  F (35.9  C) (Tympanic)   Resp 16   SpO2 97%      P: palpatory tenderness left PSIS:    A: static palpation demonstrates intersegmental asymmetry , pelvis  R: motion palpation notes restricted motion, Sacrum   T: muscle spasm at level(s):  left quadratus lumborum:      Segmental spinal dysfunction/restrictions found at:  :  Sacrum Left lateral flexion restricted and Extension restriction.      Assessment: Due to exacerbation ongoing low back and sciatica concerns.  Patient has been showing good signs of progress with chiropractic intervention along with traction therapy.  Patient does have upcoming trip at end of week to [place].  Expect 2-3 visits this week prior to patient's departure.  Follow-up care to be  determined upon patient's return from trip.    Diagnoses:      1. Segmental and somatic dysfunction of sacral region    2. Degeneration of lumbar or lumbosacral intervertebral disc    3. Acute left-sided low back pain with left-sided sciatica        Patient's condition:  Patient had restrictions pre-manipulation    Treatment effectiveness:  Post manipulation there is better intersegmental movement and Patient claims to feel looser post manipulation      Procedures:  CMT:  19816 Chiropractic manipulative treatment 1-2 regions performed   Pelvis: Diversified, Sacrum , Side posture    Modalities:  [id number]: Mechanical traction, lumbar spine, 20/12, intermittent pull, 16minutes    Therapeutic procedures:  None    Response to Treatment  Reduction in symptoms as reported by patient    Prognosis: Good    Progress towards Goals: acute exacerbation, goals in progress    Recommendations:    Instructions:ice 20 minutes every other hour as needed and walk 10 minutes    Follow-up:  Return to care in 2 days.

## 2023-08-24 ENCOUNTER — THERAPY VISIT (OUTPATIENT)
Dept: CHIROPRACTIC MEDICINE | Facility: OTHER | Age: 58
End: 2023-08-24
Attending: CHIROPRACTOR
Payer: COMMERCIAL

## 2023-08-24 VITALS — TEMPERATURE: 96.8 F | RESPIRATION RATE: 16 BRPM | OXYGEN SATURATION: 96 % | HEART RATE: 74 BPM

## 2023-08-24 DIAGNOSIS — M54.42 ACUTE LEFT-SIDED LOW BACK PAIN WITH LEFT-SIDED SCIATICA: ICD-10-CM

## 2023-08-24 DIAGNOSIS — M51.379 DEGENERATION OF LUMBAR OR LUMBOSACRAL INTERVERTEBRAL DISC: ICD-10-CM

## 2023-08-24 DIAGNOSIS — M99.04 SEGMENTAL AND SOMATIC DYSFUNCTION OF SACRAL REGION: Primary | ICD-10-CM

## 2023-08-24 PROCEDURE — 98940 CHIROPRACT MANJ 1-2 REGIONS: CPT | Mod: AT | Performed by: CHIROPRACTOR

## 2023-08-24 NOTE — PROGRESS NOTES
Left lower back is occasionally aching. 0/10 W24 2/10. Using ice which is providing relief.   Carmina Markham on 8/24/2023 at 8:31 AM    Reviewed by EW    Visit #:  6    Subjective:  Soraida Suresh is a 58 year old female who is seen in f/u up for:        Segmental and somatic dysfunction of sacral region  Degeneration of lumbar or lumbosacral intervertebral disc  Acute left-sided low back pain with left-sided sciatica.     Since last visit on 8/21/2023,  Soraida Suresh reports: Symptoms showing improvement at this time.  Reports feeling sore initially following treatment but was able to go for a walk.  Bending forward does cause pain which radiates into the hamstrings bilaterally but not beyond the knee.  Has not been experiencing radicular complaints noted on first visit.  Patient will be leaving tomorrow for trip to [place].    ([facility]) Pain Rating Score : 0-No pain (W24 2/10) (08/24/23 0828)     Objective:  The following was observed:  Pulse 74   Temp 96.8  F (36  C) (Tympanic)   Resp 16   SpO2 96%      P: palpatory tenderness left PSIS mild:    A: static palpation demonstrates intersegmental asymmetry , pelvis  R: motion palpation notes restricted motion, Sacrum   T: muscle spasm at level(s):  left quadratus lumborum:      Segmental spinal dysfunction/restrictions found at:  :  Sacrum Left lateral flexion restricted and Extension restriction.      Assessment: Symptoms showing quite a bit of improvement.  At this time we will follow-up with patient if needed prior to her departure on her trip.  Consider use of traction therapy if symptoms fail to improve or exacerbate acutely tomorrow.  Patient will contact our office for any additional follow-ups if needed.    Diagnoses:      1. Segmental and somatic dysfunction of sacral region    2. Degeneration of lumbar or lumbosacral intervertebral disc    3. Acute left-sided low back pain with left-sided sciatica        Patient's condition:  Patient had restrictions  pre-manipulation    Treatment effectiveness:  Post manipulation there is better intersegmental movement and Patient claims to feel looser post manipulation      Procedures:  CMT:  70748 Chiropractic manipulative treatment 1-2 regions performed   Pelvis: Diversified, Sacrum , Side posture    Modalities:  None performed this visit    Therapeutic procedures:  None    Response to Treatment  Improved intersegmental motion    Prognosis: Good    Progress towards Goals: Patient is making progress towards the goal.     Recommendations:    Instructions:ice 20 minutes every other hour as needed    Follow-up:  Return to care if symptoms persist.

## 2023-10-27 ENCOUNTER — ALLIED HEALTH/NURSE VISIT (OUTPATIENT)
Dept: FAMILY MEDICINE | Facility: OTHER | Age: 58
End: 2023-10-27
Payer: COMMERCIAL

## 2023-10-27 DIAGNOSIS — Z23 HIGH PRIORITY FOR 2019-NCOV VACCINE: ICD-10-CM

## 2023-10-27 DIAGNOSIS — Z23 NEED FOR PROPHYLACTIC VACCINATION AND INOCULATION AGAINST INFLUENZA: Primary | ICD-10-CM

## 2023-10-27 PROCEDURE — 90682 RIV4 VACC RECOMBINANT DNA IM: CPT

## 2023-10-27 PROCEDURE — 90480 ADMN SARSCOV2 VAC 1/ONLY CMP: CPT

## 2023-10-27 PROCEDURE — 90471 IMMUNIZATION ADMIN: CPT

## 2023-10-27 PROCEDURE — 91320 SARSCV2 VAC 30MCG TRS-SUC IM: CPT

## 2023-11-28 DIAGNOSIS — F33.42 RECURRENT MAJOR DEPRESSIVE DISORDER, IN FULL REMISSION (H): ICD-10-CM

## 2023-11-28 ASSESSMENT — ENCOUNTER SYMPTOMS
BREAST MASS: 0
CONSTIPATION: 1
ABDOMINAL PAIN: 1
JOINT SWELLING: 1
HEMATOCHEZIA: 1
DIARRHEA: 1
ARTHRALGIAS: 1

## 2023-12-01 RX ORDER — BUPROPION HYDROCHLORIDE 300 MG/1
300 TABLET ORAL EVERY MORNING
Qty: 90 TABLET | Refills: 0 | Status: SHIPPED | OUTPATIENT
Start: 2023-12-01 | End: 2023-12-04

## 2023-12-01 NOTE — TELEPHONE ENCOUNTER
[facility] sent Rx request for the following:      Requested Prescriptions   Pending Prescriptions Disp Refills    buPROPion (WELLBUTRIN XL) 300 MG 24 hr tablet [Pharmacy Med Name: bupropion HCl  mg 24 hr tablet, extended release] 90 tablet 3     Sig: Take 1 tablet (300 mg) by mouth every morning       SSRIs Protocol Failed - 11/28/2023  7:52 AM        Failed - PHQ-9 score less than 5 in past 6 months     Please review last PHQ-9 score.          Last Prescription Date:   11/27/22  Last Fill Qty/Refills:         90, R-3    Last Office Visit:              11/28/22   Future Office visit:           12/4/23      Routing refill request to provider for review/approval because:  Drug not on the [facility] refill protocol     Jessi Phillips RN on 12/1/2023 at 10:41 AM

## 2023-12-04 ENCOUNTER — OFFICE VISIT (OUTPATIENT)
Dept: FAMILY MEDICINE | Facility: OTHER | Age: 58
End: 2023-12-04
Attending: FAMILY MEDICINE
Payer: COMMERCIAL

## 2023-12-04 ENCOUNTER — HOSPITAL ENCOUNTER (OUTPATIENT)
Dept: GENERAL RADIOLOGY | Facility: OTHER | Age: 58
Discharge: HOME | End: 2023-12-04
Attending: FAMILY MEDICINE
Payer: COMMERCIAL

## 2023-12-04 ENCOUNTER — HOSPITAL ENCOUNTER (OUTPATIENT)
Dept: MAMMOGRAPHY | Facility: OTHER | Age: 58
Discharge: HOME | End: 2023-12-04
Attending: FAMILY MEDICINE
Payer: COMMERCIAL

## 2023-12-04 VITALS
RESPIRATION RATE: 16 BRPM | HEIGHT: 66 IN | SYSTOLIC BLOOD PRESSURE: 126 MMHG | TEMPERATURE: 97.6 F | BODY MASS INDEX: 26.2 KG/M2 | HEART RATE: 67 BPM | DIASTOLIC BLOOD PRESSURE: 84 MMHG | OXYGEN SATURATION: 98 % | WEIGHT: 163 LBS

## 2023-12-04 DIAGNOSIS — Z12.31 VISIT FOR SCREENING MAMMOGRAM: ICD-10-CM

## 2023-12-04 DIAGNOSIS — R71.8 ELEVATED MCV: Primary | ICD-10-CM

## 2023-12-04 DIAGNOSIS — Z13.29 SCREENING FOR THYROID DISORDER: ICD-10-CM

## 2023-12-04 DIAGNOSIS — K29.50 CHRONIC GASTRITIS WITHOUT BLEEDING, UNSPECIFIED GASTRITIS TYPE: ICD-10-CM

## 2023-12-04 DIAGNOSIS — Z00.00 HEALTH CARE MAINTENANCE: Primary | ICD-10-CM

## 2023-12-04 DIAGNOSIS — R71.8 ELEVATED MCV: ICD-10-CM

## 2023-12-04 DIAGNOSIS — Z13.0 SCREENING FOR DEFICIENCY ANEMIA: ICD-10-CM

## 2023-12-04 DIAGNOSIS — L73.9 FOLLICULITIS: ICD-10-CM

## 2023-12-04 DIAGNOSIS — M25.441 FINGER JOINT SWELLING, RIGHT: ICD-10-CM

## 2023-12-04 DIAGNOSIS — Z13.1 SCREENING FOR DIABETES MELLITUS: ICD-10-CM

## 2023-12-04 DIAGNOSIS — F33.42 RECURRENT MAJOR DEPRESSIVE DISORDER, IN FULL REMISSION (H): ICD-10-CM

## 2023-12-04 DIAGNOSIS — Z13.220 SCREENING FOR LIPID DISORDERS: ICD-10-CM

## 2023-12-04 DIAGNOSIS — R14.0 BLOATING: ICD-10-CM

## 2023-12-04 LAB
ALBUMIN SERPL BCG-MCNC: 4.7 G/DL (ref 3.5–5.2)
ALP SERPL-CCNC: 40 U/L (ref 40–150)
ALT SERPL W P-5'-P-CCNC: 14 U/L (ref 0–50)
ANION GAP SERPL CALCULATED.3IONS-SCNC: 10 MMOL/L (ref 7–15)
AST SERPL W P-5'-P-CCNC: 20 U/L (ref 0–45)
BASOPHILS # BLD AUTO: 0 10E3/UL (ref 0–0.2)
BASOPHILS NFR BLD AUTO: 1 %
BILIRUB SERPL-MCNC: 0.3 MG/DL
BUN SERPL-MCNC: 11 MG/DL (ref 8–23)
CALCIUM SERPL-MCNC: 9.7 MG/DL (ref 8.6–10)
CHLORIDE SERPL-SCNC: 103 MMOL/L (ref 98–107)
CHOLEST SERPL-MCNC: 255 MG/DL
CREAT SERPL-MCNC: 0.62 MG/DL (ref 0.51–0.95)
CRP SERPL-MCNC: <3 MG/L
DEPRECATED HCO3 PLAS-SCNC: 28 MMOL/L (ref 22–29)
EGFRCR SERPLBLD CKD-EPI 2021: >90 ML/MIN/1.73M2
EOSINOPHIL # BLD AUTO: 0 10E3/UL (ref 0–0.7)
EOSINOPHIL NFR BLD AUTO: 1 %
ERYTHROCYTE [DISTWIDTH] IN BLOOD BY AUTOMATED COUNT: 12 % (ref 10–15)
ERYTHROCYTE [SEDIMENTATION RATE] IN BLOOD BY WESTERGREN METHOD: 1 MM/HR (ref 0–30)
GLUCOSE SERPL-MCNC: 109 MG/DL (ref 70–99)
HCT VFR BLD AUTO: 40.9 % (ref 35–47)
HDLC SERPL-MCNC: 113 MG/DL
HGB BLD-MCNC: 13.7 G/DL (ref 11.7–15.7)
IMM GRANULOCYTES # BLD: 0 10E3/UL
IMM GRANULOCYTES NFR BLD: 0 %
LDLC SERPL CALC-MCNC: 128 MG/DL
LYMPHOCYTES # BLD AUTO: 1.6 10E3/UL (ref 0.8–5.3)
LYMPHOCYTES NFR BLD AUTO: 39 %
MCH RBC QN AUTO: 34.1 PG (ref 26.5–33)
MCHC RBC AUTO-ENTMCNC: 33.5 G/DL (ref 31.5–36.5)
MCV RBC AUTO: 102 FL (ref 78–100)
MONOCYTES # BLD AUTO: 0.3 10E3/UL (ref 0–1.3)
MONOCYTES NFR BLD AUTO: 8 %
NEUTROPHILS # BLD AUTO: 2.1 10E3/UL (ref 1.6–8.3)
NEUTROPHILS NFR BLD AUTO: 51 %
NONHDLC SERPL-MCNC: 142 MG/DL
NRBC # BLD AUTO: 0 10E3/UL
NRBC BLD AUTO-RTO: 0 /100
PLATELET # BLD AUTO: 255 10E3/UL (ref 150–450)
POTASSIUM SERPL-SCNC: 4 MMOL/L (ref 3.4–5.3)
PROT SERPL-MCNC: 6.9 G/DL (ref 6.4–8.3)
RBC # BLD AUTO: 4.02 10E6/UL (ref 3.8–5.2)
RHEUMATOID FACT SERPL-ACNC: <10 IU/ML
SODIUM SERPL-SCNC: 141 MMOL/L (ref 135–145)
TRIGL SERPL-MCNC: 71 MG/DL
TSH SERPL DL<=0.005 MIU/L-ACNC: 1.52 UIU/ML (ref 0.3–4.2)
URATE SERPL-MCNC: 3.9 MG/DL (ref 2.4–5.7)
VIT B12 SERPL-MCNC: 1463 PG/ML (ref 232–1245)
WBC # BLD AUTO: 4.1 10E3/UL (ref 4–11)

## 2023-12-04 PROCEDURE — 87624 HPV HI-RISK TYP POOLED RSLT: CPT | Mod: ZL | Performed by: FAMILY MEDICINE

## 2023-12-04 PROCEDURE — 77067 SCR MAMMO BI INCL CAD: CPT

## 2023-12-04 PROCEDURE — 83718 ASSAY OF LIPOPROTEIN: CPT | Mod: ZL | Performed by: FAMILY MEDICINE

## 2023-12-04 PROCEDURE — 99396 PREV VISIT EST AGE 40-64: CPT | Performed by: FAMILY MEDICINE

## 2023-12-04 PROCEDURE — 86618 LYME DISEASE ANTIBODY: CPT | Mod: ZL | Performed by: FAMILY MEDICINE

## 2023-12-04 PROCEDURE — 82374 ASSAY BLOOD CARBON DIOXIDE: CPT | Mod: ZL | Performed by: FAMILY MEDICINE

## 2023-12-04 PROCEDURE — G0123 SCREEN CERV/VAG THIN LAYER: HCPCS | Performed by: FAMILY MEDICINE

## 2023-12-04 PROCEDURE — 85025 COMPLETE CBC W/AUTO DIFF WBC: CPT | Mod: ZL | Performed by: FAMILY MEDICINE

## 2023-12-04 PROCEDURE — 86140 C-REACTIVE PROTEIN: CPT | Mod: ZL | Performed by: FAMILY MEDICINE

## 2023-12-04 PROCEDURE — 84550 ASSAY OF BLOOD/URIC ACID: CPT | Mod: ZL | Performed by: FAMILY MEDICINE

## 2023-12-04 PROCEDURE — 86431 RHEUMATOID FACTOR QUANT: CPT | Mod: ZL | Performed by: FAMILY MEDICINE

## 2023-12-04 PROCEDURE — 82947 ASSAY GLUCOSE BLOOD QUANT: CPT | Mod: ZL | Performed by: FAMILY MEDICINE

## 2023-12-04 PROCEDURE — 86038 ANTINUCLEAR ANTIBODIES: CPT | Mod: ZL | Performed by: FAMILY MEDICINE

## 2023-12-04 PROCEDURE — 36415 COLL VENOUS BLD VENIPUNCTURE: CPT | Mod: ZL | Performed by: FAMILY MEDICINE

## 2023-12-04 PROCEDURE — 73140 X-RAY EXAM OF FINGER(S): CPT | Mod: RT

## 2023-12-04 PROCEDURE — 86200 CCP ANTIBODY: CPT | Mod: ZL | Performed by: FAMILY MEDICINE

## 2023-12-04 PROCEDURE — 85652 RBC SED RATE AUTOMATED: CPT | Mod: ZL | Performed by: FAMILY MEDICINE

## 2023-12-04 PROCEDURE — 80051 ELECTROLYTE PANEL: CPT | Mod: ZL | Performed by: FAMILY MEDICINE

## 2023-12-04 PROCEDURE — 82607 VITAMIN B-12: CPT | Mod: ZL | Performed by: FAMILY MEDICINE

## 2023-12-04 PROCEDURE — 99213 OFFICE O/P EST LOW 20 MIN: CPT | Mod: 25 | Performed by: FAMILY MEDICINE

## 2023-12-04 PROCEDURE — 84443 ASSAY THYROID STIM HORMONE: CPT | Mod: ZL | Performed by: FAMILY MEDICINE

## 2023-12-04 RX ORDER — CLINDAMYCIN PHOSPHATE 11.9 MG/ML
SOLUTION TOPICAL 2 TIMES DAILY
Qty: 60 ML | Refills: 3 | Status: SHIPPED | OUTPATIENT
Start: 2023-12-04

## 2023-12-04 RX ORDER — BUPROPION HYDROCHLORIDE 300 MG/1
300 TABLET ORAL EVERY MORNING
Qty: 90 TABLET | Refills: 3 | Status: SHIPPED | OUTPATIENT
Start: 2023-12-04

## 2023-12-04 RX ORDER — LANOLIN ALCOHOL/MO/W.PET/CERES
CREAM (GRAM) TOPICAL
COMMUNITY
Start: 2021-11-01

## 2023-12-04 RX ORDER — CALCIUM CARBONATE/VITAMIN D3 600 MG-10
TABLET ORAL
COMMUNITY
Start: 2022-11-01

## 2023-12-04 ASSESSMENT — PAIN SCALES - GENERAL: PAINLEVEL: NO PAIN (0)

## 2023-12-04 ASSESSMENT — ENCOUNTER SYMPTOMS
BREAST MASS: 0
HEMATOCHEZIA: 1
ARTHRALGIAS: 1
DIARRHEA: 1
JOINT SWELLING: 1
ABDOMINAL PAIN: 1
CONSTIPATION: 1

## 2023-12-04 ASSESSMENT — PATIENT HEALTH QUESTIONNAIRE - PHQ9
SUM OF ALL RESPONSES TO PHQ QUESTIONS 1-9: 2
10. IF YOU CHECKED OFF ANY PROBLEMS, HOW DIFFICULT HAVE THESE PROBLEMS MADE IT FOR YOU TO DO YOUR WORK, TAKE CARE OF THINGS AT HOME, OR GET ALONG WITH OTHER PEOPLE: NOT DIFFICULT AT ALL
SUM OF ALL RESPONSES TO PHQ QUESTIONS 1-9: 2

## 2023-12-04 NOTE — PROGRESS NOTES
Nursing Notes:   Beatrice Lopez LPN  12/4/2023  8:43 AM  Signed  Chief Complaint   Patient presents with     Physical         Medication Reconciliation: complete    Beatrice Lopez LPN       SUBJECTIVE:   Soraida is a 58 year old, presenting for the following:  Physical        12/4/2023     8:41 AM   Additional Questions   Roomed by Beatrice Lopez       Right hand 5th finger DIP joint has been swollen and painful.  Getting worse with time.  No swelling of other joints.  Used voltaren gel, but didn't help.  Tylenol doesn't help much either.      In the past month, had some pimples in her left armpit.  Had to squeeze some of them as they were so painful.  Used triple antibiotics ointment, which helped, but still feels a lumpy sensation there.  Avoided shaving for a while to see if it would help.      She has had ongoing issues with abdominal bloating.  At times has issues with her stools as well being looser.  Occasionally might see blood in stools, but not often.  She has noticed over the years that certain foods definitely make her feel worse.  She always avoids dairy and also avoids gluten.  She notices that if she eats a lot of sugar or other foods from the night shades family, she will have more symptoms as well.  If she is able to stick to the foods she knows she tolerates, she does better.  However, when traveling and having to eat out and has less control over what she eats, she feels more poorly.  She has had a prior EGD that showed gastritis.  She also had about 75 polyps removed from her stomach at that time as well.  She has had a prior colonoscopy in 2015 and had subsequent complication of a perirectal abscess and chronic fistula.    Healthy Habits:     Getting at least 3 servings of Calcium per day:  Yes    Bi-annual eye exam:  Yes    Dental care twice a year:  Yes    Sleep apnea or symptoms of sleep apnea:  None    Diet:  Carbohydrate counting and Gluten-free/reduced    Frequency of exercise:  6-7  days/week    Duration of exercise:  45-60 minutes    Taking medications regularly:  Yes      Today's PHQ-9 Score:       2023     8:42 AM   PHQ-9 SCORE   PHQ-9 Total Score MyChart 2 (Minimal depression)   PHQ-9 Total Score 2         Social History     Tobacco Use     Smoking status: Former     Types: Cigarettes     Quit date: 10/10/2005     Years since quittin.1     Smokeless tobacco: Never   Substance Use Topics     Alcohol use: Yes     Alcohol/week: 14.0 standard drinks of alcohol     Comment: Alcoholic Drinks/day: 2-3 per day             2023    11:00 AM   Alcohol Use   Prescreen: >3 drinks/day or >7 drinks/week? No     Reviewed orders with patient.  Reviewed health maintenance and updated orders accordingly - Yes  BP Readings from Last 3 Encounters:   23 126/84   23 118/78   23 118/72    Wt Readings from Last 3 Encounters:   23 73.9 kg (163 lb)   23 70.3 kg (155 lb)   23 69.7 kg (153 lb 9.6 oz)                  Patient Active Problem List   Diagnosis     Other congenital anomalies of great veins     Allergic rhinitis due to pollen     Cervical disc herniation     Esophageal reflux     Irritable bowel syndrome     Major depression     Partial congenital anomalous pulmonary venous connection     Segmental and somatic dysfunction of cervical region     Abscess of anal and rectal regions     Hyperlipidemia LDL goal <100     Microscopic hematuria     Closed displaced fracture of shaft of fifth metacarpal bone of left hand, sequela     Primary osteoarthritis of left foot     Abnormal electrocardiogram     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     Epigastric pain     Gastritis     Recurrent major depressive disorder, in full remission (H24)     Past Surgical History:   Procedure Laterality Date     anorectal exam anesthesia  2015    DC ANORECTAL EXAM SURG REQ ANESTH  DIAG     APPENDECTOMY OPEN      1987     ARTHROSCOPY KNEE       2006, 2013,Debridement, partial medial meniscectomy, and anterior cruciate ligament reconstruction using tibialis anterior allograft, right knee     AS HYSTEROSCOPY, SURGICAL; W/ ENDOMETRIAL ABLATION, ANY METHOD  2015    Kathya     CARDIAC SURGERY  2006    Repair of anomalous pulmonary venous return by anastomosis of the anomalous vein to the left atrial appendage, done at [facility]     COLONOSCOPY  2014 Follow up in 10 years 24 normal     ECHO LIMITED      mild left atrial enlargement;  normal ef     ESOPHAGOSCOPY, GASTROSCOPY, DUODENOSCOPY (EGD), COMBINED           ESOPHAGOSCOPY, GASTROSCOPY, DUODENOSCOPY (EGD), COMBINED           ESOPHAGOSCOPY, GASTROSCOPY, DUODENOSCOPY (EGD), COMBINED           ESOPHAGOSCOPY, GASTROSCOPY, DUODENOSCOPY (EGD), COMBINED      11,Normal     ESOPHAGOSCOPY, GASTROSCOPY, DUODENOSCOPY (EGD), COMBINED N/A 2022    Procedure: ESOPHAGOGASTRODUODENOSCOPY, WITH BIOPSY;  Surgeon: Ramon Sales MD;  Location: [facility] OR     NISSEN FUNDOPLICATION  2000    Dr. Sales     OTHER SURGICAL HISTORY Left 2021    Tenex procedure for plantar fasciitis with Dr. Reveles     SIGMOIDOSCOPY FLEXIBLE      ,And BE, negative, for rectal bleeding.  Fissures and hemorrhoids noted     SURGICAL PATHOLOGY EXAM Left 2016    Left flank       Social History     Tobacco Use     Smoking status: Former     Types: Cigarettes     Quit date: 10/10/2005     Years since quittin.1     Smokeless tobacco: Never   Substance Use Topics     Alcohol use: Yes     Alcohol/week: 14.0 standard drinks of alcohol     Comment: Alcoholic Drinks/day: 2-3 per day     Family History   Problem Relation Age of Onset     Arthritis Mother         Arthritis,Osteoarthritis;  of 76 pneumonia but no autopsy     Diabetes Father         Diabetes     Other - See Comments Father         COPD/Benign colon polyps/AAA -  of ruptured AAA     Colon Cancer Paternal  Grandmother         Cancer-colon     Colon Cancer Paternal Aunt         Cancer-colon     Diabetes Paternal Aunt         Diabetes     Obesity Sister         bariatric surgery     Other - See Comments Sister         TBI     Depression Sister      Anxiety Disorder Sister      Diabetes Sister      Hyperlipidemia Sister      Diabetes Brother      Hyperlipidemia Brother      Other - See Comments Maternal Grandmother          of old age     Dementia Maternal Grandfather         possible dementia,  of pneumonia     Other - See Comments Brother         MVA - at age 50     Hypertension Brother      Diabetes Brother      Hyperlipidemia Brother      Depression Brother      Diabetes Brother      Hyperlipidemia Brother      Macular Degeneration Brother      Diabetes Brother      Hyperlipidemia Brother      Suicide Brother 49     Cerebrovascular Disease Paternal Grandfather          of stroke         Current Outpatient Medications   Medication Sig Dispense Refill     acyclovir (ZOVIRAX) 5 % external ointment Apply to affected area 6 times a day for 7 days. 30 g 11     albuterol (PROAIR HFA/PROVENTIL HFA/VENTOLIN HFA) 108 (90 Base) MCG/ACT inhaler Inhale 2 puffs into the lungs every 4 hours as needed for shortness of breath / dyspnea or wheezing 18 g 1     buPROPion (WELLBUTRIN XL) 300 MG 24 hr tablet Take 1 tablet (300 mg) by mouth every morning 90 tablet 3     calcium carbonate-vitamin D (CALTRATE) 600-10 MG-MCG per tablet        clindamycin (CLEOCIN T) 1 % external solution Apply topically 2 times daily 60 mL 3     cyanocobalamin (VITAMIN B-12) 1000 MCG sublingual tablet        diphenhydrAMINE HCl (BENADRYL ALLERGY PO)        folic acid (FOLVITE) 400 MCG tablet        Glucosamine-Chondroitin 750 & 400 MG MISC Take 1 tablet by mouth daily       L-Lysine 500 MG TABS Take 1 tablet by mouth daily       MELATONIN PO        omeprazole (PRILOSEC) 40 MG DR capsule Take 1 capsule (40 mg) by mouth daily as needed 90 capsule 3      triamcinolone (NASACORT) 55 MCG/ACT nasal aerosol Spray 2 sprays into both nostrils 2 times daily 16.9 mL 9     Allergies   Allergen Reactions     Codeine Other (See Comments)     Medroxyprogesterone Other (See Comments)     Caused patient's brain to swell.     Azithromycin Palpitations     Latex Rash     hands     Penicillins Rash     Proline Rash     Prolene: Sutures; caused keloid     Sulfamethoxazole-Trimethoprim Rash     Sulfamethoxazole-Trimethoprim Rash       Breast Cancer Screening:    FHS-7:       11/15/2021     9:55 AM 11/28/2023    11:02 AM   Breast CA Risk Assessment (FHS-7)   Did any of your first-degree relatives have breast or ovarian cancer? No No   Did any of your relatives have bilateral breast cancer? No No   Did any man in your family have breast cancer? No No   Did any woman in your family have breast and ovarian cancer? No No   Did any woman in your family have breast cancer before age 50 y? No No   Do you have 2 or more relatives with breast and/or ovarian cancer? No No   Do you have 2 or more relatives with breast and/or bowel cancer? No No       Mammogram Screening: Recommended mammography every 1-2 years with patient discussion and risk factor consideration  Pertinent mammograms are reviewed under the imaging tab.    History of abnormal Pap smear: NO - age 30-65 PAP every 5 years with negative HPV co-testing recommended     Reviewed and updated as needed this visit by clinical staff   Tobacco  Allergies               Reviewed and updated as needed this visit by Provider                 Past Medical History:   Diagnosis Date     Abnormal cytological finding in specimen from cervix uteri     10/11/2011     Allergic rhinitis     No Comments Provided     Benign lipomatous neoplasm of skin and subcutaneous tissue of trunk     1/20/2016     Contact dermatitis     No Comments Provided     Dyshidrosis     No Comments Provided     Encounter for screening for malignant neoplasm of colon      8/27/2014     Gastro-esophageal reflux disease without esophagitis     No Comments Provided     Major depressive disorder, single episode     6/24/2011     Other congenital malformations of great veins (CODE)     7/10/2006     Other specified diseases of anus and rectum (CODE)     5/20/2015     Panic disorder without agoraphobia     resolved after heart surgery     Partial anomalous pulmonary venous connection     No Comments Provided     Residual hemorrhoidal skin tags     3/3/2011      Past Surgical History:   Procedure Laterality Date     anorectal exam anesthesia  05/20/2015    [place] ANORECTAL EXAM SURG REQ ANESTH  DIAG     APPENDECTOMY OPEN      5/1987     ARTHROSCOPY KNEE      7/2006, 2013,Debridement, partial medial meniscectomy, and anterior cruciate ligament reconstruction using tibialis anterior allograft, right knee     AS HYSTEROSCOPY, SURGICAL; W/ ENDOMETRIAL ABLATION, ANY METHOD  12/14/2015    Kathya     CARDIAC SURGERY  11/2006    Repair of anomalous pulmonary venous return by anastomosis of the anomalous vein to the left atrial appendage, done at [facility]     COLONOSCOPY  08/28/2014 8/28/2014 Follow up in 10 years 8/28/24 normal     ECHO LIMITED      mild left atrial enlargement;  normal ef     ESOPHAGOSCOPY, GASTROSCOPY, DUODENOSCOPY (EGD), COMBINED      1996     ESOPHAGOSCOPY, GASTROSCOPY, DUODENOSCOPY (EGD), COMBINED      2001     ESOPHAGOSCOPY, GASTROSCOPY, DUODENOSCOPY (EGD), COMBINED      2006     ESOPHAGOSCOPY, GASTROSCOPY, DUODENOSCOPY (EGD), COMBINED      6/28/11,Normal     ESOPHAGOSCOPY, GASTROSCOPY, DUODENOSCOPY (EGD), COMBINED N/A 4/4/2022    Procedure: ESOPHAGOGASTRODUODENOSCOPY, WITH BIOPSY;  Surgeon: Ramon Sales MD;  Location: [facility] OR     NISSEN FUNDOPLICATION  08/22/2000    Dr. Sales     OTHER SURGICAL HISTORY Left 09/2021    Tenex procedure for plantar fasciitis with Dr. Reveles     SIGMOIDOSCOPY FLEXIBLE      1996,And BE, negative, for rectal bleeding.  Fissures  and hemorrhoids noted     SURGICAL PATHOLOGY EXAM Left 02/02/2016    Left flank       Review of Systems   Gastrointestinal:  Positive for abdominal pain, constipation, diarrhea and hematochezia.   Breasts:  Negative for tenderness, breast mass and discharge.   Genitourinary:  Negative for pelvic pain, vaginal bleeding and vaginal discharge.   Musculoskeletal:  Positive for arthralgias and joint swelling.     CONSTITUTIONAL: NEGATIVE for fever, chills, change in weight  INTEGUMENTARY/SKIN: NEGATIVE for worrisome rashes, moles or lesions  EYES: NEGATIVE for vision changes or irritation  ENT: NEGATIVE for ear, mouth and throat problems  RESP: NEGATIVE for significant cough or SOB  BREAST: NEGATIVE for masses, tenderness or discharge  CV: NEGATIVE for chest pain, palpitations or peripheral edema  GI: diarrhea, gas or bloating, and hematochezia  : NEGATIVE for unusual urinary or vaginal symptoms. No vaginal bleeding.  MUSCULOSKELETAL: NEGATIVE for significant arthralgias or myalgia  NEURO: NEGATIVE for weakness, dizziness or paresthesias  PSYCHIATRIC: NEGATIVE for changes in mood or affect      OBJECTIVE:   Breastfeeding No   Physical Exam  Constitutional:       General: She is not in acute distress.     Appearance: She is well-developed.   HENT:      Head: Normocephalic.      Right Ear: Tympanic membrane and external ear normal.      Left Ear: Tympanic membrane and external ear normal.      Nose: Nose normal.      Mouth/Throat:      Pharynx: No oropharyngeal exudate.   Eyes:      General:         Right eye: No discharge.         Left eye: No discharge.      Conjunctiva/sclera: Conjunctivae normal.      Pupils: Pupils are equal, round, and reactive to light.   Neck:      Thyroid: No thyromegaly.      Trachea: No tracheal deviation.   Cardiovascular:      Rate and Rhythm: Normal rate and regular rhythm.      Pulses: Normal pulses.      Heart sounds: Normal heart sounds, S1 normal and S2 normal. No murmur heard.     No  friction rub. No gallop. No S3 or S4 sounds.   Pulmonary:      Effort: Pulmonary effort is normal. No respiratory distress.      Breath sounds: Normal breath sounds. No wheezing or rales.      Comments: Breast exam:  No masses palpable bilaterally.  No skin changes, tethering or axillary lymphadenopathy bilaterally.  No axillary lesions noted at this time.    Abdominal:      General: Bowel sounds are normal. There is no distension.      Palpations: Abdomen is soft. There is no mass.      Tenderness: There is no abdominal tenderness.   Genitourinary:     Comments: Pelvic Exam:  Vulva: No external lesions, normal hair distribution, no adenopathy  Vagina: Moist, pink, no abnormal discharge, well rugated, no lesions  Cervix: Pap smear obtained.  Cervix is parous, smooth, pink, no visible lesions  Uterus: Normal size, anteverted, non-tender, mobile  Ovaries: No mass, non-tender, mobile  Musculoskeletal:         General: Normal range of motion.      Cervical back: Neck supple.      Comments: Right fifth DIP joint is swollen and tender to palpation with decreased range of motion.   Lymphadenopathy:      Cervical: No cervical adenopathy.   Skin:     General: Skin is warm and dry.      Findings: No rash.   Neurological:      Mental Status: She is alert and oriented to person, place, and time.      Motor: No abnormal muscle tone.      Deep Tendon Reflexes: Reflexes are normal and symmetric.   Psychiatric:         Thought Content: Thought content normal.         Judgment: Judgment normal.           Diagnostic Test Results:  Labs reviewed in Epic    ASSESSMENT/PLAN:       ICD-10-CM    1. Health care maintenance  Z00.00 Pap Screen with HPV - recommended age 30 - 65 years      2. Screening for lipid disorders  Z13.220 Lipid Profile     Lipid Profile      3. Screening for diabetes mellitus  Z13.1 Comprehensive metabolic panel     Comprehensive metabolic panel      4. Screening for thyroid disorder  Z13.29 TSH with free T4 reflex      TSH with free T4 reflex      5. Screening for deficiency anemia  Z13.0 CBC with Platelets & Differential     CBC with Platelets & Differential      6. Finger joint swelling, right  M25.441 Anti Nuclear Suly IgG by IFA with Reflex     Cyclic Citrullinated Peptide Antibody IgG     CRP inflammation     Erythrocyte sedimentation rate auto     Rheumatoid factor     Lyme Disease Total Abs Bld with Reflex to Confirm CLIA     Uric acid     XR Finger Right G/E 2 Views     Anti Nuclear Suly IgG by IFA with Reflex     Cyclic Citrullinated Peptide Antibody IgG     CRP inflammation     Erythrocyte sedimentation rate auto     Rheumatoid factor     Lyme Disease Total Abs Bld with Reflex to Confirm CLIA     Uric acid      7. Folliculitis  L73.9 clindamycin (CLEOCIN T) 1 % external solution      8. Bloating  R14.0 Adult GI  Referral - Consult Only      9. Recurrent major depressive disorder, in full remission (H24)  F33.42 buPROPion (WELLBUTRIN XL) 300 MG 24 hr tablet      10. Chronic gastritis without bleeding, unspecified gastritis type  K29.50 Adult GI  Referral - Consult Only      11. Elevated MCV  R71.8 Vitamin B12        1.  Mammogram is being completed today.  Last DEXA scan was 12/11/2020 and was normal.  Cologuard last completed 12/28/2022 and was normal.  Pap/HPV completed today.  Tdap last completed 11/5/2020.  Flu shot and COVID vaccines have been completed this season.  Shingrix is up-to-date.  2.  Fasting lipid profile completed today.  3.  Comprehensive metabolic profile completed today.  4.  TSH today.  5.  CBC completed today.  6.  X-ray completed as above as well as labs as above.  Suspect osteoarthritis given location.  7.  No lesions in the left axilla were noted today, but by history this sounds like folliculitis.  I did give her a prescription for clindamycin topical solution to use as needed if this returns.  If continues to have issues, follow-up.  May also be a milder case of  hidradenitis.  8.  She is referred to GI for further evaluation of ongoing issues.  9.  Stable.  Wellbutrin refilled.  10.  Referred to GI.  11.  On today's labs, it was noted that MCV was a bit elevated.  B12 level was added to labs.  If this is normal, would offer patient to return to have a folate level obtained as well.    Patient has been advised of split billing requirements and indicates understanding: Yes      COUNSELING:  Reviewed preventive health counseling, as reflected in patient instructions       Regular exercise       Healthy diet/nutrition       Vision screening       Hearing screening       Osteoporosis prevention/bone health       Colorectal Cancer Screening        She reports that she quit smoking about 18 years ago. Her smoking use included cigarettes. She has never used smokeless tobacco.        Patricia Brooke MD  [facility] AND [facility]

## 2023-12-04 NOTE — NURSING NOTE
Chief Complaint   Patient presents with    Physical         Medication Reconciliation: complete    Beatrice Lopez, LPN

## 2023-12-05 LAB — ANA SER QL IF: NEGATIVE

## 2023-12-06 LAB
B BURGDOR IGG+IGM SER QL: 0.04
CCP AB SER IA-ACNC: 0.6 U/ML

## 2023-12-08 ENCOUNTER — LAB (OUTPATIENT)
Dept: LAB | Facility: OTHER | Age: 58
End: 2023-12-08
Attending: FAMILY MEDICINE
Payer: COMMERCIAL

## 2023-12-08 DIAGNOSIS — R71.8 ELEVATED MCV: ICD-10-CM

## 2023-12-08 LAB — FOLATE SERPL-MCNC: >20 NG/ML (ref 4.6–34.8)

## 2023-12-08 PROCEDURE — 36415 COLL VENOUS BLD VENIPUNCTURE: CPT | Mod: ZL

## 2023-12-08 PROCEDURE — 82746 ASSAY OF FOLIC ACID SERUM: CPT | Mod: ZL

## 2023-12-11 LAB
BKR LAB AP GYN ADEQUACY: NORMAL
BKR LAB AP GYN INTERPRETATION: NORMAL
BKR LAB AP HPV REFLEX: NORMAL
BKR LAB AP PREVIOUS ABNORMAL: NORMAL
PATH REPORT.COMMENTS IMP SPEC: NORMAL
PATH REPORT.COMMENTS IMP SPEC: NORMAL
PATH REPORT.RELEVANT HX SPEC: NORMAL

## 2023-12-13 LAB
HUMAN PAPILLOMA VIRUS 16 DNA: NEGATIVE
HUMAN PAPILLOMA VIRUS 18 DNA: NEGATIVE
HUMAN PAPILLOMA VIRUS FINAL DIAGNOSIS: NORMAL
HUMAN PAPILLOMA VIRUS OTHER HR: NEGATIVE

## 2024-01-04 ENCOUNTER — MYC MEDICAL ADVICE (OUTPATIENT)
Dept: FAMILY MEDICINE | Facility: OTHER | Age: 59
End: 2024-01-04
Payer: COMMERCIAL

## 2024-01-04 NOTE — TELEPHONE ENCOUNTER
[facility]      Patient update on [employer].    Jenifer Joyner RN on 1/4/2024 at 4:41 PM

## 2024-01-05 NOTE — TELEPHONE ENCOUNTER
From [facility]:  this has been done and valid til 5-     [facility]    Jenifer Joyner RN on 1/5/2024 at 8:11 AM

## 2024-01-05 NOTE — TELEPHONE ENCOUNTER
[facility] auth #:  [id number] valid from 12-7-23 to 5-31-24    [facility]    Jenifer Joyner RN on 1/5/2024 at 10:33 AM

## 2024-01-05 NOTE — TELEPHONE ENCOUNTER
[facility]      Jenifer Joyner RN on 1/5/2024 at 10:09 AM

## 2024-01-16 ENCOUNTER — MYC MEDICAL ADVICE (OUTPATIENT)
Dept: FAMILY MEDICINE | Facility: OTHER | Age: 59
End: 2024-01-16
Payer: COMMERCIAL

## 2024-01-16 DIAGNOSIS — H10.33 ACUTE CONJUNCTIVITIS OF BOTH EYES, UNSPECIFIED ACUTE CONJUNCTIVITIS TYPE: Primary | ICD-10-CM

## 2024-01-16 RX ORDER — CEFDINIR 300 MG/1
300 CAPSULE ORAL 2 TIMES DAILY
Qty: 20 CAPSULE | Refills: 0 | Status: SHIPPED | OUTPATIENT
Start: 2024-01-16 | End: 2024-01-26

## 2024-01-22 ENCOUNTER — TRANSFERRED RECORDS (OUTPATIENT)
Dept: HEALTH INFORMATION MANAGEMENT | Facility: OTHER | Age: 59
End: 2024-01-22
Payer: COMMERCIAL

## 2024-01-29 ENCOUNTER — THERAPY VISIT (OUTPATIENT)
Dept: CHIROPRACTIC MEDICINE | Facility: OTHER | Age: 59
End: 2024-01-29
Attending: CHIROPRACTOR
Payer: COMMERCIAL

## 2024-01-29 VITALS — OXYGEN SATURATION: 97 % | TEMPERATURE: 97.3 F | HEART RATE: 76 BPM | RESPIRATION RATE: 16 BRPM

## 2024-01-29 DIAGNOSIS — M99.04 SEGMENTAL AND SOMATIC DYSFUNCTION OF SACRAL REGION: ICD-10-CM

## 2024-01-29 DIAGNOSIS — M51.379 DEGENERATION OF LUMBAR OR LUMBOSACRAL INTERVERTEBRAL DISC: ICD-10-CM

## 2024-01-29 DIAGNOSIS — M62.838 SPASM OF MUSCLE: ICD-10-CM

## 2024-01-29 DIAGNOSIS — M75.42 IMPINGEMENT SYNDROME OF SHOULDER REGION, LEFT: Primary | ICD-10-CM

## 2024-01-29 PROCEDURE — 97012 MECHANICAL TRACTION THERAPY: CPT | Performed by: CHIROPRACTOR

## 2024-01-29 PROCEDURE — 99212 OFFICE O/P EST SF 10 MIN: CPT | Mod: 25 | Performed by: CHIROPRACTOR

## 2024-01-29 PROCEDURE — 98940 CHIROPRACT MANJ 1-2 REGIONS: CPT | Mod: AT | Performed by: CHIROPRACTOR

## 2024-01-29 NOTE — PROGRESS NOTES
Left lower back is intermittently burning and aching. Radiating down left leg. 3/10 W24 7/10. Using ice which provides a decrease in pain.   Carmina Markham on 1/29/2024 at 2:04 PM    Reviewed by EW    Visit #:  1    Subjective:  Soraida Suresh is a 58 year old female who is seen in f/u up for:        Impingement syndrome of shoulder region, left  Segmental and somatic dysfunction of sacral region  Degeneration of lumbar or lumbosacral intervertebral disc  Spasm of muscle.     Since last visit on 8/24/2023,  Soraida Suresh reports: Has begun walking on treadmill more recently which has contributed to exacerbation of left-sided low back pain with sciatica symptoms on the left side.  Denies any red flag symptoms consistent with cauda equina.  Presents for chiropractic care and spinal traction as these have provided help in the past.    Finds that when she is sitting symptoms will radiate into the anterolateral left shin.    Patient continuing to have left shoulder pain after a fall this past summer.  This was assessed when patient was dealing with epicondylitis issues.  Symptoms have not improved much.  Notes that it is very limiting for her when performing weight lifting/exercises.  Some clicking/clunking sensations noted.    Continues to work with massage therapist.  Finding some benefit with treatment for shoulder concerns.    (DVPRS) Pain Rating Score : 3-Sometimes distracts me (W24 7/10) (01/29/24 1358)     Objective:  The following was observed:  Pulse 76   Temp 97.3  F (36.3  C) (Tympanic)   Resp 16   SpO2 97%    Oswestry (SWATHI) Questionnaire        1/29/2024     2:05 PM   OSWESTRY DISABILITY INDEX   Count 9   Sum 8   Oswestry Score (%) 17.78 %     Slight right antalgia when standing.       Thoracic/lumbar AROM: Right rotation restriction, left lateral flexion, extension restriction    Slumps: Negative bilaterally  Kemps: Positive left PSIS with extension    Neers:-right, +left  Obriens:-right, +left  Empty  can:-right, -left    Isolated muscle functional strength  Deltoid: 4/5 right, 3+/5 left  Serratus anterior: 4+/5 right, 4+/5 left  Pectoralis major: 5/5 right, 4+/5 left  Latissimus dorsi: 5/5 right, 5/5 left  Supraspinatus: 4+/5 right, 4/5 left with pain and posterior left shoulder  Infraspinatus: 4+/5 right, 4+/5 left      P: palpatory tenderness left PSIS, posterior shoulder around teres muscle group, subscapularis muscle  A: static palpation demonstrates intersegmental asymmetry , pelvis  R: motion palpation notes restricted motion, Sacrum   T: muscle spasm at level(s):  Left quadratus lumborum, taut and tender fibers around left shoulder girdle including but not limited to teres muscle group, subscapularis, posterior deltoid:      Segmental spinal dysfunction/restrictions found at:  :  Sacrum Left lateral flexion restricted and Extension restriction.      Assessment: Acute exacerbation chronic low back concerns.  Patient has been under care in the past with similar symptoms and typically responds favorably with chiropractic intervention as well as spinal traction therapy.  At this time we will plan to follow-up with patient on 1-2 times a week basis for next 4 weeks barring acute exacerbation of symptoms.  Subject to change pending response to treatment.    Left shoulder concerns: Referral has been placed for sports medicine.  Provocative test showed possible shoulder impingement syndrome.  However provocative test also suggestive of labral concern.  Consider use of soft tissue modalities such as ultrasound, [place] technique, or acupuncture.  This to be determined after assessment with sports medicine.    Diagnoses:      1. Impingement syndrome of shoulder region, left    2. Segmental and somatic dysfunction of sacral region    3. Degeneration of lumbar or lumbosacral intervertebral disc    4. Spasm of muscle        Patient's condition:  Patient had restrictions pre-manipulation    Treatment  effectiveness:  Post manipulation there is better intersegmental movement      Procedures:  E/M 98208    CMT:  48267 Chiropractic manipulative treatment 1-2 regions performed   Pelvis: Diversified, Sacrum , Side posture    Modalities:  77388: mechanical traction, lumbar spine, 25/12, intermittent pull, 16 minutes    Therapeutic procedures:  None    Response to Treatment  Improved intersegmental motion    Prognosis: Good    Progress towards Goals: Reduce back pain symptoms by 50% within 4 weeks  Refer patient for shoulder consultation  Patient will be able to demonstrate improved sitting as shown by 1-2 point decrease on Oswestry score  Improve Oswestry score 10%    Recommendations:    Instructions: Monitor symptoms and perform activities as tolerated    Follow-up:  Return to care in within 1 week if not sooner.

## 2024-02-07 ENCOUNTER — HOSPITAL ENCOUNTER (OUTPATIENT)
Dept: GENERAL RADIOLOGY | Facility: OTHER | Age: 59
Discharge: HOME | End: 2024-02-07
Attending: FAMILY MEDICINE
Payer: COMMERCIAL

## 2024-02-07 ENCOUNTER — OFFICE VISIT (OUTPATIENT)
Dept: FAMILY MEDICINE | Facility: OTHER | Age: 59
End: 2024-02-07
Attending: CHIROPRACTOR
Payer: COMMERCIAL

## 2024-02-07 VITALS
DIASTOLIC BLOOD PRESSURE: 86 MMHG | OXYGEN SATURATION: 98 % | HEART RATE: 75 BPM | SYSTOLIC BLOOD PRESSURE: 122 MMHG | BODY MASS INDEX: 26.88 KG/M2 | TEMPERATURE: 98.1 F | RESPIRATION RATE: 16 BRPM | WEIGHT: 164 LBS

## 2024-02-07 DIAGNOSIS — M75.42 SUBACROMIAL IMPINGEMENT OF LEFT SHOULDER: Primary | ICD-10-CM

## 2024-02-07 DIAGNOSIS — M67.912 TENDINOPATHY OF LEFT ROTATOR CUFF: ICD-10-CM

## 2024-02-07 DIAGNOSIS — G25.89 SCAPULAR DYSKINESIS: ICD-10-CM

## 2024-02-07 PROCEDURE — 99214 OFFICE O/P EST MOD 30 MIN: CPT | Performed by: FAMILY MEDICINE

## 2024-02-07 PROCEDURE — 73030 X-RAY EXAM OF SHOULDER: CPT | Mod: LT

## 2024-02-07 ASSESSMENT — PAIN SCALES - GENERAL: PAINLEVEL: SEVERE PAIN (7)

## 2024-02-07 NOTE — PROGRESS NOTES
Sports Medicine Office Note    HPI:  58-year-old female coming for evaluation of left shoulder pain.  Her pain has been present for approximately 8 months.  She stumbled while turning to try to avoid her grandchild behind her and fell into a wall.  Since this time she has had pain.  She rates her pain at 5/10.  She characterized the pain as sharp.  Bringing her arms above her head off to the side is particularly bothersome.  She has been using heat and ice to help with the pain.  She localizes the pain to the lateral and posterior aspects of the shoulder.      EXAM:  /86 (BP Location: Right arm, Patient Position: Sitting, Cuff Size: Adult Regular)   Pulse 75   Temp 98.1  F (36.7  C) (Temporal)   Resp 16   Wt 74.4 kg (164 lb)   SpO2 98%   BMI 26.88 kg/m    MUSCULOSKELETAL EXAM:  LEFT SHOULDER  Inspection:  -No gross deformity  -No bruising or swelling  -Scars: Well-healed surgical scar on left posterior rib cage    Tenderness to palpation of the:  -SC joint:  Negative  -AC joint: Mild pain  -Clavicle:  Negative  -Biceps tendon in bicipital groove: Mild pain  -Deltoid musculature: Mild pain laterally  -Upper trapezius musculature:  Negative    Range of Motion:  -Active flexion:  170 left, 170 right  -Active abduction:  170 left, 170 right    Strength:  -Supraspinatus:  5/5  -Infraspinatus:  5/5  -Subscapularis:  5/5  -Deltoid:  5/5    Special Tests:  -Brendan test: Positive pain without weakness  -Hamm test: Positive  -Neer test: Positive  -Mid-arc pain: Present  -Speeds test:  Negative  -[place] active compression test:  Negative  -Crossarm adduction test:  Negative  -Yergason test:  Negative  -Lag sign:  Negative  -Scapular dyskinesis: Somewhat present with left scapula resting higher than right  -[place] scapular slide test: 1.5 cm difference    Other:  -Intact sensation to light touch distally.  -No signs of cyanosis. Normal skin temperature of the upper extremity.  -Elbow:  No gross deformity. Full  range of motion.  -Hand/wrist:  No gross deformity. Full range of motion.  -Right shoulder:  No gross deformity. No palpable tenderness. Normal strength.      IMAGIN2024: 3 view left shoulder x-ray  - No fracture, dislocation, or bony lesion      ASSESSMENT/PLAN:  Diagnoses and all orders for this visit:  Subacromial impingement of left shoulder  -     XR Shoulder Left G/E 3 Views  -     Physical Therapy Referral; Future  Tendinopathy of left rotator cuff  -     Physical Therapy Referral; Future  Scapular dyskinesis  -     Physical Therapy Referral; Future    58-year-old female with subacromial impingement of the left shoulder.  X-rays were performed in the office today and personally reviewed by me with the findings as demonstrated above by my interpretation.  Treatment options include activity modification, physical therapy, ice, topical medications, oral medications, and injections.  No findings to suggest significant structural pathology or radiculopathy.  - Referral placed to physical therapy  - OTC analgesics as needed  - If the above interventions are failing to provide desired symptom relief, recommend following up for reevaluation and consideration of CSI      Dejuan Vallejo MD  2024  10:06 AM    Total time spent with this patient was 24 minutes which included chart review, visualization and independent interpretation of images, time spent with the patient, and documentation.    Procedure time:  0 minute(s)

## 2024-02-12 ENCOUNTER — MYC MEDICAL ADVICE (OUTPATIENT)
Dept: FAMILY MEDICINE | Facility: OTHER | Age: 59
End: 2024-02-12
Payer: COMMERCIAL

## 2024-02-12 ENCOUNTER — THERAPY VISIT (OUTPATIENT)
Dept: PHYSICAL THERAPY | Facility: OTHER | Age: 59
End: 2024-02-12
Attending: FAMILY MEDICINE
Payer: COMMERCIAL

## 2024-02-12 DIAGNOSIS — G89.29 CHRONIC LEFT SHOULDER PAIN: ICD-10-CM

## 2024-02-12 DIAGNOSIS — G25.89 SCAPULAR DYSKINESIS: ICD-10-CM

## 2024-02-12 DIAGNOSIS — M67.912 TENDINOPATHY OF LEFT ROTATOR CUFF: ICD-10-CM

## 2024-02-12 DIAGNOSIS — M75.42 SUBACROMIAL IMPINGEMENT OF LEFT SHOULDER: ICD-10-CM

## 2024-02-12 DIAGNOSIS — M25.512 CHRONIC LEFT SHOULDER PAIN: ICD-10-CM

## 2024-02-12 DIAGNOSIS — R29.898 WEAKNESS OF LEFT UPPER EXTREMITY: Primary | ICD-10-CM

## 2024-02-12 PROCEDURE — 97110 THERAPEUTIC EXERCISES: CPT | Mod: GP

## 2024-02-12 PROCEDURE — 97161 PT EVAL LOW COMPLEX 20 MIN: CPT | Mod: GP

## 2024-02-12 NOTE — TELEPHONE ENCOUNTER
2/7/2024  Saw Dejuan Venegas.    2/7/2024  PT ordered placed by Dr. Venegas.      PT referral still pending auth.  Reached out to [employer] who auth'd it.     Patient update on MyChart.    Jenifer Joyner RN on 2/12/2024 at 9:33 AM

## 2024-02-12 NOTE — PROGRESS NOTES
PHYSICAL THERAPY EVALUATION  Type of Visit: Evaluation    See electronic medical record for Abuse and Falls Screening details.    Subjective patient is a 58-year-old right-hand-dominant female with complaints of 8-month history of left shoulder pain.  She reports an incident where she was avoiding falling over her grandchild and fell backward and to the left hitting the wall with her left shoulder very hard.  She reports bruising and swelling significantly occurred.  States she felt that symptoms would improve but have not improved and continues to have pain waking if laying on the left side and is unable to abduct fully with pain at approximately 60 degrees.  She denies having pain at rest and feels a lot of strength loss with complaints of pain at the deltoid wrapping down the arm and posteriorly at the shoulder.  It is limiting sleep as well as putting on clothing and any activity that abducts the arm as well as trying to hang up close.  She has tried to do more abduction exercises while working out but will just create more pain symptoms.  Patient will be leaving for 2 weeks to vacation and return for further treatment after this.      Presenting condition or subjective complaint:  Chronic left shoulder pain with decreased active range of motion, decreased strength into abduction  Date of onset: 06/15/23      Living Environment  Social support:   Spouse  Type of home:   [facility]  Employment:    Retired [employer] employee    Patient goals for therapy:  Return full function to the left upper extremity for lifting and dressing and sleep       Objective   SHOULDER EVALUATION  PAIN: Pain Level at Rest: 0/10  Pain Level with Use: 6/10  Pain Location: Left shoulder deltoid radiating down the arm and posteriorly at the shoulder  Pain Frequency: intermittent  Pain is Exacerbated By: Lifting and abduction, ADLs such as getting dressed with reaching overhead and into abduction, pain with laying on the left  side for sleep  Pain is Relieved By: rest  INTEGUMENTARY (edema, incisions): WNL, atrophy of the posterior shoulder is noted  POSTURE: Sitting Posture: Rounded shoulders  GAIT: WNL  ROM:  AROM flexion: Left= 160 degrees, right= 170 degrees.  Abduction: Left= 65 degrees with pain, right= 177 degrees.  Internal rotation= equal and within functional limits  STRENGTH: Left shoulder internal rotation 4/5, supraspinatus weak and painful. ER 5/5  SPECIAL TESTS:  Positive Hamm Wero, positive Neer impingement, positive empty can with weak and painful supraspinatus.  PALPATION:  Positive for pain at the posterior shoulder and middle deltoid at the rotator cuff insertion of the supraspinatus      Assessment & Plan   CLINICAL IMPRESSIONS  Medical Diagnosis: Tendinopathy of left rotator cuff, Subacromial impingement of left shoulder    Treatment Diagnosis: Rotator cuff tendinopathy, impingement of the left shoulder, chronic left shoulder pain with muscle weakness   Impression/Assessment: Patient is a 58 year old female with chronic left shoulder pain complaints, with signs and symptoms consistent with impingement as well as rotator cuff tendinopathy versus possible injury or tear.  Patient presents with decreased range of motion, weakness and pain limiting function.  The following significant findings have been identified: Pain, Decreased ROM/flexibility, and Decreased strength. These impairments interfere with their ability to perform self care tasks, recreational activities, household chores, and sleep  as compared to previous level of function.     Clinical Decision Making (Complexity):  Clinical Presentation: Stable/Uncomplicated  Clinical Presentation Rationale: based on medical and personal factors listed in PT evaluation  Clinical Decision Making (Complexity): Low complexity    PLAN OF CARE  Treatment Interventions:  Interventions: Manual Therapy, Neuromuscular Re-education, Therapeutic Exercise    Long Term Goals      PT Goal 1  Goal Identifier: ROM  Goal Description: Patient will have left shoulder abduction equal to that of the opposite side to reach and lift for ADLs of the left upper extremity without limitation.  Target Date: 03/25/24  PT Goal 2  Goal Identifier: Pain/sleep  Goal Description: Patient will be able to sleep on her left side without waking secondary to pain return to her previous level of function.  Target Date: 03/25/24  PT Goal 3  Goal Identifier: Strength  Goal Description: Patient will demonstrate left shoulder strength 5/5 globally for lifting overhead with the left upper extremity to put dishes away.  Target Date: 03/25/24      Frequency of Treatment: 2 times a week  Duration of Treatment: 6 weeks    Education Assessment:        Risks and benefits of evaluation/treatment have been explained.   Patient/Family/caregiver agrees with Plan of Care.     Evaluation Time:             Signing Clinician: Xiomy Landin, PT

## 2024-02-14 ENCOUNTER — TRANSFERRED RECORDS (OUTPATIENT)
Dept: HEALTH INFORMATION MANAGEMENT | Facility: OTHER | Age: 59
End: 2024-02-14
Payer: COMMERCIAL

## 2024-02-15 ENCOUNTER — THERAPY VISIT (OUTPATIENT)
Dept: PHYSICAL THERAPY | Facility: OTHER | Age: 59
End: 2024-02-15
Attending: FAMILY MEDICINE
Payer: COMMERCIAL

## 2024-02-15 DIAGNOSIS — M75.42 SUBACROMIAL IMPINGEMENT OF LEFT SHOULDER: Primary | ICD-10-CM

## 2024-02-15 DIAGNOSIS — R29.898 WEAKNESS OF LEFT UPPER EXTREMITY: ICD-10-CM

## 2024-02-15 DIAGNOSIS — M67.912 TENDINOPATHY OF LEFT ROTATOR CUFF: ICD-10-CM

## 2024-02-15 DIAGNOSIS — G89.29 CHRONIC LEFT SHOULDER PAIN: ICD-10-CM

## 2024-02-15 DIAGNOSIS — M25.512 CHRONIC LEFT SHOULDER PAIN: ICD-10-CM

## 2024-02-15 PROCEDURE — 97110 THERAPEUTIC EXERCISES: CPT | Mod: GP

## 2024-02-15 PROCEDURE — 97140 MANUAL THERAPY 1/> REGIONS: CPT | Mod: GP

## 2024-02-27 PROBLEM — K22.70 BARRETT ESOPHAGUS: Status: ACTIVE | Noted: 2024-02-27

## 2024-03-05 ENCOUNTER — THERAPY VISIT (OUTPATIENT)
Dept: PHYSICAL THERAPY | Facility: OTHER | Age: 59
End: 2024-03-05
Attending: FAMILY MEDICINE
Payer: COMMERCIAL

## 2024-03-05 DIAGNOSIS — G89.29 CHRONIC LEFT SHOULDER PAIN: ICD-10-CM

## 2024-03-05 DIAGNOSIS — M75.42 SUBACROMIAL IMPINGEMENT OF LEFT SHOULDER: ICD-10-CM

## 2024-03-05 DIAGNOSIS — M67.912 TENDINOPATHY OF LEFT ROTATOR CUFF: Primary | ICD-10-CM

## 2024-03-05 DIAGNOSIS — M25.512 CHRONIC LEFT SHOULDER PAIN: ICD-10-CM

## 2024-03-05 DIAGNOSIS — R29.898 WEAKNESS OF LEFT UPPER EXTREMITY: ICD-10-CM

## 2024-03-05 PROCEDURE — 97110 THERAPEUTIC EXERCISES: CPT | Mod: GP

## 2024-03-07 ENCOUNTER — THERAPY VISIT (OUTPATIENT)
Dept: PHYSICAL THERAPY | Facility: OTHER | Age: 59
End: 2024-03-07
Attending: FAMILY MEDICINE
Payer: COMMERCIAL

## 2024-03-07 DIAGNOSIS — R29.898 WEAKNESS OF LEFT UPPER EXTREMITY: ICD-10-CM

## 2024-03-07 DIAGNOSIS — M67.912 TENDINOPATHY OF LEFT ROTATOR CUFF: Primary | ICD-10-CM

## 2024-03-07 DIAGNOSIS — M75.42 SUBACROMIAL IMPINGEMENT OF LEFT SHOULDER: ICD-10-CM

## 2024-03-07 DIAGNOSIS — M25.512 CHRONIC LEFT SHOULDER PAIN: ICD-10-CM

## 2024-03-07 DIAGNOSIS — G89.29 CHRONIC LEFT SHOULDER PAIN: ICD-10-CM

## 2024-03-07 PROCEDURE — 97110 THERAPEUTIC EXERCISES: CPT | Mod: GP

## 2024-03-12 ENCOUNTER — OFFICE VISIT (OUTPATIENT)
Dept: FAMILY MEDICINE | Facility: OTHER | Age: 59
End: 2024-03-12
Attending: FAMILY MEDICINE
Payer: COMMERCIAL

## 2024-03-12 ENCOUNTER — THERAPY VISIT (OUTPATIENT)
Dept: PHYSICAL THERAPY | Facility: OTHER | Age: 59
End: 2024-03-12
Attending: FAMILY MEDICINE
Payer: COMMERCIAL

## 2024-03-12 VITALS
TEMPERATURE: 99 F | BODY MASS INDEX: 26.71 KG/M2 | WEIGHT: 163 LBS | SYSTOLIC BLOOD PRESSURE: 118 MMHG | DIASTOLIC BLOOD PRESSURE: 70 MMHG | OXYGEN SATURATION: 98 % | HEART RATE: 70 BPM | RESPIRATION RATE: 16 BRPM

## 2024-03-12 DIAGNOSIS — R19.7 DIARRHEA, UNSPECIFIED TYPE: Primary | ICD-10-CM

## 2024-03-12 DIAGNOSIS — G89.29 CHRONIC LEFT SHOULDER PAIN: ICD-10-CM

## 2024-03-12 DIAGNOSIS — M75.42 SUBACROMIAL IMPINGEMENT OF LEFT SHOULDER: ICD-10-CM

## 2024-03-12 DIAGNOSIS — F33.42 RECURRENT MAJOR DEPRESSIVE DISORDER, IN FULL REMISSION (H): ICD-10-CM

## 2024-03-12 DIAGNOSIS — M67.912 TENDINOPATHY OF LEFT ROTATOR CUFF: Primary | ICD-10-CM

## 2024-03-12 DIAGNOSIS — A09 TRAVELER'S DIARRHEA: ICD-10-CM

## 2024-03-12 DIAGNOSIS — M25.512 CHRONIC LEFT SHOULDER PAIN: ICD-10-CM

## 2024-03-12 DIAGNOSIS — R29.898 WEAKNESS OF LEFT UPPER EXTREMITY: ICD-10-CM

## 2024-03-12 PROCEDURE — 87209 SMEAR COMPLEX STAIN: CPT | Mod: ZL

## 2024-03-12 PROCEDURE — 87493 C DIFF AMPLIFIED PROBE: CPT | Mod: ZL,XU

## 2024-03-12 PROCEDURE — 97110 THERAPEUTIC EXERCISES: CPT | Mod: GP

## 2024-03-12 PROCEDURE — 87324 CLOSTRIDIUM AG IA: CPT | Mod: ZL

## 2024-03-12 PROCEDURE — 87507 IADNA-DNA/RNA PROBE TQ 12-25: CPT | Mod: ZL

## 2024-03-12 PROCEDURE — 99213 OFFICE O/P EST LOW 20 MIN: CPT | Performed by: FAMILY MEDICINE

## 2024-03-12 RX ORDER — LEVOFLOXACIN 500 MG/1
500 TABLET, FILM COATED ORAL DAILY
Qty: 3 TABLET | Refills: 0 | Status: SHIPPED | OUTPATIENT
Start: 2024-03-12 | End: 2024-03-15

## 2024-03-12 RX ORDER — MOXIFLOXACIN 5 MG/ML
SOLUTION/ DROPS OPHTHALMIC
COMMUNITY
Start: 2024-01-12 | End: 2024-05-01

## 2024-03-12 RX ORDER — SODIUM, POTASSIUM,MAG SULFATES 17.5-3.13G
SOLUTION, RECONSTITUTED, ORAL ORAL SEE ADMIN INSTRUCTIONS
COMMUNITY
Start: 2024-02-06 | End: 2024-05-01

## 2024-03-12 ASSESSMENT — PAIN SCALES - GENERAL: PAINLEVEL: MODERATE PAIN (4)

## 2024-03-12 ASSESSMENT — ENCOUNTER SYMPTOMS: DIARRHEA: 1

## 2024-03-12 NOTE — PROGRESS NOTES
Nursing Notes:   Beatrice Lopez LPN  3/12/2024  1:33 PM  Signed  Chief Complaint   Patient presents with    Diarrhea         Medication Reconciliation: complete    Beatrice Lopez LPN        Westley Quigley is a 58 year old, presenting for the following health issues:  Diarrhea        3/12/2024     1:31 PM   Additional Questions   Roomed by Beatrice Lopez     Soraida is here today for a complaint of diarrhea.  Has been going on for about 10 days.  Has been loose/watery.  Has tried liquid diet.  Has tried going back to a low-fodmap diet, which was previously recommended by GI.  Usually her bouts of diarrhea don't last this long.  Has cramping, urgency.  Has a constant feel like she needs to have a bowel movement, even though she just went.  Hemorrhoids flared up as well.  Had EGD/Colonoscopy on 2/14/24.  She was told that her stomach looked back to normal, but has Rowe's esophagus and needs follow up in 1 year.  Her colon/intestinal biopsies were looking normal.  [facility]  Her symptoms started later in the week after getting back from her trip. She was told that there is an up to date filtration system at the resort they stayed at.  Her  has been fine.  Drinking gatorade and jello.  Still having normal urination, although a little less frequent than normal.   She has been having >20 stools per day.  She is waking up in the middle of the night to have a bowel movement.  Her stools look greasy/fatty.  No blood in stools, but notices it with wiping  from her hemorrhoids.  The blood she does see is bright red.  No recent antibiotics that she had taken.  Minimal nausea, no vomiting.  Had a prior Nissen.  Has been taking imodium, which is slowing her stools a little.    History of Present Illness       Reason for visit:  Cronic diarrhea 7+ days  Symptom onset:  1-2 weeks ago  Symptoms include:  Severe cramping abdominal pain & cramping with pressure with urgency!  Symptom intensity:   Severe  Symptom progression:  Staying the same  Had these symptoms before:  Yes  Has tried/received treatment for these symptoms:  Yes  Previous treatment was successful:  No  What makes it worse:  Eating  What makes it better:  Not eating    She eats 4 or more servings of fruits and vegetables daily.She consumes 0 sweetened beverage(s) daily.She exercises with enough effort to increase her heart rate 60 or more minutes per day.  She exercises with enough effort to increase her heart rate 7 days per week.   She is taking medications regularly.         6/7/2023     9:24 AM 12/4/2023     8:42 AM 3/12/2024     1:25 PM   PHQ   PHQ-9 Total Score 6 2 3   Q9: Thoughts of better off dead/self-harm past 2 weeks Not at all Not at all Not at all         Review of Systems  Constitutional, HEENT, cardiovascular, pulmonary, GI, , musculoskeletal, neuro, skin, endocrine and psych systems are negative, except as otherwise noted.        6/7/2023     9:24 AM 12/4/2023     8:42 AM 3/12/2024     1:25 PM   PHQ   PHQ-9 Total Score 6 2 3   Q9: Thoughts of better off dead/self-harm past 2 weeks Not at all Not at all Not at all           Objective    /70   Pulse 70   Temp 99  F (37.2  C) (Tympanic)   Resp 16   Wt 73.9 kg (163 lb)   SpO2 98%   Breastfeeding No   BMI 26.71 kg/m    Body mass index is 26.71 kg/m .  Physical Exam  Constitutional:       Appearance: Normal appearance.   HENT:      Head: Normocephalic.   Eyes:      Extraocular Movements: Extraocular movements intact.      Pupils: Pupils are equal, round, and reactive to light.   Cardiovascular:      Rate and Rhythm: Normal rate and regular rhythm.      Heart sounds: Normal heart sounds. No murmur heard.  Pulmonary:      Effort: Pulmonary effort is normal.      Breath sounds: Normal breath sounds. No wheezing, rhonchi or rales.   Abdominal:      General: Abdomen is flat. There is no distension.      Palpations: Abdomen is soft.      Tenderness: There is no abdominal  tenderness. There is no guarding or rebound.      Hernia: No hernia is present.      Comments: Hyperactive bowel sounds   Musculoskeletal:      Cervical back: Normal range of motion and neck supple.   Lymphadenopathy:      Cervical: No cervical adenopathy.   Neurological:      Mental Status: She is alert.   Psychiatric:         Mood and Affect: Mood normal.         Behavior: Behavior normal.          ICD-10-CM    1. Diarrhea, unspecified type  R19.7 Routine parasitology exam     Enteric Bacteria and Virus Panel PCR     C. difficile Toxin B PCR with reflex to C. difficile Antigen and Toxins A/B EIA      2. Traveler's diarrhea  A09 levofloxacin (LEVAQUIN) 500 MG tablet      3. Recurrent major depressive disorder, in full remission (H24)  F33.42           With recent travel, concerned that she may have traveler's diarrhea causing her symptoms.  Stool tests were ordered as noted above.  Will treat empirically with Levaquin 500 mg daily x 3 days.  Encouraged her to start this after collecting stool specimens so as not to confuse results.  She may continue using Imodium or Pepto-Bismol as needed to help with symptoms as well.  Continue to push fluids.  If having any symptoms of dehydration or otherwise worsening symptoms or lack of improvement, she should follow-up.  If she does not get any better with antibiotics and workup is negative, could consider repeat colonoscopy, but she just had this done less than a month ago and no concerns noted on her colonoscopy that would explain her current symptoms.  She does typically stick to a low FODMAP diet to help with her chronic diarrhea bouts, this seems different than her usual episodes however.  See #1.  Stable, in remission.  On Wellbutrin XL.    No follow-ups on file.           Patricia Brooke MD

## 2024-03-12 NOTE — NURSING NOTE
Chief Complaint   Patient presents with    Diarrhea         Medication Reconciliation: complete    Beatrice Lopez, LPN

## 2024-03-13 ENCOUNTER — NURSE TRIAGE (OUTPATIENT)
Dept: FAMILY MEDICINE | Facility: OTHER | Age: 59
End: 2024-03-13
Payer: COMMERCIAL

## 2024-03-13 ENCOUNTER — LAB (OUTPATIENT)
Dept: LAB | Facility: OTHER | Age: 59
End: 2024-03-13
Attending: FAMILY MEDICINE
Payer: COMMERCIAL

## 2024-03-13 ENCOUNTER — MYC MEDICAL ADVICE (OUTPATIENT)
Dept: FAMILY MEDICINE | Facility: OTHER | Age: 59
End: 2024-03-13
Payer: COMMERCIAL

## 2024-03-13 DIAGNOSIS — A04.72 C. DIFFICILE COLITIS: Primary | ICD-10-CM

## 2024-03-13 DIAGNOSIS — R19.7 DIARRHEA, UNSPECIFIED TYPE: ICD-10-CM

## 2024-03-13 LAB
ADV 40+41 DNA STL QL NAA+NON-PROBE: NEGATIVE
ASTRO TYP 1-8 RNA STL QL NAA+NON-PROBE: NEGATIVE
C CAYETANENSIS DNA STL QL NAA+NON-PROBE: NEGATIVE
C DIFF GDH STL QL IA: POSITIVE
C DIFF TOX A+B STL QL IA: POSITIVE
C DIFF TOX B STL QL: POSITIVE
CAMPYLOBACTER DNA SPEC NAA+PROBE: NEGATIVE
CRYPTOSP DNA STL QL NAA+NON-PROBE: NEGATIVE
E COLI O157 DNA STL QL NAA+NON-PROBE: NORMAL
E HISTOLYT DNA STL QL NAA+NON-PROBE: NEGATIVE
EAEC ASTA GENE ISLT QL NAA+PROBE: NEGATIVE
EC STX1+STX2 GENES STL QL NAA+NON-PROBE: NEGATIVE
EPEC EAE GENE STL QL NAA+NON-PROBE: NEGATIVE
ETEC LTA+ST1A+ST1B TOX ST NAA+NON-PROBE: NEGATIVE
G LAMBLIA DNA STL QL NAA+NON-PROBE: NEGATIVE
NOROVIRUS GI+II RNA STL QL NAA+NON-PROBE: NEGATIVE
P SHIGELLOIDES DNA STL QL NAA+NON-PROBE: NEGATIVE
RVA RNA STL QL NAA+NON-PROBE: NEGATIVE
SALMONELLA SP RPOD STL QL NAA+PROBE: NEGATIVE
SAPO I+II+IV+V RNA STL QL NAA+NON-PROBE: NEGATIVE
SHIGELLA SP+EIEC IPAH ST NAA+NON-PROBE: NEGATIVE
V CHOLERAE DNA SPEC QL NAA+PROBE: NEGATIVE
VIBRIO DNA SPEC NAA+PROBE: NEGATIVE
Y ENTEROCOL DNA STL QL NAA+PROBE: NEGATIVE

## 2024-03-13 RX ORDER — VANCOMYCIN HYDROCHLORIDE 125 MG/1
125 CAPSULE ORAL 4 TIMES DAILY
Qty: 40 CAPSULE | Refills: 0 | Status: SHIPPED | OUTPATIENT
Start: 2024-03-13 | End: 2024-03-23

## 2024-03-13 RX ORDER — VANCOMYCIN HYDROCHLORIDE 125 MG/1
250 CAPSULE ORAL 4 TIMES DAILY
Qty: 112 CAPSULE | Refills: 0 | Status: SHIPPED | OUTPATIENT
Start: 2024-03-13 | End: 2024-03-27

## 2024-03-13 NOTE — TELEPHONE ENCOUNTER
S-(situation): Patient was seen 3/12/24 for diarrhea. She was given Levaquin. She states the diarrhea she was experiencing is worse, she has increasing abdominal cramping and pain. States her anal area is very painful as well. States she is eating and drinking.     B-(background): Patient brought in stool samples as directed. C-diff positive.    A-(assessment): C- diff positive lab with accompanying symptoms of abdominal cramping and sore rectal area.     R-(recommendations): Wash hands frequently. Will route to provider for further recommendations on how to treat cramping and pain.    Jessi Phillips RN on 3/13/2024 at 1:04 PM

## 2024-03-13 NOTE — TELEPHONE ENCOUNTER
Mp sent today:  (adding to triage encounter)    Good Morning Dr. Brooke~  I dropped off the stool samples first thing this morning and picked up my RX you prescribed.  Which I took right away along with Imodium.   I'm having even worse symptoms this morning with severer cramping and barely made it back home from the clinic.  I also have nausea now and I am curious what you would suggest I eat or drink so I don't make my symptoms worse?       Jenifer Joyner RN on 3/13/2024 at 1:13 PM

## 2024-03-13 NOTE — TELEPHONE ENCOUNTER
Called [facility] pharmacy to inquire about 2 vanco rx's sent in today by 2 different providers with differing amounts.     Beatirce Dumont's was d/c'd and Aamir's was filled and picked up.     Jenifer Joyner RN on 3/13/2024 at 4:12 PM

## 2024-03-13 NOTE — TELEPHONE ENCOUNTER
Stop the Levaquin (we may end up resuming later).  Start Vancomycin orally prescription sent.  It will take several days to slow down. Push fluids and diet as tolerated avoiding milk products.  Frequent hand washing and have others in the home use a different bathroom if possible until things settle down.     Paco Rutledge MD on 3/13/2024 at 3:06 PM

## 2024-03-13 NOTE — TELEPHONE ENCOUNTER
We received a Rx from Beatrice Dumont for 125 mg QID x 10 days. I'm guessing we'll proceed with the Rx Dr. Rutledge sent instead but someone should D/C Beatrice Dumont's Rx if it is incorrect.     Tyler Liz, PharmD  [facility]

## 2024-03-14 ENCOUNTER — MYC MEDICAL ADVICE (OUTPATIENT)
Dept: FAMILY MEDICINE | Facility: OTHER | Age: 59
End: 2024-03-14
Payer: COMMERCIAL

## 2024-03-14 LAB
O+P STL MICRO: NEGATIVE
TRI STN SPEC: NORMAL

## 2024-03-14 NOTE — TELEPHONE ENCOUNTER
Positive CDIFF and antibiotic change on 3/13.          Patient wondering about Kefir.     Jenifer Joyner RN on 3/14/2024 at 11:14 AM

## 2024-03-19 ENCOUNTER — THERAPY VISIT (OUTPATIENT)
Dept: PHYSICAL THERAPY | Facility: OTHER | Age: 59
End: 2024-03-19
Attending: FAMILY MEDICINE
Payer: COMMERCIAL

## 2024-03-19 DIAGNOSIS — M75.42 SUBACROMIAL IMPINGEMENT OF LEFT SHOULDER: ICD-10-CM

## 2024-03-19 DIAGNOSIS — R29.898 WEAKNESS OF LEFT UPPER EXTREMITY: ICD-10-CM

## 2024-03-19 DIAGNOSIS — G89.29 CHRONIC LEFT SHOULDER PAIN: ICD-10-CM

## 2024-03-19 DIAGNOSIS — M25.512 CHRONIC LEFT SHOULDER PAIN: ICD-10-CM

## 2024-03-19 DIAGNOSIS — M67.912 TENDINOPATHY OF LEFT ROTATOR CUFF: Primary | ICD-10-CM

## 2024-03-19 PROCEDURE — 97110 THERAPEUTIC EXERCISES: CPT | Mod: GP

## 2024-04-11 ENCOUNTER — NURSE TRIAGE (OUTPATIENT)
Dept: FAMILY MEDICINE | Facility: OTHER | Age: 59
End: 2024-04-11
Payer: COMMERCIAL

## 2024-04-11 ENCOUNTER — TRANSFERRED RECORDS (OUTPATIENT)
Dept: HEALTH INFORMATION MANAGEMENT | Facility: OTHER | Age: 59
End: 2024-04-11
Payer: COMMERCIAL

## 2024-04-11 NOTE — TELEPHONE ENCOUNTER
Dr. Cantu Patient is scheduled to see you today at 3:05 PM. Routing as FYI only in case you would like to review Assessment Questions and patient-reported history.     Patient states she was last treated by a provider in [place] for the procedure after Dr. Drew performed a colonoscopy and could not determine the cause of symptoms. Dr. Gutiérrez (fistula, had a couple of surgeries on it) - patient estimates this was 15 years ago.   Dora Kim RN on 4/11/2024 at 9:04 AM      Reason for Disposition   Rectal pain is main concern   MODERATE-SEVERE rectal pain (i.e., interferes with school, work, or sleep)    Additional Information   Negative: Shock suspected (e.g., cold/pale/clammy skin, too weak to stand, low BP, rapid pulse)   Negative: Passed out (i.e., lost consciousness, collapsed and was not responding)   Negative: Sounds like a life-threatening emergency to the triager   Negative: Sounds like a life-threatening emergency to the triager   Negative: Diarrhea is main symptom   Negative: Constipation is main symptom (e.g., pain or discomfort caused by passage of hard BMs)   Negative: Blood in or on bowel movement is main symptom   Negative: MPOX SUSPECTED (e.g., direct skin contact such as sex, recent travel to [place]) and any SYMPTOMS OF MPOX (e.g., rash, fever, muscle aches, or swollen lymph nodes)   Negative: At risk for Mpox (men-who-have-sex-with-men) and possible exposure (e.g., multiple sex partners in past 21 days) and ANY SYMPTOMS OF MPOX (e.g., rash, fever, muscle aches, or swollen lymph nodes)   Negative: Sexual assault or rape (sexual intercourse or activity occurs without freely given consent), known or suspected   Negative: Injury to rectum   Negative: Patient sounds very sick or weak to the triager   Negative: Severe rectal pain   Negative: Rectal pain or redness and fever > 100.4 F (38.0 C)   Negative: Acute onset rectal pain and constipation (straining with rectal pressure or  "fullness), which is not relieved by Sitz bath or suppository    Answer Assessment - Initial Assessment Questions  1. LOCATION: \"Where does it hurt?\"       Starting on left side of rectum     2. RADIATION: \"Does the pain shoot anywhere else?\" (e.g., lower back, groin, thighs)      Goes toward the vagina     3. ONSET: \"When did the pain begin?\" (e.g., minutes, hours or days ago)       The evening of the 9th     4. SUDDEN: \"Gradual or sudden onset?\"      Sudden     5. PATTERN \"Does the pain come and go, or is it constant?\"     - If constant: \"Is it getting better, staying the same, or worsening?\"       (Note: Constant means the pain never goes away completely; most serious pain is constant and gets worse over time)      - If intermittent: \"How long does it last?\" \"Do you have pain now?\"      (Note: Intermittent means the pain goes away completely between bouts)      Constant     6. SEVERITY: \"How bad is the pain?\"  (e.g., Scale 1-10; mild, moderate, or severe)    - MILD (1-3): doesn't interfere with normal activities, area soft and not tender to touch     - MODERATE (4-7): interferes with normal activities or awakens from sleep, abdomen tender to touch     - SEVERE (8-10): excruciating pain, doubled over, unable to do any normal activities       Pulsing pain, at its worst it's at a 9 right after a bowel movement, then it subsides to a dull ache     7. RECURRENT SYMPTOM: \"Have you ever had this type of pelvic pain before?\" If Yes, ask: \"When was the last time?\" and \"What happened that time?\"       Has had this before and it was an abscess     8. CAUSE: \"What do you think is causing the pelvic pain?\"      As above     9. RELIEVING/AGGRAVATING FACTORS: \"What makes it better or worse?\" (e.g., activity/rest, sexual intercourse, voiding, passing stool)      Sitting on a doughnut and icing the area     10. OTHER SYMPTOMS: \"Has there been any other symptoms?\" (e.g., fever, constipation, diarrhea, urine problems, vaginal " "bleeding, vaginal discharge, or vomiting?\"        Denies     11. PREGNANCY: \"Is there any chance you are pregnant?\" \"When was your last menstrual period?\"        no    Protocols used: Pelvic Pain - Female-A-OH, Rectal Symptoms-A-OH    "

## 2024-04-12 ENCOUNTER — HOSPITAL ENCOUNTER (EMERGENCY)
Facility: OTHER | Age: 59
Discharge: HOME | End: 2024-04-13
Payer: COMMERCIAL

## 2024-04-12 ENCOUNTER — APPOINTMENT (OUTPATIENT)
Dept: CT IMAGING | Facility: OTHER | Age: 59
End: 2024-04-12
Payer: COMMERCIAL

## 2024-04-12 VITALS
SYSTOLIC BLOOD PRESSURE: 119 MMHG | WEIGHT: 156 LBS | RESPIRATION RATE: 18 BRPM | TEMPERATURE: 98.7 F | BODY MASS INDEX: 25.56 KG/M2 | DIASTOLIC BLOOD PRESSURE: 78 MMHG | OXYGEN SATURATION: 95 % | HEART RATE: 109 BPM

## 2024-04-12 DIAGNOSIS — K61.0 PERIANAL ABSCESS: ICD-10-CM

## 2024-04-12 LAB
ALBUMIN SERPL BCG-MCNC: 4.2 G/DL (ref 3.5–5.2)
ALBUMIN UR-MCNC: NEGATIVE MG/DL
ALP SERPL-CCNC: 41 U/L (ref 40–150)
ALT SERPL W P-5'-P-CCNC: 11 U/L (ref 0–50)
ANION GAP SERPL CALCULATED.3IONS-SCNC: 12 MMOL/L (ref 7–15)
APPEARANCE UR: CLEAR
AST SERPL W P-5'-P-CCNC: 16 U/L (ref 0–45)
BASOPHILS # BLD AUTO: 0 10E3/UL (ref 0–0.2)
BASOPHILS NFR BLD AUTO: 0 %
BILIRUB SERPL-MCNC: 0.4 MG/DL
BILIRUB UR QL STRIP: NEGATIVE
BUN SERPL-MCNC: 6.9 MG/DL (ref 8–23)
CALCIUM SERPL-MCNC: 9.6 MG/DL (ref 8.6–10)
CHLORIDE SERPL-SCNC: 100 MMOL/L (ref 98–107)
COLOR UR AUTO: ABNORMAL
CREAT SERPL-MCNC: 0.59 MG/DL (ref 0.51–0.95)
CRP SERPL-MCNC: 59.2 MG/L
DEPRECATED HCO3 PLAS-SCNC: 25 MMOL/L (ref 22–29)
EGFRCR SERPLBLD CKD-EPI 2021: >90 ML/MIN/1.73M2
EOSINOPHIL # BLD AUTO: 0.1 10E3/UL (ref 0–0.7)
EOSINOPHIL NFR BLD AUTO: 1 %
ERYTHROCYTE [DISTWIDTH] IN BLOOD BY AUTOMATED COUNT: 12 % (ref 10–15)
GLUCOSE SERPL-MCNC: 102 MG/DL (ref 70–99)
GLUCOSE UR STRIP-MCNC: NEGATIVE MG/DL
HCT VFR BLD AUTO: 39.9 % (ref 35–47)
HGB BLD-MCNC: 13.1 G/DL (ref 11.7–15.7)
HGB UR QL STRIP: ABNORMAL
HOLD SPECIMEN: NORMAL
IMM GRANULOCYTES # BLD: 0 10E3/UL
IMM GRANULOCYTES NFR BLD: 0 %
KETONES UR STRIP-MCNC: 10 MG/DL
LACTATE SERPL-SCNC: 1.4 MMOL/L (ref 0.7–2)
LEUKOCYTE ESTERASE UR QL STRIP: NEGATIVE
LYMPHOCYTES # BLD AUTO: 1.5 10E3/UL (ref 0.8–5.3)
LYMPHOCYTES NFR BLD AUTO: 17 %
MCH RBC QN AUTO: 32.8 PG (ref 26.5–33)
MCHC RBC AUTO-ENTMCNC: 32.8 G/DL (ref 31.5–36.5)
MCV RBC AUTO: 100 FL (ref 78–100)
MONOCYTES # BLD AUTO: 0.6 10E3/UL (ref 0–1.3)
MONOCYTES NFR BLD AUTO: 7 %
MUCOUS THREADS #/AREA URNS LPF: PRESENT /LPF
NEUTROPHILS # BLD AUTO: 6.6 10E3/UL (ref 1.6–8.3)
NEUTROPHILS NFR BLD AUTO: 75 %
NITRATE UR QL: NEGATIVE
NRBC # BLD AUTO: 0 10E3/UL
NRBC BLD AUTO-RTO: 0 /100
PH UR STRIP: 7 [PH] (ref 5–9)
PLATELET # BLD AUTO: 237 10E3/UL (ref 150–450)
POTASSIUM SERPL-SCNC: 3.6 MMOL/L (ref 3.4–5.3)
PROT SERPL-MCNC: 6.9 G/DL (ref 6.4–8.3)
RBC # BLD AUTO: 3.99 10E6/UL (ref 3.8–5.2)
RBC URINE: 1 /HPF
SODIUM SERPL-SCNC: 137 MMOL/L (ref 135–145)
SP GR UR STRIP: 1.01 (ref 1–1.03)
UROBILINOGEN UR STRIP-MCNC: NORMAL MG/DL
WBC # BLD AUTO: 8.8 10E3/UL (ref 4–11)
WBC URINE: 1 /HPF

## 2024-04-12 PROCEDURE — 87181 SC STD AGAR DILUTION PER AGT: CPT | Mod: 91

## 2024-04-12 PROCEDURE — 83605 ASSAY OF LACTIC ACID: CPT

## 2024-04-12 PROCEDURE — 87186 SC STD MICRODIL/AGAR DIL: CPT | Mod: XU

## 2024-04-12 PROCEDURE — 80053 COMPREHEN METABOLIC PANEL: CPT

## 2024-04-12 PROCEDURE — 99285 EMERGENCY DEPT VISIT HI MDM: CPT | Mod: 25

## 2024-04-12 PROCEDURE — 85025 COMPLETE CBC W/AUTO DIFF WBC: CPT

## 2024-04-12 PROCEDURE — 87086 URINE CULTURE/COLONY COUNT: CPT

## 2024-04-12 PROCEDURE — 36415 COLL VENOUS BLD VENIPUNCTURE: CPT

## 2024-04-12 PROCEDURE — 86140 C-REACTIVE PROTEIN: CPT

## 2024-04-12 PROCEDURE — 96376 TX/PRO/DX INJ SAME DRUG ADON: CPT | Mod: XU

## 2024-04-12 PROCEDURE — 96361 HYDRATE IV INFUSION ADD-ON: CPT | Mod: XU

## 2024-04-12 PROCEDURE — 46040 I&D ISCHIORCT&/PERIRCT ABSC: CPT | Performed by: SURGERY

## 2024-04-12 PROCEDURE — 87040 BLOOD CULTURE FOR BACTERIA: CPT | Mod: 91

## 2024-04-12 PROCEDURE — 99284 EMERGENCY DEPT VISIT MOD MDM: CPT

## 2024-04-12 PROCEDURE — 87205 SMEAR GRAM STAIN: CPT

## 2024-04-12 PROCEDURE — 258N000003 HC RX IP 258 OP 636

## 2024-04-12 PROCEDURE — 46040 I&D ISCHIORCT&/PERIRCT ABSC: CPT

## 2024-04-12 PROCEDURE — 250N000013 HC RX MED GY IP 250 OP 250 PS 637

## 2024-04-12 PROCEDURE — 250N000011 HC RX IP 250 OP 636

## 2024-04-12 PROCEDURE — 96374 THER/PROPH/DIAG INJ IV PUSH: CPT | Mod: XU

## 2024-04-12 PROCEDURE — 72193 CT PELVIS W/DYE: CPT

## 2024-04-12 PROCEDURE — 81003 URINALYSIS AUTO W/O SCOPE: CPT

## 2024-04-12 PROCEDURE — 96375 TX/PRO/DX INJ NEW DRUG ADDON: CPT | Mod: XU

## 2024-04-12 RX ORDER — CIPROFLOXACIN 500 MG/1
500 TABLET, FILM COATED ORAL ONCE
Status: COMPLETED | OUTPATIENT
Start: 2024-04-12 | End: 2024-04-12

## 2024-04-12 RX ORDER — OXYCODONE HYDROCHLORIDE 5 MG/1
5 TABLET ORAL EVERY 6 HOURS PRN
Qty: 20 TABLET | Refills: 0 | Status: SHIPPED | OUTPATIENT
Start: 2024-04-12 | End: 2024-05-01

## 2024-04-12 RX ORDER — FENTANYL CITRATE 50 UG/ML
50 INJECTION, SOLUTION INTRAMUSCULAR; INTRAVENOUS ONCE
Status: COMPLETED | OUTPATIENT
Start: 2024-04-12 | End: 2024-04-12

## 2024-04-12 RX ORDER — IOPAMIDOL 755 MG/ML
100 INJECTION, SOLUTION INTRAVASCULAR ONCE
Status: COMPLETED | OUTPATIENT
Start: 2024-04-12 | End: 2024-04-12

## 2024-04-12 RX ORDER — KETOROLAC TROMETHAMINE 15 MG/ML
15 INJECTION, SOLUTION INTRAMUSCULAR; INTRAVENOUS ONCE
Status: COMPLETED | OUTPATIENT
Start: 2024-04-12 | End: 2024-04-12

## 2024-04-12 RX ORDER — CIPROFLOXACIN 500 MG/1
500 TABLET, FILM COATED ORAL 2 TIMES DAILY
Qty: 10 TABLET | Refills: 0 | Status: SHIPPED | OUTPATIENT
Start: 2024-04-12 | End: 2024-04-17

## 2024-04-12 RX ORDER — METRONIDAZOLE 500 MG/1
500 TABLET ORAL 2 TIMES DAILY
Qty: 10 TABLET | Refills: 0 | Status: SHIPPED | OUTPATIENT
Start: 2024-04-12 | End: 2024-04-17

## 2024-04-12 RX ORDER — METRONIDAZOLE 500 MG/1
500 TABLET ORAL ONCE
Status: COMPLETED | OUTPATIENT
Start: 2024-04-12 | End: 2024-04-12

## 2024-04-12 RX ORDER — ONDANSETRON 2 MG/ML
4 INJECTION INTRAMUSCULAR; INTRAVENOUS ONCE
Status: COMPLETED | OUTPATIENT
Start: 2024-04-12 | End: 2024-04-12

## 2024-04-12 RX ORDER — FENTANYL CITRATE 50 UG/ML
50 INJECTION, SOLUTION INTRAMUSCULAR; INTRAVENOUS
Status: COMPLETED | OUTPATIENT
Start: 2024-04-12 | End: 2024-04-12

## 2024-04-12 RX ORDER — BUPIVACAINE HYDROCHLORIDE 2.5 MG/ML
10 INJECTION, SOLUTION INFILTRATION; PERINEURAL ONCE
Status: COMPLETED | OUTPATIENT
Start: 2024-04-12 | End: 2024-04-12

## 2024-04-12 RX ADMIN — CIPROFLOXACIN HYDROCHLORIDE 500 MG: 500 TABLET, FILM COATED ORAL at 23:27

## 2024-04-12 RX ADMIN — BUPIVACAINE HYDROCHLORIDE 25 MG: 2.5 INJECTION, SOLUTION EPIDURAL; INFILTRATION; INTRACAUDAL; PERINEURAL at 22:37

## 2024-04-12 RX ADMIN — SODIUM CHLORIDE 1000 ML: 9 INJECTION, SOLUTION INTRAVENOUS at 19:11

## 2024-04-12 RX ADMIN — FENTANYL CITRATE 50 MCG: 50 INJECTION INTRAMUSCULAR; INTRAVENOUS at 21:45

## 2024-04-12 RX ADMIN — FENTANYL CITRATE 50 MCG: 50 INJECTION INTRAMUSCULAR; INTRAVENOUS at 23:38

## 2024-04-12 RX ADMIN — METRONIDAZOLE 500 MG: 500 TABLET ORAL at 23:27

## 2024-04-12 RX ADMIN — KETOROLAC TROMETHAMINE 15 MG: 15 INJECTION, SOLUTION INTRAMUSCULAR; INTRAVENOUS at 19:11

## 2024-04-12 RX ADMIN — ONDANSETRON 4 MG: 2 INJECTION INTRAMUSCULAR; INTRAVENOUS at 19:11

## 2024-04-12 RX ADMIN — FENTANYL CITRATE 50 MCG: 50 INJECTION INTRAMUSCULAR; INTRAVENOUS at 19:10

## 2024-04-12 RX ADMIN — IOPAMIDOL 100 ML: 755 INJECTION, SOLUTION INTRAVENOUS at 20:09

## 2024-04-12 ASSESSMENT — ACTIVITIES OF DAILY LIVING (ADL)
ADLS_ACUITY_SCORE: 35

## 2024-04-12 ASSESSMENT — COLUMBIA-SUICIDE SEVERITY RATING SCALE - C-SSRS
6. HAVE YOU EVER DONE ANYTHING, STARTED TO DO ANYTHING, OR PREPARED TO DO ANYTHING TO END YOUR LIFE?: NO
2. HAVE YOU ACTUALLY HAD ANY THOUGHTS OF KILLING YOURSELF IN THE PAST MONTH?: NO
1. IN THE PAST MONTH, HAVE YOU WISHED YOU WERE DEAD OR WISHED YOU COULD GO TO SLEEP AND NOT WAKE UP?: NO

## 2024-04-12 ASSESSMENT — ENCOUNTER SYMPTOMS
NAUSEA: 1
CHILLS: 1
RECTAL PAIN: 1
APPETITE CHANGE: 1

## 2024-04-12 NOTE — ED TRIAGE NOTES
Patient present with rectal pain that started 4/8. States she was seen yesterday in [place] by Dr. Correa, dx with rectal abscess with possible fistula. Was referred to Dr. Gutiérrez at [facility] but was not able to schedule an appt today. States pain is 9/10, unable to sit comfortably. Took   hydrocodone/tylenol with poor result. Last BM today.      Triage Assessment (Adult)       Row Name 04/12/24 [id number]          Triage Assessment    Airway WDL WDL     Additional Documentation Breath Sounds (Group)        Respiratory WDL    Respiratory WDL WDL        Breath Sounds    Breath Sounds All Fields     All Lung Fields Breath Sounds equal bilaterally;clear        Skin Circulation/Temperature WDL    Skin Circulation/Temperature WDL WDL        Cardiac WDL    Cardiac WDL WDL     Cardiac Rhythm NSR        Peripheral/Neurovascular WDL    Peripheral Neurovascular WDL WDL        Cognitive/Neuro/Behavioral WDL    Cognitive/Neuro/Behavioral WDL WDL

## 2024-04-12 NOTE — ED PROVIDER NOTES
History     Chief Complaint   Patient presents with    Rectal/perineal Pain     The history is provided by the patient and medical records.     Soraida Suresh is a 58 year old female with PMH IBS, MDD, tendinopathy left rotator cuff, henderson esophagus, abscess of anal and rectal regions, who presents to the ED with complaints of rectal pain and swelling. Rectal pain and swelling started 3 days ago. Swelling and pain is spreading into her vagina, left groin, and left thigh today. Rates jeremy-rectal pain 9/10. She is not able to sit r/t to the pain. Took an  hydrocodone that she had at home around noon with no change in pain. She saw Dr. Correa yesterday in [place] and diagnosed with jeremy-rectal abscess with possible anal fistula. At that time a referral was placed to see Dr. Gutiérrez at [facility] but when she called today she was not able to make an appointment. She reports she had c-diff in March after taking an antibiotic for an eye infection. No appetite. Nausea. C/o chills. She also reports she will break out in a cold sweat over the past couple of days.     2024- Saw Dr. Correa- She has a history years ago of jeremy-rectal abscess and anal fistula. She saw Dr. Gutiérrez for this back in  the abscess was drained but the fistula continued to drain serous fluid and occasional stool a second procedure was performed a month later according to the patient to attempt to treat the fistula but was unsuccessful and for the 9 years she has had a small amount of drainage from this fistula opening. In the last week or so she is noted swelling in the perianal area and increased drainage from the fistula opening with more stool than she typically sees. She stated that there was actually is much stool coming from the fistula tract as there is from her anus. Impression: jeremy-rectal abscess, anal fistula. The patient will be referred to Dr. Gutiérrez for further evaluation and incision and drainage.      Allergies:  Allergies   Allergen Reactions    Codeine Other (See Comments)    Medroxyprogesterone Other (See Comments)     Caused patient's brain to swell.    Azithromycin Palpitations    Latex Rash     hands    Penicillins Rash    Proline Rash     Prolene: Sutures; caused keloid    Sulfamethoxazole-Trimethoprim Rash    Sulfamethoxazole-Trimethoprim Rash       Problem List:    Patient Active Problem List    Diagnosis Date Noted    Rowe esophagus 02/27/2024     Priority: Medium     egd 2/14/24 - fu 1 yr - Correa      Tendinopathy of left rotator cuff 02/12/2024     Priority: Medium    Subacromial impingement of left shoulder 02/12/2024     Priority: Medium    Recurrent major depressive disorder, in full remission (H24) 11/28/2022     Priority: Medium    Gastritis 04/04/2022     Priority: Medium    Epigastric pain 03/29/2022     Priority: Medium    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 11/25/2019     Priority: Medium    Primary osteoarthritis of left foot 10/29/2019     Priority: Medium    Abnormal electrocardiogram 10/29/2019     Priority: Medium    Hyperlipidemia LDL goal <100 10/08/2018     Priority: Medium    Microscopic hematuria 10/08/2018     Priority: Medium    Closed displaced fracture of shaft of fifth metacarpal bone of left hand, sequela 10/08/2018     Priority: Medium    Segmental and somatic dysfunction of cervical region 08/16/2018     Priority: Medium    Allergic rhinitis due to pollen 01/16/2018     Priority: Medium    Esophageal reflux 01/16/2018     Priority: Medium    Irritable bowel syndrome 01/16/2018     Priority: Medium    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        Cervical disc herniation 07/23/2013     Priority: Medium      Overview:   C6/7 on left;  S/p injection Dr. 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     Priority: Medium        Past Medical History:    Past Medical History:   Diagnosis Date    Abnormal cytological finding in specimen from cervix uteri     Allergic rhinitis     Rowe esophagus     Benign lipomatous neoplasm of skin and subcutaneous tissue of trunk     Contact dermatitis     Dyshidrosis     Encounter for screening for malignant neoplasm of colon     Gastro-esophageal reflux disease without esophagitis     Major depressive disorder, single episode     Other congenital malformations of great veins (CODE)     Other specified diseases of anus and rectum (CODE)     Panic disorder without agoraphobia     Partial anomalous pulmonary venous connection     Residual hemorrhoidal skin tags        Past Surgical History:    Past Surgical History:   Procedure Laterality Date    anorectal exam anesthesia  05/20/2015    [place] ANORECTAL EXAM SURG REQ ANESTH  DIAG    APPENDECTOMY OPEN      5/1987    ARTHROSCOPY KNEE      7/2006, 2013,Debridement, partial medial meniscectomy, and anterior cruciate ligament reconstruction using tibialis anterior allograft, right knee    AS HYSTEROSCOPY, SURGICAL; W/ ENDOMETRIAL ABLATION, ANY METHOD  12/14/2015    Kathya    CARDIAC SURGERY  11/2006    Repair of anomalous pulmonary venous return by anastomosis of the anomalous vein to the left atrial appendage, done at [facility]    COLONOSCOPY  08/28/2014 8/28/2014 Follow up in 10 years 8/28/24 normal    COLONOSCOPY  02/14/2024    Correa - normal    ECHO LIMITED      mild left atrial enlargement;  normal ef    EGD  02/14/2024    Correa - Rowe's esophagus - f/u 1 year rec'd    ESOPHAGOSCOPY, GASTROSCOPY, DUODENOSCOPY (EGD), COMBINED      1996    ESOPHAGOSCOPY, GASTROSCOPY, DUODENOSCOPY (EGD), COMBINED      2001    ESOPHAGOSCOPY, GASTROSCOPY,  DUODENOSCOPY (EGD), COMBINED          ESOPHAGOSCOPY, GASTROSCOPY, DUODENOSCOPY (EGD), COMBINED      11,Normal    ESOPHAGOSCOPY, GASTROSCOPY, DUODENOSCOPY (EGD), COMBINED N/A 2022    Procedure: ESOPHAGOGASTRODUODENOSCOPY, WITH BIOPSY;  Surgeon: Ramon Sales MD;  Location: [facility] OR    NISSEN FUNDOPLICATION  2000    Dr. Sales    OTHER SURGICAL HISTORY Left 2021    Tenex procedure for plantar fasciitis with Dr. Reveles    SIGMOIDOSCOPY FLEXIBLE      ,And BE, negative, for rectal bleeding.  Fissures and hemorrhoids noted    SURGICAL PATHOLOGY EXAM Left 2016    Left flank       Family History:    Family History   Problem Relation Age of Onset    Arthritis Mother         Arthritis,Osteoarthritis;  of 76 pneumonia but no autopsy    Diabetes Father         Diabetes    Other - See Comments Father         COPD/Benign colon polyps/AAA -  of ruptured AAA    Colon Cancer Paternal Grandmother         Cancer-colon    Colon Cancer Paternal Aunt         Cancer-colon    Diabetes Paternal Aunt         Diabetes    Obesity Sister         bariatric surgery    Other - See Comments Sister         TBI    Depression Sister     Anxiety Disorder Sister     Diabetes Sister     Hyperlipidemia Sister     Diabetes Brother     Hyperlipidemia Brother     Other - See Comments Maternal Grandmother          of old age    Dementia Maternal Grandfather         possible dementia,  of pneumonia    Other - See Comments Brother         MVA - at age 50    Hypertension Brother     Diabetes Brother     Hyperlipidemia Brother     Depression Brother     Diabetes Brother     Hyperlipidemia Brother     Macular Degeneration Brother     Diabetes Brother     Hyperlipidemia Brother     Suicide Brother 49    Cerebrovascular Disease Paternal Grandfather          of stroke       Social History:  Marital Status:   [2]  Social History     Tobacco Use    Smoking status: Former     Current packs/day: 0.00     Types:  Cigarettes     Quit date: 10/10/2005     Years since quittin.5    Smokeless tobacco: Never   Vaping Use    Vaping status: Never Used   Substance Use Topics    Alcohol use: Yes     Alcohol/week: 14.0 standard drinks of alcohol     Comment: Alcoholic Drinks/day: 2-3 per day    Drug use: No        Medications:    ciprofloxacin (CIPRO) 500 MG tablet  metroNIDAZOLE (FLAGYL) 500 MG tablet  oxyCODONE (ROXICODONE) 5 MG tablet  acyclovir (ZOVIRAX) 5 % external ointment  albuterol (PROAIR HFA/PROVENTIL HFA/VENTOLIN HFA) 108 (90 Base) MCG/ACT inhaler  buPROPion (WELLBUTRIN XL) 300 MG 24 hr tablet  calcium carbonate-vitamin D (CALTRATE) 600-10 MG-MCG per tablet  clindamycin (CLEOCIN T) 1 % external solution  cyanocobalamin (VITAMIN B-12) 1000 MCG sublingual tablet  diphenhydrAMINE HCl (BENADRYL ALLERGY PO)  folic acid (FOLVITE) 400 MCG tablet  Glucosamine-Chondroitin 750 & 400 MG MISC  L-Lysine 500 MG TABS  MELATONIN PO  moxifloxacin (VIGAMOX) 0.5 % ophthalmic solution  Na Sulfate-K Sulfate-Mg Sulf (SUPREP BOWEL PREP) solution  omeprazole (PRILOSEC) 40 MG DR capsule  triamcinolone (NASACORT) 55 MCG/ACT nasal aerosol        Review of Systems   Constitutional:  Positive for appetite change and chills.   Gastrointestinal:  Positive for nausea and rectal pain.   Genitourinary:  Positive for vaginal pain.   All other systems reviewed and are negative.      Physical Exam   BP: 116/79  Pulse: 109  Temp: 98.7  F (37.1  C)  Resp: 18  Weight: 70.8 kg (156 lb)  SpO2: 97 %      Physical Exam  Vitals and nursing note reviewed. Exam conducted with a chaperone present.   Constitutional:       General: She is in acute distress (r/t jeremy-rectal pain).      Appearance: Normal appearance. She is normal weight.   HENT:      Head: Normocephalic.      Right Ear: Tympanic membrane normal.      Left Ear: Tympanic membrane normal.      Nose: Nose normal.      Mouth/Throat:      Mouth: Mucous membranes are moist.      Pharynx: No posterior  oropharyngeal erythema.   Eyes:      Conjunctiva/sclera: Conjunctivae normal.   Cardiovascular:      Rate and Rhythm: Regular rhythm. Tachycardia present.      Pulses: Normal pulses.      Heart sounds: Normal heart sounds.   Pulmonary:      Effort: Pulmonary effort is normal.      Breath sounds: Normal breath sounds.   Abdominal:      General: Bowel sounds are normal.      Palpations: Abdomen is soft.      Tenderness: There is no abdominal tenderness.   Genitourinary:     Exam position: Knee-chest position.      Labia:         Left: Tenderness present.       Rectum: Tenderness and external hemorrhoid present.            Comments: Left sided jeremy-rectal erythema and swelling. Tenderness on palpation to the redness. Swelling spreads into left labia.     Musculoskeletal:         General: Normal range of motion.      Cervical back: Normal range of motion and neck supple.   Skin:     General: Skin is warm and dry.      Capillary Refill: Capillary refill takes less than 2 seconds.      Findings: Erythema present.   Neurological:      General: No focal deficit present.      Mental Status: She is alert and oriented to person, place, and time. Mental status is at baseline.   Psychiatric:         Mood and Affect: Mood is anxious.            Results for orders placed or performed during the hospital encounter of 04/12/24 (from the past 24 hour(s))   CBC with platelets differential    Narrative    The following orders were created for panel order CBC with platelets differential.  Procedure                               Abnormality         Status                     ---------                               -----------         ------                     CBC with platelets and d...[[id number]]                      Final result                 Please view results for these tests on the individual orders.   Comprehensive metabolic panel   Result Value Ref Range    Sodium 137 135 - 145 mmol/L    Potassium 3.6 3.4 - 5.3 mmol/L    Carbon  Dioxide (CO2) 25 22 - 29 mmol/L    Anion Gap 12 7 - 15 mmol/L    Urea Nitrogen 6.9 (L) 8.0 - 23.0 mg/dL    Creatinine 0.59 0.51 - 0.95 mg/dL    GFR Estimate >90 >60 mL/min/1.73m2    Calcium 9.6 8.6 - 10.0 mg/dL    Chloride 100 98 - 107 mmol/L    Glucose 102 (H) 70 - 99 mg/dL    Alkaline Phosphatase 41 40 - 150 U/L    AST 16 0 - 45 U/L    ALT 11 0 - 50 U/L    Protein Total 6.9 6.4 - 8.3 g/dL    Albumin 4.2 3.5 - 5.2 g/dL    Bilirubin Total 0.4 <=1.2 mg/dL   Lactic acid whole blood   Result Value Ref Range    Lactic Acid 1.4 0.7 - 2.0 mmol/L   CRP inflammation   Result Value Ref Range    CRP Inflammation 59.20 (H) <5.00 mg/L   [place] Draw    Narrative    The following orders were created for panel order [place] [facility].  Procedure                               Abnormality         Status                     ---------                               -----------         ------                     Extra Blue Top Tube[[id number]]                              Final result               Extra Red Top Tube[[id number]]                               Final result               Extra Green Top (Lithium...[[id number]]                                                 Extra Purple Top Tube[[id number]]                            Final result               Extra Green Top (Lithium...[[id number]]                                                   Please view results for these tests on the individual orders.   CBC with platelets and differential   Result Value Ref Range    WBC Count 8.8 4.0 - 11.0 10e3/uL    RBC Count 3.99 3.80 - 5.20 10e6/uL    Hemoglobin 13.1 11.7 - 15.7 g/dL    Hematocrit 39.9 35.0 - 47.0 %     78 - 100 fL    MCH 32.8 26.5 - 33.0 pg    MCHC 32.8 31.5 - 36.5 g/dL    RDW 12.0 10.0 - 15.0 %    Platelet Count 237 150 - 450 10e3/uL    % Neutrophils 75 %    % Lymphocytes 17 %    % Monocytes 7 %    % Eosinophils 1 %    % Basophils 0 %    % Immature Granulocytes 0 %    NRBCs per 100 WBC 0 <1 /100    Absolute Neutrophils 6.6 1.6  - 8.3 10e3/uL    Absolute Lymphocytes 1.5 0.8 - 5.3 10e3/uL    Absolute Monocytes 0.6 0.0 - 1.3 10e3/uL    Absolute Eosinophils 0.1 0.0 - 0.7 10e3/uL    Absolute Basophils 0.0 0.0 - 0.2 10e3/uL    Absolute Immature Granulocytes 0.0 <=0.4 10e3/uL    Absolute NRBCs 0.0 10e3/uL   Extra Blue Top Tube   Result Value Ref Range    Hold Specimen x    Extra Red Top Tube   Result Value Ref Range    Hold Specimen x    Extra Purple Top Tube   Result Value Ref Range    Hold Specimen JIC    UA with Microscopic reflex to Culture    Specimen: Urine, Midstream   Result Value Ref Range    Color Urine Light Yellow Colorless, Straw, Light Yellow, Yellow    Appearance Urine Clear Clear    Glucose Urine Negative Negative mg/dL    Bilirubin Urine Negative Negative    Ketones Urine 10 (A) Negative mg/dL    Specific Gravity Urine 1.009 1.000 - 1.030    Blood Urine Trace (A) Negative    pH Urine 7.0 5.0 - 9.0    Protein Albumin Urine Negative Negative mg/dL    Urobilinogen Urine Normal Normal, 2.0 mg/dL    Nitrite Urine Negative Negative    Leukocyte Esterase Urine Negative Negative    Mucus Urine Present (A) None Seen /LPF    RBC Urine 1 <=2 /HPF    WBC Urine 1 <=5 /HPF    Narrative    Urine Culture not indicated   CT Pelvis Soft Tissue w Contrast    Narrative    Exam: CT PELVIS SOFT TISSUE W CONTRAST    Exam reason: jeremy rectal pain, swelling, redness. Swelling and pain  spreading into posterior left thigh.    Technique: Images were obtained of the pelvis after administration of  IV contrast. Sagittal and coronal reformation was performed. This CT  was performed using one or more of the following dose reduction  techniques: automated exposure control, adjustment of the mA and/or kV  according to patient size, and/or use of iterative reconstruction  technique.    Meds/Contrast: Isovue 370, 90 mL    Comparison: None.     FINDINGS:     Bowel/Mesentery: There is a 2.8 x 2.7 x 1.7 cm perianal abscess at the  1-2:00 position on the left. No  evidence of supralevator extension. No  discrete fistula. There is associated fat stranding in the soft  tissues.  Peritoneal Cavity: No free fluid.  Lymph Nodes: No adenopathy.   Abdominal Wall: No acute findings.     Bones: No acute osseous abnormality.       Impression    IMPRESSION:    There is a perianal abscess on the left measuring up to 2.8 cm.    GIUSEPPE OWUSU MD         SYSTEM ID:  [id number]       Medications   sodium chloride 0.9% BOLUS 1,000 mL (0 mLs Intravenous Stopped 24)   fentaNYL (PF) (SUBLIMAZE) injection 50 mcg (50 mcg Intravenous $Given 24)   ondansetron (ZOFRAN) injection 4 mg (4 mg Intravenous $Given 24)   ketorolac (TORADOL) injection 15 mg (15 mg Intravenous $Given 24)   iopamidol (ISOVUE-370) solution 100 mL (100 mLs Intravenous $Given 24)   fentaNYL (PF) (SUBLIMAZE) injection 50 mcg (50 mcg Intravenous $Given 24)   lidocaine 1 % 10 mL (10 mLs Intradermal Not Given 24)   BUPivacaine (MARCAINE) 0.25 % injection 25 mg (25 mg Intradermal $Given 24)   ciprofloxacin (CIPRO) tablet 500 mg (500 mg Oral $Given 24)   metroNIDAZOLE (FLAGYL) tablet 500 mg (500 mg Oral $Given 24)   fentaNYL (PF) (SUBLIMAZE) injection 50 mcg (50 mcg Intravenous $Given 24)       Assessments & Plan (with Medical Decision Making)  Soraida Suresh is a 58 year old female with PMH IBS, MDD, tendinopathy left rotator cuff, henderson esophagus, abscess of anal and rectal regions, who presents to the ED with complaints of rectal pain and swelling. Rectal pain and swelling started 3 days ago. Swelling and pain is spreading into her vagina, left groin, and left thigh today. Rates jeremy-rectal pain 9/10. She is not able to sit r/t to the pain. Took an  hydrocodone that she had at home around noon with no change in pain. She saw Dr. Correa yesterday in [place] and diagnosed with jeremy-rectal abscess with possible anal  fistula. At that time a referral was placed to see Dr. Gutiérrez at [facility] but when she called today she was not able to make an appointment. She reports she had c-diff in March after taking an antibiotic for an eye infection. No appetite. Nausea. C/o chills. She also reports she will break out in a cold sweat over the past couple of days.   4/11/2024- Saw Dr. Correa- She has a history years ago of jeremy-rectal abscess and anal fistula. She saw Dr. Gutiérrez for this back in 2015 the abscess was drained but the fistula continued to drain serous fluid and occasional stool a second procedure was performed a month later according to the patient to attempt to treat the fistula but was unsuccessful and for the 9 years she has had a small amount of drainage from this fistula opening. In the last week or so she is noted swelling in the perianal area and increased drainage from the fistula opening with more stool than she typically sees. She stated that there was actually is much stool coming from the fistula tract as there is from her anus. Impression: jeremy-rectal abscess, anal fistula. The patient will be referred to Dr. Gutiérerz for further evaluation and incision and drainage.   VS in the ED. /78   Pulse 109   Temp 98.7  F (37.1  C) (Oral)   Resp 18   Wt 70.8 kg (156 lb)   SpO2 95%   BMI 25.56 kg/m  Awake and alert. In moderate distress r/t jeremy-rectal pain. In the prone position, the perineum is the 12 o'clock position, erythema and swelling is 1-5 position on the left. Tenderness on palpation to the erythema region. Tenderness into the perineum and labia. Tenderness on palpation below left gluteal fold. Gave IVF, Toradol, Zofran, and Fentanyl.   DDx: Differentials considered but not limited to jeremy-rectal abscess, anal fistula, cellulitis (labs, CT pending).   Diagnostics:    Lab: CBC- normal. CMP- okay. CRP- elevated 59.20. Lactic acid- normal. UA- does not imply UTI. UC- in process. BC- in process.     CT  scan: I independently reviewed CT pelvis w/contrast and then confirmed by radiology findings noted There is a 2.8 x 2.7 x 1.7 cm perianal abscess at the 1-2:00 position on the left. No evidence of supralevator extension. No discrete fistula. There is associated fat stranding in the soft tissues.    ED Course as of 04/12/24 2340 Fri Apr 12, 2024 1929 Decreased jeremy-rectal pain after IV pain medication.    2200 Spoke with the general surgeon. CT reveals jeremy-rectal abscess left side measuring up to 2.8cm. Appears deep. He will be coming in to see the pt.    2245 Dr. Sales into perform I & D of perianal abscess.      Discussed lab and imaging results. Lactic acid normal.  No leukocytosis. BC pending. CT pelvis reveals perianal abscess on the left measuring up to 2.8 cm. Consulted with Dr. Sales regarding deep appearing perianal abscess on the left. Hx perirectal abscess back in 2015 that was surgically drained complicated with a fistula. Dr. Sales will be coming in to evaluate the pt. Dr. Sales performed I & D of the perianal abscess. Wound culture- in process. Prescriptions for Ciprofloxacin and metronidazole sent to the pharmacy, first dose given in the ED. Oxycodone prescription as needed for severe pain. No signs of systemic infection at this time. Call to schedule follow up with Dr. Brock bautista. After shared decision making, the patient is comfortable with discharging home. Discussed return to ED strict precautions. Verbalizes understanding of discharge plan.      I have reviewed the nursing notes.    I have reviewed the findings, diagnosis, plan and need for follow up with the patient.  Medical Decision Making  The patient's presentation was of moderate complexity (an acute illness with systemic symptoms).    The patient's evaluation involved:  an assessment requiring an independent historian (see separate area of note for details)  review of external note(s) from 3+ sources (see separate area of note for  details)  review of 3+ test result(s) ordered prior to this encounter (see separate area of note for details)  ordering and/or review of 3+ test(s) in this encounter (see separate area of note for details)    The patient's management necessitated moderate risk (a decision regarding minor procedure (incision & drainage) with risk factors of none).    New Prescriptions    CIPROFLOXACIN (CIPRO) 500 MG TABLET    Take 1 tablet (500 mg) by mouth 2 times daily for 5 days    METRONIDAZOLE (FLAGYL) 500 MG TABLET    Take 1 tablet (500 mg) by mouth 2 times daily for 5 days    OXYCODONE (ROXICODONE) 5 MG TABLET    Take 1 tablet (5 mg) by mouth every 6 hours as needed for severe pain     Final diagnoses:   Perianal abscess     4/12/2024   [facility] AND [facility]       Marlen Lundberg APRN CNP  04/12/24 [id number]

## 2024-04-13 ENCOUNTER — HOSPITAL ENCOUNTER (EMERGENCY)
Facility: OTHER | Age: 59
Discharge: HOME | End: 2024-04-13
Attending: STUDENT IN AN ORGANIZED HEALTH CARE EDUCATION/TRAINING PROGRAM | Admitting: STUDENT IN AN ORGANIZED HEALTH CARE EDUCATION/TRAINING PROGRAM
Payer: COMMERCIAL

## 2024-04-13 ENCOUNTER — NURSE TRIAGE (OUTPATIENT)
Dept: NURSING | Facility: CLINIC | Age: 59
End: 2024-04-13
Payer: COMMERCIAL

## 2024-04-13 VITALS
SYSTOLIC BLOOD PRESSURE: 116 MMHG | HEIGHT: 65 IN | BODY MASS INDEX: 25.99 KG/M2 | OXYGEN SATURATION: 91 % | RESPIRATION RATE: 22 BRPM | DIASTOLIC BLOOD PRESSURE: 56 MMHG | HEART RATE: 59 BPM | WEIGHT: 156 LBS | TEMPERATURE: 98.5 F

## 2024-04-13 DIAGNOSIS — Z51.89 VISIT FOR WOUND CHECK: ICD-10-CM

## 2024-04-13 PROCEDURE — 250N000009 HC RX 250: Performed by: STUDENT IN AN ORGANIZED HEALTH CARE EDUCATION/TRAINING PROGRAM

## 2024-04-13 PROCEDURE — 96372 THER/PROPH/DIAG INJ SC/IM: CPT | Performed by: STUDENT IN AN ORGANIZED HEALTH CARE EDUCATION/TRAINING PROGRAM

## 2024-04-13 PROCEDURE — 96374 THER/PROPH/DIAG INJ IV PUSH: CPT | Performed by: STUDENT IN AN ORGANIZED HEALTH CARE EDUCATION/TRAINING PROGRAM

## 2024-04-13 PROCEDURE — 99283 EMERGENCY DEPT VISIT LOW MDM: CPT | Performed by: STUDENT IN AN ORGANIZED HEALTH CARE EDUCATION/TRAINING PROGRAM

## 2024-04-13 PROCEDURE — 99284 EMERGENCY DEPT VISIT MOD MDM: CPT | Mod: 25 | Performed by: STUDENT IN AN ORGANIZED HEALTH CARE EDUCATION/TRAINING PROGRAM

## 2024-04-13 PROCEDURE — 96375 TX/PRO/DX INJ NEW DRUG ADDON: CPT | Performed by: STUDENT IN AN ORGANIZED HEALTH CARE EDUCATION/TRAINING PROGRAM

## 2024-04-13 PROCEDURE — 250N000011 HC RX IP 250 OP 636: Performed by: STUDENT IN AN ORGANIZED HEALTH CARE EDUCATION/TRAINING PROGRAM

## 2024-04-13 RX ORDER — OXYCODONE AND ACETAMINOPHEN 5; 325 MG/1; MG/1
1 TABLET ORAL EVERY 6 HOURS PRN
Qty: 6 TABLET | Refills: 0 | Status: SHIPPED | OUTPATIENT
Start: 2024-04-13 | End: 2024-05-01

## 2024-04-13 RX ORDER — ETOMIDATE 2 MG/ML
10 INJECTION INTRAVENOUS ONCE
Status: COMPLETED | OUTPATIENT
Start: 2024-04-13 | End: 2024-04-13

## 2024-04-13 RX ORDER — ONDANSETRON 2 MG/ML
4 INJECTION INTRAMUSCULAR; INTRAVENOUS ONCE
Status: COMPLETED | OUTPATIENT
Start: 2024-04-13 | End: 2024-04-13

## 2024-04-13 RX ADMIN — ONDANSETRON 4 MG: 2 INJECTION INTRAMUSCULAR; INTRAVENOUS at 13:15

## 2024-04-13 RX ADMIN — HYDROMORPHONE HYDROCHLORIDE 1 MG: 1 INJECTION, SOLUTION INTRAMUSCULAR; INTRAVENOUS; SUBCUTANEOUS at 11:42

## 2024-04-13 RX ADMIN — ETOMIDATE 10 MG: 2 INJECTION, SOLUTION INTRAVENOUS at 13:15

## 2024-04-13 RX ADMIN — ETOMIDATE 10 MG: 2 INJECTION, SOLUTION INTRAVENOUS at 13:00

## 2024-04-13 ASSESSMENT — COLUMBIA-SUICIDE SEVERITY RATING SCALE - C-SSRS
2. HAVE YOU ACTUALLY HAD ANY THOUGHTS OF KILLING YOURSELF IN THE PAST MONTH?: NO
1. IN THE PAST MONTH, HAVE YOU WISHED YOU WERE DEAD OR WISHED YOU COULD GO TO SLEEP AND NOT WAKE UP?: NO
6. HAVE YOU EVER DONE ANYTHING, STARTED TO DO ANYTHING, OR PREPARED TO DO ANYTHING TO END YOUR LIFE?: NO

## 2024-04-13 ASSESSMENT — ACTIVITIES OF DAILY LIVING (ADL)
ADLS_ACUITY_SCORE: 35

## 2024-04-13 NOTE — ED NOTES
Prior to the start of the procedure and with procedural staff participation, I verbally confirmed the patient s identity using two indicators, relevant allergies, that the procedure was appropriate and matched the consent or emergent situation, and that the correct equipment/implants were available. Immediately prior to starting the procedure I conducted the Time Out with the procedural staff and re-confirmed the patient s name, procedure, and site/side. (The Joint Commission universal protocol was followed.)  Yes    Sedation (Moderate or Deep): None

## 2024-04-13 NOTE — ED PROVIDER NOTES
Emergency Department Provider Note  : 1965 Age: 58 year old Sex: female MRN: [id number]    Chief Complaint   Patient presents with    Post Op Complications   Benign  Medical Decision Making / Assessment / Plan   58 year old female presenting with wound packing for jeremy-rectal fistula fallen out. VSS and afebrile. No drainage to suggest infection. Wound was explored with Q-tip and probed with tunneling to approximate depth of 4 cm posteriorly. Given 1 mg IM Dilaudid with persistent pain with packing, A 1/4 inch iodoform wick was placed with sedation, see procedure note. Wound appears uninfected and counseled to continue Sitz bath's and can support packing with gentle pressure from finger, instructed to follow-up with surgeon as discussed . Given oxycodone-acetaminophen for pain control.        A shared decision making model was used. Plan and all results were discussed  Time was taken to answer all questions. Patient and/or associated parties understood and were agreeable to treatment plan.  Strict return to Emergency Department precautions as well as appropriate follow up instructions were provided. The patient was discharged in stable condition.    Addendum at 10:47 pm: Lab called that cultures from  positive in 2 bottles for GPCs and Gram negative. We will wait for further speciation before calling patient. Discussed with lab.    Discharge Medication List as of 2024  2:41 PM        START taking these medications    Details   oxyCODONE-acetaminophen (PERCOCET) 5-325 MG tablet Take 1 tablet by mouth every 6 hours as needed for severe pain, Disp-6 tablet, R-0, Inst[facility]Meds           Final diagnoses:   Visit for wound check     Sina Paredes MD  2024   Emergency Department    Subjective   Soraida is a 58 year old female who presents at 10:52 AM with wound check, she had a perianal abscess debrided yesterday and while doing a sitz bath the packing fell out.  She is here for replacement of the  "packing.    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    She denies fevers, increasing drainage, erythema or swelling.    I have reviewed the Medications, Allergies, Past Medical and Surgical History, and Social History in the [facility] and with family.    Review of Systems:  Please see Subjective / HPI for pertinent positives and negatives. All other systems reviewed and found to be negative.      Objective   Patient Vitals for the past 24 hrs:   BP Temp Temp src Pulse Resp SpO2 Height Weight   04/13/24 1059 107/68 98.5  F (36.9  C) Tympanic 71 20 98 % 1.651 m (5' 5\") 70.8 kg (156 lb)   Physical Exam:     General: Awake, alert, in no acute respiratory distress.  Head: Normocephalic, atraumatic.  Eyes: No conjunctival injection and normal lids. PERRL, EOMI.  ENT: Moist membranes, external ear appears normal.   Chest/Respiratory: Unlabored respiratory effort. Equal chest rise.  Cardiovascular: Peripheral pulses present.  Abdominal: Soft, non-distended, non-tender.  Extremities: No obvious long bone deformity.  Neurological: GCS 15, moving all extremities without gross deficit.  Skin: ~1 cm incision along left jeremy-anal area. Warm, no rashes, lesions, or bruising.         Procedures / Critical Care   [facility]    Procedure: Sedation    Date/Time: 4/13/2024 8:41 PM    Performed by: Sina Paredes MD  Authorized by: Sina Paredes MD    Risks, benefits and alternatives discussed.      PROCEDURE  Describe Procedure: Sedation start time 1:09 PM, received 10 mg of IV etomidate x 2 total of 20 mg.  Wick placed. Sedation end time 1:14 PM.  Patient Tolerance:  Patient tolerated the procedure well with no immediate complications      Orders Placed This Encounter   Procedures    Sedation          Medical/Surgical History:  Past Medical History:   Diagnosis Date    Abnormal cytological finding in specimen from cervix uteri     10/11/2011    Allergic rhinitis     No Comments Provided    " Rowe esophagus     egd 2/14/24 - fu 1 yr - [facility]    Benign lipomatous neoplasm of skin and subcutaneous tissue of trunk     1/20/2016    Contact dermatitis     No Comments Provided    Dyshidrosis     No Comments Provided    Encounter for screening for malignant neoplasm of colon     8/27/2014    Gastro-esophageal reflux disease without esophagitis     No Comments Provided    Major depressive disorder, single episode     6/24/2011    Other congenital malformations of great veins (CODE)     7/10/2006    Other specified diseases of anus and rectum (CODE)     5/20/2015    Panic disorder without agoraphobia     resolved after heart surgery    Partial anomalous pulmonary venous connection     No Comments Provided    Residual hemorrhoidal skin tags     3/3/2011     Past Surgical History:   Procedure Laterality Date    anorectal exam anesthesia  05/20/2015    [place] ANORECTAL EXAM SURG REQ ANESTH  DIAG    APPENDECTOMY OPEN      5/1987    ARTHROSCOPY KNEE      7/2006, 2013,Debridement, partial medial meniscectomy, and anterior cruciate ligament reconstruction using tibialis anterior allograft, right knee    AS HYSTEROSCOPY, SURGICAL; W/ ENDOMETRIAL ABLATION, ANY METHOD  12/14/2015    Kathya    CARDIAC SURGERY  11/2006    Repair of anomalous pulmonary venous return by anastomosis of the anomalous vein to the left atrial appendage, done at [facility]    COLONOSCOPY  08/28/2014 8/28/2014 Follow up in 10 years 8/28/24 normal    COLONOSCOPY  02/14/2024    Correa - normal    ECHO LIMITED      mild left atrial enlargement;  normal ef    EGD  02/14/2024    Correa - Rowe's esophagus - f/u 1 year rec'd    ESOPHAGOSCOPY, GASTROSCOPY, DUODENOSCOPY (EGD), COMBINED      1996    ESOPHAGOSCOPY, GASTROSCOPY, DUODENOSCOPY (EGD), COMBINED      2001    ESOPHAGOSCOPY, GASTROSCOPY, DUODENOSCOPY (EGD), COMBINED      2006    ESOPHAGOSCOPY, GASTROSCOPY, DUODENOSCOPY (EGD), COMBINED      6/28/11,Normal    ESOPHAGOSCOPY,  GASTROSCOPY, DUODENOSCOPY (EGD), COMBINED N/A 04/04/2022    Procedure: ESOPHAGOGASTRODUODENOSCOPY, WITH BIOPSY;  Surgeon: Ramon Sales MD;  Location: [facility] OR    NISSEN FUNDOPLICATION  08/22/2000    Dr. Sales    OTHER SURGICAL HISTORY Left 09/2021    Tenex procedure for plantar fasciitis with Dr. Reveles    SIGMOIDOSCOPY FLEXIBLE      1996,And BE, negative, for rectal bleeding.  Fissures and hemorrhoids noted    SURGICAL PATHOLOGY EXAM Left 02/02/2016    Left flank     Medications:  No current facility-administered medications for this encounter.     Current Outpatient Medications   Medication Sig Dispense Refill    acyclovir (ZOVIRAX) 5 % external ointment Apply to affected area 6 times a day for 7 days. 30 g 11    albuterol (PROAIR HFA/PROVENTIL HFA/VENTOLIN HFA) 108 (90 Base) MCG/ACT inhaler Inhale 2 puffs into the lungs every 4 hours as needed for shortness of breath / dyspnea or wheezing 18 g 1    buPROPion (WELLBUTRIN XL) 300 MG 24 hr tablet Take 1 tablet (300 mg) by mouth every morning 90 tablet 3    calcium carbonate-vitamin D (CALTRATE) 600-10 MG-MCG per tablet       ciprofloxacin (CIPRO) 500 MG tablet Take 1 tablet (500 mg) by mouth 2 times daily for 5 days 10 tablet 0    clindamycin (CLEOCIN T) 1 % external solution Apply topically 2 times daily 60 mL 3    cyanocobalamin (VITAMIN B-12) 1000 MCG sublingual tablet       diphenhydrAMINE HCl (BENADRYL ALLERGY PO)       folic acid (FOLVITE) 400 MCG tablet       Glucosamine-Chondroitin 750 & 400 MG MISC Take 1 tablet by mouth daily      L-Lysine 500 MG TABS Take 1 tablet by mouth daily      MELATONIN PO       metroNIDAZOLE (FLAGYL) 500 MG tablet Take 1 tablet (500 mg) by mouth 2 times daily for 5 days 10 tablet 0    moxifloxacin (VIGAMOX) 0.5 % ophthalmic solution INSTILL 1 DROP IN LEFT EYE THREE TIMES DAILY      Na Sulfate-K Sulfate-Mg Sulf (SUPREP BOWEL PREP) solution See Admin Instructions      omeprazole (PRILOSEC) 40 MG DR capsule Take 1 capsule (40 mg) by  mouth daily as needed 90 capsule 3    oxyCODONE (ROXICODONE) 5 MG tablet Take 1 tablet (5 mg) by mouth every 6 hours as needed for severe pain 20 tablet 0    triamcinolone (NASACORT) 55 MCG/ACT nasal aerosol Spray 2 sprays into both nostrils 2 times daily 16.9 mL 9     Allergies:  Codeine, Medroxyprogesterone, Azithromycin, Latex, Penicillins, Proline, Sulfamethoxazole-trimethoprim, and Sulfamethoxazole-trimethoprim    Nursing notes were reviewed.  Past medical history, past surgical history, problem list, family history, social history, medication list, and allergies were reviewed as documented in epic snapshot. Relevant review of systems are documented within the HPI above.       Sina Paredes MD  04/13/24 0548       Sina Paredes MD  04/13/24 8141

## 2024-04-13 NOTE — DISCHARGE INSTRUCTIONS
Call to schedule a follow up appointment with Dr. Gutiérrez.     Metronidazole (antibiotic) one tablet twice daily x 5 days. Do not drink alcohol while taking this antibiotic.     Cipro (antibiotic)one tablet twice daily x 5 days. Avoid taking calcium or magnesium supplements while taking antibiotic.     Oxycodone (pain medication) one tablet every 6 hours as needed for severe pain. Do not drive while taking this medication.     Take tylenol as needed for pain or discomfort.     Sitz baths three times a day and after bowel movements.     Stool softeners as needed to keep stool soft.     Iodoform to come out in three days     Please return to the ED if you experience fever, decreased level of consciousness, increased swelling and swelling or drainage (pus).    Pt c/o lower back pain s/p epidural/steroid injections to lower back yesterday with MD Jaret Judd. Pt states she has had on/off fevers and chills today (t-max 102.4), pt last took tylenol at 1245. Pt c/o nausea, denies vomiting.  Pt states following procedure,

## 2024-04-13 NOTE — PROGRESS NOTES
1.  Has the patient had a previous reaction to IV contrast? No    2.  Does the patient have kidney disease? No    3.  Is the patient on dialysis? No    If YES to any of these questions, exam will be reviewed with a Radiologist before administering contrast.    
IV Contrast- Discharge Instructions After Your CT Scan      The IV contrast you received today will be filtered from your bloodstream by your kidneys during the next 24 hours and pass from the body in urine.  You will not be aware of this process and your urine will not change in color.  To help this process you should drink at least 4 additional glasses of water or juice today.  This reduces stress on your kidneys.    Most contrast reactions are immediate.  Should you develop symptoms of concern after discharge, contact the department at the number below.  After hours you should contact your personal physician.  If you develop breathing distress or wheezing, call 911.      
01/03/2018    BUN 18 01/03/2018     CMP:   Lab Results   Component Value Date     01/03/2018    K 4.3 01/03/2018     01/03/2018    CO2 24 01/03/2018    BUN 18 01/03/2018    PROT 7.3 01/03/2018     TSH:  No results found for: TSH    Echo: not done  Stress Cardiolyte: not done  EKG: Normal sinus rhythm   Incomplete right bundle branch block   Nonspecific T wave abnormality   Abnormal ECG     Assessment:   Patient Active Problem List   Diagnosis Code    SOB (shortness of breath) R06.02    Cardiac murmur R01.1    Hypertension I10     Patient Active Problem List:     SOB (shortness of breath)     Cardiac murmur     Hypertension      QUALITY MEASURES REVIEWED:  1.CAD:Patient is taking anti platelet agent:No  2. DYSLIPIDEMIA: Patient is on cholesterol lowering medication:Yes  3. Beta-Blocker therapy for CAD, if prior Myocardial Infarction:No  4. Patient does have 40 pack year H/O smoking but currently he is a non smoker. 5. Atrial fibrillation & anticoagulation therapy No  6. Discussed weight management strategies. Recommendations: Will check non- invasive Cardiac testing. OV for test results. Check Echo & Cardiolite perfusion imaging. Further recommendations based on test results.        Nikolay Catalan MD, 1/8/2018 3:29 PM

## 2024-04-13 NOTE — DISCHARGE INSTRUCTIONS
You were seen today for a wound check.    You received medication and packing into the wound. Take Tylenol 1000 mg every 8 hours for pain and Ibuprofen 600 mg every 6 hours, take with food. Take Oxycodone sparingly up to 10 mg nightly to help you sleep.    Continue sitz bath's as instructed, follow-up with your surgical team in [place].    If you notice worsening drainage, swelling, fevers, or other concerns return to the emergency department.

## 2024-04-13 NOTE — TELEPHONE ENCOUNTER
Nurse Triage SBAR    Is this a 2nd Level Triage? YES, LICENSED PRACTITIONER REVIEW IS REQUIRED    Situation: Perineal abscess drain issue      Assessment: In ED last night had abscess in her  perineal area, she had drain in  in rectal area, she was taking a sitz bath this morning and it came out, was suppose to be in  for at least 24 hours and has not been, in notes it states to be in place for three days.     Protocol Recommended Disposition: Care advise given, she will have her grandson take her back to ED [facility] RN on 4/13/2024 at 10:25 AM      Reason for Disposition   Patient sounds very sick or weak to the triager    Additional Information   Negative: Foreign body in rectum   Negative: Diarrhea is main symptom   Negative: Constipation is main symptom (e.g., pain or discomfort caused by passage of hard BMs)   Negative: Blood in or on bowel movement is main symptom   Negative: Pregnant   Negative: Pinworms are suspected (rectal itching; (white thread-like worm about size of a staple, moves)   Negative: [1] Mpox suspected (e.g., direct skin contact such as sex, recent travel to [place] or [place]) AND [2] symptoms of Mpox (e.g., rectal rash or sores, fever, muscle aches, or swollen lymph nodes)   Negative: [1] At risk for Mpox (men-who-have-sex-with-men) AND [2] possible exposure (e.g., multiple sex partners in past 21 days) AND [3] symptoms of Mpox (e.g., rectal rash or sores, fever, muscle aches, or swollen lymph nodes)   Negative: Sexual assault or rape (sexual intercourse or activity occurs without freely given consent), known or suspected   Negative: Injury to rectum   Negative: Large mass protruding out of rectum    Protocols used: Rectal Symptoms-A-AH

## 2024-04-13 NOTE — ED TRIAGE NOTES
Pt to ER from home with c/o packing being pulled out out of perineal cyst that was drained last night.  All packing removed.  No pain at this time, took an oxy prior to arrival.     Triage Assessment (Adult)       Row Name 04/13/24 1104          Triage Assessment    Airway WDL WDL        Respiratory WDL    Respiratory WDL WDL        Skin Circulation/Temperature WDL    Skin Circulation/Temperature WDL WDL        Cardiac WDL    Cardiac WDL WDL        Peripheral/Neurovascular WDL    Peripheral Neurovascular WDL WDL        Cognitive/Neuro/Behavioral WDL    Cognitive/Neuro/Behavioral WDL WDL

## 2024-04-13 NOTE — PROCEDURES
Procedure Note  Pre/Post Operative Diagnosis:   Left anterior jeremy-rectal abscess    Procedure:    Incision and drainage    Surgeon: Ramon Sales MD FACS    Local Anesthesia: 0.25% Marcaine     Indication for the procedure:    This is a 58 year old female patient referred by Marlen Lundberg NP with a left anterior perirectal abscess. Patient had prior abscess and fistulotomy in 2015. Had normal colonoscopy February 2024. Has had drainage from fistula to vaginal area. Pain started 4/9/24 and saw Dr Correa 4/11/24. Come to ER with worsening pain and abscess confirmed by CT scan which I reviewed. On exam, there is erythema, edema, tenderness in left anterior perirectal area. Looks like a fistula opening at edge of vagina.   After explaining the risks to include bleeding, infection, recurrence or need for further surgery the patient wished to proceed.    Procedure:   The area was prepped and draped in usual sterile fashion with ChloraPrep . After, adequate local anesthesia,a stab skin incision was made with # 11 blade into the left anterior abscess. Gross pus drained and cultured. Wound irrigated with saline and then a 1/4 inch iodoform wick was placed into the abscess cavity for ongoing drainage.  A clean dry dressing was applied.    Plan:  The patient will followup with Dr Gutiérrez for definitive treatment. Patient will followup if there any problems with the wound including fevers, increased redness or pain.  Cipro/Flagyl x 5 days  Iodoform to come out in three days  Sitz baths/showers to clean area

## 2024-04-14 ENCOUNTER — MYC MEDICAL ADVICE (OUTPATIENT)
Dept: FAMILY MEDICINE | Facility: OTHER | Age: 59
End: 2024-04-14
Payer: COMMERCIAL

## 2024-04-14 LAB
BACTERIA WND CULT: ABNORMAL
GRAM STAIN RESULT: ABNORMAL
GRAM STAIN RESULT: ABNORMAL

## 2024-04-15 LAB — BACTERIA UR CULT: NO GROWTH

## 2024-04-15 NOTE — TELEPHONE ENCOUNTER
My thoughts:  Doesn't look infected.  Continue to keep clean and dry.  If it itches, you can use some 1% OTC steroid cream on it.      Jenifer Joyner RN on 4/15/2024 at 11:21 AM

## 2024-04-15 NOTE — TELEPHONE ENCOUNTER
Agree with recommendations.  It's common to have a contact dermatitis to the components of adhesives, which likely are related to the latex.  Would recommend treating it as you recommended.  Patricia Brooke MD

## 2024-04-16 ENCOUNTER — TELEPHONE (OUTPATIENT)
Dept: SURGERY | Facility: OTHER | Age: 59
End: 2024-04-16
Payer: COMMERCIAL

## 2024-04-16 ENCOUNTER — TELEPHONE (OUTPATIENT)
Dept: EMERGENCY MEDICINE | Facility: OTHER | Age: 59
End: 2024-04-16
Payer: COMMERCIAL

## 2024-04-16 PROBLEM — M67.912 TENDINOPATHY OF LEFT ROTATOR CUFF: Status: RESOLVED | Noted: 2024-02-12 | Resolved: 2024-04-16

## 2024-04-16 PROBLEM — M75.42 SUBACROMIAL IMPINGEMENT OF LEFT SHOULDER: Status: RESOLVED | Noted: 2024-02-12 | Resolved: 2024-04-16

## 2024-04-16 NOTE — TELEPHONE ENCOUNTER
[facility]    Reason for call: Lab Result Notification     Lab Result (including Rx patient on, if applicable).  If culture, copy of lab report at bottom.  Lab Result: Final Wound Aerobic Bacterial Culture (specimen - rectum) report on 4/14/24  [facility] Emergency Dept discharge antibiotic prescribed: Ciprofloxacin (Cipro) 500 mg tablet, 1 tablet (500 mg) by mouth 2 times daily for 5 days for possible UTI, Metronidazole (Flagyl) 500 MG tablet, 1 tablet (500 mg) by mouth 2 times daily for 5 days     #1. Bacteria, Escherichia coli ,  is [NOT RESISTANT] to antibiotic.  Incision and Drainage performed in the [place] ED: No  Recommendations in treatment per [facility] ED lab result culture protocol    Patient's current Symptoms:   The wick came out yesterday and sitz bath.  The pain is gone  The swelling is going down.  No nausea  Very little discharge    RN Recommendations/Instructions per [place] ED lab result protocol:   [facility] ED lab result protocol utilized: culture    Patient/care giver notified to contact your PCP clinic or return to the Emergency department if your:  Symptoms return.  Symptoms do not improve after 3 days on antibiotic.  Symptoms do not resolve after completing antibiotic.  Symptoms worsen or other concerning symptoms.    Mino Jacobs RN

## 2024-04-16 NOTE — PROGRESS NOTES
03/19/24 0500   Appointment Info   Signing clinician's name / credentials Xiomy Landin DPT   Total/Authorized Visits 6   Visits Used 6/10   Medical Diagnosis Tendinopathy of left rotator cuff, Subacromial impingement of left shoulder   PT Tx Diagnosis Rotator cuff tendinopathy, impingement of the left shoulder, chronic left shoulder pain with muscle weakness   Progress Note/Certification   Start of Care Date 02/12/24   Onset of illness/injury or Date of Surgery 06/15/23   Therapy Frequency 2 times a week   Predicted Duration 6 weeks   PT Goal 1   Goal Identifier ROM   Goal Description Patient will have left shoulder abduction equal to that of the opposite side to reach and lift for ADLs of the left upper extremity without limitation.   Goal Progress Progressing, able to do AROM for repetitions   Target Date 03/25/24   PT Goal 2   Goal Identifier Pain/sleep   Goal Description Patient will be able to sleep on her left side without waking secondary to pain return to her previous level of function.   Target Date 03/25/24   Goal Progress Improving, less pain   PT Goal 3   Goal Identifier Strength   Goal Description Patient will demonstrate left shoulder strength 5/5 globally for lifting overhead with the left upper extremity to put dishes away.   Target Date 03/25/24   Goal Progress Progressed to strengthening   Subjective Report   Subjective Report Left shoulder is doing ok, is improving. Less pain with sleeping, and able to progress to more AROM abduction in standing, even did use a 1 pound weight for a few repetitions in her weight lifting class.   Objective Measure 1   Objective Measure ROM   Objective Measure 2   Objective Measure Strength   Details Left IR= 4/5, supraspinatus weak and painful   Objective Measure 3   Objective Measure Pain/sleep   Details Wakes nightly secondary to pain   Therapeutic Procedure/Exercise   Therapeutic Procedures: strength, endurance, ROM, flexibility minutes (42729) 30   Ther  Proc 1 - Details Standing: AROM L shoulder Abd x 10. Wall push up x 10 followed with 5 reps of push ups at the plinth height, pt noted some discomfort throught the lower extremities. She was instructed to do the wall push up at home. Shld Er B arms at side RTB 2x10. Sidelying: L shld ER 1# x 10-15 (towel roll under arm). Reinstructed in HEP and progression at home.   Skilled Intervention Instruction of exercise and rationale.  Verbal cue for positioning and mechanics.   Patient Response/Progress Favorable, able to progress to AROM in standing with left shoulder exercise and with some resistance. Improving sleep. Would benefit from further exercise for progression.   Education   Learner/Method Patient;Listening;Pictures/Video   Plan   Home program Access Code: [email or web address]  URL: [email or web address]  Date: 02/12/2024  Prepared by: Xiomy Landin    Exercises  - Sidelying Shoulder External Rotation  - 1 x daily - 3 x weekly - 2 sets - 10 reps  - Standing Shoulder Abduction AAROM with Dowel  - 1 x daily - 3 x weekly - 2 sets - 10 reps. Access Code: [email or web address]  URL: [email or web address]  Date: 03/07/2024  Prepared by: Xiomy Landin    Exercises  - Shoulder External Rotation with Resistance  - 1 x daily - 3 x weekly - 2 sets - 10 reps   Plan for next session Progress AROM Abd in standing as tolerated, progress to weight as tolerated. Continue with  periscapular strengthening.   Total Session Time   Timed Code Treatment Minutes 30   Total Treatment Time (sum of timed and untimed services) 30         DISCHARGE  Reason for Discharge: Patient chooses to discontinue therapy. Patient phoned and would like to continue independently with HEP to reach goals. Instructed to call with questions or concerns.    Equipment Issued: theraband    Discharge Plan: Patient to continue home program.    Referring Provider:  Dejuan Venegas

## 2024-04-16 NOTE — TELEPHONE ENCOUNTER
Patient had a referral sent to [facility] referral placed for [facility]. Valid till 5/31/2024      Patient was seen by [facility] on 4/11/2024. Where she was referred to Dr. Gutiérrez for further evaluation and incision and drainage.     Patient has a history of perirectal abscess and anal fistula and she had seen Dr. Gutiérrez for this back in 2015.     Discharge instructions from ED noted to schedule an appointment with Dr. Toña Gutiérrez.    Patient is struggling to get in to be seen by Dr. Toña Gutiérrez as there is a hang up with the referral process there. Added patient into the Dr. Cordon schedule on 4/17/2024 to have it looked at in the mean time to make sure it is still looking ok per patient request as Dr. Sales was the one that looked at it in the ED. Cleopatra Ramires RN  ....................  4/16/2024   5:41 PM

## 2024-04-16 NOTE — TELEPHONE ENCOUNTER
PT was seen in the ER on 4/13/2024 for a rectal cyst.  She needs to be seen ASAP.  Can she be worked on 4/17/2027.      Jessi Baughman on 4/16/2024 at 5:00 PM

## 2024-04-17 ENCOUNTER — OFFICE VISIT (OUTPATIENT)
Dept: SURGERY | Facility: OTHER | Age: 59
End: 2024-04-17
Attending: SURGERY
Payer: COMMERCIAL

## 2024-04-17 VITALS
BODY MASS INDEX: 26.29 KG/M2 | TEMPERATURE: 97 F | SYSTOLIC BLOOD PRESSURE: 138 MMHG | WEIGHT: 158 LBS | HEART RATE: 71 BPM | OXYGEN SATURATION: 91 % | RESPIRATION RATE: 15 BRPM | DIASTOLIC BLOOD PRESSURE: 73 MMHG

## 2024-04-17 DIAGNOSIS — Z98.890 POSTOPERATIVE STATE: Primary | ICD-10-CM

## 2024-04-17 PROBLEM — K60.30 FISTULA-IN-ANO: Status: ACTIVE | Noted: 2024-04-17

## 2024-04-17 PROCEDURE — 99024 POSTOP FOLLOW-UP VISIT: CPT | Performed by: SURGERY

## 2024-04-17 ASSESSMENT — PAIN SCALES - GENERAL: PAINLEVEL: NO PAIN (0)

## 2024-04-17 NOTE — PROGRESS NOTES
"Chief Complaint   Patient presents with    RECHECK     Wound check   Patient here for follow up perianal abscess. Packing fell out. No pain.    Initial /73   Pulse 71   Temp 97  F (36.1  C) (Tympanic)   Resp 15   Wt 71.7 kg (158 lb)   SpO2 91%   BMI 26.29 kg/m   Estimated body mass index is 26.29 kg/m  as calculated from the following:    Height as of 4/13/24: 1.651 m (5' 5\").    Weight as of this encounter: 71.7 kg (158 lb).  Medication Review: complete    The next two questions are to help us understand your food security.  If you are feeling you need any assistance in this area, we have resources available to support you today.          3/12/2024   SDOH- Food Insecurity   Within the past 12 months, did you worry that your food would run out before you got money to buy more? N   Within the past 12 months, did the food you bought just not last and you didn t have money to get more? N         Health Care Directive:  Patient has a Health Care Directive on file      Edilma Bacon MA      "

## 2024-04-17 NOTE — PROGRESS NOTES
Subjective:  This is a follow up after incision and drainage of jeremy-rectal abscess on 4/12/24. The patient has no complaints. Feeling much better. Culture reviewed with patient.    Objective:/73   Pulse 71   Temp 97  F (36.1  C) (Tympanic)   Resp 15   Wt 71.7 kg (158 lb)   SpO2 91%   BMI 26.29 kg/m    The incision is healing well without erythema. No swelling or pain. Likely fistula opening at edge of vagina.    Assessment:   Doing well after Incision and drainage of left jeremy-rectal abscess.  Fistula-in-ano    Plan:  Follow-up with Dr Gutiérrez on 5/17/24 as scheduled  Follow-up as needed if re-develops before then

## 2024-04-20 ENCOUNTER — TELEPHONE (OUTPATIENT)
Dept: EMERGENCY MEDICINE | Facility: OTHER | Age: 59
End: 2024-04-20
Payer: COMMERCIAL

## 2024-04-20 LAB
BACTERIA BLD CULT: ABNORMAL

## 2024-04-20 NOTE — LETTER
April 20, 2024        Soraida Suresh  [address] [facility] [address]          Dear Soraida Suresh:    You were seen in the [facility] Department at [facility] on 4/13/2024.  We are unable to reach you by phone, so we are sending you this letter.     It is important that you call [facility] lab result nurse at [phone], as we have information to relay to you AND/OR we MAY have to make some changes in your treatment.    Best time to call back is between 9AM and 5:30PM, 7 days a week.      Sincerely,     [facility] Lab Result RN  [phone]

## 2024-04-20 NOTE — TELEPHONE ENCOUNTER
"[facility]    Reason for call: Lab Result Notification     Lab Result (including Rx patient on, if applicable).  If culture, copy of lab report at bottom.  Lab Result: Positive Blood Cultures    Creatinine Level (mg/dl)   Creatinine   Date Value Ref Range Status   04/12/2024 0.59 0.51 - 0.95 mg/dL Final   11/05/2020 0.74 0.60 - 1.20 mg/dL Final    Creatinine clearance (ml/min), if applicable    Serum creatinine: 0.59 mg/dL 04/12/24 1901  Estimated creatinine clearance: 101.9 mL/min     Patient's current Symptoms:   Left voicemail message requesting a call back to [facility] ED Lab Result RN at [phone]. RN is available every day between 9 a.m. and 5:30 p.m.    Writer unable to surmise if PT has been informed via chart review. From MD note on 4/13 \"Addendum at 10:47 pm: Lab called that cultures from 4/12 positive in 2 bottles for GPCs and Gram negative. We will wait for further speciation before calling patient. Discussed with lab.\"    If pt returns call, assess symptoms and consult ED if needed. Letter also sent [employer]    PT Returned call. States she feels significantly better and does not have fever, dizziness, or other signs of systemic infection. Pt also has follow up next week for retesting. Advised if symptoms return or worsen to return to ED, otherwise continue with follow up as planned.     Alexis Abarca RN           "

## 2024-04-29 ENCOUNTER — THERAPY VISIT (OUTPATIENT)
Dept: CHIROPRACTIC MEDICINE | Facility: OTHER | Age: 59
End: 2024-04-29
Attending: CHIROPRACTOR
Payer: COMMERCIAL

## 2024-04-29 VITALS — OXYGEN SATURATION: 98 % | RESPIRATION RATE: 14 BRPM | TEMPERATURE: 96.8 F | HEART RATE: 72 BPM

## 2024-04-29 DIAGNOSIS — M62.838 SPASM OF MUSCLE: ICD-10-CM

## 2024-04-29 DIAGNOSIS — M99.04 SEGMENTAL AND SOMATIC DYSFUNCTION OF SACRAL REGION: Primary | ICD-10-CM

## 2024-04-29 DIAGNOSIS — M51.379 DEGENERATION OF LUMBAR OR LUMBOSACRAL INTERVERTEBRAL DISC: ICD-10-CM

## 2024-04-29 PROCEDURE — 99212 OFFICE O/P EST SF 10 MIN: CPT | Mod: 25 | Performed by: CHIROPRACTOR

## 2024-04-29 PROCEDURE — 98940 CHIROPRACT MANJ 1-2 REGIONS: CPT | Mod: AT | Performed by: CHIROPRACTOR

## 2024-04-29 NOTE — PROGRESS NOTES
Left lower back is frequently sharp. Flared yesterday when bent over to get curling iron out of the drawer. Radiating into left leg. 5/10 W24 9/10. Using ice and heat with not much help.   Carmina Markham on 4/29/2024 at 7:40 AM    Reviewed by EW    Visit #:  1/6  New Episode of care    Subjective:  Soraida Suresh is a 59 year old female who is seen for:        Segmental and somatic dysfunction of sacral region  Degeneration of lumbar or lumbosacral intervertebral disc  Spasm of muscle.      Soraida Suresh reports: Yesterday patient was at home when she bent over to grab a curling iron and felt something in her back go.  Has been utilizing ice.  Pain does radiate into the left leg.  No obvious weakness of the leg, patient currently dealing with perianal abscess which is being monitored by surgery.  No apparent red flags consistent with cauda equina.      Left shoulder appears to be doing much better after course of physical therapy.      (DVPRS) Pain Rating Score : 5-Interrupts some activities (W24 9/10) (04/29/24 0736)     Objective:  The following was observed:  Pulse 72   Temp 96.8  F (36  C) (Tympanic)   Resp 14   SpO2 98%    Oswestry (SWATHI) Questionnaire        4/29/2024     7:40 AM   OSWESTRY DISABILITY INDEX   Count 9   Sum 27   Oswestry Score (%) 60 %      Thoracic/lumbar AROM: Rotation restriction bilaterally, observed limited flexion    Slumps: -right, +left  Kemps: + on left SI joint    Positive minors sign from sit to stand    P: palpatory tenderness left PSIS:    A: static palpation demonstrates intersegmental asymmetry , pelvis  R: motion palpation notes restricted motion, Sacrum   T: muscle spasm at level(s): Lumbar erector spine and Quad lumb L>>R    Segmental spinal dysfunction/restrictions found at:  :  Sacrum Left lateral flexion restricted and Extension restriction.      Assessment: Acute exacerbation low back pain secondary to degeneration of the level sacral spine.  Patient has been under  chiropractic care with good results which anticipate is to be the case at this time.  Suspect up to 6 chiropractic visits for current episode of care.  Consider inclusion of traction/decompression therapy if radicular symptoms continue after 2 visits.    Diagnoses:      1. Segmental and somatic dysfunction of sacral region    2. Degeneration of lumbar or lumbosacral intervertebral disc    3. Spasm of muscle        Patient's condition:  Patient had restrictions pre-manipulation    Treatment effectiveness:  Post manipulation there is better intersegmental movement      Procedures:  E/M 26741    [facility]:  [address] Chiropractic manipulative treatment 1-2 regions performed   Pelvis: Diversified, Sacrum , Side posture    Modalities:  None performed this visit    Therapeutic procedures:  None    Response to Treatment  Improved intersegmental motion. No observed minors sign from sit to stand    Prognosis: Good    Progress towards Goals: Reduce pain symptoms by 50% within 4 weeks  Improve oswestry score up to 30% within 4 weeks       Recommendations:    Instructions:ice 20 minutes every other hour as needed, heat 15 minutes every other hour as needed, and walk 10 minutes    Follow-up:  Return to care in 2 days, sooner if symptoms fail to improve.

## 2024-05-01 ENCOUNTER — OFFICE VISIT (OUTPATIENT)
Dept: SURGERY | Facility: OTHER | Age: 59
End: 2024-05-01
Attending: SURGERY
Payer: COMMERCIAL

## 2024-05-01 ENCOUNTER — THERAPY VISIT (OUTPATIENT)
Dept: CHIROPRACTIC MEDICINE | Facility: OTHER | Age: 59
End: 2024-05-01
Attending: CHIROPRACTOR
Payer: COMMERCIAL

## 2024-05-01 VITALS
DIASTOLIC BLOOD PRESSURE: 75 MMHG | BODY MASS INDEX: 26.39 KG/M2 | HEIGHT: 65 IN | TEMPERATURE: 97.2 F | HEART RATE: 91 BPM | SYSTOLIC BLOOD PRESSURE: 129 MMHG | OXYGEN SATURATION: 97 % | RESPIRATION RATE: 16 BRPM | WEIGHT: 158.4 LBS

## 2024-05-01 VITALS — TEMPERATURE: 97.2 F | OXYGEN SATURATION: 97 % | RESPIRATION RATE: 16 BRPM | HEART RATE: 83 BPM

## 2024-05-01 DIAGNOSIS — M51.379 DEGENERATION OF LUMBAR OR LUMBOSACRAL INTERVERTEBRAL DISC: ICD-10-CM

## 2024-05-01 DIAGNOSIS — M62.838 SPASM OF MUSCLE: ICD-10-CM

## 2024-05-01 DIAGNOSIS — M99.04 SEGMENTAL AND SOMATIC DYSFUNCTION OF SACRAL REGION: Primary | ICD-10-CM

## 2024-05-01 DIAGNOSIS — K60.30 FISTULA-IN-ANO: Primary | ICD-10-CM

## 2024-05-01 PROCEDURE — 98940 CHIROPRACT MANJ 1-2 REGIONS: CPT | Mod: AT | Performed by: CHIROPRACTOR

## 2024-05-01 PROCEDURE — 99024 POSTOP FOLLOW-UP VISIT: CPT | Performed by: SURGERY

## 2024-05-01 ASSESSMENT — PAIN SCALES - GENERAL: PAINLEVEL: NO PAIN (0)

## 2024-05-01 NOTE — NURSING NOTE
"Chief Complaint   Patient presents with    RECHECK       Initial /75 (BP Location: Right arm, Patient Position: Sitting, Cuff Size: Adult Large)   Pulse 91   Temp 97.2  F (36.2  C) (Tympanic)   Resp 16   Ht 1.651 m (5' 5\")   Wt 71.8 kg (158 lb 6.4 oz)   SpO2 97%   BMI 26.36 kg/m   Estimated body mass index is 26.36 kg/m  as calculated from the following:    Height as of this encounter: 1.651 m (5' 5\").    Weight as of this encounter: 71.8 kg (158 lb 6.4 oz).  Meds Reconciled: complete    Cleopatra Ramires RN     "

## 2024-05-01 NOTE — PROGRESS NOTES
Lower back is doing much better. No pain in the last 24 hours. 0/10 W24 0/10.  Carmina Markham on 5/1/2024 at 7:32 AM    Reviewed by EW    Visit #:  2    Subjective:  Soraida Suresh is a 59 year old female who is seen in f/u up for:        Segmental and somatic dysfunction of sacral region  Degeneration of lumbar or lumbosacral intervertebral disc  Spasm of muscle.     Since last visit on 4/29/2024,  Soraida Suresh reports: Symptoms showing much improvement since our last encounter.       (DVPRS) Pain Rating Score : 0-No pain (05/01/24 0730)     Objective:  The following was observed:  Pulse 83   Temp 97.2  F (36.2  C) (Tympanic)   Resp 16   SpO2 97%      P: palpatory tenderness left PSIS:    A: asymmetric joints, not apparent  R: motion palpation notes restricted motion, Sacrum   T: Mild spasms left quadratus lumborum    Segmental spinal dysfunction/restrictions found at:  :  Sacrum Left lateral flexion restricted and Extension restriction.      Assessment: Symptoms showing tremendous improvement since last encounter.  At this time plan to follow-up with patient within the next couple of weeks on as-needed basis if symptoms fail to improve or exacerbate acutely.    Diagnoses:      1. Segmental and somatic dysfunction of sacral region    2. Degeneration of lumbar or lumbosacral intervertebral disc    3. Spasm of muscle        Patient's condition:  Patient had restrictions pre-manipulation    Treatment effectiveness:  Post manipulation there is better intersegmental movement      Procedures:  CMT:  37740 Chiropractic manipulative treatment 1-2 regions performed   Pelvis: Diversified, Sacrum , Side posture    Modalities:  None performed this visit    Therapeutic procedures:  None    Response to Treatment  Reduction in symptoms as reported by patient    Prognosis: Excellent    Progress towards Goals: Patient is making progress towards the goal.     Recommendations:    Instructions: monitor symptoms    Follow-up:   Return to care if symptoms persist.

## 2024-05-01 NOTE — PROGRESS NOTES
"Subjective:  This is a follow up after drainage of jeremy-rectal abscess on 4/12/24. The patient had some pain and swelling and was concerned.   She had some drainage and feels better.    Objective:/75 (BP Location: Right arm, Patient Position: Sitting, Cuff Size: Adult Large)   Pulse 91   Temp 97.2  F (36.2  C) (Tympanic)   Resp 16   Ht 1.651 m (5' 5\")   Wt 71.8 kg (158 lb 6.4 oz)   SpO2 97%   BMI 26.36 kg/m    The incision is healing well without erythema. No drainage or fluid accumulation.     Assessment:   Fistula-in-ano with some spontaneous drainage    Plan:  Follow-up with Dr Gutiérrez as scheduled for definitive treatrment    "

## 2024-08-15 NOTE — TELEPHONE ENCOUNTER
Called patient and is aware. Carolina Sanchez LPN ....................7/11/2019  12:53 PM     What Type Of Note Output Would You Prefer (Optional)?: Bullet Format How Severe Is Your Skin Lesion?: moderate Has Your Skin Lesion Been Treated?: not been treated Is This A New Presentation, Or A Follow-Up?: Skin Lesions

## 2024-09-09 ENCOUNTER — OFFICE VISIT (OUTPATIENT)
Dept: FAMILY MEDICINE | Facility: OTHER | Age: 59
End: 2024-09-09
Attending: STUDENT IN AN ORGANIZED HEALTH CARE EDUCATION/TRAINING PROGRAM
Payer: COMMERCIAL

## 2024-09-09 VITALS
DIASTOLIC BLOOD PRESSURE: 77 MMHG | BODY MASS INDEX: 24.72 KG/M2 | RESPIRATION RATE: 12 BRPM | TEMPERATURE: 97.8 F | HEART RATE: 85 BPM | SYSTOLIC BLOOD PRESSURE: 110 MMHG | HEIGHT: 65 IN | WEIGHT: 148.4 LBS | OXYGEN SATURATION: 99 %

## 2024-09-09 DIAGNOSIS — M79.5 FOREIGN BODY (FB) IN SOFT TISSUE: Primary | ICD-10-CM

## 2024-09-09 PROCEDURE — 10120 INC&RMVL FB SUBQ TISS SMPL: CPT | Performed by: PHYSICIAN ASSISTANT

## 2024-09-09 PROCEDURE — 250N000009 HC RX 250: Performed by: PHYSICIAN ASSISTANT

## 2024-09-09 RX ORDER — LIDOCAINE HYDROCHLORIDE 10 MG/ML
10 INJECTION, SOLUTION EPIDURAL; INFILTRATION; INTRACAUDAL; PERINEURAL ONCE
Status: COMPLETED | OUTPATIENT
Start: 2024-09-09 | End: 2024-09-09

## 2024-09-09 RX ORDER — GINSENG 100 MG
1 CAPSULE ORAL ONCE
Status: COMPLETED | OUTPATIENT
Start: 2024-09-09 | End: 2024-09-09

## 2024-09-09 RX ADMIN — BACITRACIN ZINC 1 G: 500 OINTMENT TOPICAL at 18:36

## 2024-09-09 RX ADMIN — LIDOCAINE HYDROCHLORIDE 10 ML: 10 INJECTION, SOLUTION EPIDURAL; INFILTRATION; INTRACAUDAL; PERINEURAL at 18:37

## 2024-09-09 ASSESSMENT — PAIN SCALES - GENERAL: PAINLEVEL: MILD PAIN (2)

## 2024-09-09 NOTE — PROGRESS NOTES
"ASSESSMENT/PLAN:     I have reviewed the nursing notes.  I have reviewed the findings, diagnosis, plan and need for follow up with the patient.    Presents to clinic today for evaluation of a sliver in the great toe on her right foot.  This occurred just prior to arrival.  Afebrile.  Vital signs stable.  Toe block achieved using 8 mL of lidocaine 1% without epi.  Wound area where the sliver entered was explored with 11 blade and a 4 mm wooden sliver was removed.  Wound was dressed with antibiotic ointment and a bandage.  Discussed symptomatic treatments.  Return to clinic for signs and symptoms of infection.    Tetanus 2020      Diagnoses and all orders for this visit:    Foreign body (FB) in soft tissue  -     REMOVAL FOREIGN BODY FOOT, SUBCUTANEOUS           Symptomatic treatments  - foot soaks 1-2 x daily, tylenol as needed for pain, topical antibiotic ointment 3 x daily.   Return to clinic if symptoms persist/worsen        I explained my diagnostic considerations and recommendations to the patient, who voiced understanding and agreement with the treatment plan. All questions were answered. We discussed potential side effects of any prescribed or recommended therapies, as well as expectations for response to treatments.    Maria Victoria Kovacs PA-C  [facility]          Nursing Notes:   Jasen Nugent  9/9/2024  1:36 PM  Signed  Chief Complaint   Patient presents with    Nail Problem    Toe Injury     Splinter in right big toe.       Initial /77 (BP Location: Left arm, Patient Position: Sitting, Cuff Size: Adult Large)   Pulse 85   Temp 97.8  F (36.6  C) (Temporal)   Resp 12   Ht 1.651 m (5' 5\")   Wt 67.3 kg (148 lb 6.4 oz)   SpO2 99%   BMI 24.70 kg/m   Estimated body mass index is 24.7 kg/m  as calculated from the following:    Height as of this encounter: 1.651 m (5' 5\").    Weight as of this encounter: 67.3 kg (148 lb 6.4 oz).  Medication Review: complete    The next two questions " are to help us understand your food security.  If you are feeling you need any assistance in this area, we have resources available to support you today.          4/17/2024   SDOH- Food Insecurity   Within the past 12 months, did you worry that your food would run out before you got money to buy more? N   Within the past 12 months, did the food you bought just not last and you didn t have money to get more? N            Health Care Directive:  Patient has a Health Care Directive on file      Jasen Nugent           SUBJECTIVE:   Soraida Suresh is a 59 year old female who presents to clinic today for evaluation of foreign body great toe right foot  Onset:  just PTA  Course unchanged  Mechanism of injury: she was walking barefoot on her deck and got a sliver - treated wood  Associated symptoms: pain, purulent drainage        Past Medical History:   Diagnosis Date    Abnormal cytological finding in specimen from cervix uteri     10/11/2011    Allergic rhinitis     No Comments Provided    Rowe esophagus     egd 2/14/24 - fu 1 yr - [facility]    Benign lipomatous neoplasm of skin and subcutaneous tissue of trunk     1/20/2016    Contact dermatitis     No Comments Provided    Dyshidrosis     No Comments Provided    Encounter for screening for malignant neoplasm of colon     8/27/2014    Gastro-esophageal reflux disease without esophagitis     No Comments Provided    Major depressive disorder, single episode     6/24/2011    Other congenital malformations of great veins (CODE)     7/10/2006    Other specified diseases of anus and rectum (CODE)     5/20/2015    Panic disorder without agoraphobia     resolved after heart surgery    Partial anomalous pulmonary venous connection     No Comments Provided    Residual hemorrhoidal skin tags     3/3/2011     Past Surgical History:   Procedure Laterality Date    anorectal exam anesthesia  05/20/2015    IN ANORECTAL EXAM SURG REQ ANESTH  DIAG    APPENDECTOMY OPEN      5/1987     ARTHROSCOPY KNEE      2006, 2013,Debridement, partial medial meniscectomy, and anterior cruciate ligament reconstruction using tibialis anterior allograft, right knee    AS HYSTEROSCOPY, SURGICAL; W/ ENDOMETRIAL ABLATION, ANY METHOD  2015    [facility]    CARDIAC SURGERY  2006    Repair of anomalous pulmonary venous return by anastomosis of the anomalous vein to the left atrial appendage, done at [facility]    COLONOSCOPY  2014 Follow up in 10 years 24 normal    COLONOSCOPY  2024    [facility] - normal    ECHO LIMITED      mild left atrial enlargement;  normal ef    EGD  2024    [facility] - Rowe's esophagus - f/u 1 year rec'd    ESOPHAGOSCOPY, GASTROSCOPY, DUODENOSCOPY (EGD), COMBINED          ESOPHAGOSCOPY, GASTROSCOPY, DUODENOSCOPY (EGD), COMBINED          ESOPHAGOSCOPY, GASTROSCOPY, DUODENOSCOPY (EGD), COMBINED          ESOPHAGOSCOPY, GASTROSCOPY, DUODENOSCOPY (EGD), COMBINED      11,Normal    ESOPHAGOSCOPY, GASTROSCOPY, DUODENOSCOPY (EGD), COMBINED N/A 2022    Procedure: ESOPHAGOGASTRODUODENOSCOPY, WITH BIOPSY;  Surgeon: Ramon Sales MD;  Location: [facility] OR    NISSEN FUNDOPLICATION  2000    Dr. Sales    OTHER SURGICAL HISTORY Left 2021    Tenex procedure for plantar fasciitis with Dr. Reveles    SIGMOIDOSCOPY FLEXIBLE      ,And BE, negative, for rectal bleeding.  Fissures and hemorrhoids noted    SURGICAL PATHOLOGY EXAM Left 2016    Left flank     Social History     Tobacco Use    Smoking status: Former     Current packs/day: 0.00     Types: Cigarettes     Quit date: 10/10/2005     Years since quittin.9     Passive exposure: Past    Smokeless tobacco: Never   Substance Use Topics    Alcohol use: Yes     Alcohol/week: 14.0 standard drinks of alcohol     Comment: Alcoholic Drinks/day: 2-3 per day     Current Outpatient Medications   Medication Sig Dispense Refill    acyclovir (ZOVIRAX) 5 % external ointment Apply to  "affected area 6 times a day for 7 days. 30 g 11    albuterol (PROAIR HFA/PROVENTIL HFA/VENTOLIN HFA) 108 (90 Base) MCG/ACT inhaler Inhale 2 puffs into the lungs every 4 hours as needed for shortness of breath / dyspnea or wheezing 18 g 1    buPROPion (WELLBUTRIN XL) 300 MG 24 hr tablet Take 1 tablet (300 mg) by mouth every morning 90 tablet 3    calcium carbonate-vitamin D (CALTRATE) 600-10 MG-MCG per tablet       clindamycin (CLEOCIN T) 1 % external solution Apply topically 2 times daily 60 mL 3    cyanocobalamin (VITAMIN B-12) 1000 MCG sublingual tablet       diphenhydrAMINE HCl (BENADRYL ALLERGY PO)       folic acid (FOLVITE) 400 MCG tablet       Glucosamine-Chondroitin 750 & 400 MG MISC Take 1 tablet by mouth daily      L-Lysine 500 MG TABS Take 1 tablet by mouth daily      MELATONIN PO       omeprazole (PRILOSEC) 40 MG DR capsule Take 1 capsule (40 mg) by mouth daily as needed 90 capsule 3    triamcinolone (NASACORT) 55 MCG/ACT nasal aerosol Spray 2 sprays into both nostrils 2 times daily 16.9 mL 9     Allergies   Allergen Reactions    Codeine Other (See Comments)    Medroxyprogesterone Other (See Comments)     Caused patient's brain to swell.    Azithromycin Palpitations    Latex Rash     hands    Penicillins Rash    Proline Rash     Prolene: Sutures; caused keloid    Sulfamethoxazole-Trimethoprim Rash    Sulfamethoxazole-Trimethoprim Rash         Past medical history, past surgical history, current medications and allergies reviewed and accurate to the best of my knowledge.          OBJECTIVE:     /77 (BP Location: Left arm, Patient Position: Sitting, Cuff Size: Adult Large)   Pulse 85   Temp 97.8  F (36.6  C) (Temporal)   Resp 12   Ht 1.651 m (5' 5\")   Wt 67.3 kg (148 lb 6.4 oz)   SpO2 99%   BMI 24.70 kg/m    Body mass index is 24.7 kg/m .  Physical Exam    General Appearance: Well appearing female,  No acute distress  Musculoskeletal great toe, right foot  Inspection: medial/distal toe has a " wound with appearance of dark spot - foreign body  Palpation: TTP  Normal distal pulses, sensation to soft touch, temperature   ROM: normal

## 2024-09-09 NOTE — NURSING NOTE
"Chief Complaint   Patient presents with    Nail Problem    Toe Injury     Splinter in right big toe.       Initial /77 (BP Location: Left arm, Patient Position: Sitting, Cuff Size: Adult Large)   Pulse 85   Temp 97.8  F (36.6  C) (Temporal)   Resp 12   Ht 1.651 m (5' 5\")   Wt 67.3 kg (148 lb 6.4 oz)   SpO2 99%   BMI 24.70 kg/m   Estimated body mass index is 24.7 kg/m  as calculated from the following:    Height as of this encounter: 1.651 m (5' 5\").    Weight as of this encounter: 67.3 kg (148 lb 6.4 oz).  Medication Review: complete    The next two questions are to help us understand your food security.  If you are feeling you need any assistance in this area, we have resources available to support you today.          4/17/2024   SDOH- Food Insecurity   Within the past 12 months, did you worry that your food would run out before you got money to buy more? N   Within the past 12 months, did the food you bought just not last and you didn t have money to get more? N            Health Care Directive:  Patient has a Health Care Directive on file      Jasen Nugent      "

## 2024-09-09 NOTE — PATIENT INSTRUCTIONS
Foreign body left great toe    Topical antibiotic 3 x daily  Tylenol and ibuprofen as needed for pain

## 2024-10-08 ENCOUNTER — OFFICE VISIT (OUTPATIENT)
Dept: FAMILY MEDICINE | Facility: OTHER | Age: 59
End: 2024-10-08
Attending: FAMILY MEDICINE
Payer: COMMERCIAL

## 2024-10-08 ENCOUNTER — HOSPITAL ENCOUNTER (OUTPATIENT)
Dept: GENERAL RADIOLOGY | Facility: OTHER | Age: 59
Discharge: HOME | End: 2024-10-08
Attending: FAMILY MEDICINE
Payer: COMMERCIAL

## 2024-10-08 VITALS
HEART RATE: 71 BPM | BODY MASS INDEX: 25.89 KG/M2 | WEIGHT: 155.6 LBS | OXYGEN SATURATION: 98 % | TEMPERATURE: 97.8 F | DIASTOLIC BLOOD PRESSURE: 78 MMHG | SYSTOLIC BLOOD PRESSURE: 122 MMHG | RESPIRATION RATE: 16 BRPM

## 2024-10-08 DIAGNOSIS — M22.41 CHONDROMALACIA PATELLAE OF RIGHT KNEE: ICD-10-CM

## 2024-10-08 DIAGNOSIS — Z98.890 HISTORY OF MENISCECTOMY OF RIGHT KNEE: ICD-10-CM

## 2024-10-08 DIAGNOSIS — Z98.890 HISTORY OF REPAIR OF ANTERIOR CRUCIATE LIGAMENT OF RIGHT KNEE: ICD-10-CM

## 2024-10-08 DIAGNOSIS — M23.203 DEGENERATIVE TEAR OF MEDIAL MENISCUS OF RIGHT KNEE: Primary | ICD-10-CM

## 2024-10-08 PROCEDURE — 99214 OFFICE O/P EST MOD 30 MIN: CPT | Mod: 25 | Performed by: FAMILY MEDICINE

## 2024-10-08 PROCEDURE — 73560 X-RAY EXAM OF KNEE 1 OR 2: CPT | Mod: LT

## 2024-10-08 PROCEDURE — 20610 DRAIN/INJ JOINT/BURSA W/O US: CPT | Mod: RT | Performed by: FAMILY MEDICINE

## 2024-10-08 PROCEDURE — 250N000011 HC RX IP 250 OP 636: Mod: JW | Performed by: FAMILY MEDICINE

## 2024-10-08 PROCEDURE — 250N000009 HC RX 250: Performed by: FAMILY MEDICINE

## 2024-10-08 RX ORDER — BUPIVACAINE HYDROCHLORIDE 5 MG/ML
2 INJECTION, SOLUTION EPIDURAL; INTRACAUDAL ONCE
Status: COMPLETED | OUTPATIENT
Start: 2024-10-08 | End: 2024-10-08

## 2024-10-08 RX ORDER — TRIAMCINOLONE ACETONIDE 40 MG/ML
40 INJECTION, SUSPENSION INTRA-ARTICULAR; INTRAMUSCULAR ONCE
Status: COMPLETED | OUTPATIENT
Start: 2024-10-08 | End: 2024-10-08

## 2024-10-08 RX ORDER — LIDOCAINE HYDROCHLORIDE 10 MG/ML
2 INJECTION, SOLUTION EPIDURAL; INFILTRATION; INTRACAUDAL; PERINEURAL ONCE
Status: COMPLETED | OUTPATIENT
Start: 2024-10-08 | End: 2024-10-08

## 2024-10-08 RX ADMIN — LIDOCAINE HYDROCHLORIDE 2 ML: 10 INJECTION, SOLUTION INFILTRATION; PERINEURAL at 10:11

## 2024-10-08 RX ADMIN — TRIAMCINOLONE ACETONIDE 40 MG: 40 INJECTION, SUSPENSION INTRA-ARTICULAR; INTRAMUSCULAR at 10:11

## 2024-10-08 RX ADMIN — BUPIVACAINE HYDROCHLORIDE 10 MG: 5 INJECTION, SOLUTION EPIDURAL; INTRACAUDAL; PERINEURAL at 10:11

## 2024-10-08 ASSESSMENT — PAIN SCALES - GENERAL: PAINLEVEL: SEVERE PAIN (7)

## 2024-10-08 NOTE — PROGRESS NOTES
Sports Medicine Office Note    HPI:  59-year-old female coming in for follow-up evaluation of right knee pain.  She has a history of chronic pain with occasional painful clicking.  She has a history of a previous ACL reconstruction 20+ years ago.  She was seen in this office in July 2022 and received a CSI.  She notes significant improvement with her pain.  Over the last several weeks/months her pain has returned.  She rates her pain at 7/10.  She characterized the pain as sharp, aching, and throbbing.  Weightbearing activities are bothersome.  She has tried ice, Voltaren gel, hinged knee brace, and OTC APAP.  She feels like she has an associated swelling with pain that refers into the posterior aspect of the distal thigh and lower leg.      EXAM:  /78 (BP Location: Right arm, Patient Position: Sitting, Cuff Size: Adult Regular)   Pulse 71   Temp 97.8  F (36.6  C) (Temporal)   Resp 16   Wt 70.6 kg (155 lb 9.6 oz)   SpO2 98%   BMI 25.89 kg/m    MUSCULOSKELETAL EXAM:  RIGHT KNEE  Inspection:  -No gross deformity  -No bruising or soft tissue swelling  -Scars: Well-healed surgical scars from previous ACL reconstruction    Tenderness to palpation of the:  -Quadriceps musculature:  Negative  -Quadriceps tendon:  Negative  -Patella:  Negative  -Medial patellar facet: Positive  -Lateral patellar facet:  Negative  -Inferior pole of the patella:  Negative  -Patellar tendon:  Negative  -Tibial tuberosity:  Negative  -Medial joint line: Positive  -Medial collateral ligament:  Negative  -Medial hamstring tendons:  Negative  -Medial femoral condyle:  Negative  -Medial tibial plateau:  Negative  -Pes anserine bursa:  Negative  -Lateral joint line: Mild pain  -Distal IT band:  Negative  -Gerdy's tubercle:  Negative  -Lateral collateral ligament:  Negative  -Lateral hamstring tendons:  Negative  -Lateral femoral condyle:  Negative  -Lateral tibial plateau:  Negative  -Posterior lateral corner:  Negative  -Popliteal  fossa: Mild pain    Range of Motion:  -Passive flexion:  120 with pain at endrange  -Passive extension:  0    Strength:  -Extension:  5/5  -Flexion:  5/5    Special Tests:  -Effusion:  Absent  -Medial patellar glide:  Negative  -Lateral patellar glide:  Negative  -Patellar apprehension:  Negative  -Valgus stress:  Negative  -Varus stress:  Negative  -Lachman test:  Negative  -Anterior drawer:  Negative  -Posterior drawer:  Negative    Other:  -Intact sensation to light touch distally.  -No signs of cyanosis. Normal skin temperature of the lower extremity.  -Foot/ankle:  No gross deformity. Full range of motion.  -Left knee:  No gross deformity. No palpable tenderness. Normal strength and ROM.      IMAGIN2022: 4 view right knee x-ray  - Tunneling from previous ACL reconstruction noted on the right.  Mild-moderate medial tibiofemoral compartment joint space narrowing with subtle osteophytosis.  Hardware from previous ACL reconstruction noted on the left knee.    10/8/2024: 4 view right knee x-ray  - Similar findings to x-rays from 2022.  No acute bony abnormalities.      ASSESSMENT/PLAN:  Diagnoses and all orders for this visit:  Degenerative tear of medial meniscus of right knee  -     XR Knee Standing 2v  Bilateral & 2v Right  -     DRAIN/INJECT LARGE JOINT/BURSA  -     triamcinolone (KENALOG-40) injection 40 mg  -     lidocaine (PF) (XYLOCAINE) 1 % injection 2 mL  -     Bupivacaine 0.5% 2mL Intra-articular  Chondromalacia patellae of right knee  -     DRAIN/INJECT LARGE JOINT/BURSA  -     triamcinolone (KENALOG-40) injection 40 mg  -     lidocaine (PF) (XYLOCAINE) 1 % injection 2 mL  -     Bupivacaine 0.5% 2mL Intra-articular  History of repair of anterior cruciate ligament of right knee  History of meniscectomy of right knee    59-year-old female with right knee arthritis, chondromalacia patella, and likely a degenerative medial meniscal tear.  X-rays from 2022 and today were both personally  reviewed in the office with the findings as demonstrated above by my interpretation.  We discussed treatment options to include ice, heat, topical medications, oral medications, injections, therapy, and surgery.  After reviewing the risks/benefits, the patient elects to proceed with a repeat CSI.  See procedure note below:    PROCEDURE:  Intraarticular Injection of the Right Knee     PROCEDURAL PAUSE:    A procedural pause was conducted to verify:  correct patient identity, procedure to be performed, and as applicable, correct side, site, and correct patient position.      INFORMED CONSENT:   I discussed the risks, possible benefits, and alternatives to injection.  Following denial of allergy and review of potential side effects and complications (including but not necessarily limited to infection, allergic reaction, fat necrosis, skin depigmentation, local tissue breakdown, systemic effects of corticosteroids, elevation of blood glucose, injury to soft tissue and/or nerves, and seizure), all questions were answered, and consent was given to proceed.  Patient verbalized understanding.      PROCEDURE DETAILS:    Side and site were marked, and a time out was performed to verify appropriate patient identifiers.  Following this the right knee, just superior to the tibial plateau and medial to the patellar tendon, was prepped with chlorhexidine.  Utilizing a 22-gauge needle the right intraarticular knee was injected using a anteromedial to posterolateral approach with 2mL of 1% Lidocaine, 2mL of 0.5% bupivacaine, and 1mL triamcinolone (40mg/mL).  0mL was wasted.      The patient tolerated the procedure without complication and was discharged in good condition after a short observation period.  The patient was instructed to contact me regarding any questions pertaining to the procedure.      DIAGNOSIS:    -Successful injection of the right intraarticular knee without immediate complication      PLAN:   -Post-procedure  care reviewed, including avoiding submersion of the injection site for 48 hours   -Return precautions reviewed for signs/symptoms that would be concerning for infection   -The patient was instructed to ice and take APAP this evening if needed  -Continue ice and OTC analgesics as needed, discussed regular use of OTC APAP as this can be beneficial if taken on a consistent basis (discussed 1000 mg 3 times daily)  -Recommend avoiding knee braces with firm sides for regular, daily activities as this can offload the work of the muscles  -Return to clinic as needed  -If the above intervention fails to provide desired symptom relief, consider trying a viscosupplementation injection      Dejuan Vallejo MD  10/8/2024  9:29 AM    Total time spent with this patient was 36 minutes which included chart review, visualization and independent interpretation of images, time spent with the patient, and documentation.    Procedure time:  3 minute(s)

## 2024-10-09 ENCOUNTER — IMMUNIZATION (OUTPATIENT)
Dept: FAMILY MEDICINE | Facility: OTHER | Age: 59
End: 2024-10-09
Attending: FAMILY MEDICINE
Payer: COMMERCIAL

## 2024-10-09 DIAGNOSIS — Z23 NEED FOR PROPHYLACTIC VACCINATION AND INOCULATION AGAINST INFLUENZA: Primary | ICD-10-CM

## 2024-10-09 DIAGNOSIS — Z23 HIGH PRIORITY FOR 2019-NCOV VACCINE: ICD-10-CM

## 2024-10-09 PROCEDURE — 90471 IMMUNIZATION ADMIN: CPT

## 2024-10-09 PROCEDURE — 90673 RIV3 VACCINE NO PRESERV IM: CPT

## 2024-10-09 PROCEDURE — 91320 SARSCV2 VAC 30MCG TRS-SUC IM: CPT

## 2024-10-09 PROCEDURE — 90480 ADMN SARSCOV2 VAC 1/ONLY CMP: CPT

## 2024-11-05 NOTE — PROGRESS NOTES
Left elbow with intermittent pinching. 1/10 W24 1/10.Using heat and stretches with a decrease.   Taya Matthews on 6/1/2023 at 10:39 AM    Reviewed by EW    Visit #:  9    Subjective:  Soraida Suresh is a 58 year old female who is seen in f/u up for:        Left lateral epicondylitis  Segmental and somatic dysfunction of thoracic region  Pain in thoracic spine.     Since last visit on 5/22/2023,  Soraida Suresh reports: Arm symptoms showing improvement at this time.  Noted that treatment with OT normally aggravates but however that was not the case today.      Concerns of left-sided mid to lower back pain.  Symptoms started last night.  Recent upper respiratory concern reports feeling a rib out and spasms in her back.          (DVPRS) Pain Rating Score : 1-Hardly notice pain (W24 1/10) (06/01/23 1035)     Objective:  The following was observed:  Pulse 65   Temp 97.6  F (36.4  C) (Tympanic)   Resp 16   SpO2 95%      P: palpatory tenderness Left side T12 vertebra:    A: static palpation demonstrates intersegmental asymmetry , thoracic  R: motion palpation notes restricted motion, T12   T: muscle spasm at level(s): Intercostal muscles left lower aspect, paraspinals left TL junction, left latissimus dorsi:      Segmental spinal dysfunction/restrictions found at:  :  T12 Left lateral flexion restricted and Extension restriction.      Assessment: Lateral epicondylitis showing great amounts of progress.  Plan to follow-up with patient in 2 weeks.  There is joint restriction of the T12 vertebra.    Diagnoses:      1. Left lateral epicondylitis    2. Segmental and somatic dysfunction of thoracic region    3. Pain in thoracic spine        Patient's condition:  Patient had restrictions pre-manipulation    Treatment effectiveness:  Post manipulation there is better intersegmental movement and Patient claims to feel looser post manipulation      Procedures:  CMT:  69651 Chiropractic manipulative treatment 1-2 regions performed    Thoracic: Diversified, T12, Prone    Modalities:  89050: Acupuncture, for 15 minutes:  Points: TW 8, 9, LI 9, 11, Shahla 5  For 15 minutes    Therapeutic procedures:  None    Response to Treatment  Reduction in symptoms as reported by patient    Prognosis: Good    Progress towards Goals: Patient is making progress towards the goal.     Recommendations:    Instructions:none    Follow-up:  Return to care in 2 weeks.            Detail Level: Generalized Detail Level: Zone Quality 137: Melanoma: Continuity Of Care - Recall System: Patient information entered into a recall system that includes: target date for the next exam specified AND a process to follow up with patients regarding missed or unscheduled appointments Detail Level: Detailed Detail Level: Simple

## 2024-12-02 ENCOUNTER — OFFICE VISIT (OUTPATIENT)
Dept: FAMILY MEDICINE | Facility: OTHER | Age: 59
End: 2024-12-02
Attending: NURSE PRACTITIONER
Payer: COMMERCIAL

## 2024-12-02 VITALS
WEIGHT: 153 LBS | HEART RATE: 72 BPM | RESPIRATION RATE: 16 BRPM | TEMPERATURE: 97.7 F | BODY MASS INDEX: 25.49 KG/M2 | HEIGHT: 65 IN | OXYGEN SATURATION: 96 %

## 2024-12-02 DIAGNOSIS — K60.30 ANAL FISTULA: ICD-10-CM

## 2024-12-02 DIAGNOSIS — K22.70 BARRETT'S ESOPHAGUS WITHOUT DYSPLASIA: ICD-10-CM

## 2024-12-02 DIAGNOSIS — Z01.818 PREOP GENERAL PHYSICAL EXAM: Primary | ICD-10-CM

## 2024-12-02 LAB
ANION GAP SERPL CALCULATED.3IONS-SCNC: 6 MMOL/L (ref 7–15)
BUN SERPL-MCNC: 14.5 MG/DL (ref 8–23)
CALCIUM SERPL-MCNC: 9.7 MG/DL (ref 8.8–10.4)
CHLORIDE SERPL-SCNC: 101 MMOL/L (ref 98–107)
CREAT SERPL-MCNC: 0.71 MG/DL (ref 0.51–0.95)
EGFRCR SERPLBLD CKD-EPI 2021: >90 ML/MIN/1.73M2
GLUCOSE SERPL-MCNC: 101 MG/DL (ref 70–99)
HCO3 SERPL-SCNC: 32 MMOL/L (ref 22–29)
HGB BLD-MCNC: 14.7 G/DL (ref 11.7–15.7)
POTASSIUM SERPL-SCNC: 4.1 MMOL/L (ref 3.4–5.3)
SODIUM SERPL-SCNC: 139 MMOL/L (ref 135–145)

## 2024-12-02 PROCEDURE — 85018 HEMOGLOBIN: CPT | Mod: ZL | Performed by: NURSE PRACTITIONER

## 2024-12-02 PROCEDURE — 82310 ASSAY OF CALCIUM: CPT | Mod: ZL | Performed by: NURSE PRACTITIONER

## 2024-12-02 PROCEDURE — 80048 BASIC METABOLIC PNL TOTAL CA: CPT | Mod: ZL | Performed by: NURSE PRACTITIONER

## 2024-12-02 PROCEDURE — 36415 COLL VENOUS BLD VENIPUNCTURE: CPT | Mod: ZL | Performed by: NURSE PRACTITIONER

## 2024-12-02 RX ORDER — MULTIVITAMIN/IRON/FOLIC ACID 18MG-0.4MG
TABLET ORAL
COMMUNITY

## 2024-12-02 ASSESSMENT — ANXIETY QUESTIONNAIRES
GAD7 TOTAL SCORE: 1
1. FEELING NERVOUS, ANXIOUS, OR ON EDGE: NOT AT ALL
7. FEELING AFRAID AS IF SOMETHING AWFUL MIGHT HAPPEN: NOT AT ALL
3. WORRYING TOO MUCH ABOUT DIFFERENT THINGS: NOT AT ALL
5. BEING SO RESTLESS THAT IT IS HARD TO SIT STILL: NOT AT ALL
8. IF YOU CHECKED OFF ANY PROBLEMS, HOW DIFFICULT HAVE THESE MADE IT FOR YOU TO DO YOUR WORK, TAKE CARE OF THINGS AT HOME, OR GET ALONG WITH OTHER PEOPLE?: SOMEWHAT DIFFICULT
GAD7 TOTAL SCORE: 1
2. NOT BEING ABLE TO STOP OR CONTROL WORRYING: NOT AT ALL
IF YOU CHECKED OFF ANY PROBLEMS ON THIS QUESTIONNAIRE, HOW DIFFICULT HAVE THESE PROBLEMS MADE IT FOR YOU TO DO YOUR WORK, TAKE CARE OF THINGS AT HOME, OR GET ALONG WITH OTHER PEOPLE: SOMEWHAT DIFFICULT
4. TROUBLE RELAXING: NOT AT ALL
GAD7 TOTAL SCORE: 1
6. BECOMING EASILY ANNOYED OR IRRITABLE: SEVERAL DAYS
7. FEELING AFRAID AS IF SOMETHING AWFUL MIGHT HAPPEN: NOT AT ALL

## 2024-12-02 ASSESSMENT — PAIN SCALES - GENERAL: PAINLEVEL_OUTOF10: NO PAIN (0)

## 2024-12-02 ASSESSMENT — PATIENT HEALTH QUESTIONNAIRE - PHQ9
SUM OF ALL RESPONSES TO PHQ QUESTIONS 1-9: 0
10. IF YOU CHECKED OFF ANY PROBLEMS, HOW DIFFICULT HAVE THESE PROBLEMS MADE IT FOR YOU TO DO YOUR WORK, TAKE CARE OF THINGS AT HOME, OR GET ALONG WITH OTHER PEOPLE: NOT DIFFICULT AT ALL
SUM OF ALL RESPONSES TO PHQ QUESTIONS 1-9: 0

## 2024-12-02 NOTE — PROGRESS NOTES
Preoperative Evaluation  [facility]  [address]  [place] [address]  Phone: [phone]  Fax: [phone]  Primary Provider: Patricia Brooke MD  Pre-op Performing Provider: AGUSTINA Acosta CNP  Dec 2, 2024             11/27/2024   Surgical Information   What procedure is being done? Anal exam under anesthesia, possible Seton placement, possible fistulotomy, possible ligation of the internal fistula tract.    Facility or Hospital where procedure/surgery will be performed: [facility]    Who is doing the procedure / surgery? Dr. Gutiérrez    Date of surgery / procedure: Monday, December 16, 2024    Time of surgery / procedure: not yet known they will call me Friday, December 13, 2024 with time.    Where do you plan to recover after surgery? at home with family        Patient-reported     Fax number for surgical facility: [phone]    Assessment & Plan     The proposed surgical procedure is considered INTERMEDIATE risk.    Problem List Items Addressed This Visit          Digestive    Rowe esophagus     Other Visit Diagnoses       Preop general physical exam    -  Primary    Relevant Orders    EKG 12-lead, tracing only (Same Day)    Hemoglobin    Basic Metabolic Panel    Anal fistula              She is optimized for upcoming surgery as requested.           - No identified additional risk factors other than previously addressed    Preoperative Medication Instructions  Antiplatelet or Anticoagulation Medication Instructions   - Patient is on no antiplatelet or anticoagulation medications.    Additional Medication Instructions  Take all scheduled medications on the day of surgery EXCEPT for modifications listed below:   - Herbal medications and vitamins: DO NOT TAKE 14 days prior to surgery.    Recommendation  Approval given to proceed with proposed procedure, without further diagnostic evaluation.    Westley Quigley is a 59 year  old, presenting for the following:  Pre-Op Exam        HPI related to upcoming procedure: Presents to clinic today for preoperative clearance.  She has an anal fistula, will be having a exam under anesthesia with seton placement.  She reports she has had this in the past.  Will need to have this removed once things heal.  She is otherwise feeling well, taking multiple over-the-counter supplements as well as as needed omeprazole.        11/27/2024   Pre-Op Questionnaire   Have you ever had a heart attack or stroke? No    Have you ever had surgery on your heart or blood vessels, such as a stent placement, a coronary artery bypass, or surgery on an artery in your head, neck, heart, or legs? (!) YES congenital irregularity repair    Do you have chest pain with activity? No    Do you have a history of heart failure? No    Do you currently have a cold, bronchitis or symptoms of other infection? No    Do you have a cough, shortness of breath, or wheezing? No    Do you or anyone in your family have previous history of blood clots? (!) YES FH    Do you or does anyone in your family have a serious bleeding problem such as prolonged bleeding following surgeries or cuts? No    Have you ever had problems with anemia or been told to take iron pills? No    Have you had any abnormal blood loss such as black, tarry or bloody stools, or abnormal vaginal bleeding? No    Have you ever had a blood transfusion? No    Are you willing to have a blood transfusion if it is medically needed before, during, or after your surgery? Yes    Have you or any of your relatives ever had problems with anesthesia? No    Do you have sleep apnea, excessive snoring or daytime drowsiness? No    Do you have any artifical heart valves or other implanted medical devices like a pacemaker, defibrillator, or continuous glucose monitor? No    Do you have artificial joints? No    Are you allergic to latex? (!) YES        Patient-reported     Health Care  Directive  Patient has a Health Care Directive on file      Preoperative Review of    reviewed - no record of controlled substances prescribed.      Status of Chronic Conditions:  See problem list for active medical problems.  Problems all longstanding and stable, except as noted/documented.  See ROS for pertinent symptoms related to these conditions.    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 11/25/2019     Priority: Medium    Primary osteoarthritis of left foot 10/29/2019     Priority: Medium    Abnormal electrocardiogram 10/29/2019     Priority: Medium    Hyperlipidemia LDL goal <100 10/08/2018     Priority: Medium    Microscopic hematuria 10/08/2018     Priority: Medium    Closed displaced fracture of shaft of fifth metacarpal bone of left hand, sequela 10/08/2018     Priority: Medium    Segmental and somatic dysfunction of cervical region 08/16/2018     Priority: Medium    Allergic rhinitis due to pollen 01/16/2018     Priority: Medium    Esophageal reflux 01/16/2018     Priority: Medium    Irritable bowel syndrome 01/16/2018     Priority: Medium    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        Cervical disc herniation 07/23/2013     Priority: Medium     Overview:   C6/7 on left;  S/p injection Dr. 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     Priority: Medium       Past Medical History:   Diagnosis Date    Abnormal cytological finding in specimen from cervix uteri     10/11/2011    Allergic rhinitis     No Comments Provided    Rowe esophagus     egd 2/14/24 - fu 1 yr - [facility]    Benign lipomatous neoplasm of skin and subcutaneous tissue of trunk     1/20/2016    Contact dermatitis     No Comments Provided    Dyshidrosis     No Comments Provided    Encounter for screening for malignant neoplasm of colon     8/27/2014    Gastro-esophageal reflux disease without esophagitis     No Comments Provided    Major depressive disorder, single episode     6/24/2011    Other congenital malformations of great veins (CODE)     7/10/2006    Other specified diseases of anus and rectum (CODE)     5/20/2015    Panic disorder without agoraphobia     resolved after heart surgery    Partial anomalous pulmonary venous connection     No Comments Provided    Residual hemorrhoidal skin tags     3/3/2011     Past Surgical History:   Procedure Laterality Date    anorectal exam anesthesia  05/20/2015    AR ANORECTAL EXAM SURG REQ ANESTH  DIAG    APPENDECTOMY OPEN      5/1987    ARTHROSCOPY KNEE      7/2006, 2013,Debridement, partial medial meniscectomy, and anterior cruciate ligament reconstruction using tibialis anterior allograft, right knee    AS HYSTEROSCOPY, SURGICAL; W/ ENDOMETRIAL ABLATION, ANY METHOD  12/14/2015    [place]    CARDIAC SURGERY  11/2006    Repair of anomalous pulmonary venous return by anastomosis of the anomalous vein to the left atrial appendage, done at [facility]    COLONOSCOPY  08/28/2014 8/28/2014 Follow up in 10 years 8/28/24 normal    COLONOSCOPY  02/14/2024    [facility] - normal    ECHO LIMITED      mild left atrial enlargement;  normal ef    EGD  02/14/2024    [facility] - Rowe's esophagus - f/u 1 year rec'd    ESOPHAGOSCOPY, GASTROSCOPY, DUODENOSCOPY (EGD), COMBINED      1996    ESOPHAGOSCOPY, GASTROSCOPY, DUODENOSCOPY (EGD), COMBINED      2001     ESOPHAGOSCOPY, GASTROSCOPY, DUODENOSCOPY (EGD), COMBINED      2006    ESOPHAGOSCOPY, GASTROSCOPY, DUODENOSCOPY (EGD), COMBINED      6/28/11,Normal    ESOPHAGOSCOPY, GASTROSCOPY, DUODENOSCOPY (EGD), COMBINED N/A 04/04/2022    Procedure: ESOPHAGOGASTRODUODENOSCOPY, WITH BIOPSY;  Surgeon: Ramon Sales MD;  Location: [facility] OR    NISSEN FUNDOPLICATION  08/22/2000    Dr. Sales    OTHER SURGICAL HISTORY Left 09/2021    Tenex procedure for plantar fasciitis with Dr. Reveles    SIGMOIDOSCOPY FLEXIBLE      1996,And [facility], negative, for rectal bleeding.  Fissures and hemorrhoids noted    SURGICAL PATHOLOGY EXAM Left 02/02/2016    Left flank     Current Outpatient Medications   Medication Sig Dispense Refill    acyclovir (ZOVIRAX) 5 % external ointment Apply to affected area 6 times a day for 7 days. 30 g 11    albuterol (PROAIR HFA/PROVENTIL HFA/VENTOLIN HFA) 108 (90 Base) MCG/ACT inhaler Inhale 2 puffs into the lungs every 4 hours as needed for shortness of breath / dyspnea or wheezing 18 g 1    buPROPion (WELLBUTRIN XL) 300 MG 24 hr tablet Take 1 tablet (300 mg) by mouth every morning 90 tablet 3    calcium carbonate-vitamin D (CALTRATE) 600-10 MG-MCG per tablet       cyanocobalamin (VITAMIN B-12) 1000 MCG sublingual tablet       diltiazem 2% 2% OINT ointment Apply topically.      diphenhydrAMINE HCl (BENADRYL ALLERGY PO)       folic acid (FOLVITE) 400 MCG tablet       Glucosamine-Chondroitin 750 & 400 MG MISC Take 1 tablet by mouth daily      L-Lysine 500 MG TABS Take 1 tablet by mouth daily      MELATONIN PO       omeprazole (PRILOSEC) 40 MG DR capsule Take 1 capsule (40 mg) by mouth daily as needed 90 capsule 3    triamcinolone (NASACORT) 55 MCG/ACT nasal aerosol Spray 2 sprays into both nostrils 2 times daily 16.9 mL 9    clindamycin (CLEOCIN T) 1 % external solution Apply topically 2 times daily 60 mL 3    Multiple Vitamins-Minerals (CENTRUM ULTRA WOMENS) TABS          Allergies   Allergen Reactions    Codeine Other (See  "Comments)    Medroxyprogesterone Other (See Comments)     Caused patient's brain to swell.    Azithromycin Palpitations    Latex Rash     hands    Penicillins Rash    Proline Rash     Prolene: Sutures; caused keloid    Sulfamethoxazole-Trimethoprim Rash        Social History     Tobacco Use    Smoking status: Former     Current packs/day: 0.00     Types: Cigarettes     Quit date: 10/10/2005     Years since quittin.1     Passive exposure: Past    Smokeless tobacco: Never   Substance Use Topics    Alcohol use: Yes     Alcohol/week: 14.0 standard drinks of alcohol     Comment: Alcoholic Drinks/day: 2-3 per day       History   Drug Use No             Objective    Pulse 72   Temp 97.7  F (36.5  C)   Resp 16   Ht 1.651 m (5' 5\")   Wt 69.4 kg (153 lb)   SpO2 96%   BMI 25.46 kg/m     Estimated body mass index is 25.46 kg/m  as calculated from the following:    Height as of this encounter: 1.651 m (5' 5\").    Weight as of this encounter: 69.4 kg (153 lb).  Physical Exam  GENERAL: alert and no distress  EYES: Eyes grossly normal to inspection, PERRL and conjunctivae and sclerae normal  HENT: ear canals and TM's normal, nose and mouth without ulcers or lesions  NECK: no adenopathy, no asymmetry, masses, or scars  RESP: lungs clear to auscultation - no rales, rhonchi or wheezes  CV: regular rate and rhythm, normal S1 S2, no S3 or S4, no murmur, click or rub, no peripheral edema  ABDOMEN: soft, nontender, no hepatosplenomegaly, no masses and bowel sounds normal  MS: no gross musculoskeletal defects noted, no edema  SKIN: no suspicious lesions or rashes  NEURO: Normal strength and tone, mentation intact and speech normal  PSYCH: mentation appears normal, affect normal/bright    Recent Labs   Lab Test 24  1901 23  1001   HGB 13.1 13.7    255    141   POTASSIUM 3.6 4.0   CR 0.59 0.62        Diagnostics  Results for orders placed or performed in visit on 24   Hemoglobin     Status: Normal "   Result Value Ref Range    Hemoglobin 14.7 11.7 - 15.7 g/dL   Basic Metabolic Panel     Status: Abnormal   Result Value Ref Range    Sodium 139 135 - 145 mmol/L    Potassium 4.1 3.4 - 5.3 mmol/L    Chloride 101 98 - 107 mmol/L    Carbon Dioxide (CO2) 32 (H) 22 - 29 mmol/L    Anion Gap 6 (L) 7 - 15 mmol/L    Urea Nitrogen 14.5 8.0 - 23.0 mg/dL    Creatinine 0.71 0.51 - 0.95 mg/dL    GFR Estimate >90 >60 mL/min/1.73m2    Calcium 9.7 8.8 - 10.4 mg/dL    Glucose 101 (H) 70 - 99 mg/dL   EKG 12-lead, tracing only (Same Day)     Status: None (Preliminary result)   Result Value Ref Range    Systolic Blood Pressure  mmHg    Diastolic Blood Pressure  mmHg    Ventricular Rate 68 BPM    Atrial Rate 68 BPM    FL Interval 162 ms    QRS Duration 80 ms     ms    QTc 444 ms    P Axis 49 degrees    R AXIS 15 degrees    T Axis 9 degrees    Interpretation ECG       Sinus rhythm  Normal ECG  No previous ECGs available          EKG: appears normal, NSR, normal axis, normal intervals, no acute ST/T changes c/w ischemia, no LVH by voltage criteria, unchanged from previous tracings    Revised Cardiac Risk Index (RCRI)  The patient has the following serious cardiovascular risks for perioperative complications:   - No serious cardiac risks = 0 points     RCRI Interpretation: 0 points: Class I (very low risk - 0.4% complication rate)         Signed Electronically by: AGUSTINA Acosta CNP  A copy of this evaluation report is provided to the requesting physician.

## 2024-12-02 NOTE — NURSING NOTE
Patient presents today for pre op exam.    Medication Reconciliation Complete    Debby Rubio LPN  12/2/2024 7:40 AM

## 2024-12-02 NOTE — PATIENT INSTRUCTIONS
How to Take Your Medication Before Surgery  Preoperative Medication Instructions   Antiplatelet or Anticoagulation Medication Instructions   - Patient is on no antiplatelet or anticoagulation medications.    Additional Medication Instructions  Take all scheduled medications on the day of surgery EXCEPT for modifications listed below:   - Herbal medications and vitamins: DO NOT TAKE 14 days prior to surgery.       Patient Education   Preparing for Your Surgery  For Adults  Getting started  In most cases, a nurse will call to review your health history and instructions. They will give you an arrival time based on your scheduled surgery time. Please be ready to share:  Your doctor's clinic name and phone number  Your medical, surgical, and anesthesia history  A list of allergies and sensitivities  A list of medicines, including herbal treatments and over-the-counter drugs  Whether the patient has a legal guardian (ask how to send us the papers in advance)  Note: You may not receive a call if you were seen at our PAC (Preoperative Assessment Center).  Please tell us if you're pregnant--or if there's any chance you might be pregnant. Some surgeries may injure a fetus (unborn baby), so they require a pregnancy test. Surgeries that are safe for a fetus don't always need a test, and you can choose whether to have one.   Preparing for surgery  Within 10 to 30 days of surgery: Have a pre-op exam (sometimes called an H&P, or History and Physical). This can be done at a clinic or pre-operative center.  If you're having a , you may not need this exam. Talk to your care team.  At your pre-op exam, talk to your care team about all medicines you take. (This includes CBD oil and any drugs, such as THC, marijuana, and other forms of cannabis.) If you need to stop any medicine before surgery, ask when to start taking it again.  This is for your safety. Many medicines and drugs can make you bleed too much during surgery. Some  change how well surgery (anesthesia) drugs work.  Call your insurance company to let them know you're having surgery. (If you don't have insurance, call [phone].)  Call your clinic if there's any change in your health. This includes a scrape or scratch near the surgery site, or any signs of a cold (sore throat, runny nose, cough, rash, fever).  Eating and drinking guidelines  For your safety: Unless your surgeon tells you otherwise, follow the guidelines below.  Eat and drink as normal until 8 hours before you arrive for surgery. After that, no food or milk. You can spit out gum when you arrive.  Drink clear liquids until 2 hours before you arrive. These are liquids you can see through, like water, Gatorade, and Propel Water. They also include plain black coffee and tea (no cream or milk).  No alcohol for 24 hours before you arrive. The night before surgery, stop any drinks that contain THC.  If your care team tells you to take medicine on the morning of surgery, it's okay to take it with a sip of water. No other medicines or drugs are allowed (including CBD oil)--follow your care team's instructions.  If you have questions the day of surgery, call your hospital or surgery center.   Preventing infection  Shower or bathe the night before and the morning of surgery. Follow the instructions your clinic gave you. (If no instructions, use regular soap.)  Don't shave or clip hair near your surgery site. We'll remove the hair if needed.  Don't smoke or vape the morning of surgery. No chewing tobacco for 6 hours before you arrive. A nicotine patch is okay. You may spit out nicotine gum when you arrive.  For some surgeries, the surgeon will tell you to fully quit smoking and nicotine.  We will make every effort to keep you safe from infection. We will:  Clean our hands often with soap and water (or an alcohol-based hand rub).  Clean the skin at your surgery site with a special soap that kills germs.  Give you a special  gown to keep you warm. (Cold raises the risk of infection.)  Wear hair covers, masks, gowns, and gloves during surgery.  Give antibiotic medicine, if prescribed. Not all surgeries need this medicine.  What to bring on the day of surgery  Photo ID and insurance card  Copy of your health care directive, if you have one  Glasses and hearing aids (bring cases)  You can't wear contacts during surgery  Inhaler and eye drops, if you use them (tell us about these when you arrive)  CPAP machine or breathing device, if you use them  A few personal items, if spending the night  If you have . . .  A pacemaker, ICD (cardiac defibrillator), or other implant: Bring the ID card.  An implanted stimulator: Bring the remote control.  A legal guardian: Bring a copy of the certified (court-stamped) guardianship papers.  Please remove any jewelry, including body piercings. Leave jewelry and other valuables at home.  If you're going home the day of surgery  You must have a responsible adult drive you home. They should stay with you overnight as well.  If you don't have someone to stay with you, and you aren't safe to go home alone, we may keep you overnight. Insurance often won't pay for this.  After surgery  If it's hard to control your pain or you need more pain medicine, please call your surgeon's office.  Questions?   If you have any questions for your care team, list them here:   ____________________________________________________________________________________________________________________________________________________________________________________________________________________________________________________________  For informational purposes only. Not to replace the advice of your health care provider. Copyright   2003, 2019 [facility]. All rights reserved. Clinically reviewed by Malvin Chapin MD. [employer] [id number] - REV 08/24.

## 2024-12-04 SDOH — HEALTH STABILITY: PHYSICAL HEALTH: ON AVERAGE, HOW MANY MINUTES DO YOU ENGAGE IN EXERCISE AT THIS LEVEL?: 60 MIN

## 2024-12-04 SDOH — HEALTH STABILITY: PHYSICAL HEALTH: ON AVERAGE, HOW MANY DAYS PER WEEK DO YOU ENGAGE IN MODERATE TO STRENUOUS EXERCISE (LIKE A BRISK WALK)?: 5 DAYS

## 2024-12-04 ASSESSMENT — SOCIAL DETERMINANTS OF HEALTH (SDOH): HOW OFTEN DO YOU GET TOGETHER WITH FRIENDS OR RELATIVES?: THREE TIMES A WEEK

## 2024-12-05 LAB
ATRIAL RATE - MUSE: 68 BPM
DIASTOLIC BLOOD PRESSURE - MUSE: NORMAL MMHG
INTERPRETATION ECG - MUSE: NORMAL
P AXIS - MUSE: 49 DEGREES
PR INTERVAL - MUSE: 162 MS
QRS DURATION - MUSE: 80 MS
QT - MUSE: 418 MS
QTC - MUSE: 444 MS
R AXIS - MUSE: 15 DEGREES
SYSTOLIC BLOOD PRESSURE - MUSE: NORMAL MMHG
T AXIS - MUSE: 9 DEGREES
VENTRICULAR RATE- MUSE: 68 BPM

## 2024-12-08 ASSESSMENT — PATIENT HEALTH QUESTIONNAIRE - PHQ9
SUM OF ALL RESPONSES TO PHQ QUESTIONS 1-9: 3
SUM OF ALL RESPONSES TO PHQ QUESTIONS 1-9: 3
10. IF YOU CHECKED OFF ANY PROBLEMS, HOW DIFFICULT HAVE THESE PROBLEMS MADE IT FOR YOU TO DO YOUR WORK, TAKE CARE OF THINGS AT HOME, OR GET ALONG WITH OTHER PEOPLE: SOMEWHAT DIFFICULT

## 2024-12-09 ENCOUNTER — OFFICE VISIT (OUTPATIENT)
Dept: FAMILY MEDICINE | Facility: OTHER | Age: 59
End: 2024-12-09
Attending: FAMILY MEDICINE
Payer: COMMERCIAL

## 2024-12-09 ENCOUNTER — HOSPITAL ENCOUNTER (OUTPATIENT)
Dept: MAMMOGRAPHY | Facility: OTHER | Age: 59
Discharge: HOME | End: 2024-12-09
Attending: FAMILY MEDICINE
Payer: COMMERCIAL

## 2024-12-09 VITALS
HEART RATE: 56 BPM | BODY MASS INDEX: 24.85 KG/M2 | SYSTOLIC BLOOD PRESSURE: 126 MMHG | OXYGEN SATURATION: 97 % | DIASTOLIC BLOOD PRESSURE: 72 MMHG | TEMPERATURE: 97.6 F | WEIGHT: 154.6 LBS | HEIGHT: 66 IN | RESPIRATION RATE: 16 BRPM

## 2024-12-09 DIAGNOSIS — Z13.29 SCREENING FOR THYROID DISORDER: ICD-10-CM

## 2024-12-09 DIAGNOSIS — Z00.00 ROUTINE GENERAL MEDICAL EXAMINATION AT A HEALTH CARE FACILITY: Primary | ICD-10-CM

## 2024-12-09 DIAGNOSIS — E78.5 HYPERLIPIDEMIA LDL GOAL <100: ICD-10-CM

## 2024-12-09 DIAGNOSIS — K60.30 ANAL FISTULA: ICD-10-CM

## 2024-12-09 DIAGNOSIS — Z13.0 SCREENING FOR DEFICIENCY ANEMIA: ICD-10-CM

## 2024-12-09 DIAGNOSIS — L57.0 ACTINIC KERATOSIS: ICD-10-CM

## 2024-12-09 DIAGNOSIS — Z12.31 VISIT FOR SCREENING MAMMOGRAM: ICD-10-CM

## 2024-12-09 LAB
BASOPHILS # BLD AUTO: 0 10E3/UL (ref 0–0.2)
BASOPHILS NFR BLD AUTO: 0 %
CHOLEST SERPL-MCNC: 238 MG/DL
EOSINOPHIL # BLD AUTO: 0 10E3/UL (ref 0–0.7)
EOSINOPHIL NFR BLD AUTO: 1 %
ERYTHROCYTE [DISTWIDTH] IN BLOOD BY AUTOMATED COUNT: 11.9 % (ref 10–15)
FASTING STATUS PATIENT QL REPORTED: YES
FOLATE SERPL-MCNC: 19 NG/ML (ref 4.6–34.8)
HCT VFR BLD AUTO: 40 % (ref 35–47)
HDLC SERPL-MCNC: 117 MG/DL
HGB BLD-MCNC: 13.2 G/DL (ref 11.7–15.7)
IMM GRANULOCYTES # BLD: 0 10E3/UL
IMM GRANULOCYTES NFR BLD: 0 %
LDLC SERPL CALC-MCNC: 106 MG/DL
LYMPHOCYTES # BLD AUTO: 1.9 10E3/UL (ref 0.8–5.3)
LYMPHOCYTES NFR BLD AUTO: 46 %
MCH RBC QN AUTO: 33.2 PG (ref 26.5–33)
MCHC RBC AUTO-ENTMCNC: 33 G/DL (ref 31.5–36.5)
MCV RBC AUTO: 101 FL (ref 78–100)
MONOCYTES # BLD AUTO: 0.3 10E3/UL (ref 0–1.3)
MONOCYTES NFR BLD AUTO: 8 %
NEUTROPHILS # BLD AUTO: 1.8 10E3/UL (ref 1.6–8.3)
NEUTROPHILS NFR BLD AUTO: 45 %
NONHDLC SERPL-MCNC: 121 MG/DL
NRBC # BLD AUTO: 0 10E3/UL
NRBC BLD AUTO-RTO: 0 /100
PLATELET # BLD AUTO: 273 10E3/UL (ref 150–450)
RBC # BLD AUTO: 3.98 10E6/UL (ref 3.8–5.2)
TRIGL SERPL-MCNC: 76 MG/DL
TSH SERPL DL<=0.005 MIU/L-ACNC: 1.71 UIU/ML (ref 0.3–4.2)
WBC # BLD AUTO: 4 10E3/UL (ref 4–11)

## 2024-12-09 PROCEDURE — 82465 ASSAY BLD/SERUM CHOLESTEROL: CPT | Mod: ZL | Performed by: FAMILY MEDICINE

## 2024-12-09 PROCEDURE — 77067 SCR MAMMO BI INCL CAD: CPT

## 2024-12-09 PROCEDURE — 77063 BREAST TOMOSYNTHESIS BI: CPT

## 2024-12-09 PROCEDURE — 85014 HEMATOCRIT: CPT | Mod: ZL | Performed by: FAMILY MEDICINE

## 2024-12-09 PROCEDURE — 85004 AUTOMATED DIFF WBC COUNT: CPT | Mod: ZL | Performed by: FAMILY MEDICINE

## 2024-12-09 PROCEDURE — 84443 ASSAY THYROID STIM HORMONE: CPT | Mod: ZL | Performed by: FAMILY MEDICINE

## 2024-12-09 PROCEDURE — 80061 LIPID PANEL: CPT | Mod: ZL | Performed by: FAMILY MEDICINE

## 2024-12-09 PROCEDURE — 82746 ASSAY OF FOLIC ACID SERUM: CPT | Mod: ZL | Performed by: FAMILY MEDICINE

## 2024-12-09 PROCEDURE — 36415 COLL VENOUS BLD VENIPUNCTURE: CPT | Mod: ZL | Performed by: FAMILY MEDICINE

## 2024-12-09 ASSESSMENT — PAIN SCALES - GENERAL: PAINLEVEL_OUTOF10: NO PAIN (0)

## 2024-12-09 NOTE — PROGRESS NOTES
Preventive Care Visit  [facility]  Patricia Brooke MD, Family Medicine  Dec 9, 2024        Westley Quigley is a 59 year old, presenting for the following:  Physical        12/9/2024     8:09 AM   Additional Questions   Roomed by Beatrice Lopez        Soraida is here today for an annual wellness visit.      She has had a rough area on the bridge of her nose.  Sometimes peels off and feels raw.  Doesn't bleed.      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 She will be having surgery with Dr. Gutiérrez later this month.    Health Care Directive  Patient has a Health Care Directive on file  Advance care planning document is on file and is current.      12/4/2024   General Health   How would you rate your overall physical health? Good   Feel stress (tense, anxious, or unable to sleep) Only a little      (!) STRESS CONCERN      12/4/2024   Nutrition   Three or more servings of calcium each day? Yes   Diet: Carbohydrate counting    Gluten-free/reduced   How many servings of fruit and vegetables per day? (!) 2-3   How many sweetened beverages each day? 0-1       Multiple values from one day are sorted in reverse-chronological order         12/4/2024   Exercise   Days per week of moderate/strenous exercise 5 days   Average minutes spent exercising at this level 60 min            12/4/2024   Social Factors   Frequency of gathering with friends or relatives Three times a week   Worry food won't last until get money to buy more No   Food not last or not have enough money for food? No   Do you have housing? (Housing is defined as stable permanent housing and does not include staying ouside in a car, in a tent, in an abandoned building, in an overnight shelter, or couch-surfing.) Yes   Are you worried about losing your housing? No   Lack of transportation? No   Unable to get utilities (heat,electricity)? No            12/4/2024   Fall Risk   Fallen 2 or more times in the past  year? No    Trouble with walking or balance? No        Patient-reported          2024   Dental   Dentist two times every year? Yes            2024   TB Screening   Were you born outside of the [place]? No          Today's PHQ-9 Score:       2024     7:51 AM   PHQ-9 SCORE   PHQ-9 Total Score MyChart 3 (Minimal depression)   PHQ-9 Total Score 3        Patient-reported         2024   Substance Use   Alcohol more than 3/day or more than 7/wk No   Do you use any other substances recreationally? No        Social History     Tobacco Use    Smoking status: Former     Current packs/day: 0.00     Types: Cigarettes     Quit date: 10/10/2005     Years since quittin.1     Passive exposure: Past    Smokeless tobacco: Never   Vaping Use    Vaping status: Never Used   Substance Use Topics    Alcohol use: Yes     Alcohol/week: 14.0 standard drinks of alcohol     Comment: Alcoholic Drinks/day: 2-3 per day    Drug use: No           2023   LAST FHS-7 RESULTS   1st degree relative breast or ovarian cancer No   Any relative bilateral breast cancer No   Any male have breast cancer No   Any ONE woman have BOTH breast AND ovarian cancer No   Any woman with breast cancer before 50yrs No   2 or more relatives with breast AND/OR ovarian cancer No   2 or more relatives with breast AND/OR bowel cancer No           Mammogram Screening - Mammogram every 1-2 years updated in Health Maintenance based on mutual decision making        2024   STI Screening   New sexual partner(s) since last STI/HIV test? No        History of abnormal Pap smear: No - age 30- 64 PAP with HPV every 5 years recommended        Latest Ref Rng & Units 2023     9:11 AM   PAP / HPV   PAP  Negative for Intraepithelial Lesion or Malignancy (NILM)    HPV 16 DNA Negative Negative    HPV 18 DNA Negative Negative    Other HR HPV Negative Negative      ASCVD Risk   The ASCVD Risk score (Jonna PATEL, et al., 2019) failed to calculate for the  following reasons:    The valid HDL cholesterol range is 20 to 100 mg/dL           Reviewed and updated as needed this visit by Provider                    Past Medical History:   Diagnosis Date    Abnormal cytological finding in specimen from cervix uteri     10/11/2011    Allergic rhinitis     No Comments Provided    Rowe esophagus     egd 2/14/24 - fu 1 yr - Madeline    Benign lipomatous neoplasm of skin and subcutaneous tissue of trunk     1/20/2016    Contact dermatitis     No Comments Provided    Dyshidrosis     No Comments Provided    Encounter for screening for malignant neoplasm of colon     8/27/2014    Gastro-esophageal reflux disease without esophagitis     No Comments Provided    Major depressive disorder, single episode     6/24/2011    Other congenital malformations of great veins (CODE)     7/10/2006    Other specified diseases of anus and rectum (CODE)     5/20/2015    Panic disorder without agoraphobia     resolved after heart surgery    Partial anomalous pulmonary venous connection     No Comments Provided    Residual hemorrhoidal skin tags     3/3/2011     Past Surgical History:   Procedure Laterality Date    anorectal exam anesthesia  05/20/2015    [place] ANORECTAL EXAM SURG REQ ANESTH  DIAG    APPENDECTOMY OPEN      5/1987    ARTHROSCOPY KNEE      7/2006, 2013,Debridement, partial medial meniscectomy, and anterior cruciate ligament reconstruction using tibialis anterior allograft, right knee    AS HYSTEROSCOPY, SURGICAL; W/ ENDOMETRIAL ABLATION, ANY METHOD  12/14/2015    Kathya    CARDIAC SURGERY  11/2006    Repair of anomalous pulmonary venous return by anastomosis of the anomalous vein to the left atrial appendage, done at [facility]    COLONOSCOPY  08/28/2014 8/28/2014 Follow up in 10 years 8/28/24 normal    COLONOSCOPY  02/14/2024    Correa - normal    ECHO LIMITED      mild left atrial enlargement;  normal ef    EGD  02/14/2024    Correa - Rowe's esophagus - f/u 1 year  rec'd    ESOPHAGOSCOPY, GASTROSCOPY, DUODENOSCOPY (EGD), COMBINED      1996    ESOPHAGOSCOPY, GASTROSCOPY, DUODENOSCOPY (EGD), COMBINED      2001    ESOPHAGOSCOPY, GASTROSCOPY, DUODENOSCOPY (EGD), COMBINED      2006    ESOPHAGOSCOPY, GASTROSCOPY, DUODENOSCOPY (EGD), COMBINED      6/28/11,Normal    ESOPHAGOSCOPY, GASTROSCOPY, DUODENOSCOPY (EGD), COMBINED N/A 04/04/2022    Procedure: ESOPHAGOGASTRODUODENOSCOPY, WITH BIOPSY;  Surgeon: Ramon Sales MD;  Location: [facility] OR    NISSEN FUNDOPLICATION  08/22/2000    Dr. Sales    OTHER SURGICAL HISTORY Left 09/2021    Tenex procedure for plantar fasciitis with Dr. Reveles    SIGMOIDOSCOPY FLEXIBLE      1996,And BE, negative, for rectal bleeding.  Fissures and hemorrhoids noted    SURGICAL PATHOLOGY EXAM Left 02/02/2016    Left flank     BP Readings from Last 3 Encounters:   12/09/24 126/72   10/08/24 122/78   09/09/24 110/77    Wt Readings from Last 3 Encounters:   12/09/24 70.1 kg (154 lb 9.6 oz)   12/02/24 69.4 kg (153 lb)   10/08/24 70.6 kg (155 lb 9.6 oz)                  Patient Active Problem List   Diagnosis    Other congenital anomalies of great veins    Allergic rhinitis due to pollen    Cervical disc herniation    Esophageal reflux    Irritable bowel syndrome    Major depression    Partial congenital anomalous pulmonary venous connection    Segmental and somatic dysfunction of cervical region    Hyperlipidemia LDL goal <100    Microscopic hematuria    Closed displaced fracture of shaft of fifth metacarpal bone of left hand, sequela    Primary osteoarthritis of left foot    Abnormal electrocardiogram    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    Epigastric pain    Gastritis    Recurrent major depressive disorder, in full remission (H)    Rowe esophagus    Fistula-in-ano     Past Surgical History:   Procedure Laterality Date    anorectal exam anesthesia  05/20/2015    [place] ANORECTAL EXAM SURG REQ ANESTH  DIAG    APPENDECTOMY OPEN      5/1987     ARTHROSCOPY KNEE      2006, 2013,Debridement, partial medial meniscectomy, and anterior cruciate ligament reconstruction using tibialis anterior allograft, right knee    AS HYSTEROSCOPY, SURGICAL; W/ ENDOMETRIAL ABLATION, ANY METHOD  2015    Kathya    CARDIAC SURGERY  2006    Repair of anomalous pulmonary venous return by anastomosis of the anomalous vein to the left atrial appendage, done at [facility]    COLONOSCOPY  2014 Follow up in 10 years 24 normal    COLONOSCOPY  2024    Correa - normal    ECHO LIMITED      mild left atrial enlargement;  normal ef    EGD  2024    Correa - Rowe's esophagus - f/u 1 year rec'd    ESOPHAGOSCOPY, GASTROSCOPY, DUODENOSCOPY (EGD), COMBINED          ESOPHAGOSCOPY, GASTROSCOPY, DUODENOSCOPY (EGD), COMBINED          ESOPHAGOSCOPY, GASTROSCOPY, DUODENOSCOPY (EGD), COMBINED          ESOPHAGOSCOPY, GASTROSCOPY, DUODENOSCOPY (EGD), COMBINED      11,Normal    ESOPHAGOSCOPY, GASTROSCOPY, DUODENOSCOPY (EGD), COMBINED N/A 2022    Procedure: ESOPHAGOGASTRODUODENOSCOPY, WITH BIOPSY;  Surgeon: Ramon Sales MD;  Location: [facility] OR    NISSEN FUNDOPLICATION  2000    Dr. Sales    OTHER SURGICAL HISTORY Left 2021    Tenex procedure for plantar fasciitis with Dr. Reveles    SIGMOIDOSCOPY FLEXIBLE      ,And BE, negative, for rectal bleeding.  Fissures and hemorrhoids noted    SURGICAL PATHOLOGY EXAM Left 2016    Left flank       Social History     Tobacco Use    Smoking status: Former     Current packs/day: 0.00     Types: Cigarettes     Quit date: 10/10/2005     Years since quittin.1     Passive exposure: Past    Smokeless tobacco: Never   Substance Use Topics    Alcohol use: Yes     Alcohol/week: 14.0 standard drinks of alcohol     Comment: Alcoholic Drinks/day: 2-3 per day     Family History   Problem Relation Age of Onset    Arthritis Mother         Arthritis,Osteoarthritis;  of 76  pneumonia but no autopsy    Diabetes Father         Diabetes    Other - See Comments Father         COPD/Benign colon polyps/AAA -  of ruptured AAA    Colon Cancer Paternal Grandmother         Cancer-colon    Colon Cancer Paternal Aunt         Cancer-colon    Diabetes Paternal Aunt         Diabetes    Obesity Sister         bariatric surgery    Other - See Comments Sister         TBI    Depression Sister     Anxiety Disorder Sister     Diabetes Sister     Hyperlipidemia Sister     Diabetes Brother     Hyperlipidemia Brother     Other - See Comments Maternal Grandmother          of old age    Dementia Maternal Grandfather         possible dementia,  of pneumonia    Other - See Comments Brother         MVA - at age 50    Hypertension Brother     Diabetes Brother     Hyperlipidemia Brother     Depression Brother     Diabetes Brother     Hyperlipidemia Brother     Macular Degeneration Brother     Diabetes Brother     Hyperlipidemia Brother     Suicide Brother 49    Cerebrovascular Disease Paternal Grandfather          of stroke         Current Outpatient Medications   Medication Sig Dispense Refill    acyclovir (ZOVIRAX) 5 % external ointment Apply to affected area 6 times a day for 7 days. 30 g 11    albuterol (PROAIR HFA/PROVENTIL HFA/VENTOLIN HFA) 108 (90 Base) MCG/ACT inhaler Inhale 2 puffs into the lungs every 4 hours as needed for shortness of breath / dyspnea or wheezing 18 g 1    buPROPion (WELLBUTRIN XL) 300 MG 24 hr tablet Take 1 tablet (300 mg) by mouth every morning 90 tablet 3    calcium carbonate-vitamin D (CALTRATE) 600-10 MG-MCG per tablet       cyanocobalamin (VITAMIN B-12) 1000 MCG sublingual tablet       diltiazem 2% 2% OINT ointment Apply 1 g topically 2 times daily as needed.      diltiazem 2% 2% OINT ointment Apply topically.      diphenhydrAMINE HCl (BENADRYL ALLERGY PO)       folic acid (FOLVITE) 400 MCG tablet       Glucosamine-Chondroitin 750 & 400 MG MISC Take 1 tablet by mouth  "daily      L-Lysine 500 MG TABS Take 1 tablet by mouth daily      MELATONIN PO       Multiple Vitamins-Minerals (CENTRUM ULTRA WOMENS) TABS       omeprazole (PRILOSEC) 40 MG DR capsule Take 1 capsule (40 mg) by mouth daily as needed 90 capsule 3    triamcinolone (NASACORT) 55 MCG/ACT nasal aerosol Spray 2 sprays into both nostrils 2 times daily 16.9 mL 9     Allergies   Allergen Reactions    Codeine Other (See Comments)    Medroxyprogesterone Other (See Comments)     Caused patient's brain to swell.    Azithromycin Palpitations    Latex Rash     hands    Penicillins Rash    Proline Rash     Prolene: Sutures; caused keloid    Sulfamethoxazole-Trimethoprim Rash         Review of Systems  Constitutional, HEENT, cardiovascular, pulmonary, GI, , musculoskeletal, neuro, skin, endocrine and psych systems are negative, except as otherwise noted.     Objective    Exam  /72   Pulse 56   Temp 97.6  F (36.4  C) (Tympanic)   Resp 16   Ht 1.664 m (5' 5.5\")   Wt 70.1 kg (154 lb 9.6 oz)   SpO2 97%   Breastfeeding No   BMI 25.34 kg/m     Estimated body mass index is 25.34 kg/m  as calculated from the following:    Height as of this encounter: 1.664 m (5' 5.5\").    Weight as of this encounter: 70.1 kg (154 lb 9.6 oz).    Physical Exam  Constitutional:       General: She is not in acute distress.     Appearance: She is well-developed.   HENT:      Head: Normocephalic.      Right Ear: Tympanic membrane and external ear normal.      Left Ear: Tympanic membrane and external ear normal.      Nose: Nose normal.      Mouth/Throat:      Mouth: Mucous membranes are moist.      Pharynx: Oropharynx is clear. No oropharyngeal exudate or posterior oropharyngeal erythema.   Eyes:      General:         Right eye: No discharge.         Left eye: No discharge.      Conjunctiva/sclera: Conjunctivae normal.      Pupils: Pupils are equal, round, and reactive to light.   Neck:      Thyroid: No thyromegaly.      Trachea: No tracheal " deviation.   Cardiovascular:      Rate and Rhythm: Normal rate and regular rhythm.      Pulses: Normal pulses.      Heart sounds: Normal heart sounds, S1 normal and S2 normal. No murmur heard.     No friction rub. No gallop. No S3 or S4 sounds.   Pulmonary:      Effort: Pulmonary effort is normal. No respiratory distress.      Breath sounds: Normal breath sounds. No wheezing or rales.      Comments: Breast exam:  No masses palpable bilaterally.  No skin changes, tethering or axillary lymphadenopathy bilaterally.    Abdominal:      General: Bowel sounds are normal. There is no distension.      Palpations: Abdomen is soft. There is no mass.      Tenderness: There is no abdominal tenderness.   Genitourinary:     Comments: Pelvic/Rectal exams deferred per patient.  Musculoskeletal:         General: Normal range of motion.      Cervical back: Neck supple.   Lymphadenopathy:      Cervical: No cervical adenopathy.   Skin:     General: Skin is warm and dry.      Findings: No rash.      Comments: Poorly defined rough area on the bridge of her nose with slight associated scaling.  No scabbing or bleeding. The area is about 3 mm in diameter.  This was frozen x 3 with liquid nitrogen in a freeze-thaw-freeze pattern.   Neurological:      Mental Status: She is alert and oriented to person, place, and time.      Motor: No abnormal muscle tone.      Deep Tendon Reflexes: Reflexes are normal and symmetric.   Psychiatric:         Mood and Affect: Mood normal.         Thought Content: Thought content normal.         Judgment: Judgment normal.           ICD-10-CM    1. Routine general medical examination at a health care facility  Z00.00       2. Hyperlipidemia LDL goal <100  E78.5 Lipid Profile     Lipid Profile      3. Screening for deficiency anemia  Z13.0 CBC with Platelets & Differential     Folic Acid     CBC with Platelets & Differential     Folic Acid      4. Screening for thyroid disorder  Z13.29 TSH with free T4 reflex      TSH with free T4 reflex      5. Actinic keratosis  L57.0 DESTRUCT PREMALIGNANT LESION, FIRST      6. Anal fistula  K60.30            Mammogram is being completed later today.  Last DEXA was normal on 12/11/2020.  Colonoscopy last compoleted 2/14/24 and was normal.  10 year follow up recommended.  Pap Smear is up to date, last completed 12/4/23 and was negative.  Tdap, flu, covid and Shingrix vaccines are all up to date.  Labs as above.  She is fasting today.  Complete Blood Count as above.  TSH as above.  Lesion frozen as noted above.  If does not resolve over the next few weeks, recommended notifying us for referral to Dermatology.  As noted, will be having upcoming surgery with Dr. Gutiérrez.  Already had her preop last week.    Return in about 53 weeks (around 12/15/2025) for Annual Wellness Visit.     33 minutes spent in review of chart, interviewing patient, examining patient, discussing plan, reviewing results and completing note on the date of encounter.      Patricia Brooke MD

## 2024-12-09 NOTE — PATIENT INSTRUCTIONS
Patient Education   Preventive Care Advice   This is general advice given by our system to help you stay healthy. However, your care team may have specific advice just for you. Please talk to your care team about your preventive care needs.  Nutrition  Eat 5 or more servings of fruits and vegetables each day.  Try wheat bread, brown rice and whole grain pasta (instead of white bread, rice, and pasta).  Get enough calcium and vitamin D. Check the label on foods and aim for 100% of the RDA (recommended daily allowance).  Lifestyle  Exercise at least 150 minutes each week  (30 minutes a day, 5 days a week).  Do muscle strengthening activities 2 days a week. These help control your weight and prevent disease.  No smoking.  Wear sunscreen to prevent skin cancer.  Have a dental exam and cleaning every 6 months.  Yearly exams  See your health care team every year to talk about:  Any changes in your health.  Any medicines your care team has prescribed.  Preventive care, family planning, and ways to prevent chronic diseases.  Shots (vaccines)   HPV shots (up to age 26), if you've never had them before.  Hepatitis B shots (up to age 59), if you've never had them before.  COVID-19 shot: Get this shot when it's due.  Flu shot: Get a flu shot every year.  Tetanus shot: Get a tetanus shot every 10 years.  Pneumococcal, hepatitis A, and RSV shots: Ask your care team if you need these based on your risk.  Shingles shot (for age 50 and up)  General health tests  Diabetes screening:  Starting at age 35, Get screened for diabetes at least every 3 years.  If you are younger than age 35, ask your care team if you should be screened for diabetes.  Cholesterol test: At age 39, start having a cholesterol test every 5 years, or more often if advised.  Bone density scan (DEXA): At age 50, ask your care team if you should have this scan for osteoporosis (brittle bones).  Hepatitis C: Get tested at least once in your life.  STIs (sexually  transmitted infections)  Before age 24: Ask your care team if you should be screened for STIs.  After age 24: Get screened for STIs if you're at risk. You are at risk for STIs (including HIV) if:  You are sexually active with more than one person.  You don't use condoms every time.  You or a partner was diagnosed with a sexually transmitted infection.  If you are at risk for HIV, ask about PrEP medicine to prevent HIV.  Get tested for HIV at least once in your life, whether you are at risk for HIV or not.  Cancer screening tests  Cervical cancer screening: If you have a cervix, begin getting regular cervical cancer screening tests starting at age 21.  Breast cancer scan (mammogram): If you've ever had breasts, begin having regular mammograms starting at age 40. This is a scan to check for breast cancer.  Colon cancer screening: It is important to start screening for colon cancer at age 45.  Have a colonoscopy test every 10 years (or more often if you're at risk) Or, ask your provider about stool tests like a FIT test every year or Cologuard test every 3 years.  To learn more about your testing options, visit:   .  For help making a decision, visit:   https://bit.ly/bg43348.  Prostate cancer screening test: If you have a prostate, ask your care team if a prostate cancer screening test (PSA) at age 55 is right for you.  Lung cancer screening: If you are a current or former smoker ages 50 to 80, ask your care team if ongoing lung cancer screenings are right for you.  For informational purposes only. Not to replace the advice of your health care provider. Copyright   2023 [employer]. All rights reserved. Clinically reviewed by the [facility]. MyTime 839224 - REV 01/24.

## 2024-12-17 ENCOUNTER — MYC MEDICAL ADVICE (OUTPATIENT)
Dept: FAMILY MEDICINE | Facility: OTHER | Age: 59
End: 2024-12-17
Payer: COMMERCIAL

## 2024-12-31 ENCOUNTER — ALLIED HEALTH/NURSE VISIT (OUTPATIENT)
Dept: FAMILY MEDICINE | Facility: OTHER | Age: 59
End: 2024-12-31
Payer: COMMERCIAL

## 2024-12-31 VITALS — TEMPERATURE: 98.1 F

## 2024-12-31 DIAGNOSIS — Z23 ENCOUNTER FOR IMMUNIZATION: Primary | ICD-10-CM

## 2024-12-31 PROCEDURE — 90471 IMMUNIZATION ADMIN: CPT

## 2024-12-31 PROCEDURE — 90677 PCV20 VACCINE IM: CPT

## 2024-12-31 NOTE — PROGRESS NOTES
Immunization Documentation  Verified patient's first and last name, and . Stated reason for visit today is to receive prevnar vaccine. Accompained to clinic visit with spouse. Denied any concerns with previous immunizations. Allergies reviewed. VIS handout(s) reviewed and given to take home. Vaccine prepared and administered per standing order. Administration documented in IMMUNIZATIONS (see flowsheet and order for further information). Patient Instructed to wait in lobby for 15 minutes post-injection and notify staff immediately of any reaction.     Jessi Phillips RN ....................  2024   8:11 AM

## 2025-01-06 ENCOUNTER — MYC MEDICAL ADVICE (OUTPATIENT)
Dept: FAMILY MEDICINE | Facility: OTHER | Age: 60
End: 2025-01-06
Payer: COMMERCIAL

## 2025-01-06 DIAGNOSIS — J30.1 ALLERGIC RHINITIS DUE TO POLLEN, UNSPECIFIED SEASONALITY: ICD-10-CM

## 2025-01-07 RX ORDER — TRIAMCINOLONE ACETONIDE 55 UG/1
2 SPRAY, METERED NASAL 2 TIMES DAILY
Qty: 16.9 ML | Refills: 9 | Status: SHIPPED | OUTPATIENT
Start: 2025-01-07

## 2025-01-07 NOTE — TELEPHONE ENCOUNTER
Requested Prescriptions   Pending Prescriptions Disp Refills    triamcinolone (NASACORT) 55 MCG/ACT nasal aerosol 16.9 mL 9     Sig: Spray 2 sprays into both nostrils 2 times daily.       There is no refill protocol information for this order          Last Prescription Date:   2/23/2022  Last Fill Qty/Refills:         16.9ml, R-9    Last Office Visit:              12/9/2024 Physical with PCP.    Future Office visit:            None      Jenifer Joyner RN on 1/7/2025 at 2:24 PM

## 2025-02-17 DIAGNOSIS — F33.42 RECURRENT MAJOR DEPRESSIVE DISORDER, IN FULL REMISSION: ICD-10-CM

## 2025-02-18 ENCOUNTER — OFFICE VISIT (OUTPATIENT)
Dept: FAMILY MEDICINE | Facility: OTHER | Age: 60
End: 2025-02-18
Attending: NURSE PRACTITIONER
Payer: COMMERCIAL

## 2025-02-18 VITALS
SYSTOLIC BLOOD PRESSURE: 114 MMHG | BODY MASS INDEX: 24.75 KG/M2 | WEIGHT: 154 LBS | DIASTOLIC BLOOD PRESSURE: 70 MMHG | HEART RATE: 76 BPM | TEMPERATURE: 98.5 F | OXYGEN SATURATION: 97 % | RESPIRATION RATE: 16 BRPM | HEIGHT: 66 IN

## 2025-02-18 DIAGNOSIS — Z01.818 PREOP GENERAL PHYSICAL EXAM: Primary | ICD-10-CM

## 2025-02-18 DIAGNOSIS — K60.30 FISTULA-IN-ANO: ICD-10-CM

## 2025-02-18 DIAGNOSIS — K21.9 GASTROESOPHAGEAL REFLUX DISEASE, UNSPECIFIED WHETHER ESOPHAGITIS PRESENT: ICD-10-CM

## 2025-02-18 LAB
ANION GAP SERPL CALCULATED.3IONS-SCNC: 10 MMOL/L (ref 7–15)
BUN SERPL-MCNC: 14.3 MG/DL (ref 8–23)
CALCIUM SERPL-MCNC: 9.3 MG/DL (ref 8.8–10.4)
CHLORIDE SERPL-SCNC: 103 MMOL/L (ref 98–107)
CREAT SERPL-MCNC: 0.82 MG/DL (ref 0.51–0.95)
EGFRCR SERPLBLD CKD-EPI 2021: 81 ML/MIN/1.73M2
GLUCOSE SERPL-MCNC: 119 MG/DL (ref 70–99)
HCO3 SERPL-SCNC: 29 MMOL/L (ref 22–29)
HGB BLD-MCNC: 13.1 G/DL (ref 11.7–15.7)
POTASSIUM SERPL-SCNC: 4.3 MMOL/L (ref 3.4–5.3)
SODIUM SERPL-SCNC: 142 MMOL/L (ref 135–145)

## 2025-02-18 PROCEDURE — 85018 HEMOGLOBIN: CPT | Mod: ZL | Performed by: NURSE PRACTITIONER

## 2025-02-18 PROCEDURE — 36415 COLL VENOUS BLD VENIPUNCTURE: CPT | Mod: ZL | Performed by: NURSE PRACTITIONER

## 2025-02-18 PROCEDURE — 82565 ASSAY OF CREATININE: CPT | Mod: ZL | Performed by: NURSE PRACTITIONER

## 2025-02-18 RX ORDER — LIDOCAINE AND PRILOCAINE 25; 25 MG/G; MG/G
CREAM TOPICAL
COMMUNITY

## 2025-02-18 ASSESSMENT — PAIN SCALES - GENERAL: PAINLEVEL_OUTOF10: NO PAIN (0)

## 2025-02-18 NOTE — NURSING NOTE
Patient presents today for pre op exam.    Medication Reconciliation Complete    Debby Rubio LPN  2/18/2025 1:07 PM

## 2025-02-18 NOTE — PATIENT INSTRUCTIONS
How to Take Your Medication Before Surgery  Preoperative Medication Instructions   Antiplatelet or Anticoagulation Medication Instructions   - We reviewed the medication list and the patient is not on an antiplatelet or anticoagulation medications.    Additional Medication Instructions  Take all scheduled medications on the day of surgery   - Herbal medications and vitamins: DO NOT TAKE 14 days prior to surgery.       Patient Education   Preparing for Your Surgery  For Adults  Getting started  In most cases, a nurse will call to review your health history and instructions. They will give you an arrival time based on your scheduled surgery time. Please be ready to share:  Your doctor's clinic name and phone number  Your medical, surgical, and anesthesia history  A list of allergies and sensitivities  A list of medicines, including herbal treatments and over-the-counter drugs  Whether the patient has a legal guardian (ask how to send us the papers in advance)  Note: You may not receive a call if you were seen at our PAC (Preoperative Assessment Center).  Please tell us if you're pregnant--or if there's any chance you might be pregnant. Some surgeries may injure a fetus (unborn baby), so they require a pregnancy test. Surgeries that are safe for a fetus don't always need a test, and you can choose whether to have one.   Preparing for surgery  Within 10 to 30 days of surgery: Have a pre-op exam (sometimes called an H&P, or History and Physical). This can be done at a clinic or pre-operative center.  If you're having a , you may not need this exam. Talk to your care team.  At your pre-op exam, talk to your care team about all medicines you take. (This includes CBD oil and any drugs, such as THC, marijuana, and other forms of cannabis.) If you need to stop any medicine before surgery, ask when to start taking it again.  This is for your safety. Many medicines and drugs can make you bleed too much during surgery.  Some change how well surgery (anesthesia) drugs work.  Call your insurance company to let them know you're having surgery. (If you don't have insurance, call [phone].)  Call your clinic if there's any change in your health. This includes a scrape or scratch near the surgery site, or any signs of a cold (sore throat, runny nose, cough, rash, fever).  Eating and drinking guidelines  For your safety: Unless your surgeon tells you otherwise, follow the guidelines below.  Eat and drink as normal until 8 hours before you arrive for surgery. After that, no food or milk. You can spit out gum when you arrive.  Drink clear liquids until 2 hours before you arrive. These are liquids you can see through, like water, Gatorade, and Propel Water. They also include plain black coffee and tea (no cream or milk).  No alcohol for 24 hours before you arrive. The night before surgery, stop any drinks that contain THC.  If your care team tells you to take medicine on the morning of surgery, it's okay to take it with a sip of water. No other medicines or drugs are allowed (including CBD oil)--follow your care team's instructions.  If you have questions the day of surgery, call your hospital or surgery center.   Preventing infection  Shower or bathe the night before and the morning of surgery. Follow the instructions your clinic gave you. (If no instructions, use regular soap.)  Don't shave or clip hair near your surgery site. We'll remove the hair if needed.  Don't smoke or vape the morning of surgery. No chewing tobacco for 6 hours before you arrive. A nicotine patch is okay. You may spit out nicotine gum when you arrive.  For some surgeries, the surgeon will tell you to fully quit smoking and nicotine.  We will make every effort to keep you safe from infection. We will:  Clean our hands often with soap and water (or an alcohol-based hand rub).  Clean the skin at your surgery site with a special soap that kills germs.  Give you a  special gown to keep you warm. (Cold raises the risk of infection.)  Wear hair covers, masks, gowns, and gloves during surgery.  Give antibiotic medicine, if prescribed. Not all surgeries need this medicine.  What to bring on the day of surgery  Photo ID and insurance card  Copy of your health care directive, if you have one  Glasses and hearing aids (bring cases)  You can't wear contacts during surgery  Inhaler and eye drops, if you use them (tell us about these when you arrive)  CPAP machine or breathing device, if you use them  A few personal items, if spending the night  If you have . . .  A pacemaker, ICD (cardiac defibrillator), or other implant: Bring the ID card.  An implanted stimulator: Bring the remote control.  A legal guardian: Bring a copy of the certified (court-stamped) guardianship papers.  Please remove any jewelry, including body piercings. Leave jewelry and other valuables at home.  If you're going home the day of surgery  You must have a responsible adult drive you home. They should stay with you overnight as well.  If you don't have someone to stay with you, and you aren't safe to go home alone, we may keep you overnight. Insurance often won't pay for this.  After surgery  If it's hard to control your pain or you need more pain medicine, please call your surgeon's office.  Questions?   If you have any questions for your care team, list them here:   ____________________________________________________________________________________________________________________________________________________________________________________________________________________________________________________________  For informational purposes only. Not to replace the advice of your health care provider. Copyright   2003, 2019 [facility]. All rights reserved. Clinically reviewed by Malvin Chapin MD. [employer] 809539 - REV 08/24.

## 2025-02-18 NOTE — PROGRESS NOTES
Preoperative Evaluation  [facility]  [address]  [place] [address]  Phone: [phone]  Fax: [phone]  Primary Provider: Patricia Brooke MD  Pre-op Performing Provider: AGUSTINA Acosta CNP  Feb 18, 2025             2/15/2025   Surgical Information   What procedure is being done? Anal exam under Anesthesia, Ligation of internal Fistula Tract   [facility]   Who is doing the procedure / surgery? Dr. Gutiérrez   Date of surgery / procedure: 3/5/2025   Time of surgery / procedure: Not known at this time. They will call day before w/time   Where do you plan to recover after surgery? at home with family     Fax number for surgical facility: [phone]    Assessment & Plan     The proposed surgical procedure is considered INTERMEDIATE risk.    Problem List Items Addressed This Visit          Digestive    Esophageal reflux    Fistula-in-ano     Other Visit Diagnoses       Preop general physical exam    -  Primary    Relevant Orders    Basic Metabolic Panel (Completed)    Hemoglobin (Completed)          She is optimized for upcoming procedure as requested        - No identified additional risk factors other than previously addressed    Antiplatelet or Anticoagulation Medication Instructions   - We reviewed the medication list and the patient is not on an antiplatelet or anticoagulation medications.    Additional Medication Instructions   - Herbal medications and vitamins: DO NOT TAKE 14 days prior to surgery.    Recommendation  Approval given to proceed with proposed procedure, without further diagnostic evaluation.    Westley Quigley is a 59 year old, presenting for the following:  Pre-Op Exam        HPI related to upcoming procedure: She presents to clinic today for preoperative clearance for annual exam under anesthesia and possible closure of the fistula.  She does continue to  have drainage from the area, unclear if they are going to be able to close this.  She otherwise is feeling well, no health concerns today.        2/15/2025   Pre-Op Questionnaire   Have you ever had a heart attack or stroke? No   Have you ever had surgery on your heart or blood vessels, such as a stent placement, a coronary artery bypass, or surgery on an artery in your head, neck, heart, or legs? (!) YES    Do you have chest pain with activity? No   Do you have a history of heart failure? No   Do you currently have a cold, bronchitis or symptoms of other infection? No   Do you have a cough, shortness of breath, or wheezing? No   Do you or anyone in your family have previous history of blood clots? (!) YES    Do you or does anyone in your family have a serious bleeding problem such as prolonged bleeding following surgeries or cuts? No   Have you ever had problems with anemia or been told to take iron pills? No   Have you had any abnormal blood loss such as black, tarry or bloody stools, or abnormal vaginal bleeding? No   Have you ever had a blood transfusion? No   Are you willing to have a blood transfusion if it is medically needed before, during, or after your surgery? Yes   Have you or any of your relatives ever had problems with anesthesia? No   Do you have sleep apnea, excessive snoring or daytime drowsiness? No   Do you have any artifical heart valves or other implanted medical devices like a pacemaker, defibrillator, or continuous glucose monitor? No   Do you have artificial joints? No   Are you allergic to latex? (!) YES     Health Care Directive  Patient has a Health Care Directive on file      Preoperative Review of    reviewed - no record of controlled substances prescribed.      Status of Chronic Conditions:  See problem list for active medical problems.  Problems all longstanding and stable, except as noted/documented.  See ROS for pertinent symptoms related to these conditions.    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 11/25/2019     Priority: Medium    Primary osteoarthritis of left foot 10/29/2019     Priority: Medium    Abnormal electrocardiogram 10/29/2019     Priority: Medium    Hyperlipidemia LDL goal <100 10/08/2018     Priority: Medium    Microscopic hematuria 10/08/2018     Priority: Medium    Closed displaced fracture of shaft of fifth metacarpal bone of left hand, sequela 10/08/2018     Priority: Medium    Segmental and somatic dysfunction of cervical region 08/16/2018     Priority: Medium    Allergic rhinitis due to pollen 01/16/2018     Priority: Medium    Esophageal reflux 01/16/2018     Priority: Medium    Irritable bowel syndrome 01/16/2018     Priority: Medium    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        Cervical disc herniation 07/23/2013     Priority: Medium     Overview:   C6/7 on left;  S/p injection Dr. 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     Priority: Medium      Past Medical History:   Diagnosis Date    Abnormal cytological finding in specimen from cervix uteri     10/11/2011    Allergic rhinitis     No Comments Provided    Rowe esophagus     egd 2/14/24 - fu 1 yr - [facility]    Benign lipomatous neoplasm of skin and subcutaneous tissue of trunk     1/20/2016    Contact dermatitis     No Comments Provided    Dyshidrosis     No  Comments Provided    Encounter for screening for malignant neoplasm of colon     8/27/2014    Gastro-esophageal reflux disease without esophagitis     No Comments Provided    Major depressive disorder, single episode     6/24/2011    Other congenital malformations of great veins (CODE)     7/10/2006    Other specified diseases of anus and rectum (CODE)     5/20/2015    Panic disorder without agoraphobia     resolved after heart surgery    Partial anomalous pulmonary venous connection     No Comments Provided    Residual hemorrhoidal skin tags     3/3/2011     Past Surgical History:   Procedure Laterality Date    anorectal exam anesthesia  05/20/2015    ID ANORECTAL EXAM SURG REQ ANESTH  DIAG    APPENDECTOMY OPEN      5/1987    ARTHROSCOPY KNEE      7/2006, 2013,Debridement, partial medial meniscectomy, and anterior cruciate ligament reconstruction using tibialis anterior allograft, right knee    AS HYSTEROSCOPY, SURGICAL; W/ ENDOMETRIAL ABLATION, ANY METHOD  12/14/2015    Kathya    CARDIAC SURGERY  11/2006    Repair of anomalous pulmonary venous return by anastomosis of the anomalous vein to the left atrial appendage, done at [facility]    COLONOSCOPY  08/28/2014 8/28/2014 Follow up in 10 years 8/28/24 normal    COLONOSCOPY  02/14/2024    [facility] - normal    ECHO LIMITED      mild left atrial enlargement;  normal ef    EGD  02/14/2024    [facility] - Rowe's esophagus - f/u 1 year rec'd    ESOPHAGOSCOPY, GASTROSCOPY, DUODENOSCOPY (EGD), COMBINED      1996    ESOPHAGOSCOPY, GASTROSCOPY, DUODENOSCOPY (EGD), COMBINED      2001    ESOPHAGOSCOPY, GASTROSCOPY, DUODENOSCOPY (EGD), COMBINED      2006    ESOPHAGOSCOPY, GASTROSCOPY, DUODENOSCOPY (EGD), COMBINED      6/28/11,Normal    ESOPHAGOSCOPY, GASTROSCOPY, DUODENOSCOPY (EGD), COMBINED N/A 04/04/2022    Procedure: ESOPHAGOGASTRODUODENOSCOPY, WITH BIOPSY;  Surgeon: Ramon Sales MD;  Location: [facility] OR    NISSEN FUNDOPLICATION  08/22/2000    Dr. Sales    OTHER  SURGICAL HISTORY Left 09/2021    Tenex procedure for plantar fasciitis with Dr. Reveles    SIGMOIDOSCOPY FLEXIBLE      1996,And BE, negative, for rectal bleeding.  Fissures and hemorrhoids noted    SURGICAL PATHOLOGY EXAM Left 02/02/2016    Left flank     Current Outpatient Medications   Medication Sig Dispense Refill    acyclovir (ZOVIRAX) 5 % external ointment Apply to affected area 6 times a day for 7 days. 30 g 11    albuterol (PROAIR HFA/PROVENTIL HFA/VENTOLIN HFA) 108 (90 Base) MCG/ACT inhaler Inhale 2 puffs into the lungs every 4 hours as needed for shortness of breath / dyspnea or wheezing 18 g 1    buPROPion (WELLBUTRIN XL) 300 MG 24 hr tablet Take 1 tablet (300 mg) by mouth every morning 90 tablet 3    calcium carbonate-vitamin D (CALTRATE) 600-10 MG-MCG per tablet       cyanocobalamin (VITAMIN B-12) 1000 MCG sublingual tablet       diltiazem 2% 2% OINT ointment Apply 1 g topically 2 times daily as needed.      diphenhydrAMINE HCl (BENADRYL ALLERGY PO)       folic acid (FOLVITE) 400 MCG tablet       Glucosamine-Chondroitin 750 & 400 MG MISC Take 1 tablet by mouth daily      L-Lysine 500 MG TABS Take 1 tablet by mouth daily      lidocaine-prilocaine (EMLA) 2.5-2.5 % external cream Apply topically.      MELATONIN PO       Multiple Vitamins-Minerals (CENTRUM ULTRA WOMENS) TABS       omeprazole (PRILOSEC) 40 MG DR capsule Take 1 capsule (40 mg) by mouth daily as needed 90 capsule 3    triamcinolone (NASACORT) 55 MCG/ACT nasal aerosol Spray 2 sprays into both nostrils 2 times daily. 16.9 mL 9       Allergies   Allergen Reactions    Codeine Other (See Comments)    Medroxyprogesterone Other (See Comments)     Caused patient's brain to swell.    Azithromycin Palpitations    Latex Rash     hands    Penicillins Rash    Proline Rash     Prolene: Sutures; caused keloid    Sulfamethoxazole-Trimethoprim Rash        Social History     Tobacco Use    Smoking status: Former     Current packs/day: 0.00     Types: Cigarettes  "    Quit date: 10/10/2005     Years since quittin.3     Passive exposure: Past    Smokeless tobacco: Never   Substance Use Topics    Alcohol use: Yes     Alcohol/week: 14.0 standard drinks of alcohol     Comment: Alcoholic Drinks/day: 2-3 per day     History   Drug Use No           Objective    /70   Pulse 76   Temp 98.5  F (36.9  C)   Resp 16   Ht 1.664 m (5' 5.5\")   Wt 69.9 kg (154 lb)   SpO2 97%   BMI 25.24 kg/m     Estimated body mass index is 25.24 kg/m  as calculated from the following:    Height as of this encounter: 1.664 m (5' 5.5\").    Weight as of this encounter: 69.9 kg (154 lb).  Physical Exam  GENERAL: alert and no distress  EYES: Eyes grossly normal to inspection, PERRL and conjunctivae and sclerae normal  HENT: ear canals and TM's normal, nose and mouth without ulcers or lesions  NECK: no adenopathy, no asymmetry, masses, or scars  RESP: lungs clear to auscultation - no rales, rhonchi or wheezes  CV: regular rate and rhythm, normal S1 S2, no S3 or S4, no murmur, click or rub, no peripheral edema  ABDOMEN: soft, nontende  MS: no gross musculoskeletal defects noted, no edema  SKIN: no suspicious lesions or rashes  NEURO: Normal strength and tone, mentation intact and speech normal  PSYCH: mentation appears normal, affect normal/bright    Recent Labs   Lab Test 24  0903 24  0813 24  1901   HGB 13.2 14.7 13.1     --  237   NA  --  139 137   POTASSIUM  --  4.1 3.6   CR  --  0.71 0.59        Diagnostics  Recent Results (from the past 24 hours)   Basic Metabolic Panel    Collection Time: 25  1:30 PM   Result Value Ref Range    Sodium 142 135 - 145 mmol/L    Potassium 4.3 3.4 - 5.3 mmol/L    Chloride 103 98 - 107 mmol/L    Carbon Dioxide (CO2) 29 22 - 29 mmol/L    Anion Gap 10 7 - 15 mmol/L    Urea Nitrogen 14.3 8.0 - 23.0 mg/dL    Creatinine 0.82 0.51 - 0.95 mg/dL    GFR Estimate 81 >60 mL/min/1.73m2    Calcium 9.3 8.8 - 10.4 mg/dL    Glucose 119 (H) 70 - 99 " mg/dL   Hemoglobin    Collection Time: 02/18/25  1:30 PM   Result Value Ref Range    Hemoglobin 13.1 11.7 - 15.7 g/dL      No EKG required, no history of coronary heart disease, significant arrhythmia, peripheral arterial disease or other structural heart disease.    Revised Cardiac Risk Index (RCRI)  The patient has the following serious cardiovascular risks for perioperative complications:   - No serious cardiac risks = 0 points     RCRI Interpretation: 0 points: Class I (very low risk - 0.4% complication rate)         Signed Electronically by: AGUSTINA Acosta CNP  A copy of this evaluation report is provided to the requesting physician.

## 2025-02-20 RX ORDER — BUPROPION HYDROCHLORIDE 300 MG/1
300 TABLET ORAL EVERY MORNING
Qty: 90 TABLET | Refills: 2 | Status: SHIPPED | OUTPATIENT
Start: 2025-02-20

## 2025-02-20 NOTE — TELEPHONE ENCOUNTER
[facility] sent Rx request for the following:      Requested Prescriptions   Pending Prescriptions Disp Refills    buPROPion (WELLBUTRIN XL) 300 MG 24 hr tablet [Pharmacy Med Name: bupropion HCl  mg 24 hr tablet, extended release] 90 tablet 3     Sig: Take 1 tablet (300 mg) by mouth every morning       Rx Protocol Bupropion Passed - 2/20/2025 10:21 AM          Last Prescription Date:   12/4/23  Last Fill Qty/Refills:         90, R-3    Last Office Visit:              12/9/24   Future Office visit:                Prescription approved per [facility] Refill Protocol.  Danielle Gruber RN on 2/20/2025 at 10:24 AM

## 2025-03-31 ENCOUNTER — TRANSFERRED RECORDS (OUTPATIENT)
Dept: HEALTH INFORMATION MANAGEMENT | Facility: OTHER | Age: 60
End: 2025-03-31
Payer: COMMERCIAL

## 2025-05-05 ENCOUNTER — THERAPY VISIT (OUTPATIENT)
Dept: CHIROPRACTIC MEDICINE | Facility: OTHER | Age: 60
End: 2025-05-05
Attending: CHIROPRACTOR
Payer: COMMERCIAL

## 2025-05-05 VITALS — RESPIRATION RATE: 18 BRPM | TEMPERATURE: 96.8 F | HEART RATE: 88 BPM | OXYGEN SATURATION: 98 %

## 2025-05-05 DIAGNOSIS — M25.511 ACUTE PAIN OF RIGHT SHOULDER: ICD-10-CM

## 2025-05-05 DIAGNOSIS — M75.101 ROTATOR CUFF SYNDROME, RIGHT: Primary | ICD-10-CM

## 2025-05-05 NOTE — PROGRESS NOTES
Right shoulder is constantly dull aching. Sharp shooting and burning pains with any movement. Radiating up right side of neck and down into right arm and hand. Hand and a couple of fingers are numb and tingling. 9/10 W24 10/10. Using heat which is taking the edge off.   Carmina Markham on 5/5/2025 at 10:46 AM    Reviewed by EW    Visit #:  1    Subjective:  Soraida Suresh is a 60 year old female who is seen for:        Rotator cuff syndrome, right  Acute pain of right shoulder.     Soraida Suresh reports: Has been dealing with right shoulder pain.  Pain seems to be due to overuse.  Patient has been doing quite a bit of preparatory work at her home before her  undergoes medical procedure and will be unable to help participate with these home activities.  No traumatic events noted.  Patient does experience some radiating of symptoms into the 3rd and 4th digits of the right hand.  Possible weakness of the hands.  Patient having quite a bit of pain with flexion and abduction of the shoulder.  Symptoms do seem to be extending into the upper thoracic and cervical regions.      (DVPRS) Pain Rating Score : 9-Can't bear the pain unable to do anything (W24 10/10) (05/05/25 1041)     Objective:  The following was observed:  Pulse 88   Temp 96.8  F (36  C) (Tympanic)   Resp 18   SpO2 98%        5/5/2025    10:47 AM   Neck Disability Index (  Marcus H. and Raphael C. 1991. All rights reserved.; used with permission)   SECTION 1 - PAIN INTENSITY 2   SECTION 2 - PERSONAL CARE 2   SECTION 3 - LIFTING 3   SECTION 4 - READING 1   SECTION 5 - HEADACHES 0   SECTION 6 - CONCENTRATION 1   SECTION 7 - WORK 0   SECTION 8 - DRIVING 2   SECTION 9 - SLEEPING 3   SECTION 10 - RECREATION 4   Count 10   Sum 18   Raw Score: /50 18   Neck Disability Index Score: (%) 36 %      Cervical AROM; moderate/severe limitations with lateral flexion bilaterally, rotation restrictions bilaterally moderate    Cervical compression; -    Oswestry (SWATHI)  Questionnaire        5/5/2025    10:51 AM   OSWESTRY DISABILITY INDEX   Count 9   Sum 25   Oswestry Score (%) 55.56 %             5/5/2025    10:52 AM   Upper Extremity Functional Index (  [address] [place] [address])   a.  Any of your usual work, housework or school activities 2-Moderate Difficulty   b.  Your usual hobbies, recreational or sporting activities 3-A Little bit of Difficulty   c.  Lifting a bag of groceries to waist level 0-Extreme Difficulty   d.  Placing an object onto, or removing it from an overhead shelf 0-Extreme Difficulty   e.  Washing your hair or scalp 3-A Little bit of Difficulty   f.   Pushing up on your hands (e.g., from bathtub or chair) 4-No Difficulty   g.  Preparing food (e.g., peeling, cutting) 4-No Difficulty   h.  Driving 2-Moderate Difficulty   I.   Vacuuming, sweeping, or raking 1-Quite a bit of Difficulty   j.   Dressing 1-Quite a bit of Difficulty   k.  Doing up buttons 4-No Difficulty   l.   Using tools or appliances 4-No Difficulty   m. Opening doors 4-No Difficulty   n.  Cleaning 1-Quite a bit of Difficulty   o.  Tying or lacing shoes 4-No Difficulty   p.  Sleeping 1-Quite a bit of Difficulty   q.  Laundering clothes. (e.g., washing, ironing, folding) 4-No Difficulty   r.   Opening a jar 4-No Difficulty   s.  Throwing a ball 0-Extreme Difficulty   t.   Carrying a small suitcase with your affected limb  0-Extreme Difficulty   Column Totals: /80 46      Isolated shoulder strength checks:  Deltoid: 0/5 on right does cause pain, 5/5 on left  Serratus anterior: 0/5 on right does cause pain, 5/5 on left  Pectoralis major: 5/5 right, 5/5 left    Unable to assess infraspinatus, supraspinatus, teres group, latissimus dorsi due to degree of pain that patient is exhibiting.    Supraspinatus press test: Positive on right  Palpatory positive pain bicipital tendon right side  Palpatory pain also noted distal rhomboid, levator scapula attachment on scapula, upper trapezius    A: asymmetric joints,  not apparent  R: restricted motion , not apparent  T: Taut and tender fibers noted supraspinatus, infraspinatus, teres group, rhomboid, upper trapezius, posterior and anterior aspects of the deltoid muscle, proximal bicep.  All on right side    Segmental spinal dysfunction/restrictions found at:  .      Assessment: Chiropractic assessment not suggestive of segmental/somatic dysfunction.  Suspect soft tissue component to be major contributing factor for patient's symptoms.  Due to degree of pain and weakness I do believe that x-rays are warranted.  Possible degenerative changes might be present but cannot rule out avulsion although I believe this to be highly unlikely.  At this time it seems most likely that patient is suffering from tendinitis involving multiple aspects of the rotator cuff as well as bicipital tendinitis.  Graston Technique most appropriate today.  Could also consider ultrasound treatments and/or acupuncture as patient has been treated with these in the past and responded favorably to.  Until x-rays can rule out anything sinister I do not recommend ultrasound or acupuncture at this time.  Patient will obtain x-rays at her leisure.  At this time plan to follow-up with patient at 1-2 X weekly over the next 2-4 weeks.    Diagnoses:      1. Rotator cuff syndrome, right    2. Acute pain of right shoulder      Treatment effectiveness:  Slight decrease in symptoms posttreatment      Procedures:  E/M 95851    CMT:  Not performed    Modalities:  67699: IASTM: To  upper trapezius, rhomboids, supraspinatus, infraspinatus, deltoid, bicipital tendon.  All on right side:  for 6 min    Therapeutic procedures:  Pendulum exercise for shoulder    Response to Treatment  Slight decrease in symptoms    Prognosis: Good    Progress towards Goals: Reduce pain by at least 50% over the next 2-4 weeks  Improve disability index scores by at least 20% in the next 4 weeks  Improve isolated strength checks for the next 4  weeks     Recommendations:    Instructions: Home exercises as discussed.    Follow-up:  Return to care in 3-4 days.

## 2025-05-08 ENCOUNTER — THERAPY VISIT (OUTPATIENT)
Dept: CHIROPRACTIC MEDICINE | Facility: OTHER | Age: 60
End: 2025-05-08
Attending: CHIROPRACTOR
Payer: COMMERCIAL

## 2025-05-08 ENCOUNTER — HOSPITAL ENCOUNTER (OUTPATIENT)
Dept: GENERAL RADIOLOGY | Facility: OTHER | Age: 60
Discharge: HOME | End: 2025-05-08
Attending: CHIROPRACTOR
Payer: COMMERCIAL

## 2025-05-08 VITALS — HEART RATE: 58 BPM | RESPIRATION RATE: 16 BRPM | OXYGEN SATURATION: 97 % | TEMPERATURE: 97.2 F

## 2025-05-08 DIAGNOSIS — M99.01 SEGMENTAL AND SOMATIC DYSFUNCTION OF CERVICAL REGION: ICD-10-CM

## 2025-05-08 DIAGNOSIS — M25.511 ACUTE PAIN OF RIGHT SHOULDER: ICD-10-CM

## 2025-05-08 DIAGNOSIS — M75.101 ROTATOR CUFF SYNDROME, RIGHT: Primary | ICD-10-CM

## 2025-05-08 PROCEDURE — 73030 X-RAY EXAM OF SHOULDER: CPT | Mod: RT

## 2025-05-08 NOTE — PROGRESS NOTES
Right shoulder is constantly burning. Radiating down right arm into hand. Hand feels numb and tingling. 9/10 W24 10/10. Using heat which provides comfort while in use and increases mobility.   Carmina Markham on 5/8/2025 at 9:44 AM    Reviewed by EW    Visit #:  2    Subjective:  Soraida Suresh is a 60 year old female who is seen in f/u up for:        Rotator cuff syndrome, right  Acute pain of right shoulder  Segmental and somatic dysfunction of cervical region.     Since last visit on 5/5/2025,  Soraida Suresh reports: Some improvement noted after last treatment with Graston.  X-rays taken earlier today and images are available for my review.  Patient now having pain radiating into the dorsum of the hand following the middle finger.  Patient does feel that she is getting somewhat weak with her hands.  Primarily with  strength.  Patient also recently just drove to the cities and back which did seem to aggravate symptoms somewhat.    (DVPRS) Pain Rating Score : 9-Can't bear the pain unable to do anything (W24 10/10) (05/08/25 0940)     Objective:  The following was observed:  Pulse 58   Temp 97.2  F (36.2  C) (Tympanic)   Resp 16   SpO2 97%      Study Result    Narrative & Impression   Exam: XR SHOULDER RIGHT G/E 3 VIEWS      History:Female, age 60 years, Acute pain of right shoulder     Comparison:  No relevant prior imaging.     Technique: Three views are submitted.     Findings: Bones are normally mineralized. No evidence of acute or  subacute fracture.  No evidence of dislocation.  Mild/moderate AC  joint degenerative change.                                                                           Impression:  No evidence of acute or subacute bony abnormality.     KHRIS LEONE MD         SYSTEM ID:  [id number]     Upper extremity strength:  Generally unremarkable comparatively side-to-side   strength: Slight weakness noted on the right side    Compression: Negative    P: palpatory tendernessright  upper trapezius, right side C6:    A: static palpation demonstrates intersegmental asymmetry , cervical  R: motion palpation notes restricted motion, C6   T: muscle spasm at level(s): Upper trapezius right side:      Segmental spinal dysfunction/restrictions found at:  :  C6 Right lateral flexion restricted and Extension restriction.      Assessment: Chiropractic assessment suggestive of segmental/somatic dysfunction.  At this time continue to follow-up with patient on 1-2 X weekly.  Pending today's treatment begin utilization of acupuncture which could start as early as tomorrow depending on response to today's treatment.  Occupational therapy referral has been placed as patient has found good results with similar concerns in the past only on the opposite arm.  No apparent weakness at this time.  Suspect this to be fatigue rather than true weakness.  We will continue to monitor.    Diagnoses:      1. Rotator cuff syndrome, right    2. Acute pain of right shoulder    3. Segmental and somatic dysfunction of cervical region        Patient's condition:  Slight improvement with last treatment    Treatment effectiveness:  Tolerated treatment well      Procedures:  CMT:  59416 Chiropractic manipulative treatment 1-2 regions performed   Cervical: Activator, C6, Seated    Modalities:  82045: US:  1 Hernandes/cm squared for 8 minutes at .78mhz  continuous , Location:right upper trapezius, right shoulder    Therapeutic procedures:  None    Response to Treatment  Tolerated treatment     Prognosis: Good    Progress towards Goals: Patient is making progress towards the goal.     Recommendations:    Instructions:monitor symptoms    Follow-up:  Return to care in 5 days, 1 day if symptoms fail to improve for acupuncture.

## 2025-05-12 ENCOUNTER — THERAPY VISIT (OUTPATIENT)
Dept: CHIROPRACTIC MEDICINE | Facility: OTHER | Age: 60
End: 2025-05-12
Attending: CHIROPRACTOR
Payer: COMMERCIAL

## 2025-05-12 VITALS — OXYGEN SATURATION: 96 % | RESPIRATION RATE: 16 BRPM | TEMPERATURE: 97.1 F | HEART RATE: 89 BPM

## 2025-05-12 DIAGNOSIS — M75.101 ROTATOR CUFF SYNDROME, RIGHT: Primary | ICD-10-CM

## 2025-05-12 DIAGNOSIS — M25.511 ACUTE PAIN OF RIGHT SHOULDER: ICD-10-CM

## 2025-05-12 NOTE — PROGRESS NOTES
Right shoulder is constantly aching. With intermittent stabbing pain while lifting arm or moving away from body. Radiating into right arm and hand. Noticing that the numbness is getting a little better. 6/10 W24 10/10. Using TENS unit, Tylenol, and Ibuprofen. These are providing a decrease in pain.  Carmina Markham on 5/12/2025 at 7:03 AM    Reviewed by EW    Visit #:  3    Subjective:  Soraida Suresh is a 60 year old female who is seen in f/u up for:        Rotator cuff syndrome, right  Acute pain of right shoulder.     Since last visit on 5/8/2025,  Soraida Suresh reports: Pain still continues.  Majority of pain with shoulder flexion or horizontal abduction.  Both ranges of motion are extremely limited at this time.  TENS unit helpful at home.  Curious about trying home exercises from last shoulder concern.        (DVPRS) Pain Rating Score : 6-Hard to ignore, avoid usual activities (W24 10/10) (05/12/25 0659)     Objective:  The following was observed:  Pulse 89   Temp 97.1  F (36.2  C) (Tympanic)   Resp 16   SpO2 96%      P: palpatory tendernessnot reported by patient:    A: asymmetric joints, not noted  R: restricted motion , right shoulder flexion and horizontal abduction  T: Increased tonicity deltoid musculature on right    Segmental spinal dysfunction/restrictions found at:  .      Assessment: Chiropractic assessment not suggestive of segmental/somatic dysfunction.  Continue to suspect majority of symptoms to be soft tissue in nature most likely due to repetitive/overuse.  X-rays are encouraging.  Therapy orders are in place.  Continue with twice a week care at this time to include acupuncture and other passive modalities.  Encouraged patient to at least try home exercises per OT when shoulder was last symptomatic.  This was on the opposite side.    Diagnoses:      1. Rotator cuff syndrome, right    2. Acute pain of right shoulder        Patient's condition:  Symptoms appear to be changing    Treatment  effectiveness:  Tolerated treatment      Procedures:  CMT:  Not performed    Modalities:  99455: IASTM: To deltoid, teres muscle group:  for 3 min  83058: Acupuncture, for 15 minutes:  Points: SI 11, TW 14, LI 15  For 15 minutes    Therapeutic procedures:  None    Response to Treatment  No Change in symptoms as reported by patient    Prognosis: Good    Progress towards Goals: In progress     Recommendations:    Instructions:Try old exercises per OT within tolerance.  Should any activity exacerbate or worsen symptoms discontinue immediately    Follow-up:  Return to care in 2-3 days.

## 2025-05-14 ENCOUNTER — THERAPY VISIT (OUTPATIENT)
Dept: CHIROPRACTIC MEDICINE | Facility: OTHER | Age: 60
End: 2025-05-14
Attending: CHIROPRACTOR
Payer: COMMERCIAL

## 2025-05-14 VITALS — OXYGEN SATURATION: 98 % | HEART RATE: 78 BPM | TEMPERATURE: 96.9 F | RESPIRATION RATE: 16 BRPM

## 2025-05-14 DIAGNOSIS — M75.101 ROTATOR CUFF SYNDROME, RIGHT: Primary | ICD-10-CM

## 2025-05-14 DIAGNOSIS — M25.511 ACUTE PAIN OF RIGHT SHOULDER: ICD-10-CM

## 2025-05-14 NOTE — PROGRESS NOTES
Right shoulder is frequently aching with intermittent sharp pains when moving arm away from body. Still having tingling into fingers. 6/10 W24 10/10. Using pain creams, Tylenol, and Ibuprofen. These provide a decrease in pain.  Carmina Markham on 5/14/2025 at 7:59 AM    Reviewed by EW    Visit #:  4    Subjective:  Soraida Suresh is a 60 year old female who is seen in f/u up for:        Rotator cuff syndrome, right  Acute pain of right shoulder.     Since last visit on 5/12/2025,  Soraida Suresh reports: Symptoms showing some improvement.  Sleeping improving.  Overall intensity and frequency of pain also decreasing.  Scheduled to begin physical therapy in a couple of days.    (DVPRS) Pain Rating Score : 6-Hard to ignore, avoid usual activities (W24 10/10) (05/14/25 9554)     Objective:  The following was observed:  Pulse 78   Temp 96.9  F (36.1  C) (Tympanic)   Resp 16   SpO2 98%      P: palpatory tendernessnot reported by patient:    A: asymmetric joints, not noted  R: restricted motion , not noted  T: Taut fibers noted deltoid, teres muscle group, upper trapezius, all on right    Segmental spinal dysfunction/restrictions found at:  .      Assessment: Assessment from a chiropractic since not suggestive of somatic dysfunction.  Continue to monitor and adjust when appropriate.  Continue to suspect majority of symptoms to be soft tissue in nature.  I was able to personally consult with patient's occupational therapist for coordination of care efforts.  Plan to follow-up with patient sometime next week.    Diagnoses:      1. Rotator cuff syndrome, right    2. Acute pain of right shoulder        Patient's condition:  Patient symptoms are gradually improving    Treatment effectiveness:  Symptoms appear to be decreasing      Procedures:  CMT:  Not performed    Modalities:  21215: Acupuncture, for 15 minutes:  Points: LI 14, 15, TW 14, SI 11 all right sided  For 15 minutes  IASTM technique applied to right deltoid for 2  minutes    Therapeutic procedures:  None    Response to Treatment  Reduction in symptoms as reported by patient    Prognosis: Good    Progress towards Goals: Patient is making progress towards the goal.     Recommendations:    Instructions:monitor symptoms    Follow-up:  Return to care in 5 days.

## 2025-05-28 ENCOUNTER — THERAPY VISIT (OUTPATIENT)
Dept: OCCUPATIONAL THERAPY | Facility: OTHER | Age: 60
End: 2025-05-28
Attending: CHIROPRACTOR
Payer: COMMERCIAL

## 2025-05-28 DIAGNOSIS — M77.12 LEFT LATERAL EPICONDYLITIS: Primary | ICD-10-CM

## 2025-05-28 PROCEDURE — 97035 APP MDLTY 1+ULTRASOUND EA 15: CPT | Mod: GO

## 2025-05-28 PROCEDURE — 97110 THERAPEUTIC EXERCISES: CPT | Mod: GO

## 2025-05-28 PROCEDURE — 97140 MANUAL THERAPY 1/> REGIONS: CPT | Mod: GO

## 2025-06-10 ENCOUNTER — THERAPY VISIT (OUTPATIENT)
Dept: OCCUPATIONAL THERAPY | Facility: OTHER | Age: 60
End: 2025-06-10
Attending: CHIROPRACTOR
Payer: COMMERCIAL

## 2025-06-10 DIAGNOSIS — M77.12 LEFT LATERAL EPICONDYLITIS: Primary | ICD-10-CM

## 2025-06-10 PROCEDURE — 97110 THERAPEUTIC EXERCISES: CPT | Mod: GO

## 2025-06-10 PROCEDURE — 97035 APP MDLTY 1+ULTRASOUND EA 15: CPT | Mod: GO

## 2025-06-10 PROCEDURE — 97140 MANUAL THERAPY 1/> REGIONS: CPT | Mod: GO

## 2025-06-16 ENCOUNTER — THERAPY VISIT (OUTPATIENT)
Dept: OCCUPATIONAL THERAPY | Facility: OTHER | Age: 60
End: 2025-06-16
Attending: CHIROPRACTOR
Payer: COMMERCIAL

## 2025-06-16 DIAGNOSIS — S46.009A ROTATOR CUFF INJURY: Primary | ICD-10-CM

## 2025-06-16 PROCEDURE — 97035 APP MDLTY 1+ULTRASOUND EA 15: CPT | Mod: GO

## 2025-06-16 PROCEDURE — 97140 MANUAL THERAPY 1/> REGIONS: CPT | Mod: GO

## 2025-06-16 PROCEDURE — 97010 HOT OR COLD PACKS THERAPY: CPT | Mod: GO

## 2025-06-16 PROCEDURE — 97110 THERAPEUTIC EXERCISES: CPT | Mod: GO

## 2025-06-25 ENCOUNTER — THERAPY VISIT (OUTPATIENT)
Dept: OCCUPATIONAL THERAPY | Facility: OTHER | Age: 60
End: 2025-06-25
Attending: CHIROPRACTOR
Payer: COMMERCIAL

## 2025-06-25 DIAGNOSIS — S46.009A ROTATOR CUFF INJURY: Primary | ICD-10-CM

## 2025-06-25 PROCEDURE — 97140 MANUAL THERAPY 1/> REGIONS: CPT | Mod: GO

## 2025-06-25 PROCEDURE — 97035 APP MDLTY 1+ULTRASOUND EA 15: CPT | Mod: GO

## 2025-06-25 PROCEDURE — 97110 THERAPEUTIC EXERCISES: CPT | Mod: GO

## 2025-06-25 PROCEDURE — 97010 HOT OR COLD PACKS THERAPY: CPT | Mod: GO

## 2025-07-02 ENCOUNTER — THERAPY VISIT (OUTPATIENT)
Dept: OCCUPATIONAL THERAPY | Facility: OTHER | Age: 60
End: 2025-07-02
Attending: CHIROPRACTOR
Payer: COMMERCIAL

## 2025-07-02 DIAGNOSIS — S46.009A ROTATOR CUFF INJURY: Primary | ICD-10-CM

## 2025-07-02 PROCEDURE — 97035 APP MDLTY 1+ULTRASOUND EA 15: CPT | Mod: GO

## 2025-07-02 PROCEDURE — 97140 MANUAL THERAPY 1/> REGIONS: CPT | Mod: GO

## 2025-07-02 PROCEDURE — 97110 THERAPEUTIC EXERCISES: CPT | Mod: GO

## 2025-07-09 ENCOUNTER — THERAPY VISIT (OUTPATIENT)
Dept: OCCUPATIONAL THERAPY | Facility: OTHER | Age: 60
End: 2025-07-09
Attending: CHIROPRACTOR
Payer: COMMERCIAL

## 2025-07-09 DIAGNOSIS — S46.009A ROTATOR CUFF INJURY: Primary | ICD-10-CM

## 2025-07-09 PROCEDURE — 97140 MANUAL THERAPY 1/> REGIONS: CPT | Mod: GO

## 2025-07-09 PROCEDURE — 97035 APP MDLTY 1+ULTRASOUND EA 15: CPT | Mod: GO

## 2025-07-09 PROCEDURE — 97010 HOT OR COLD PACKS THERAPY: CPT | Mod: GO

## 2025-07-09 PROCEDURE — 97110 THERAPEUTIC EXERCISES: CPT | Mod: GO

## 2025-07-15 ENCOUNTER — THERAPY VISIT (OUTPATIENT)
Dept: OCCUPATIONAL THERAPY | Facility: OTHER | Age: 60
End: 2025-07-15
Attending: CHIROPRACTOR
Payer: COMMERCIAL

## 2025-07-15 DIAGNOSIS — S46.009A ROTATOR CUFF INJURY: Primary | ICD-10-CM

## 2025-07-15 PROCEDURE — 97140 MANUAL THERAPY 1/> REGIONS: CPT | Mod: GO

## 2025-07-15 PROCEDURE — 97035 APP MDLTY 1+ULTRASOUND EA 15: CPT | Mod: GO

## 2025-07-15 PROCEDURE — 97110 THERAPEUTIC EXERCISES: CPT | Mod: GO

## 2025-07-22 ENCOUNTER — THERAPY VISIT (OUTPATIENT)
Dept: OCCUPATIONAL THERAPY | Facility: OTHER | Age: 60
End: 2025-07-22
Attending: CHIROPRACTOR
Payer: COMMERCIAL

## 2025-07-22 DIAGNOSIS — S46.009A ROTATOR CUFF INJURY: Primary | ICD-10-CM

## 2025-07-22 PROCEDURE — 97110 THERAPEUTIC EXERCISES: CPT | Mod: GO

## 2025-07-22 PROCEDURE — 97140 MANUAL THERAPY 1/> REGIONS: CPT | Mod: GO

## 2025-07-22 PROCEDURE — 97035 APP MDLTY 1+ULTRASOUND EA 15: CPT | Mod: GO

## 2025-07-22 NOTE — PROGRESS NOTES
PLAN  Continue therapy per current plan of care.    Beginning/End Dates of Progress Note Reporting Period:    5/16/2025 to 07/22/2025    Referring Provider:  Heath Gomez      07/22/25 0500   Appointment Info   Treating Provider Felicita Gama OTR/L   Visits Used 10   Medical Diagnosis Right rotator cuff syndrome   OT Tx Diagnosis Weakness, pain, numbness, decreased ability to complete ADLs and IADLs.   Progress Note/Certification   Onset of Illness/Injury or Date of Surgery 04/18/25   Therapy Frequency 0-2x/week   Predicted Duration 12 weeks   Certification date from 05/16/25   Certification date to 08/08/25   KX Modifier Statement I certify the need for these services furnished under this plan of treatment and while under my care.  (Physician co-signature of this document indicates review and certification of the therapy plan)   OT Goal 1   Goal Identifier Shoulder flexion   Goal Description Patient will achieve 100 degrees or more of shoulder flexion to be able to complete ADLs/IADLs with greater ease and independence.   Rationale In order to maximize safety and independence with performance of self-care activities   Goal Progress Progressing but goal is not yet met, shoulder flexion= 92 degrees.   Target Date 08/08/25   OT Goal 2   Goal Identifier Comb hair   Goal Description Patient will be able to comb her hair with right hand with 2/10 pain or less.   Rationale In order to maximize safety and independence with performance of self-care activities   Goal Progress Progressing with goal but not yet met. She is able to reach her hair with a modified position but still experiences greater than 2/10 pain at times. Continue with goal.   Target Date 10/14/25   OT Goal 3   Goal Identifier  strength   Goal Description Patient will increase right  sterngth by 5+ pounds (31 pounds at eval).   Rationale In order to maximize safety and independence with performance of self-care activities   Goal Progress Right grasp=55  pounds, goal met.   Target Date 08/08/25   Date Met 07/22/25   Subjective Report   Subjective Report Soraida reports that she has the most difficulty with shoulder abduction and with abduction and flexion she gets spots where her shoulder feels stuck.   Objective Measure 1   Objective Measure  strength at eval   Details Right grasp=31 pounds, Lateral pinch=12 pounds, 3 point pinch=11 pounds. Left grasp=54 pounds, Lateral pinch=14 pounds, 3 point pinch=14 pounds.  (7/22/2025: 55 pounds for right grasp.)   Hot/Cold Packs   Hot Packs, Supervised, Minutes (65858) 5 Minutes   Treatment Detail Wrapped heat pack in pillow case and drapped around the shoulder.   Patient Response/Progress Patient tolerated well.   Ultrasound   Ultrasound, Minutes (30255) 8 Minutes   Treatment Detail Ultrasound to shoulder area while supine, continuous, 1 MHz, 0.8 W/cm2   Patient Response/Progress Patient tolerated well.   Therapeutic Procedure/Exercise   Therapeutic Procedure: strength, endurance, ROM, flexibillity minutes (84269) 17   Ther Proc 1 - Details PROM to shoulder and elbow while supine in tolerable range. Shoulder abducation while supine and isometric squeeze and hold, added this to HEP. Completed light resitive exercises while supine within tolerable range. Pulleys for shoulder flexion and abduction and wall walks. 1 set of each exercise x10 reps.   Skilled Intervention Education, modification   Patient Response/Progress Pain at end range of movements. Otherwise tolerated well.   Manual Therapy   Manual Therapy Minutes (65224) 18   Manual Therapy 1 - Details STM, IASTM to the anterior and posterior shoulder, distal bicep and elbow using graston technique. Applied kinesio tape to shoulder and educated patient how to apply from shoulder to biceps at home, sent home with 2 strips of kinesiotape.   Skilled Intervention Educated patient on use and benefits.   Patient Response/Progress Patient tolerated well. Reports the kinesiotape  felt really good on the shoulder last week.   Education   Learner/Method Patient   Plan   Home program Added shoulder abduction with elbow bent and standing in front of a mirror to prevent shoulder hiking.  (5/28/2025: Modified median nerve glides.)   Plan for next session Ultrasound, ROM, heat, update HEP as tolerated.   Total Session Time   Timed Code Treatment Minutes 43   Total Treatment Time (sum of timed and untimed services) 48

## 2025-07-25 ENCOUNTER — TELEPHONE (OUTPATIENT)
Dept: FAMILY MEDICINE | Facility: OTHER | Age: 60
End: 2025-07-25

## 2025-07-25 NOTE — TELEPHONE ENCOUNTER
PT would like to do liquid viscus lidocaine for mouth wash replacement.     Terese Aviles on 7/25/2025 at 3:17 PM

## 2025-08-06 ENCOUNTER — TELEPHONE (OUTPATIENT)
Dept: FAMILY MEDICINE | Facility: OTHER | Age: 60
End: 2025-08-06

## 2025-08-06 ENCOUNTER — THERAPY VISIT (OUTPATIENT)
Dept: OCCUPATIONAL THERAPY | Facility: OTHER | Age: 60
End: 2025-08-06
Attending: CHIROPRACTOR
Payer: COMMERCIAL

## 2025-08-06 DIAGNOSIS — G89.29 CHRONIC RIGHT SHOULDER PAIN: Primary | ICD-10-CM

## 2025-08-06 DIAGNOSIS — S46.009A ROTATOR CUFF INJURY: Primary | ICD-10-CM

## 2025-08-06 DIAGNOSIS — M25.511 CHRONIC RIGHT SHOULDER PAIN: Primary | ICD-10-CM

## 2025-08-06 PROCEDURE — 97033 APP MDLTY 1+IONTPHRSIS EA 15: CPT | Mod: GO | Performed by: OCCUPATIONAL THERAPIST

## 2025-08-06 PROCEDURE — 97110 THERAPEUTIC EXERCISES: CPT | Mod: GO | Performed by: OCCUPATIONAL THERAPIST

## 2025-08-06 PROCEDURE — 97140 MANUAL THERAPY 1/> REGIONS: CPT | Mod: GO | Performed by: OCCUPATIONAL THERAPIST

## 2025-08-11 ENCOUNTER — HOSPITAL ENCOUNTER (OUTPATIENT)
Dept: MRI IMAGING | Facility: OTHER | Age: 60
Discharge: HOME | End: 2025-08-11
Attending: FAMILY MEDICINE | Admitting: RADIOLOGY
Payer: COMMERCIAL

## 2025-08-11 DIAGNOSIS — M25.511 CHRONIC RIGHT SHOULDER PAIN: ICD-10-CM

## 2025-08-11 DIAGNOSIS — G89.29 CHRONIC RIGHT SHOULDER PAIN: ICD-10-CM

## 2025-08-11 PROCEDURE — 73221 MRI JOINT UPR EXTREM W/O DYE: CPT | Mod: RT

## 2025-08-11 PROCEDURE — 73221 MRI JOINT UPR EXTREM W/O DYE: CPT | Mod: 26 | Performed by: RADIOLOGY

## 2025-08-13 ENCOUNTER — THERAPY VISIT (OUTPATIENT)
Dept: OCCUPATIONAL THERAPY | Facility: OTHER | Age: 60
End: 2025-08-13
Attending: CHIROPRACTOR
Payer: COMMERCIAL

## 2025-08-13 DIAGNOSIS — S46.009A ROTATOR CUFF INJURY: Primary | ICD-10-CM

## 2025-08-13 PROCEDURE — 97110 THERAPEUTIC EXERCISES: CPT | Mod: GO | Performed by: OCCUPATIONAL THERAPIST

## 2025-08-13 PROCEDURE — 97033 APP MDLTY 1+IONTPHRSIS EA 15: CPT | Mod: GO | Performed by: OCCUPATIONAL THERAPIST

## 2025-08-13 PROCEDURE — 97140 MANUAL THERAPY 1/> REGIONS: CPT | Mod: GO | Performed by: OCCUPATIONAL THERAPIST

## 2025-08-20 ENCOUNTER — THERAPY VISIT (OUTPATIENT)
Dept: OCCUPATIONAL THERAPY | Facility: OTHER | Age: 60
End: 2025-08-20
Attending: CHIROPRACTOR
Payer: COMMERCIAL

## 2025-08-20 DIAGNOSIS — S46.009A ROTATOR CUFF INJURY: Primary | ICD-10-CM

## 2025-08-20 PROCEDURE — 97140 MANUAL THERAPY 1/> REGIONS: CPT | Mod: GO | Performed by: OCCUPATIONAL THERAPIST

## 2025-08-20 PROCEDURE — 97110 THERAPEUTIC EXERCISES: CPT | Mod: GO | Performed by: OCCUPATIONAL THERAPIST

## 2025-08-20 PROCEDURE — 97010 HOT OR COLD PACKS THERAPY: CPT | Mod: GO | Performed by: OCCUPATIONAL THERAPIST

## (undated) DEVICE — TUBING SUCTION 10'X3/16" N510

## (undated) DEVICE — ENDO FORCEP ENDOJAW BIOPSY 2.8MMX230CM FB-220U

## (undated) DEVICE — SOL WATER 1500ML

## (undated) DEVICE — Device

## (undated) DEVICE — ENDO KIT COMPLIANCE DYKENDOCMPLY

## (undated) DEVICE — SYR 50ML LL W/O NDL 309653

## (undated) DEVICE — ENDO BITE BLOCK 60 MAXI LF 00712804

## (undated) DEVICE — SUCTION MANIFOLD NEPTUNE 2 SYS 4 PORT 0702-020-000

## (undated) RX ORDER — FENTANYL CITRATE 50 UG/ML
INJECTION, SOLUTION INTRAMUSCULAR; INTRAVENOUS
Status: DISPENSED
Start: 2024-04-12

## (undated) RX ORDER — TRIAMCINOLONE ACETONIDE 40 MG/ML
INJECTION, SUSPENSION INTRA-ARTICULAR; INTRAMUSCULAR
Status: DISPENSED
Start: 2022-07-20

## (undated) RX ORDER — BUPIVACAINE HYDROCHLORIDE 5 MG/ML
INJECTION, SOLUTION EPIDURAL; INTRACAUDAL
Status: DISPENSED
Start: 2024-10-08

## (undated) RX ORDER — LIDOCAINE HYDROCHLORIDE 10 MG/ML
INJECTION, SOLUTION EPIDURAL; INFILTRATION; INTRACAUDAL; PERINEURAL
Status: DISPENSED
Start: 2024-09-09

## (undated) RX ORDER — TRIAMCINOLONE ACETONIDE 40 MG/ML
INJECTION, SUSPENSION INTRA-ARTICULAR; INTRAMUSCULAR
Status: DISPENSED
Start: 2018-11-12

## (undated) RX ORDER — LIDOCAINE HYDROCHLORIDE 10 MG/ML
INJECTION, SOLUTION INFILTRATION; PERINEURAL
Status: DISPENSED
Start: 2019-11-04

## (undated) RX ORDER — METRONIDAZOLE 500 MG/1
TABLET ORAL
Status: DISPENSED
Start: 2024-04-12

## (undated) RX ORDER — KETOROLAC TROMETHAMINE 15 MG/ML
INJECTION, SOLUTION INTRAMUSCULAR; INTRAVENOUS
Status: DISPENSED
Start: 2024-04-12

## (undated) RX ORDER — BUPIVACAINE HYDROCHLORIDE 5 MG/ML
INJECTION, SOLUTION EPIDURAL; INTRACAUDAL
Status: DISPENSED
Start: 2019-11-04

## (undated) RX ORDER — ONDANSETRON 2 MG/ML
INJECTION INTRAMUSCULAR; INTRAVENOUS
Status: DISPENSED
Start: 2024-04-13

## (undated) RX ORDER — TRIAMCINOLONE ACETONIDE 40 MG/ML
INJECTION, SUSPENSION INTRA-ARTICULAR; INTRAMUSCULAR
Status: DISPENSED
Start: 2024-10-08

## (undated) RX ORDER — TRIAMCINOLONE ACETONIDE 40 MG/ML
INJECTION, SUSPENSION INTRA-ARTICULAR; INTRAMUSCULAR
Status: DISPENSED
Start: 2020-09-17

## (undated) RX ORDER — BUPIVACAINE HYDROCHLORIDE 2.5 MG/ML
INJECTION, SOLUTION EPIDURAL; INFILTRATION; INTRACAUDAL
Status: DISPENSED
Start: 2024-04-12

## (undated) RX ORDER — ETOMIDATE 2 MG/ML
INJECTION INTRAVENOUS
Status: DISPENSED
Start: 2024-04-13

## (undated) RX ORDER — TRIAMCINOLONE ACETONIDE 40 MG/ML
INJECTION, SUSPENSION INTRA-ARTICULAR; INTRAMUSCULAR
Status: DISPENSED
Start: 2019-11-04

## (undated) RX ORDER — TRIAMCINOLONE ACETONIDE 40 MG/ML
INJECTION, SUSPENSION INTRA-ARTICULAR; INTRAMUSCULAR
Status: DISPENSED
Start: 2019-02-18

## (undated) RX ORDER — CIPROFLOXACIN 500 MG/1
TABLET, FILM COATED ORAL
Status: DISPENSED
Start: 2024-04-12

## (undated) RX ORDER — GINSENG 100 MG
CAPSULE ORAL
Status: DISPENSED
Start: 2024-09-09

## (undated) RX ORDER — METHYLPREDNISOLONE ACETATE 80 MG/ML
INJECTION, SUSPENSION INTRA-ARTICULAR; INTRALESIONAL; INTRAMUSCULAR; SOFT TISSUE
Status: DISPENSED
Start: 2018-11-12

## (undated) RX ORDER — BUPIVACAINE HYDROCHLORIDE 5 MG/ML
INJECTION, SOLUTION EPIDURAL; INTRACAUDAL
Status: DISPENSED
Start: 2022-07-20

## (undated) RX ORDER — LIDOCAINE HCL/EPINEPHRINE/PF 2%-1:200K
VIAL (ML) INJECTION
Status: DISPENSED
Start: 2023-01-02

## (undated) RX ORDER — LIDOCAINE HYDROCHLORIDE 10 MG/ML
INJECTION, SOLUTION INFILTRATION; PERINEURAL
Status: DISPENSED
Start: 2024-10-08

## (undated) RX ORDER — ONDANSETRON 2 MG/ML
INJECTION INTRAMUSCULAR; INTRAVENOUS
Status: DISPENSED
Start: 2024-04-12

## (undated) RX ORDER — BUPIVACAINE HYDROCHLORIDE 5 MG/ML
INJECTION, SOLUTION EPIDURAL; INTRACAUDAL
Status: DISPENSED
Start: 2019-02-18

## (undated) RX ORDER — LIDOCAINE HYDROCHLORIDE 10 MG/ML
INJECTION, SOLUTION INFILTRATION; PERINEURAL
Status: DISPENSED
Start: 2022-07-20

## (undated) RX ORDER — LIDOCAINE HYDROCHLORIDE 10 MG/ML
INJECTION, SOLUTION INFILTRATION; PERINEURAL
Status: DISPENSED
Start: 2019-02-18

## (undated) RX ORDER — BUPIVACAINE HYDROCHLORIDE 5 MG/ML
INJECTION, SOLUTION EPIDURAL; INTRACAUDAL
Status: DISPENSED
Start: 2018-11-12

## (undated) RX ORDER — LIDOCAINE HYDROCHLORIDE 10 MG/ML
INJECTION, SOLUTION EPIDURAL; INFILTRATION; INTRACAUDAL; PERINEURAL
Status: DISPENSED
Start: 2020-09-17

## (undated) RX ORDER — LIDOCAINE HYDROCHLORIDE 10 MG/ML
INJECTION, SOLUTION INFILTRATION; PERINEURAL
Status: DISPENSED
Start: 2018-11-12

## (undated) RX ORDER — LIDOCAINE HYDROCHLORIDE 10 MG/ML
INJECTION, SOLUTION EPIDURAL; INFILTRATION; INTRACAUDAL; PERINEURAL
Status: DISPENSED
Start: 2018-11-12

## (undated) RX ORDER — LIDOCAINE HYDROCHLORIDE 10 MG/ML
INJECTION, SOLUTION INFILTRATION; PERINEURAL
Status: DISPENSED
Start: 2019-12-30